# Patient Record
Sex: MALE | Race: OTHER | NOT HISPANIC OR LATINO | URBAN - METROPOLITAN AREA
[De-identification: names, ages, dates, MRNs, and addresses within clinical notes are randomized per-mention and may not be internally consistent; named-entity substitution may affect disease eponyms.]

---

## 2017-08-08 ENCOUNTER — INPATIENT (INPATIENT)
Facility: HOSPITAL | Age: 52
LOS: 3 days | Discharge: HOME | DRG: 682 | End: 2017-08-12
Attending: HOSPITALIST | Admitting: FAMILY MEDICINE
Payer: MEDICARE

## 2017-08-08 VITALS
DIASTOLIC BLOOD PRESSURE: 116 MMHG | WEIGHT: 202.83 LBS | TEMPERATURE: 98 F | HEART RATE: 95 BPM | SYSTOLIC BLOOD PRESSURE: 222 MMHG | OXYGEN SATURATION: 100 % | HEIGHT: 69 IN | RESPIRATION RATE: 16 BRPM

## 2017-08-08 DIAGNOSIS — E87.70 FLUID OVERLOAD, UNSPECIFIED: ICD-10-CM

## 2017-08-08 LAB
ALBUMIN SERPL ELPH-MCNC: 4.6 G/DL — SIGNIFICANT CHANGE UP (ref 3.3–5.2)
ALP SERPL-CCNC: 76 U/L — SIGNIFICANT CHANGE UP (ref 40–120)
ALT FLD-CCNC: 19 U/L — SIGNIFICANT CHANGE UP
ANION GAP SERPL CALC-SCNC: 16 MMOL/L — SIGNIFICANT CHANGE UP (ref 5–17)
APTT BLD: 34.6 SEC — SIGNIFICANT CHANGE UP (ref 27.5–37.4)
AST SERPL-CCNC: 23 U/L — SIGNIFICANT CHANGE UP
BASOPHILS # BLD AUTO: 0 K/UL — SIGNIFICANT CHANGE UP (ref 0–0.2)
BASOPHILS NFR BLD AUTO: 0.4 % — SIGNIFICANT CHANGE UP (ref 0–2)
BILIRUB SERPL-MCNC: 0.4 MG/DL — SIGNIFICANT CHANGE UP (ref 0.4–2)
BUN SERPL-MCNC: 56 MG/DL — HIGH (ref 8–20)
CALCIUM SERPL-MCNC: 10.5 MG/DL — HIGH (ref 8.6–10.2)
CHLORIDE SERPL-SCNC: 97 MMOL/L — LOW (ref 98–107)
CO2 SERPL-SCNC: 27 MMOL/L — SIGNIFICANT CHANGE UP (ref 22–29)
CREAT SERPL-MCNC: 10.3 MG/DL — HIGH (ref 0.5–1.3)
EOSINOPHIL # BLD AUTO: 0.2 K/UL — SIGNIFICANT CHANGE UP (ref 0–0.5)
EOSINOPHIL NFR BLD AUTO: 3.4 % — SIGNIFICANT CHANGE UP (ref 0–5)
GLUCOSE SERPL-MCNC: 111 MG/DL — SIGNIFICANT CHANGE UP (ref 70–115)
HCT VFR BLD CALC: 31.8 % — LOW (ref 42–52)
HGB BLD-MCNC: 10.7 G/DL — LOW (ref 14–18)
INR BLD: 0.95 RATIO — SIGNIFICANT CHANGE UP (ref 0.88–1.16)
LYMPHOCYTES # BLD AUTO: 1.4 K/UL — SIGNIFICANT CHANGE UP (ref 1–4.8)
LYMPHOCYTES # BLD AUTO: 25.7 % — SIGNIFICANT CHANGE UP (ref 20–55)
MCHC RBC-ENTMCNC: 31.3 PG — HIGH (ref 27–31)
MCHC RBC-ENTMCNC: 33.6 G/DL — SIGNIFICANT CHANGE UP (ref 32–36)
MCV RBC AUTO: 93 FL — SIGNIFICANT CHANGE UP (ref 80–94)
MONOCYTES # BLD AUTO: 0.4 K/UL — SIGNIFICANT CHANGE UP (ref 0–0.8)
MONOCYTES NFR BLD AUTO: 7.1 % — SIGNIFICANT CHANGE UP (ref 3–10)
NEUTROPHILS # BLD AUTO: 3.4 K/UL — SIGNIFICANT CHANGE UP (ref 1.8–8)
NEUTROPHILS NFR BLD AUTO: 63.2 % — SIGNIFICANT CHANGE UP (ref 37–73)
NT-PROBNP SERPL-SCNC: HIGH PG/ML (ref 0–300)
PLATELET # BLD AUTO: 167 K/UL — SIGNIFICANT CHANGE UP (ref 150–400)
POTASSIUM SERPL-MCNC: 5.6 MMOL/L — HIGH (ref 3.5–5.3)
POTASSIUM SERPL-SCNC: 5.6 MMOL/L — HIGH (ref 3.5–5.3)
PROT SERPL-MCNC: 7.2 G/DL — SIGNIFICANT CHANGE UP (ref 6.6–8.7)
PROTHROM AB SERPL-ACNC: 10.4 SEC — SIGNIFICANT CHANGE UP (ref 9.8–12.7)
RBC # BLD: 3.42 M/UL — LOW (ref 4.6–6.2)
RBC # FLD: 14.7 % — SIGNIFICANT CHANGE UP (ref 11–15.6)
SODIUM SERPL-SCNC: 140 MMOL/L — SIGNIFICANT CHANGE UP (ref 135–145)
WBC # BLD: 5.3 K/UL — SIGNIFICANT CHANGE UP (ref 4.8–10.8)
WBC # FLD AUTO: 5.3 K/UL — SIGNIFICANT CHANGE UP (ref 4.8–10.8)

## 2017-08-08 PROCEDURE — 93010 ELECTROCARDIOGRAM REPORT: CPT

## 2017-08-08 PROCEDURE — 71010: CPT | Mod: 26

## 2017-08-08 RX ORDER — LABETALOL HCL 100 MG
10 TABLET ORAL ONCE
Qty: 0 | Refills: 0 | Status: COMPLETED | OUTPATIENT
Start: 2017-08-08 | End: 2017-08-08

## 2017-08-08 RX ADMIN — Medication 10 MILLIGRAM(S): at 23:36

## 2017-08-08 NOTE — ED PROVIDER NOTE - OBJECTIVE STATEMENT
50 y/o male with a hx of DM, on dialysis MWF, presents to the ED c/o SOB which onset 2 weeks ago. Pt notes that he has been getting recurrent episodes of SOB and CP which resolve after he receives his dialysis. He told his renal doctor about sx, however he did not have sx at that time. Notes that he also notes chest pressure. Pain is worse with lying down flat. Denies N/V/D, fever, chills, difficulty breathing, HA, diaphoresis, leg swelling, blurry vision or abd pain.  No further complaints at this time.

## 2017-08-08 NOTE — ED ADULT NURSE NOTE - OBJECTIVE STATEMENT
Assumed pt care @ 2200. Pt received sitting on stretcher in NAD. Pt AOx3 C/O SOB that worsens when laying flat. Pt has had this on/off x 2 weeks. Pt is a dialysis pt with AVF in left forearm positive bruit and thrill. MWF dialysis. No chest pain at this time. Pt reports renal doctor aware of s/s and told to go to ED if it gets worst. Neuro WNL. Lungs CTA, RR even unlabored. Abd soft non tender, + bowel sounds x 4quads. Denies Nausea, Vomiting, Diarrhea. Skin warm, dry, color appropriate for age and race.

## 2017-08-09 DIAGNOSIS — N19 UNSPECIFIED KIDNEY FAILURE: ICD-10-CM

## 2017-08-09 DIAGNOSIS — I10 ESSENTIAL (PRIMARY) HYPERTENSION: ICD-10-CM

## 2017-08-09 DIAGNOSIS — J96.91 RESPIRATORY FAILURE, UNSPECIFIED WITH HYPOXIA: ICD-10-CM

## 2017-08-09 DIAGNOSIS — N18.6 END STAGE RENAL DISEASE: ICD-10-CM

## 2017-08-09 DIAGNOSIS — E11.9 TYPE 2 DIABETES MELLITUS WITHOUT COMPLICATIONS: ICD-10-CM

## 2017-08-09 DIAGNOSIS — I16.0 HYPERTENSIVE URGENCY: ICD-10-CM

## 2017-08-09 LAB — TROPONIN T SERPL-MCNC: 0.1 NG/ML — HIGH (ref 0–0.06)

## 2017-08-09 PROCEDURE — 99291 CRITICAL CARE FIRST HOUR: CPT

## 2017-08-09 PROCEDURE — 99223 1ST HOSP IP/OBS HIGH 75: CPT

## 2017-08-09 PROCEDURE — 99285 EMERGENCY DEPT VISIT HI MDM: CPT

## 2017-08-09 RX ORDER — HEPARIN SODIUM 5000 [USP'U]/ML
5000 INJECTION INTRAVENOUS; SUBCUTANEOUS EVERY 12 HOURS
Qty: 0 | Refills: 0 | Status: DISCONTINUED | OUTPATIENT
Start: 2017-08-09 | End: 2017-08-12

## 2017-08-09 RX ORDER — GLUCAGON INJECTION, SOLUTION 0.5 MG/.1ML
1 INJECTION, SOLUTION SUBCUTANEOUS ONCE
Qty: 0 | Refills: 0 | Status: DISCONTINUED | OUTPATIENT
Start: 2017-08-09 | End: 2017-08-12

## 2017-08-09 RX ORDER — FOLIC ACID 0.8 MG
1 TABLET ORAL DAILY
Qty: 0 | Refills: 0 | Status: DISCONTINUED | OUTPATIENT
Start: 2017-08-09 | End: 2017-08-12

## 2017-08-09 RX ORDER — NITROGLYCERIN 6.5 MG
5 CAPSULE, EXTENDED RELEASE ORAL
Qty: 50 | Refills: 0 | Status: DISCONTINUED | OUTPATIENT
Start: 2017-08-09 | End: 2017-08-10

## 2017-08-09 RX ORDER — LABETALOL HCL 100 MG
10 TABLET ORAL ONCE
Qty: 0 | Refills: 0 | Status: COMPLETED | OUTPATIENT
Start: 2017-08-09 | End: 2017-08-09

## 2017-08-09 RX ORDER — ACETAMINOPHEN 500 MG
650 TABLET ORAL EVERY 6 HOURS
Qty: 0 | Refills: 0 | Status: DISCONTINUED | OUTPATIENT
Start: 2017-08-09 | End: 2017-08-12

## 2017-08-09 RX ORDER — SODIUM CHLORIDE 9 MG/ML
1000 INJECTION, SOLUTION INTRAVENOUS
Qty: 0 | Refills: 0 | Status: DISCONTINUED | OUTPATIENT
Start: 2017-08-09 | End: 2017-08-12

## 2017-08-09 RX ORDER — TAMSULOSIN HYDROCHLORIDE 0.4 MG/1
0.4 CAPSULE ORAL AT BEDTIME
Qty: 0 | Refills: 0 | Status: DISCONTINUED | OUTPATIENT
Start: 2017-08-09 | End: 2017-08-12

## 2017-08-09 RX ORDER — INSULIN GLARGINE 100 [IU]/ML
40 INJECTION, SOLUTION SUBCUTANEOUS AT BEDTIME
Qty: 0 | Refills: 0 | Status: DISCONTINUED | OUTPATIENT
Start: 2017-08-09 | End: 2017-08-12

## 2017-08-09 RX ORDER — NITROGLYCERIN 6.5 MG
5 CAPSULE, EXTENDED RELEASE ORAL
Qty: 50 | Refills: 0 | Status: DISCONTINUED | OUTPATIENT
Start: 2017-08-09 | End: 2017-08-09

## 2017-08-09 RX ORDER — DEXTROSE 50 % IN WATER 50 %
12.5 SYRINGE (ML) INTRAVENOUS ONCE
Qty: 0 | Refills: 0 | Status: DISCONTINUED | OUTPATIENT
Start: 2017-08-09 | End: 2017-08-12

## 2017-08-09 RX ORDER — NITROGLYCERIN 6.5 MG
1 CAPSULE, EXTENDED RELEASE ORAL ONCE
Qty: 0 | Refills: 0 | Status: COMPLETED | OUTPATIENT
Start: 2017-08-09 | End: 2017-08-09

## 2017-08-09 RX ORDER — DEXTROSE 50 % IN WATER 50 %
1 SYRINGE (ML) INTRAVENOUS ONCE
Qty: 0 | Refills: 0 | Status: DISCONTINUED | OUTPATIENT
Start: 2017-08-09 | End: 2017-08-12

## 2017-08-09 RX ORDER — HYDRALAZINE HCL 50 MG
10 TABLET ORAL ONCE
Qty: 0 | Refills: 0 | Status: COMPLETED | OUTPATIENT
Start: 2017-08-09 | End: 2017-08-09

## 2017-08-09 RX ORDER — VALSARTAN 80 MG/1
80 TABLET ORAL
Qty: 0 | Refills: 0 | Status: DISCONTINUED | OUTPATIENT
Start: 2017-08-09 | End: 2017-08-10

## 2017-08-09 RX ORDER — PYRIDOXINE HCL (VITAMIN B6) 100 MG
50 TABLET ORAL DAILY
Qty: 0 | Refills: 0 | Status: DISCONTINUED | OUTPATIENT
Start: 2017-08-09 | End: 2017-08-12

## 2017-08-09 RX ORDER — VALSARTAN 80 MG/1
160 TABLET ORAL DAILY
Qty: 0 | Refills: 0 | Status: DISCONTINUED | OUTPATIENT
Start: 2017-08-09 | End: 2017-08-09

## 2017-08-09 RX ORDER — DILTIAZEM HCL 120 MG
120 CAPSULE, EXT RELEASE 24 HR ORAL DAILY
Qty: 0 | Refills: 0 | Status: DISCONTINUED | OUTPATIENT
Start: 2017-08-09 | End: 2017-08-12

## 2017-08-09 RX ORDER — INSULIN LISPRO 100/ML
VIAL (ML) SUBCUTANEOUS EVERY 6 HOURS
Qty: 0 | Refills: 0 | Status: DISCONTINUED | OUTPATIENT
Start: 2017-08-09 | End: 2017-08-10

## 2017-08-09 RX ORDER — DEXTROSE 50 % IN WATER 50 %
25 SYRINGE (ML) INTRAVENOUS ONCE
Qty: 0 | Refills: 0 | Status: DISCONTINUED | OUTPATIENT
Start: 2017-08-09 | End: 2017-08-12

## 2017-08-09 RX ORDER — CALCIUM ACETATE 667 MG
667 TABLET ORAL
Qty: 0 | Refills: 0 | Status: DISCONTINUED | OUTPATIENT
Start: 2017-08-09 | End: 2017-08-12

## 2017-08-09 RX ADMIN — Medication 1 INCH(S): at 06:30

## 2017-08-09 RX ADMIN — Medication 1.5 MICROGRAM(S)/MIN: at 07:18

## 2017-08-09 RX ADMIN — Medication 10 MILLIGRAM(S): at 03:10

## 2017-08-09 RX ADMIN — INSULIN GLARGINE 40 UNIT(S): 100 INJECTION, SOLUTION SUBCUTANEOUS at 21:37

## 2017-08-09 RX ADMIN — Medication 1 MILLIGRAM(S): at 14:07

## 2017-08-09 RX ADMIN — HEPARIN SODIUM 5000 UNIT(S): 5000 INJECTION INTRAVENOUS; SUBCUTANEOUS at 17:38

## 2017-08-09 RX ADMIN — Medication 10 MILLIGRAM(S): at 05:15

## 2017-08-09 RX ADMIN — Medication 120 MILLIGRAM(S): at 14:25

## 2017-08-09 RX ADMIN — VALSARTAN 80 MILLIGRAM(S): 80 TABLET ORAL at 14:25

## 2017-08-09 RX ADMIN — Medication 1 INCH(S): at 07:18

## 2017-08-09 RX ADMIN — Medication 2: at 23:54

## 2017-08-09 NOTE — ED ADULT NURSE REASSESSMENT NOTE - NS ED NURSE REASSESS COMMENT FT1
Pt is A&Ox3 in NAD c/o heaviness to chest, NSR on cardiac monitor.  IV clean dry and intact, flushes  without difficulty. Safety maintained, Bed locked in lowest position. Call bell in reach. Pt awaiting bed placement. Will continue to monitor.

## 2017-08-09 NOTE — CONSULT NOTE ADULT - SUBJECTIVE AND OBJECTIVE BOX
HPI:  50 yo male with ESRD on HD MWF from [facility] HD [place], also has h/o htn, DM on insulin, p/w uncontrolled htn/sob/weakness. Pt notes his BP has been elevated for the past 2 weeks and has been forgetting to take his morning 5am blood pressure medications land also admits to being non complaint with his renal diet. Pt notes on Friday at dialysis he had been progressively sob which improved with oxygen and dialysis. Pt notes that since Friday the sob has worsened and that his bp is still elevated. 2 days ago pt noted chest heaviness with his sob. Notes heaviness is constant. denies HA N/V/D. Pt denies missing any HD sessions.  In ED, bp elevated 220/100, no improvement with nitro paste, nitro drip started at 5mcg/min.     OS: per HPI    PAST MEDICAL & SURGICAL HISTORY:  Hypertension  Diabetes  Dialysis patient  Renal failure  No significant past surgical history      FAMILY HISTORY:  No pertinent family history in first degree relatives  [place]    Social History:Non smoker    MEDICATIONS  (STANDING):  nitroglycerin  Infusion 5 MICROgram(s)/Min (1.5 mL/Hr) IV Continuous <Continuous>  tamsulosin 0.4 milliGRAM(s) Oral at bedtime  valsartan 160 milliGRAM(s) Oral daily  diltiazem    milliGRAM(s) Oral daily  insulin glargine Injectable (LANTUS) 40 Unit(s) SubCutaneous at bedtime  folic acid 1 milliGRAM(s) Oral daily  insulin lispro (HumaLOG) corrective regimen sliding scale   SubCutaneous every 6 hours  dextrose 5%. 1000 milliLiter(s) (50 mL/Hr) IV Continuous <Continuous>  dextrose 50% Injectable 12.5 Gram(s) IV Push once  dextrose 50% Injectable 25 Gram(s) IV Push once  dextrose 50% Injectable 25 Gram(s) IV Push once  heparin  Injectable 5000 Unit(s) SubCutaneous every 12 hours    MEDICATIONS  (PRN):  dextrose Gel 1 Dose(s) Oral once PRN Blood Glucose LESS THAN 70 milliGRAM(s)/deciLiter  glucagon  Injectable 1 milliGRAM(s) IntraMuscular once PRN Glucose <70 milliGRAM(s)/deciLiter   Meds reviewed    Vital Signs Last 24 Hrs  T(C): 36.7 (09 Aug 2017 02:54), Max: 36.9 (08 Aug 2017 21:26)  T(F): 98.1 (09 Aug 2017 02:54), Max: 98.5 (08 Aug 2017 21:26)  HR: 80 (09 Aug 2017 09:45) (79 - 95)  BP: 191/99 (09 Aug 2017 09:45) (177/88 - 226/107)  BP(mean): 138 (09 Aug 2017 09:45) (138 - 154)  RR: 12 (09 Aug 2017 09:45) (12 - 36)  SpO2: 100% (09 Aug 2017 09:45) (96% - 100%)  Daily Height in cm: 175.26 (08 Aug 2017 21:26)    Daily     PHYSICAL EXAM:    GENERAL: appears chronically ill  HEAD:  Atraumatic, Normocephalic  EYES: EOMI  NECK: Supple, neck  veins full  NERVOUS SYSTEM:  Alert & Oriented X3  CHEST/LUNG: Rales at bases B/L   HEART: Regular rate and rhythm; No murmurs  ABDOMEN: Soft, Nontender, Nondistended; Bowel sounds present  EXTREMITIES: + LE edema B/L, L UE AVF + bruit/thrill        LABS:                        10.7   5.3   )-----------( 167      ( 08 Aug 2017 22:47 )             31.8     08-08    140  |  97<L>  |  56.0<H>  ----------------------------<  111  5.6<H>   |  27.0  |  10.30<H>    Ca    10.5<H>      08 Aug 2017 22:47    TPro  7.2  /  Alb  4.6  /  TBili  0.4  /  DBili  x   /  AST  23  /  ALT  19  /  AlkPhos  76  08-08    PT/INR - ( 08 Aug 2017 22:47 )   PT: 10.4 sec;   INR: 0.95 ratio         PTT - ( 08 Aug 2017 22:47 )  PTT:34.6 sec            RADIOLOGY & ADDITIONAL TESTS:

## 2017-08-09 NOTE — ED ADULT NURSE REASSESSMENT NOTE - NS ED NURSE REASSESS COMMENT FT1
ICU PA at the bedside pt b/p remains high nitro drip increased to 20mcg via pump no redness or swelling present at site. pt aware he will be transported to ICU. BiPAP remains in use resp still very labored

## 2017-08-09 NOTE — CONSULT NOTE ADULT - ATTENDING COMMENTS
Pt seen and examined with CCM PA, agree with above assessment and plan, edits made where necessary. Pt admitted to ICU for urgent HD, respiratory support via NIV and BP control.

## 2017-08-09 NOTE — H&P ADULT - NSHPREVIEWOFSYSTEMS_GEN_ALL_CORE
+sob  +chest heavyness  +lethargy  +temp 100  no nausea  no vomiting  no abd pain  no lower ext edema  no loc +sob  +chest heaviness  +lethargy  +headaches  +temp 100  no nausea  no vomiting  no abd pain  no lower ext edema  no loc

## 2017-08-09 NOTE — ED ADULT NURSE REASSESSMENT NOTE - NS ED NURSE REASSESS COMMENT FT1
pt. received from night RN. pt. awake, alert. pt. on bipap. sating well on 100%.  pt. on nitro drip at 5mcg. pt. bp. 211/106 MD. Armando made aware. pt. received from night RN. pt. awake, alert. pt. on bipap. sating well on 100%.  pt. on nitro drip at 5mcg. pt. bp. 211/106 MD. Armando made aware. increased drip to 10mcg/min

## 2017-08-09 NOTE — ED ADULT NURSE REASSESSMENT NOTE - NS ED NURSE REASSESS COMMENT FT1
Dr. Bradley made aware of pts BP and current status. Pt unable to lay down needs to sit straight up to breath. Will continue to monitor.

## 2017-08-09 NOTE — H&P ADULT - NSHPLABSRESULTS_GEN_ALL_CORE
LABS:    PT/INR - ( 08 Aug 2017 22:47 )   PT: 10.4 sec;   INR: 0.95 ratio         PTT - ( 08 Aug 2017 22:47 )  PTT:34.6 sec  LIVER FUNCTIONS - ( 08 Aug 2017 22:47 )  Alb: 4.6 g/dL / Pro: 7.2 g/dL / ALK PHOS: 76 U/L / ALT: 19 U/L / AST: 23 U/L / GGT: x                                 10.7   5.3   )-----------( 167      ( 08 Aug 2017 22:47 )             31.8     08-08    140  |  97<L>  |  56.0<H>  ----------------------------<  111  5.6<H>   |  27.0  |  10.30<H>    CXR PENDING.   Ca    10.5<H>      08 Aug 2017 22:47    TPro  7.2  /  Alb  4.6  /  TBili  0.4  /  DBili  x   /  AST  23  /  ALT  19  /  AlkPhos  76  08-08    CARDIAC MARKERS ( 09 Aug 2017 03:14 )  x     / 0.10 ng/mL / x     / x     / x      CARDIAC MARKERS ( 08 Aug 2017 22:47 )  x     / 0.12 ng/mL / x     / x     / x LABS:    PT/INR - ( 08 Aug 2017 22:47 )   PT: 10.4 sec;   INR: 0.95 ratio         PTT - ( 08 Aug 2017 22:47 )  PTT:34.6 sec  LIVER FUNCTIONS - ( 08 Aug 2017 22:47 )  Alb: 4.6 g/dL / Pro: 7.2 g/dL / ALK PHOS: 76 U/L / ALT: 19 U/L / AST: 23 U/L / GGT: x                                 10.7   5.3   )-----------( 167      ( 08 Aug 2017 22:47 )             31.8     08-08    140  |  97<L>  |  56.0<H>  ----------------------------<  111  5.6<H>   |  27.0  |  10.30<H>    Ca    10.5<H>      08 Aug 2017 22:47    TPro  7.2  /  Alb  4.6  /  TBili  0.4  /  DBili  x   /  AST  23  /  ALT  19  /  AlkPhos  76  08-08    CARDIAC MARKERS ( 09 Aug 2017 03:14 )  x     / 0.10 ng/mL / x     / x     / x      CARDIAC MARKERS ( 08 Aug 2017 22:47 )  x     / 0.12 ng/mL / x     / x     / x    CXR PENDING.

## 2017-08-09 NOTE — CONSULT NOTE ADULT - ASSESSMENT
ESRD on HD MWF will HD today and angel clinically volume overloaded   not compliant with fluid restriction, HD consent obtained by me   Hypertensive emergency likely 2/2 volume overload and noncompliance with BP meds  as above HD today and angel renmove additional UF, will also adjust BP meds   HyperKalemia will HD on 2 K bath

## 2017-08-09 NOTE — CONSULT NOTE ADULT - PROBLEM SELECTOR RECOMMENDATION 2
Related to fluid overload. Improving. Continue BiPAP therapy, keep SaO2 > 92%. Will wean as tolerated.

## 2017-08-09 NOTE — ED ADULT NURSE REASSESSMENT NOTE - NS ED NURSE REASSESS COMMENT FT1
VS repeated @ this time. Dr. Bradley aware of the pt's BP at this time. Pt denies chest pain, but points to upper abd for some pain and heaviness on the chest and pt is a little tachypneic at this time.

## 2017-08-09 NOTE — H&P ADULT - HISTORY OF PRESENT ILLNESS
50 yo male with esrd on HD MWF, htn, DM, presenting with uncontrolled htn. Pt notes his BP has been elevated for 2 weeks. Pt notes on friday he sob, progressively worsening improves after HD. Also has chest heavyness 52 yo male with esrd on HD MWF, htn, DM on insulin, presenting with uncontrolled htn/sob/weakness. Pt notes his BP has been elevated for the past 2 weeks and has been forgetting to take his morning 5am blood pressure medications land also admits to being non complaint with his renal diet. Pt notes on Friday at dialysis he had been progressively sob which improved with oxygen and dialysis. Pt notes that since Friday the sob has worsened and that his bp is still elevated. 2 days ago pt noted chest heaviness with his sob. Notes heaviness is constant.  Pt denies missing any HD sessions.      In ED, bp elevated 220/100, no improvement with nitro paste, nitro drip started at 5mcg/min. Still c.o chest heaviness. Pt respiratory rate at 34, cxr final report pending, started on bipap. Notes some relief.

## 2017-08-09 NOTE — CHART NOTE - NSCHARTNOTEFT_GEN_A_CORE
Patient seen and examined in ED, chart reviewed. On BiPAP, reports shortness of breath and chest heaviness. He was started on Nitro gtt for BP control.  On exam,  alert, mod resp distress  Lungs: decreased breath sounds in bases  CVS: regular  Abdomen: soft, non tender  Ext: No edema, tenderness  Labs and imaging reviewed.  Imp:  Acute respiratory failure  Hypertensive emergency  ESRD on HD  Plan:   Patient accepted by ICU, continue medications for BP control.  Discussed with nephrology for urgent HD  Spoke with patient and family at bedside.

## 2017-08-09 NOTE — H&P ADULT - NSHPPHYSICALEXAM_GEN_ALL_CORE
EXAM:  Vital Signs Last 24 Hrs  T(C): 36.7 (09 Aug 2017 02:54), Max: 36.9 (08 Aug 2017 21:26)  T(F): 98.1 (09 Aug 2017 02:54), Max: 98.5 (08 Aug 2017 21:26)  HR: 83 (09 Aug 2017 07:00) (80 - 95)  BP: 190/99 (09 Aug 2017 07:00) (177/88 - 222/116)  BP(mean): --  RR: 36 (09 Aug 2017 07:00) (16 - 36)  SpO2: 97% (09 Aug 2017 07:00) (96% - 100%)    GENERAL NAD,   HEENT: NORMAL CEPHALIC ATRAUMATIC, EOMI, MOIST MUCUS MEBRANES  NECK SUPPLE  CVS S1S2, RRR,   RESP BLCTA, NO RHONCHI  ABD SOFT, NON TENDER, NON DISTENDED  EXT NO EDEMA  NEURO MOVES ALL 4 EXTREMITES  PSYCH APPROPRIATE MOOD  SKIN WARM, DRY EXAM:  Vital Signs Last 24 Hrs  T(C): 36.7 (09 Aug 2017 02:54), Max: 36.9 (08 Aug 2017 21:26)  T(F): 98.1 (09 Aug 2017 02:54), Max: 98.5 (08 Aug 2017 21:26)  HR: 83 (09 Aug 2017 07:00) (80 - 95)  BP: 190/99 (09 Aug 2017 07:00) (177/88 - 222/116)  BP(mean): --  RR: 36 (09 Aug 2017 07:00) (16 - 36)  SpO2: 97% (09 Aug 2017 07:00) (96% - 100%)    GENERAL TACHYPNEIC. ON BIPAP. C.O CHEST HEAVYNESS. WIFE AT BEDSIDE.   HEENT: NORMAL CEPHALIC ATRAUMATIC, EOMI, MOIST MUCUS MEMBRANES  NECK SUPPLE  CVS S1S2, TACYCARDIC  RESP INCREASED WORK OF BREATHING.   ABD SOFT, NON TENDER, NON DISTENDED  EXT TRACE EDEMA. LUE FISTULA.   NEURO MOVES ALL 4 EXTREMITES  PSYCH APPROPRIATE MOOD  SKIN WARM, DRY

## 2017-08-09 NOTE — CONSULT NOTE ADULT - ASSESSMENT
50 y/o M with a h/o ESRD (on HD MWF), HTN, DM with hypertensive urgency, respiratory distress requiring BiPAP, needs urgent HD.    Admit to MICU for BP control and urgent HD.

## 2017-08-09 NOTE — CONSULT NOTE ADULT - SUBJECTIVE AND OBJECTIVE BOX
Patient is a 51y old  Male who presents with a chief complaint of sob, weakness, elevated bp (09 Aug 2017 07:10)      BRIEF HOSPITAL COURSE: 52 y/o M with a h/o ESRD (on HD MWF), HTN, DM presents to ED with hypertensive urgency (BP:220/110), respiratory distress, chest heaviness and HA. As per report, patient has had elevated BP over past 2 weeks and has been periodically forgetting to take BP medications and has been noncompliant with renal diet. Patient seen and examined in ED. Awake and alert, conversant, on BiPAP. Reports that his breathing has improved. BP remains uncontrolled (207/106) despite 10mg IV labetalol x2, 10mg IV hydralazine, nitro paste, and nitro gtt @ 10mcg/min. Patient reports that he did not go to his dialysis appointment this morning and did not take any of his BP control medications either. CXR not officially read but appears to show some congestion, b/l rhonchi, rales auscultated. K of 5.6. Nephrology contacted for urgent HD.      PAST MEDICAL & SURGICAL HISTORY:  Hypertension  Diabetes  Dialysis patient  Renal failure  No significant past surgical history    Allergies    No Known Allergies    Intolerances      FAMILY HISTORY:  No pertinent family history in first degree relatives      Review of Systems:  CONSTITUTIONAL: No fever, chills, or fatigue  EYES: No eye pain, visual disturbances, or discharge  ENMT:  No difficulty hearing, tinnitus, vertigo; No sinus or throat pain  NECK: No pain or stiffness  RESPIRATORY: No cough, wheezing, chills or hemoptysis; (+) shortness of breath  CARDIOVASCULAR: (+)chest heaviness, no palpitations, dizziness, or leg swelling  GASTROINTESTINAL: No abdominal or epigastric pain. No nausea, vomiting, or hematemesis; No diarrhea or constipation. No melena or hematochezia.  GENITOURINARY: No dysuria, frequency, hematuria, or incontinence  NEUROLOGICAL: (+)headaches, no memory loss, loss of strength, numbness, or tremors  SKIN: No itching, burning, rashes, or lesions   MUSCULOSKELETAL: No joint pain or swelling; No muscle, back, or extremity pain  PSYCHIATRIC: No depression, anxiety, mood swings, or difficulty sleeping      Medications:    nitroglycerin  Infusion 5 MICROgram(s)/Min IV Continuous <Continuous>  tamsulosin 0.4 milliGRAM(s) Oral at bedtime  valsartan 160 milliGRAM(s) Oral daily  diltiazem    milliGRAM(s) Oral daily  insulin glargine Injectable (LANTUS) 40 Unit(s) SubCutaneous at bedtime  folic acid 1 milliGRAM(s) Oral daily        ICU Vital Signs Last 24 Hrs  T(C): 36.7 (09 Aug 2017 02:54), Max: 36.9 (08 Aug 2017 21:26)  T(F): 98.1 (09 Aug 2017 02:54), Max: 98.5 (08 Aug 2017 21:26)  HR: 82 (09 Aug 2017 08:45) (79 - 95)  BP: 187/91 (09 Aug 2017 08:45) (177/88 - 226/107)  BP(mean): --  ABP: --  ABP(mean): --  RR: 34 (09 Aug 2017 08:45) (16 - 36)  SpO2: 100% (09 Aug 2017 08:45) (96% - 100%)    Vital Signs Last 24 Hrs  T(C): 36.7 (09 Aug 2017 02:54), Max: 36.9 (08 Aug 2017 21:26)  T(F): 98.1 (09 Aug 2017 02:54), Max: 98.5 (08 Aug 2017 21:26)  HR: 82 (09 Aug 2017 08:45) (79 - 95)  BP: 187/91 (09 Aug 2017 08:45) (177/88 - 226/107)  BP(mean): --  RR: 34 (09 Aug 2017 08:45) (16 - 36)  SpO2: 100% (09 Aug 2017 08:45) (96% - 100%)        I&O's Detail        LABS:                        10.7   5.3   )-----------( 167      ( 08 Aug 2017 22:47 )             31.8     08-08    140  |  97<L>  |  56.0<H>  ----------------------------<  111  5.6<H>   |  27.0  |  10.30<H>    Ca    10.5<H>      08 Aug 2017 22:47    TPro  7.2  /  Alb  4.6  /  TBili  0.4  /  DBili  x   /  AST  23  /  ALT  19  /  AlkPhos  76  08-08      CARDIAC MARKERS ( 09 Aug 2017 03:14 )  x     / 0.10 ng/mL / x     / x     / x      CARDIAC MARKERS ( 08 Aug 2017 22:47 )  x     / 0.12 ng/mL / x     / x     / x          CAPILLARY BLOOD GLUCOSE        PT/INR - ( 08 Aug 2017 22:47 )   PT: 10.4 sec;   INR: 0.95 ratio         PTT - ( 08 Aug 2017 22:47 )  PTT:34.6 sec    CULTURES:      Physical Examination:    General: No acute distress.  Alert, oriented, interactive, nonfocal, on BiPAP    HEENT: Pupils equal, reactive to light.  Symmetric.    PULM: b/l scattered rhonchi, b/l basilar rales, no significant sputum production    CVS: Regular rate and rhythm, no murmurs, rubs, or gallops    ABD: Soft, nondistended, nontender, normoactive bowel sounds, no masses    EXT: No edema, nontender    SKIN: Warm and well perfused, no rashes noted.    RADIOLOGY: CXR- no official read, appears to show some b/l congestion    CRITICAL CARE TIME SPENT: 63 mins

## 2017-08-09 NOTE — H&P ADULT - ASSESSMENT
- CHEST HEAVINESS:   - HYPERTENSIVE URGENCY WITH FLUID OVERLOAD: NO IMPROVEMENT WITH NITRO PASTE. NITRO DRIP STARTED.   -SOB: ? PULMONARY CONGESTION. STARTED ON BIPAP. ICU PA CALLED. AWAITING CALL BACK.   -ESRD ON HD MWF: FOR HD TODAY. NEPHRO DR JIM SERVICE CALLED. AWAITING CALL BACK.   -DM: ON LANTUS 40UNITS QHS ON HD DAYS. 50UNITS QPM ON NON HD DAYS.   -DVT PPX - CHEST HEAVINESS:  EKG NSR, TROPONIN X 2 NEGATIVE.     - HYPERTENSIVE URGENCY WITH FLUID OVERLOAD:   NO IMPROVEMENT WITH NITRO PASTE. NITRO DRIP STARTED AT 5MCG, TITRATED TO 10MCG AS NO IMPROVEMENT. ICU CONSULTED FOR EVALUATION.    -SOB: ? PULMONARY CONGESTION:  STARTED ON BIPAP. ICU PA CALLED. AWAITING CALL BACK.     -ESRD ON HD MWF:   FOR HD TODAY. NEPHRO DR JIM SERVICE CALLED. AWAITING CALL BACK.     -DM:   ON LANTUS 40UNITS QHS ON HD DAYS. 50UNITS QPM ON NON HD DAYS.     -HTN:   CW REMA AND CARDIZEM.    -DVT PPX :  SCDS.     UPDATE: PT ACCEPTED TO ICU DR AGUILAR SERVICE.

## 2017-08-09 NOTE — CONSULT NOTE ADULT - PROBLEM SELECTOR RECOMMENDATION 9
Related to fluid overload and medication noncompliance. Will urgently dialyze, home BP medications ordered. Continue nitro gtt, titrated up to 50 mcg/min- wean as tolerated. Monitor hemodynamics closely.

## 2017-08-10 DIAGNOSIS — I67.4 HYPERTENSIVE ENCEPHALOPATHY: ICD-10-CM

## 2017-08-10 LAB
ANION GAP SERPL CALC-SCNC: 17 MMOL/L — SIGNIFICANT CHANGE UP (ref 5–17)
BUN SERPL-MCNC: 47 MG/DL — HIGH (ref 8–20)
CALCIUM SERPL-MCNC: 10.3 MG/DL — HIGH (ref 8.6–10.2)
CHLORIDE SERPL-SCNC: 93 MMOL/L — LOW (ref 98–107)
CO2 SERPL-SCNC: 28 MMOL/L — SIGNIFICANT CHANGE UP (ref 22–29)
CREAT SERPL-MCNC: 8.56 MG/DL — HIGH (ref 0.5–1.3)
GLUCOSE SERPL-MCNC: 159 MG/DL — HIGH (ref 70–115)
HBA1C BLD-MCNC: 6.5 % — HIGH (ref 4–5.6)
MAGNESIUM SERPL-MCNC: 2.5 MG/DL — SIGNIFICANT CHANGE UP (ref 1.8–2.6)
PHOSPHATE SERPL-MCNC: 4.5 MG/DL — SIGNIFICANT CHANGE UP (ref 2.4–4.7)
POTASSIUM SERPL-MCNC: 4.8 MMOL/L — SIGNIFICANT CHANGE UP (ref 3.5–5.3)
POTASSIUM SERPL-SCNC: 4.8 MMOL/L — SIGNIFICANT CHANGE UP (ref 3.5–5.3)
SODIUM SERPL-SCNC: 138 MMOL/L — SIGNIFICANT CHANGE UP (ref 135–145)

## 2017-08-10 PROCEDURE — 99233 SBSQ HOSP IP/OBS HIGH 50: CPT

## 2017-08-10 PROCEDURE — 99291 CRITICAL CARE FIRST HOUR: CPT

## 2017-08-10 RX ORDER — VALSARTAN 80 MG/1
160 TABLET ORAL
Qty: 0 | Refills: 0 | Status: DISCONTINUED | OUTPATIENT
Start: 2017-08-10 | End: 2017-08-12

## 2017-08-10 RX ORDER — HYDRALAZINE HCL 50 MG
10 TABLET ORAL EVERY 4 HOURS
Qty: 0 | Refills: 0 | Status: DISCONTINUED | OUTPATIENT
Start: 2017-08-10 | End: 2017-08-12

## 2017-08-10 RX ORDER — DILTIAZEM HCL 120 MG
120 CAPSULE, EXT RELEASE 24 HR ORAL ONCE
Qty: 0 | Refills: 0 | Status: COMPLETED | OUTPATIENT
Start: 2017-08-10 | End: 2017-08-10

## 2017-08-10 RX ORDER — INSULIN LISPRO 100/ML
VIAL (ML) SUBCUTANEOUS
Qty: 0 | Refills: 0 | Status: DISCONTINUED | OUTPATIENT
Start: 2017-08-10 | End: 2017-08-12

## 2017-08-10 RX ORDER — ONDANSETRON 8 MG/1
4 TABLET, FILM COATED ORAL EVERY 6 HOURS
Qty: 0 | Refills: 0 | Status: DISCONTINUED | OUTPATIENT
Start: 2017-08-10 | End: 2017-08-12

## 2017-08-10 RX ORDER — VALSARTAN 80 MG/1
80 TABLET ORAL ONCE
Qty: 0 | Refills: 0 | Status: COMPLETED | OUTPATIENT
Start: 2017-08-10 | End: 2017-08-10

## 2017-08-10 RX ORDER — HYDRALAZINE HCL 50 MG
20 TABLET ORAL ONCE
Qty: 0 | Refills: 0 | Status: COMPLETED | OUTPATIENT
Start: 2017-08-10 | End: 2017-08-10

## 2017-08-10 RX ADMIN — VALSARTAN 80 MILLIGRAM(S): 80 TABLET ORAL at 06:22

## 2017-08-10 RX ADMIN — Medication 120 MILLIGRAM(S): at 06:22

## 2017-08-10 RX ADMIN — Medication 2: at 11:45

## 2017-08-10 RX ADMIN — VALSARTAN 80 MILLIGRAM(S): 80 TABLET ORAL at 08:52

## 2017-08-10 RX ADMIN — Medication 1 MILLIGRAM(S): at 11:48

## 2017-08-10 RX ADMIN — Medication 2: at 21:41

## 2017-08-10 RX ADMIN — INSULIN GLARGINE 40 UNIT(S): 100 INJECTION, SOLUTION SUBCUTANEOUS at 23:20

## 2017-08-10 RX ADMIN — TAMSULOSIN HYDROCHLORIDE 0.4 MILLIGRAM(S): 0.4 CAPSULE ORAL at 21:40

## 2017-08-10 RX ADMIN — Medication 1 TABLET(S): at 11:48

## 2017-08-10 RX ADMIN — Medication 20 MILLIGRAM(S): at 11:14

## 2017-08-10 RX ADMIN — Medication 667 MILLIGRAM(S): at 20:10

## 2017-08-10 RX ADMIN — Medication 1.5 MICROGRAM(S)/MIN: at 03:09

## 2017-08-10 RX ADMIN — Medication 50 MILLIGRAM(S): at 11:48

## 2017-08-10 RX ADMIN — Medication 120 MILLIGRAM(S): at 08:52

## 2017-08-10 RX ADMIN — Medication 667 MILLIGRAM(S): at 11:48

## 2017-08-10 RX ADMIN — VALSARTAN 160 MILLIGRAM(S): 80 TABLET ORAL at 20:10

## 2017-08-10 RX ADMIN — Medication 667 MILLIGRAM(S): at 08:52

## 2017-08-10 RX ADMIN — HEPARIN SODIUM 5000 UNIT(S): 5000 INJECTION INTRAVENOUS; SUBCUTANEOUS at 06:22

## 2017-08-10 NOTE — PROGRESS NOTE ADULT - SUBJECTIVE AND OBJECTIVE BOX
NEPHROLOGY INTERVAL HPI/OVERNIGHT EVENTS:    Examined earlier  Remains volume overloaded  will HD again today    MEDICATIONS  (STANDING):  tamsulosin 0.4 milliGRAM(s) Oral at bedtime  insulin glargine Injectable (LANTUS) 40 Unit(s) SubCutaneous at bedtime  folic acid 1 milliGRAM(s) Oral daily  dextrose 5%. 1000 milliLiter(s) (50 mL/Hr) IV Continuous <Continuous>  dextrose 50% Injectable 12.5 Gram(s) IV Push once  dextrose 50% Injectable 25 Gram(s) IV Push once  dextrose 50% Injectable 25 Gram(s) IV Push once  heparin  Injectable 5000 Unit(s) SubCutaneous every 12 hours  diltiazem    milliGRAM(s) Oral daily  calcium acetate 667 milliGRAM(s) Oral three times a day with meals  Nephro-amy 1 Tablet(s) Oral daily  pyridoxine 50 milliGRAM(s) Oral daily  valsartan 160 milliGRAM(s) Oral two times a day  insulin lispro (HumaLOG) corrective regimen sliding scale   SubCutaneous Before meals and at bedtime  hydrALAZINE Injectable 20 milliGRAM(s) IV Push once    MEDICATIONS  (PRN):  dextrose Gel 1 Dose(s) Oral once PRN Blood Glucose LESS THAN 70 milliGRAM(s)/deciLiter  glucagon  Injectable 1 milliGRAM(s) IntraMuscular once PRN Glucose <70 milliGRAM(s)/deciLiter  acetaminophen   Tablet. 650 milliGRAM(s) Oral every 6 hours PRN Moderate Pain (4 - 6)  hydrALAZINE Injectable 10 milliGRAM(s) IV Push every 4 hours PRN SBP > 160      Allergies    No Known Allergies    Intolerances        Vital Signs Last 24 Hrs  T(C): 36.7 (10 Aug 2017 09:00), Max: 36.9 (09 Aug 2017 16:15)  T(F): 98 (10 Aug 2017 09:00), Max: 98.4 (09 Aug 2017 16:15)  HR: 81 (10 Aug 2017 10:00) (73 - 98)  BP: 143/76 (10 Aug 2017 10:00) (127/74 - 184/95)  BP(mean): 104 (10 Aug 2017 10:00) (88 - 132)  RR: 18 (10 Aug 2017 10:00) (7 - 33)  SpO2: 99% (10 Aug 2017 10:00) (87% - 100%)  Daily     Daily Weight in k.2 (10 Aug 2017 06:00)    PHYSICAL EXAM:  GENERAL: appears chronically ill  HEAD:  Atraumatic, Normocephalic  EYES: EOMI  NECK: Supple, neck  veins full  NERVOUS SYSTEM:  Alert & Oriented X3  CHEST/LUNG: Rales at bases B/L   HEART: Regular rate and rhythm; No murmurs  ABDOMEN: Soft, Nontender, Nondistended; Bowel sounds present  EXTREMITIES: + LE edema B/L, L UE AVF + bruit/thrill      LABS:                        10.7   5.3   )-----------( 167      ( 08 Aug 2017 22:47 )             31.8     08-    140  |  97<L>  |  56.0<H>  ----------------------------<  111  5.6<H>   |  27.0  |  10.30<H>    Ca    10.5<H>      08 Aug 2017 22:47    TPro  7.2  /  Alb  4.6  /  TBili  0.4  /  DBili  x   /  AST  23  /  ALT  19  /  AlkPhos  76  08-08    PT/INR - ( 08 Aug 2017 22:47 )   PT: 10.4 sec;   INR: 0.95 ratio         PTT - ( 08 Aug 2017 22:47 )  PTT:34.6 sec            RADIOLOGY & ADDITIONAL TESTS:

## 2017-08-10 NOTE — PROGRESS NOTE ADULT - SUBJECTIVE AND OBJECTIVE BOX
Patient is a 51y old  Male who presents with a chief complaint of sob, weakness, elevated bp (09 Aug 2017 07:10)      BRIEF HOSPITAL COURSE: ***    Events last 24 hours: ***    PAST MEDICAL & SURGICAL HISTORY:  Hypertension  Diabetes  Dialysis patient  Renal failure  No significant past surgical history      Review of Systems:  CONSTITUTIONAL: No fever, chills, or fatigue  EYES: No eye pain, visual disturbances, or discharge  ENMT:  No difficulty hearing, tinnitus, vertigo; No sinus or throat pain  NECK: No pain or stiffness  RESPIRATORY: No cough, wheezing, chills or hemoptysis; No shortness of breath  CARDIOVASCULAR: No chest pain, palpitations, dizziness, or leg swelling  GASTROINTESTINAL: No abdominal or epigastric pain. No nausea, vomiting, or hematemesis; No diarrhea or constipation. No melena or hematochezia.  GENITOURINARY: No dysuria, frequency, hematuria, or incontinence  NEUROLOGICAL: No headaches, memory loss, loss of strength, numbness, or tremors  SKIN: No itching, burning, rashes, or lesions   MUSCULOSKELETAL: No joint pain or swelling; No muscle, back, or extremity pain  PSYCHIATRIC: No depression, anxiety, mood swings, or difficulty sleeping      Medications:    tamsulosin 0.4 milliGRAM(s) Oral at bedtime  diltiazem    milliGRAM(s) Oral daily  valsartan 160 milliGRAM(s) Oral two times a day  hydrALAZINE Injectable 10 milliGRAM(s) IV Push every 4 hours PRN      acetaminophen   Tablet. 650 milliGRAM(s) Oral every 6 hours PRN      heparin  Injectable 5000 Unit(s) SubCutaneous every 12 hours        insulin glargine Injectable (LANTUS) 40 Unit(s) SubCutaneous at bedtime  dextrose Gel 1 Dose(s) Oral once PRN  dextrose 50% Injectable 12.5 Gram(s) IV Push once  dextrose 50% Injectable 25 Gram(s) IV Push once  dextrose 50% Injectable 25 Gram(s) IV Push once  glucagon  Injectable 1 milliGRAM(s) IntraMuscular once PRN  insulin lispro (HumaLOG) corrective regimen sliding scale   SubCutaneous Before meals and at bedtime    folic acid 1 milliGRAM(s) Oral daily  dextrose 5%. 1000 milliLiter(s) IV Continuous <Continuous>  calcium acetate 667 milliGRAM(s) Oral three times a day with meals  Nephro-amy 1 Tablet(s) Oral daily  pyridoxine 50 milliGRAM(s) Oral daily                ICU Vital Signs Last 24 Hrs  T(C): 37.1 (10 Aug 2017 12:00), Max: 37.1 (10 Aug 2017 12:00)  T(F): 98.8 (10 Aug 2017 12:00), Max: 98.8 (10 Aug 2017 12:00)  HR: 94 (10 Aug 2017 12:01) (73 - 98)  BP: 151/84 (10 Aug 2017 12:01) (132/69 - 184/95)  BP(mean): 111 (10 Aug 2017 12:01) (88 - 132)  ABP: --  ABP(mean): --  RR: 18 (10 Aug 2017 12:01) (7 - 33)  SpO2: 100% (10 Aug 2017 12:01) (87% - 100%)          I&O's Detail    09 Aug 2017 07:01  -  10 Aug 2017 07:00  --------------------------------------------------------  IN:    nitroglycerin  Infusion: 30 mL    nitroglycerin  Infusion: 267 mL    Oral Fluid: 240 mL  Total IN: 537 mL    OUT:    Other: 3300 mL  Total OUT: 3300 mL    Total NET: -2763 mL      10 Aug 2017 07:01  -  10 Aug 2017 13:07  --------------------------------------------------------  IN:    nitroglycerin  Infusion: 30 mL    Oral Fluid: 240 mL  Total IN: 270 mL    OUT:    Voided: 330 mL  Total OUT: 330 mL    Total NET: -60 mL            LABS:                        10.7   5.3   )-----------( 167      ( 08 Aug 2017 22:47 )             31.8     08-08    140  |  97<L>  |  56.0<H>  ----------------------------<  111  5.6<H>   |  27.0  |  10.30<H>    Ca    10.5<H>      08 Aug 2017 22:47    TPro  7.2  /  Alb  4.6  /  TBili  0.4  /  DBili  x   /  AST  23  /  ALT  19  /  AlkPhos  76  08-08      CARDIAC MARKERS ( 09 Aug 2017 03:14 )  x     / 0.10 ng/mL / x     / x     / x      CARDIAC MARKERS ( 08 Aug 2017 22:47 )  x     / 0.12 ng/mL / x     / x     / x          CAPILLARY BLOOD GLUCOSE  159 (10 Aug 2017 11:00)        PT/INR - ( 08 Aug 2017 22:47 )   PT: 10.4 sec;   INR: 0.95 ratio         PTT - ( 08 Aug 2017 22:47 )  PTT:34.6 sec    CULTURES:      Physical Examination:    General: No acute distress.      HEENT: Pupils equal, reactive to light.  Symmetric.    PULM: Clear to auscultation bilaterally, no significant sputum production    CVS: Regular rate and rhythm, no murmurs, rubs, or gallops    ABD: Soft, nondistended, nontender, normoactive bowel sounds, no masses    EXT: No edema, nontender    SKIN: Warm and well perfused, no rashes noted.    NEURO: Alert, oriented, interactive, nonfocal    RADIOLOGY: ***    CRITICAL CARE TIME SPENT: ***

## 2017-08-10 NOTE — PROGRESS NOTE ADULT - PROBLEM SELECTOR PLAN 2
continuing to titrate bp medications
able to come of BIPAP immediately after dialysis yesterday and now in on room air. Overnight CCM NP noted sleep apnea pattern of breathing, will order nocturnal CPAP

## 2017-08-10 NOTE — PROGRESS NOTE ADULT - ATTENDING COMMENTS
Pt has stabilized and can transfer out of the ICU at this time. Care handed off to Dr. Montes De Oca by [facility] ELIDA Hobbs.

## 2017-08-10 NOTE — PROGRESS NOTE ADULT - SUBJECTIVE AND OBJECTIVE BOX
Subjective: pt seen and examined, pt requiring Tridil gtt for BP control. pt s/p HD yesterday . pt placed on Cpap for withnessed CAMMIE .     tamsulosin 0.4 milliGRAM(s) Oral at bedtime  insulin glargine Injectable (LANTUS) 40 Unit(s) SubCutaneous at bedtime  folic acid 1 milliGRAM(s) Oral daily  insulin lispro (HumaLOG) corrective regimen sliding scale   SubCutaneous every 6 hours  dextrose 5%. 1000 milliLiter(s) IV Continuous <Continuous>  dextrose Gel 1 Dose(s) Oral once PRN  dextrose 50% Injectable 12.5 Gram(s) IV Push once  dextrose 50% Injectable 25 Gram(s) IV Push once  dextrose 50% Injectable 25 Gram(s) IV Push once  glucagon  Injectable 1 milliGRAM(s) IntraMuscular once PRN  heparin  Injectable 5000 Unit(s) SubCutaneous every 12 hours  nitroglycerin  Infusion 5 MICROgram(s)/Min IV Continuous <Continuous>  diltiazem    milliGRAM(s) Oral daily  valsartan 80 milliGRAM(s) Oral two times a day  acetaminophen   Tablet. 650 milliGRAM(s) Oral every 6 hours PRN  calcium acetate 667 milliGRAM(s) Oral three times a day with meals  Nephro-amy 1 Tablet(s) Oral daily  pyridoxine 50 milliGRAM(s) Oral daily                            10.7   5.3   )-----------( 167      ( 08 Aug 2017 22:47 )             31.8       Hemoglobin: 10.7 g/dL (08-08 @ 22:47)      08-08    140  |  97<L>  |  56.0<H>  ----------------------------<  111  5.6<H>   |  27.0  |  10.30<H>    Ca    10.5<H>      08 Aug 2017 22:47    TPro  7.2  /  Alb  4.6  /  TBili  0.4  /  DBili  x   /  AST  23  /  ALT  19  /  AlkPhos  76  08-08    Creatinine Trend: 10.30<--    COAGS:     CARDIAC MARKERS ( 09 Aug 2017 03:14 )  x     / 0.10 ng/mL / x     / x     / x      CARDIAC MARKERS ( 08 Aug 2017 22:47 )  x     / 0.12 ng/mL / x     / x     / x            T(C): 36.6 (08-10-17 @ 03:00), Max: 36.9 (08-09-17 @ 16:15)  HR: 80 (08-10-17 @ 04:00) (73 - 96)  BP: 155/73 (08-10-17 @ 04:00) (127/74 - 226/107)  RR: 15 (08-10-17 @ 04:00) (7 - 36)  SpO2: 100% (08-10-17 @ 04:00) (87% - 100%)  Wt(kg): --    I&O's Summary    09 Aug 2017 07:01  -  10 Aug 2017 06:06  --------------------------------------------------------  IN: 483 mL / OUT: 3300 mL / NET: -2817 mL        Appearance: Normal	  HEENT:   Normal oral mucosa, PERRL, EOMI	  Lymphatic: No lymphadenopathy , no edema  Cardiovascular: Normal S1 S2, No JVD, No murmurs , Peripheral pulses palpable 2+ bilaterally  Respiratory: Lungs clear to auscultation, normal effort 	  Gastrointestinal:  Soft, Non-tender, + BS	  Skin: No rashes, No ecchymoses, No cyanosis, warm to touch  Musculoskeletal: Normal range of motion, normal strength  Psychiatry:  Mood & affect appropriate    TELEMETRY: 	  nsr      ASSESSMENT/PLAN: 	51y Male hx of HTN, chol , ESRD on HD now with HTN urgency , requiring urgent HD    HD per renal ( home schedule)  start all home Antihypertensives- wean tridil   CPAP support - recommend outpatient Sleep study   GI / DVT prophylaxis.   keep K>4, mag >2.0

## 2017-08-10 NOTE — PROGRESS NOTE ADULT - ASSESSMENT
52 y/o M with a h/o ESRD (on HD MWF), HTN, DM with hypertensive urgency, respiratory distress requiring BiPAP, needs urgent HD.    Admitted to MICU for BP control and urgent HD.

## 2017-08-10 NOTE — PROGRESS NOTE ADULT - ASSESSMENT
ESRD on HD MWF will HD today clinically volume overloaded   Chronic noncompliance with fluid restriction as long as I have known him  Hypertensive emergency likely 2/2 volume overload and noncompliance with BP meds  as above HD today remove additional UF, will also adjust BP meds   HyperKalemia will HD on 2 K bath

## 2017-08-10 NOTE — PATIENT PROFILE ADULT. - VISION (WITH CORRECTIVE LENSES IF THE PATIENT USUALLY WEARS THEM):
Nearsighted/Partially impaired: cannot see medication labels or newsprint, but can see obstacles in path, and the surrounding layout; can count fingers at arm's length

## 2017-08-10 NOTE — PROGRESS NOTE ADULT - PROBLEM SELECTOR PLAN 1
improving, patient describes less headache  continuing neurochecks
pt dialyzed yesterday and weaned off nitro drip today

## 2017-08-10 NOTE — PROGRESS NOTE ADULT - PROBLEM SELECTOR PLAN 4
hold oral hypoglycemics  continuing to follow fsg Qac and titrating HISS
doubled pt's medication regimen. Counseled him on medication compliance as well as dietary discretion and compliance to HD

## 2017-08-10 NOTE — PROGRESS NOTE ADULT - SUBJECTIVE AND OBJECTIVE BOX
Patient is a 51y old  Male who presents with a chief complaint of sob, weakness, elevated bp (09 Aug 2017 07:10)        HPI:  50 yo male with esrd on HD MWF, htn, DM on insulin, presenting with uncontrolled htn/sob/weakness. Pt notes his BP has been elevated for the past 2 weeks and has been forgetting to take his morning 5am blood pressure medications land also admits to being non complaint with his renal diet. Pt notes on Friday at dialysis he had been progressively sob which improved with oxygen and dialysis. Pt notes that since Friday the sob has worsened and that his bp is still elevated. 2 days ago pt noted chest heaviness with his sob. Notes heaviness is constant.  Pt denies missing any HD sessions.      In ED, bp elevated 220/100, no improvement with nitro paste, nitro drip started at 5mcg/min. Still c.o chest heaviness. Pt respiratory rate at 34, cxr final report pending, started on bipap. Notes some relief. (09 Aug 2017 07:10)      SUBJECTIVE & OBJECTIVE:   Pt seen and examined at bedside this afternoon.  States that his headache is resolving.  Denies chest pain, shortness of breath or other symptom.      PHYSICAL EXAM:  T(C): 36.8 (08-10-17 @ 16:20), Max: 37.1 (08-10-17 @ 12:00)  HR: 84 (08-10-17 @ 16:20) (73 - 98)  BP: 185/81 (08-10-17 @ 16:20) (132/69 - 185/81)  RR: 16 (08-10-17 @ 16:20) (7 - 33)  SpO2: 99% (08-10-17 @ 16:20) (87% - 100%)  Wt(kg): --   GENERAL: NAD, well-groomed, well-developed  HEAD:  Atraumatic, Normocephalic  EYES: EOMI, PERRLA, conjunctiva and sclera clear  ENMT: Moist mucous membranes  NECK: Supple, No JVD  NERVOUS SYSTEM:  Alert & Oriented X3, Motor Strength 5/5 B/L upper and lower extremities; DTRs 2+ intact and symmetric  CHEST/LUNG: Clear to auscultation bilaterally; No rales, rhonchi, wheezing, or rubs  HEART: Regular rate and rhythm; No murmurs, rubs, or gallops  ABDOMEN: Soft, Nontender, Nondistended; Bowel sounds present  EXTREMITIES:  2+ Peripheral Pulses, No clubbing, cyanosis, or edema        MEDICATIONS  (STANDING):  tamsulosin 0.4 milliGRAM(s) Oral at bedtime  insulin glargine Injectable (LANTUS) 40 Unit(s) SubCutaneous at bedtime  folic acid 1 milliGRAM(s) Oral daily  dextrose 5%. 1000 milliLiter(s) (50 mL/Hr) IV Continuous <Continuous>  dextrose 50% Injectable 12.5 Gram(s) IV Push once  dextrose 50% Injectable 25 Gram(s) IV Push once  dextrose 50% Injectable 25 Gram(s) IV Push once  heparin  Injectable 5000 Unit(s) SubCutaneous every 12 hours  diltiazem    milliGRAM(s) Oral daily  calcium acetate 667 milliGRAM(s) Oral three times a day with meals  Nephro-amy 1 Tablet(s) Oral daily  pyridoxine 50 milliGRAM(s) Oral daily  valsartan 160 milliGRAM(s) Oral two times a day  insulin lispro (HumaLOG) corrective regimen sliding scale   SubCutaneous Before meals and at bedtime    MEDICATIONS  (PRN):  dextrose Gel 1 Dose(s) Oral once PRN Blood Glucose LESS THAN 70 milliGRAM(s)/deciLiter  glucagon  Injectable 1 milliGRAM(s) IntraMuscular once PRN Glucose <70 milliGRAM(s)/deciLiter  acetaminophen   Tablet. 650 milliGRAM(s) Oral every 6 hours PRN Moderate Pain (4 - 6)  hydrALAZINE Injectable 10 milliGRAM(s) IV Push every 4 hours PRN SBP > 160  ondansetron Injectable 4 milliGRAM(s) IV Push every 6 hours PRN Nausea and/or Vomiting      LABS:                        10.7   5.3   )-----------( 167      ( 08 Aug 2017 22:47 )             31.8     08-10    138  |  93<L>  |  47.0<H>  ----------------------------<  159<H>  4.8   |  28.0  |  8.56<H>    Ca    10.3<H>      10 Aug 2017 12:36  Phos  4.5     08-10  Mg     2.5     08-10    TPro  7.2  /  Alb  4.6  /  TBili  0.4  /  DBili  x   /  AST  23  /  ALT  19  /  AlkPhos  76  08-08    PT/INR - ( 08 Aug 2017 22:47 )   PT: 10.4 sec;   INR: 0.95 ratio         PTT - ( 08 Aug 2017 22:47 )  PTT:34.6 sec    Magnesium, Serum: 2.5 mg/dL (08-10 @ 12:36)    CAPILLARY BLOOD GLUCOSE  127 (10 Aug 2017 16:20)  159 (10 Aug 2017 11:00)  85 (10 Aug 2017 06:30)  69 (10 Aug 2017 06:15)    CARDIAC MARKERS ( 09 Aug 2017 03:14 )  x     / 0.10 ng/mL / x     / x     / x      CARDIAC MARKERS ( 08 Aug 2017 22:47 )  x     / 0.12 ng/mL / x     / x     / x          RADIOLOGY & ADDITIONAL TESTS:  < from: Xray Chest 1 View AP/PA. (08.08.17 @ 22:55) >  IMPRESSION:   No infiltrates.    < end of copied text >      DVT/GI ppx  Discussed with pt @ bedside

## 2017-08-11 ENCOUNTER — TRANSCRIPTION ENCOUNTER (OUTPATIENT)
Age: 52
End: 2017-08-11

## 2017-08-11 PROCEDURE — 99239 HOSP IP/OBS DSCHRG MGMT >30: CPT

## 2017-08-11 RX ORDER — DILTIAZEM HCL 120 MG
1 CAPSULE, EXT RELEASE 24 HR ORAL
Qty: 0 | Refills: 0 | COMMUNITY
Start: 2017-08-11

## 2017-08-11 RX ORDER — TAMSULOSIN HYDROCHLORIDE 0.4 MG/1
1 CAPSULE ORAL
Qty: 30 | Refills: 0
Start: 2017-08-11 | End: 2017-09-10

## 2017-08-11 RX ORDER — FOLIC ACID 0.8 MG
1 TABLET ORAL
Qty: 30 | Refills: 3
Start: 2017-08-11 | End: 2017-12-08

## 2017-08-11 RX ORDER — VALSARTAN 80 MG/1
1 TABLET ORAL
Qty: 60 | Refills: 0 | OUTPATIENT
Start: 2017-08-11 | End: 2017-09-10

## 2017-08-11 RX ORDER — DILTIAZEM HCL 120 MG
1 CAPSULE, EXT RELEASE 24 HR ORAL
Qty: 30 | Refills: 0 | OUTPATIENT
Start: 2017-08-11 | End: 2017-09-10

## 2017-08-11 RX ORDER — AZITHROMYCIN 500 MG/1
250 TABLET, FILM COATED ORAL
Qty: 1250 | Refills: 0 | OUTPATIENT
Start: 2017-08-11 | End: 2017-08-16

## 2017-08-11 RX ORDER — INSULIN GLARGINE 100 [IU]/ML
20 INJECTION, SOLUTION SUBCUTANEOUS
Qty: 0 | Refills: 3 | DISCHARGE
Start: 2017-08-11 | End: 2017-12-08

## 2017-08-11 RX ORDER — PYRIDOXINE HCL (VITAMIN B6) 100 MG
1 TABLET ORAL
Qty: 30 | Refills: 3 | OUTPATIENT
Start: 2017-08-11 | End: 2017-12-08

## 2017-08-11 RX ORDER — INSULIN GLARGINE 100 [IU]/ML
40 INJECTION, SOLUTION SUBCUTANEOUS
Qty: 1 | Refills: 3 | OUTPATIENT
Start: 2017-08-11 | End: 2017-12-08

## 2017-08-11 RX ORDER — INSULIN LISPRO 100/ML
0 VIAL (ML) SUBCUTANEOUS
Qty: 0 | Refills: 0 | COMMUNITY
Start: 2017-08-11

## 2017-08-11 RX ORDER — CALCIUM ACETATE 667 MG
1 TABLET ORAL
Qty: 90 | Refills: 0 | OUTPATIENT
Start: 2017-08-11 | End: 2017-09-10

## 2017-08-11 RX ADMIN — Medication 667 MILLIGRAM(S): at 18:01

## 2017-08-11 RX ADMIN — Medication 1 TABLET(S): at 18:00

## 2017-08-11 RX ADMIN — HEPARIN SODIUM 5000 UNIT(S): 5000 INJECTION INTRAVENOUS; SUBCUTANEOUS at 05:55

## 2017-08-11 RX ADMIN — INSULIN GLARGINE 40 UNIT(S): 100 INJECTION, SOLUTION SUBCUTANEOUS at 21:41

## 2017-08-11 RX ADMIN — Medication 667 MILLIGRAM(S): at 12:55

## 2017-08-11 RX ADMIN — HEPARIN SODIUM 5000 UNIT(S): 5000 INJECTION INTRAVENOUS; SUBCUTANEOUS at 18:01

## 2017-08-11 RX ADMIN — Medication 50 MILLIGRAM(S): at 18:01

## 2017-08-11 RX ADMIN — Medication 2: at 12:55

## 2017-08-11 RX ADMIN — VALSARTAN 160 MILLIGRAM(S): 80 TABLET ORAL at 18:01

## 2017-08-11 RX ADMIN — Medication 1 MILLIGRAM(S): at 12:55

## 2017-08-11 RX ADMIN — Medication 120 MILLIGRAM(S): at 05:55

## 2017-08-11 RX ADMIN — TAMSULOSIN HYDROCHLORIDE 0.4 MILLIGRAM(S): 0.4 CAPSULE ORAL at 21:45

## 2017-08-11 RX ADMIN — Medication 667 MILLIGRAM(S): at 08:45

## 2017-08-11 RX ADMIN — VALSARTAN 160 MILLIGRAM(S): 80 TABLET ORAL at 06:03

## 2017-08-11 NOTE — DISCHARGE NOTE ADULT - PATIENT PORTAL LINK FT
“You can access the [employer] Patient Portal, offered by [facility], by registering with the following website: [email or web address]”

## 2017-08-11 NOTE — DISCHARGE NOTE ADULT - MEDICATION SUMMARY - MEDICATIONS TO TAKE
I will START or STAY ON the medications listed below when I get home from the hospital:    azithromycin  -- 250 milligram(s) by mouth once a day  -- Indication: For Respiratory failure with hypoxia    Diovan 160 mg oral tablet  -- 1 tab(s) by mouth 2 times a day  -- Do not take this drug if you are pregnant.  It is very important that you take or use this exactly as directed.  Do not skip doses or discontinue unless directed by your doctor.  Some non-prescription drugs may aggravate your condition.  Read all labels carefully.  If a warning appears, check with your doctor before taking.    -- Indication: For ESRD (end stage renal disease) on dialysis    Flomax 0.4 mg oral capsule  -- 1 cap(s) by mouth once a day  -- It is very important that you take or use this exactly as directed.  Do not skip doses or discontinue unless directed by your doctor.  May cause drowsiness.  Alcohol may intensify this effect.  Use care when operating dangerous machinery.  Some non-prescription drugs may aggravate your condition.  Read all labels carefully.  If a warning appears, check with your doctor before taking.  Swallow whole.  Do not crush.  Take with food or milk.    -- Indication: For BPH    dilTIAZem 120 mg/24 hours oral capsule, extended release  -- 1 cap(s) by mouth once a day  -- Indication: For Hypertension    dilTIAZem 240 mg/24 hours oral capsule, extended release  -- 1 cap(s) by mouth once a day  -- Indication: For Hypertensive urgency    insulin  --     -- Indication: For Diabetes    insulin lispro 100 units/mL subcutaneous solution  --  subcutaneous 4 times a day (before meals and at bedtime); 2 Unit(s) if Glucose 151 - 200  4 Unit(s) if Glucose 201 - 250  6 Unit(s) if Glucose 251 - 300  8 Unit(s) if Glucose 301 - 350  10 Unit(s) if Glucose 351 - 400  12 Unit(s) if Glucose GREATER THAN 400  -- Indication: For Diabetes    insulin glargine (concentrated) 300 units/mL subcutaneous solution  -- 40 unit(s) subcutaneous once a day  -- Check with your doctor before becoming pregnant.  Do not drink alcoholic beverages when taking this medication.  Expires___________________  It is very important that you take or use this exactly as directed.  Do not skip doses or discontinue unless directed by your doctor.  Keep in refrigerator.  Do not freeze.  Obtain medical advice before taking any non-prescription drugs as some may affect the action of this medication.  This drug may impair the ability to drive or operate machinery.  Use care until you become familiar with its effects.    -- Indication: For Diabetes    calcium acetate 667 mg oral tablet  -- 1 by mouth 3 times a day  -- Indication: For ESRD (end stage renal disease) on dialysis    folic acid 1 mg oral tablet  -- 1 tab(s) by mouth once a day  -- Indication: For ESRD (end stage renal disease) on dialysis    pyridoxine 50 mg oral tablet  -- 1 tab(s) by mouth once a day  -- Indication: For ESRD (end stage renal disease) on dialysis I will START or STAY ON the medications listed below when I get home from the hospital:    azithromycin  -- 250 milligram(s) by mouth once a day  -- Indication: For Respiratory failure with hypoxia    Diovan 160 mg oral tablet  -- 1 tab(s) by mouth 2 times a day  -- Do not take this drug if you are pregnant.  It is very important that you take or use this exactly as directed.  Do not skip doses or discontinue unless directed by your doctor.  Some non-prescription drugs may aggravate your condition.  Read all labels carefully.  If a warning appears, check with your doctor before taking.    -- Indication: For ESRD (end stage renal disease) on dialysis    Flomax 0.4 mg oral capsule  -- 1 cap(s) by mouth once a day  -- It is very important that you take or use this exactly as directed.  Do not skip doses or discontinue unless directed by your doctor.  May cause drowsiness.  Alcohol may intensify this effect.  Use care when operating dangerous machinery.  Some non-prescription drugs may aggravate your condition.  Read all labels carefully.  If a warning appears, check with your doctor before taking.  Swallow whole.  Do not crush.  Take with food or milk.    -- Indication: For BPH    dilTIAZem 120 mg/24 hours oral capsule, extended release  -- 1 cap(s) by mouth once a day  -- Indication: For Hypertension    dilTIAZem 240 mg/24 hours oral capsule, extended release  -- 1 cap(s) by mouth once a day  -- Indication: For Hypertensive urgency    insulin  --     -- Indication: For Diabetes    insulin lispro 100 units/mL subcutaneous solution  --  subcutaneous 4 times a day (before meals and at bedtime); 2 Unit(s) if Glucose 151 - 200  4 Unit(s) if Glucose 201 - 250  6 Unit(s) if Glucose 251 - 300  8 Unit(s) if Glucose 301 - 350  10 Unit(s) if Glucose 351 - 400  12 Unit(s) if Glucose GREATER THAN 400  -- Indication: For Diabetes    insulin glargine (concentrated) 300 units/mL subcutaneous solution  -- 40 unit(s) subcutaneous once a day  -- Check with your doctor before becoming pregnant.  Do not drink alcoholic beverages when taking this medication.  Expires___________________  It is very important that you take or use this exactly as directed.  Do not skip doses or discontinue unless directed by your doctor.  Keep in refrigerator.  Do not freeze.  Obtain medical advice before taking any non-prescription drugs as some may affect the action of this medication.  This drug may impair the ability to drive or operate machinery.  Use care until you become familiar with its effects.    -- Indication: For Diabetes    calcium acetate 667 mg oral tablet  -- 2 by mouth 3 times a day  -- Indication: For ESRD (end stage renal disease) on dialysis    folic acid 1 mg oral tablet  -- 1 tab(s) by mouth once a day  -- Indication: For ESRD (end stage renal disease) on dialysis    pyridoxine 50 mg oral tablet  -- 1 tab(s) by mouth once a day  -- Indication: For ESRD (end stage renal disease) on dialysis

## 2017-08-11 NOTE — DISCHARGE NOTE ADULT - CARE PROVIDER_API CALL
Darwin Walton (DO), Nephrology  [address] [place] [address]  Phone: [phone]  Fax: [phone]

## 2017-08-11 NOTE — PROGRESS NOTE ADULT - SUBJECTIVE AND OBJECTIVE BOX
Patient seen and examined    Feels fine  no c/o CP SOB NV   No swelling feet    Patient without any significant change  no specific c/o    Vital Signs Last 24 Hrs  T(C): 36.9 (08-11-17 @ 16:09), Max: 37.3 (08-10-17 @ 20:00)  T(F): 98.5 (08-11-17 @ 16:09), Max: 99.1 (08-10-17 @ 20:00)  HR: 84 (08-11-17 @ 16:09) (84 - 94)  BP: 169/89 (08-11-17 @ 16:09) (110/59 - 185/81)  BP(mean): --  RR: 18 (08-11-17 @ 16:09) (16 - 18)  SpO2: 97% (08-11-17 @ 16:09) (96% - 99%)    Chest   clear  CV   no murmur  Abd   soft, NT BS+  Extr   tr edema  Neuro  grossly iintact motor    Patient overall stable  Labs and Meds reviewed      10 Aug 2017 12:36    138    |  93     |  47.0   ----------------------------<  159    4.8     |  28.0   |  8.56     Ca    10.3       10 Aug 2017 12:36  Phos  4.5       10 Aug 2017 12:36  Mg     2.5       10 Aug 2017 12:36    Continue same treatment  Had HD wed/Th - shall dialyze in am and d/c home

## 2017-08-11 NOTE — DISCHARGE NOTE ADULT - HOSPITAL COURSE
50 yo male with ESRD on HD MWF, htn, DM on insulin, presenting with uncontrolled htn/sob/weakness. Pt notes his BP has been elevated for the past 2 weeks and has been forgetting to take his morning 5am blood pressure medications land also admits to being non complaint with his renal diet. Pt notes on Friday at dialysis he had been progressively sob which improved with oxygen and dialysis. Pt notes that since Friday the sob has worsened and that his bp is still elevated. 2 days ago pt noted chest heaviness with his sob. Notes heaviness is constant.  Pt denies missing any HD sessions but said he  has been very non compliant with his medications.      Restarted BP medications.  HD.  Bp is controlled . Patient is being discharge with BP meds and to continue HD. Patient has improved.

## 2017-08-11 NOTE — DISCHARGE NOTE ADULT - VISION (WITH CORRECTIVE LENSES IF THE PATIENT USUALLY WEARS THEM):
Partially impaired: cannot see medication labels or newsprint, but can see obstacles in path, and the surrounding layout; can count fingers at arm's length/Nearsighted

## 2017-08-11 NOTE — DISCHARGE NOTE ADULT - CARE PLAN
Principal Discharge DX:	Hypertensive encephalopathy  Goal:	ADLs  Instructions for follow-up, activity and diet:	Follow up with nephrology 5-10 days   Continue HD  Renally adjusted diet.  Up ad corbin  Secondary Diagnosis:	Type 2 diabetes mellitus with other circulatory complication  Secondary Diagnosis:	Acute respiratory failure with hypoxia  Secondary Diagnosis:	ESRD (end stage renal disease) on dialysis

## 2017-08-11 NOTE — DISCHARGE NOTE ADULT - SECONDARY DIAGNOSIS.
Type 2 diabetes mellitus with other circulatory complication Acute respiratory failure with hypoxia ESRD (end stage renal disease) on dialysis

## 2017-08-12 VITALS
RESPIRATION RATE: 19 BRPM | HEART RATE: 91 BPM | DIASTOLIC BLOOD PRESSURE: 65 MMHG | TEMPERATURE: 98 F | SYSTOLIC BLOOD PRESSURE: 130 MMHG | OXYGEN SATURATION: 97 %

## 2017-08-12 PROCEDURE — 99233 SBSQ HOSP IP/OBS HIGH 50: CPT

## 2017-08-12 RX ORDER — CALCIUM ACETATE 667 MG
2 TABLET ORAL
Qty: 180 | Refills: 0 | OUTPATIENT
Start: 2017-08-12 | End: 2017-09-11

## 2017-08-12 RX ADMIN — VALSARTAN 160 MILLIGRAM(S): 80 TABLET ORAL at 05:41

## 2017-08-12 RX ADMIN — Medication 667 MILLIGRAM(S): at 17:46

## 2017-08-12 RX ADMIN — HEPARIN SODIUM 5000 UNIT(S): 5000 INJECTION INTRAVENOUS; SUBCUTANEOUS at 05:41

## 2017-08-12 RX ADMIN — Medication 1 MILLIGRAM(S): at 17:46

## 2017-08-12 RX ADMIN — Medication 667 MILLIGRAM(S): at 08:36

## 2017-08-12 RX ADMIN — Medication 50 MILLIGRAM(S): at 17:46

## 2017-08-12 RX ADMIN — Medication 120 MILLIGRAM(S): at 05:41

## 2017-08-12 RX ADMIN — VALSARTAN 160 MILLIGRAM(S): 80 TABLET ORAL at 17:46

## 2017-08-12 RX ADMIN — Medication 1 TABLET(S): at 17:46

## 2017-08-12 RX ADMIN — Medication 4: at 17:46

## 2017-08-12 NOTE — PROGRESS NOTE ADULT - ASSESSMENT
52 y/o male with PMHx of ESRD on HD that presents in hypertensive emergency    Problem/Plan - 1:  ·  Problem: Hypertensive encephalopathy.  Plan: Symptoms of headache and confusion resolved.   BP controlled on medication. Was not taking his meds, non compliant . Has ordered BP meds for discharge.  To discharge home today     Problem/Plan - 2:  ·  Problem: ESRD (end stage renal disease) on dialysis.  Plan: received dialysis today  renal recommendations appreciated.  Continue outpatient HD    Problem/Plan - 4:  ·  Problem: Diabetes.  Plan: Will restarted po DM medications at home.

## 2017-08-12 NOTE — DIETITIAN INITIAL EVALUATION ADULT. - PERTINENT LABORATORY DATA
(8/8)- Labs WNL except low H/H 10.7/31.8, (8/10)-elevated BUN 47, elevated Crea 8.56, elevated Glucose 159

## 2017-08-12 NOTE — PROGRESS NOTE ADULT - SUBJECTIVE AND OBJECTIVE BOX
Patient was seen and evaluated on dialysis.   No c/o CP SOB NV  no F/C  no swelling    T(C): 36.9 (08-12-17 @ 10:52), Max: 36.9 (08-11-17 @ 16:09)  HR: 95 (08-12-17 @ 10:52) (84 - 95)  BP: 130/80 (08-12-17 @ 10:52) (130/80 - 169/89)  Wt(kg): --  PE ;  NAD  lungs - CTA  CV gr 1 murmer,  No gallop or rub  Abd : soft, NT BS +, No masses  Ext- No edema  Neuro : Grossly intact, moving extremities        08-10    138  |  93<L>  |  47.0<H>  ----------------------------<  159<H>  4.8   |  28.0  |  8.56<H>    Ca    10.3<H>      10 Aug 2017 12:36  Phos  4.5     08-10  Mg     2.5     08-10        MEDICATIONS  (STANDING):  tamsulosin  insulin glargine Injectable (LANTUS)  folic acid  dextrose 5%.  dextrose Gel PRN  dextrose 50% Injectable  dextrose 50% Injectable  dextrose 50% Injectable  glucagon  Injectable PRN  heparin  Injectable  diltiazem   CD  acetaminophen   Tablet. PRN  calcium acetate  Nephro-amy  pyridoxine  valsartan  hydrALAZINE Injectable PRN  insulin lispro (HumaLOG) corrective regimen sliding scale  ondansetron Injectable PRN      Patient stable  [facility] HD easily  Continue

## 2017-08-12 NOTE — PROGRESS NOTE ADULT - SUBJECTIVE AND OBJECTIVE BOX
CC: ESRD on HD, hypertensive urgency resolved.   HPI:  52 yo male with esrd on HD MWF, htn, DM on insulin, presenting with uncontrolled htn/sob/weakness. Pt notes his BP has been elevated for the past 2 weeks and has been forgetting to take his morning 5am blood pressure medications land also admits to being non complaint with his renal diet. Pt notes on Friday at dialysis he had been progressively sob which improved with oxygen and dialysis. Pt notes that since Friday the sob has worsened and that his bp is still elevated. 2 days ago pt noted chest heaviness with his sob. Notes heaviness is constant.  Pt denies missing any HD sessions.      In ED, bp elevated 220/100, no improvement with nitro paste, nitro drip started at 5mcg/min. Still c.o chest heaviness. Pt respiratory rate at 34, cxr final report pending, started on bipap. Notes some relief. (09 Aug 2017 07:10)    REVIEW OF SYSTEMS:    Patient denied fever, chills, abdominal pain, nausea, vomiting, cough, shortness of breath, chest pain or palpitations    Vital Signs Last 24 Hrs  T(C): 36.7 (12 Aug 2017 11:15), Max: 36.9 (11 Aug 2017 16:09)  T(F): 98.1 (12 Aug 2017 11:15), Max: 98.5 (11 Aug 2017 16:09)  HR: 86 (12 Aug 2017 11:15) (84 - 95)  BP: 154/85 (12 Aug 2017 11:15) (130/80 - 169/89)  BP(mean): --  RR: 16 (12 Aug 2017 11:15) (16 - 18)  SpO2: 98% (12 Aug 2017 11:15) (97% - 99%)I&O's Summary    PHYSICAL EXAM:  GENERAL: NAD, well-groomed  HEENT: PERRL, +EOMI, anicteric, no [place]  NECK: Supple, No JVD   CHEST/LUNG: CTA bilaterally; Normal effort  HEART: S1S2 Normal intensity, no murmurs, gallops or rubs noted  ABDOMEN: Soft, BS Normoactive, NT, ND, no HSM noted  EXTREMITIES:  2+ radial and DP pulses noted, no clubbing, cyanosis, or edema noted, FROM x 4  SKIN: No rashes or lesions noted  NEURO: A&Ox3, no focal deficits noted, CN II-XII intact  PSYCH: normal mood and affect; insight/judgement appropriate  LABS:              RADIOLOGY & ADDITIONAL TESTS:    MEDICATIONS:  MEDICATIONS  (STANDING):  tamsulosin 0.4 milliGRAM(s) Oral at bedtime  insulin glargine Injectable (LANTUS) 40 Unit(s) SubCutaneous at bedtime  folic acid 1 milliGRAM(s) Oral daily  dextrose 5%. 1000 milliLiter(s) (50 mL/Hr) IV Continuous <Continuous>  dextrose 50% Injectable 12.5 Gram(s) IV Push once  dextrose 50% Injectable 25 Gram(s) IV Push once  dextrose 50% Injectable 25 Gram(s) IV Push once  heparin  Injectable 5000 Unit(s) SubCutaneous every 12 hours  calcium acetate 667 milliGRAM(s) Oral three times a day with meals  Nephro-amy 1 Tablet(s) Oral daily  pyridoxine 50 milliGRAM(s) Oral daily  valsartan 160 milliGRAM(s) Oral two times a day  insulin lispro (HumaLOG) corrective regimen sliding scale   SubCutaneous Before meals and at bedtime  diltiazem    milliGRAM(s) Oral daily    MEDICATIONS  (PRN):  dextrose Gel 1 Dose(s) Oral once PRN Blood Glucose LESS THAN 70 milliGRAM(s)/deciLiter  glucagon  Injectable 1 milliGRAM(s) IntraMuscular once PRN Glucose <70 milliGRAM(s)/deciLiter  acetaminophen   Tablet. 650 milliGRAM(s) Oral every 6 hours PRN Moderate Pain (4 - 6)  hydrALAZINE Injectable 10 milliGRAM(s) IV Push every 4 hours PRN SBP > 160  ondansetron Injectable 4 milliGRAM(s) IV Push every 6 hours PRN Nausea and/or Vomiting 2017 10:52

## 2017-10-19 PROCEDURE — 80048 BASIC METABOLIC PNL TOTAL CA: CPT

## 2017-10-19 PROCEDURE — 96374 THER/PROPH/DIAG INJ IV PUSH: CPT

## 2017-10-19 PROCEDURE — 83036 HEMOGLOBIN GLYCOSYLATED A1C: CPT

## 2017-10-19 PROCEDURE — 80053 COMPREHEN METABOLIC PANEL: CPT

## 2017-10-19 PROCEDURE — 94660 CPAP INITIATION&MGMT: CPT

## 2017-10-19 PROCEDURE — 85610 PROTHROMBIN TIME: CPT

## 2017-10-19 PROCEDURE — 99261: CPT

## 2017-10-19 PROCEDURE — 96375 TX/PRO/DX INJ NEW DRUG ADDON: CPT

## 2017-10-19 PROCEDURE — 93005 ELECTROCARDIOGRAM TRACING: CPT

## 2017-10-19 PROCEDURE — 84484 ASSAY OF TROPONIN QUANT: CPT

## 2017-10-19 PROCEDURE — 99221 1ST HOSP IP/OBS SF/LOW 40: CPT

## 2017-10-19 PROCEDURE — 84100 ASSAY OF PHOSPHORUS: CPT

## 2017-10-19 PROCEDURE — 96376 TX/PRO/DX INJ SAME DRUG ADON: CPT

## 2017-10-19 PROCEDURE — 83880 ASSAY OF NATRIURETIC PEPTIDE: CPT

## 2017-10-19 PROCEDURE — 71045 X-RAY EXAM CHEST 1 VIEW: CPT

## 2017-10-19 PROCEDURE — 99285 EMERGENCY DEPT VISIT HI MDM: CPT | Mod: 25

## 2017-10-19 PROCEDURE — 85027 COMPLETE CBC AUTOMATED: CPT

## 2017-10-19 PROCEDURE — 83735 ASSAY OF MAGNESIUM: CPT

## 2017-10-19 PROCEDURE — 36415 COLL VENOUS BLD VENIPUNCTURE: CPT

## 2017-10-19 PROCEDURE — 99231 SBSQ HOSP IP/OBS SF/LOW 25: CPT

## 2017-10-19 PROCEDURE — 85730 THROMBOPLASTIN TIME PARTIAL: CPT

## 2018-03-17 ENCOUNTER — INPATIENT (INPATIENT)
Facility: HOSPITAL | Age: 53
LOS: 0 days | Discharge: HOME | DRG: 640 | End: 2018-03-18
Attending: HOSPITALIST | Admitting: HOSPITALIST
Payer: COMMERCIAL

## 2018-03-17 VITALS
TEMPERATURE: 98 F | OXYGEN SATURATION: 85 % | SYSTOLIC BLOOD PRESSURE: 196 MMHG | HEART RATE: 105 BPM | DIASTOLIC BLOOD PRESSURE: 96 MMHG

## 2018-03-17 DIAGNOSIS — N18.6 END STAGE RENAL DISEASE: ICD-10-CM

## 2018-03-17 DIAGNOSIS — E11.65 TYPE 2 DIABETES MELLITUS WITH HYPERGLYCEMIA: ICD-10-CM

## 2018-03-17 DIAGNOSIS — E87.70 FLUID OVERLOAD, UNSPECIFIED: ICD-10-CM

## 2018-03-17 LAB
ALBUMIN SERPL ELPH-MCNC: 4 G/DL — SIGNIFICANT CHANGE UP (ref 3.3–5.2)
ALP SERPL-CCNC: 87 U/L — SIGNIFICANT CHANGE UP (ref 40–120)
ALT FLD-CCNC: 20 U/L — SIGNIFICANT CHANGE UP
ANION GAP SERPL CALC-SCNC: 15 MMOL/L — SIGNIFICANT CHANGE UP (ref 5–17)
AST SERPL-CCNC: 22 U/L — SIGNIFICANT CHANGE UP
BASOPHILS # BLD AUTO: 0 K/UL — SIGNIFICANT CHANGE UP (ref 0–0.2)
BASOPHILS NFR BLD AUTO: 0.5 % — SIGNIFICANT CHANGE UP (ref 0–2)
BILIRUB SERPL-MCNC: 0.3 MG/DL — LOW (ref 0.4–2)
BUN SERPL-MCNC: 68 MG/DL — HIGH (ref 8–20)
CALCIUM SERPL-MCNC: 8.8 MG/DL — SIGNIFICANT CHANGE UP (ref 8.6–10.2)
CHLORIDE SERPL-SCNC: 98 MMOL/L — SIGNIFICANT CHANGE UP (ref 98–107)
CK MB CFR SERPL CALC: 5 NG/ML — SIGNIFICANT CHANGE UP (ref 0–6.7)
CK SERPL-CCNC: 442 U/L — HIGH (ref 30–200)
CO2 SERPL-SCNC: 25 MMOL/L — SIGNIFICANT CHANGE UP (ref 22–29)
CREAT SERPL-MCNC: 10.52 MG/DL — HIGH (ref 0.5–1.3)
EOSINOPHIL # BLD AUTO: 0.2 K/UL — SIGNIFICANT CHANGE UP (ref 0–0.5)
EOSINOPHIL NFR BLD AUTO: 2.9 % — SIGNIFICANT CHANGE UP (ref 0–5)
GLUCOSE BLDC GLUCOMTR-MCNC: 103 MG/DL — HIGH (ref 70–99)
GLUCOSE BLDC GLUCOMTR-MCNC: 104 MG/DL — HIGH (ref 70–99)
GLUCOSE BLDC GLUCOMTR-MCNC: 111 MG/DL — HIGH (ref 70–99)
GLUCOSE BLDC GLUCOMTR-MCNC: 126 MG/DL — HIGH (ref 70–99)
GLUCOSE SERPL-MCNC: 174 MG/DL — HIGH (ref 70–115)
HCT VFR BLD CALC: 27.9 % — LOW (ref 42–52)
HGB BLD-MCNC: 9.2 G/DL — LOW (ref 14–18)
LYMPHOCYTES # BLD AUTO: 1.1 K/UL — SIGNIFICANT CHANGE UP (ref 1–4.8)
LYMPHOCYTES # BLD AUTO: 19.7 % — LOW (ref 20–55)
MCHC RBC-ENTMCNC: 32.9 PG — HIGH (ref 27–31)
MCHC RBC-ENTMCNC: 33 G/DL — SIGNIFICANT CHANGE UP (ref 32–36)
MCV RBC AUTO: 99.6 FL — HIGH (ref 80–94)
MONOCYTES # BLD AUTO: 0.5 K/UL — SIGNIFICANT CHANGE UP (ref 0–0.8)
MONOCYTES NFR BLD AUTO: 8.6 % — SIGNIFICANT CHANGE UP (ref 3–10)
NEUTROPHILS # BLD AUTO: 3.8 K/UL — SIGNIFICANT CHANGE UP (ref 1.8–8)
NEUTROPHILS NFR BLD AUTO: 68.1 % — SIGNIFICANT CHANGE UP (ref 37–73)
PLATELET # BLD AUTO: 175 K/UL — SIGNIFICANT CHANGE UP (ref 150–400)
POTASSIUM SERPL-MCNC: 5.3 MMOL/L — SIGNIFICANT CHANGE UP (ref 3.5–5.3)
POTASSIUM SERPL-SCNC: 5.3 MMOL/L — SIGNIFICANT CHANGE UP (ref 3.5–5.3)
PROT SERPL-MCNC: 6.5 G/DL — LOW (ref 6.6–8.7)
RBC # BLD: 2.8 M/UL — LOW (ref 4.6–6.2)
RBC # FLD: 15.5 % — SIGNIFICANT CHANGE UP (ref 11–15.6)
SODIUM SERPL-SCNC: 138 MMOL/L — SIGNIFICANT CHANGE UP (ref 135–145)
TROPONIN T SERPL-MCNC: 0.11 NG/ML — HIGH (ref 0–0.06)
WBC # BLD: 5.6 K/UL — SIGNIFICANT CHANGE UP (ref 4.8–10.8)
WBC # FLD AUTO: 5.6 K/UL — SIGNIFICANT CHANGE UP (ref 4.8–10.8)

## 2018-03-17 PROCEDURE — 99223 1ST HOSP IP/OBS HIGH 75: CPT | Mod: AI

## 2018-03-17 PROCEDURE — 93010 ELECTROCARDIOGRAM REPORT: CPT

## 2018-03-17 PROCEDURE — 99291 CRITICAL CARE FIRST HOUR: CPT

## 2018-03-17 PROCEDURE — 71045 X-RAY EXAM CHEST 1 VIEW: CPT | Mod: 26

## 2018-03-17 RX ORDER — HYDRALAZINE HCL 50 MG
20 TABLET ORAL ONCE
Qty: 0 | Refills: 0 | Status: COMPLETED | OUTPATIENT
Start: 2018-03-17 | End: 2018-03-17

## 2018-03-17 RX ORDER — DEXTROSE 50 % IN WATER 50 %
12.5 SYRINGE (ML) INTRAVENOUS ONCE
Qty: 0 | Refills: 0 | Status: DISCONTINUED | OUTPATIENT
Start: 2018-03-17 | End: 2018-03-18

## 2018-03-17 RX ORDER — INSULIN LISPRO 100/ML
VIAL (ML) SUBCUTANEOUS
Qty: 0 | Refills: 0 | Status: DISCONTINUED | OUTPATIENT
Start: 2018-03-17 | End: 2018-03-18

## 2018-03-17 RX ORDER — FOLIC ACID 0.8 MG
1 TABLET ORAL DAILY
Qty: 0 | Refills: 0 | Status: DISCONTINUED | OUTPATIENT
Start: 2018-03-17 | End: 2018-03-18

## 2018-03-17 RX ORDER — DEXTROSE 50 % IN WATER 50 %
25 SYRINGE (ML) INTRAVENOUS ONCE
Qty: 0 | Refills: 0 | Status: DISCONTINUED | OUTPATIENT
Start: 2018-03-17 | End: 2018-03-18

## 2018-03-17 RX ORDER — SODIUM CHLORIDE 9 MG/ML
3 INJECTION INTRAMUSCULAR; INTRAVENOUS; SUBCUTANEOUS ONCE
Qty: 0 | Refills: 0 | Status: COMPLETED | OUTPATIENT
Start: 2018-03-17 | End: 2018-03-17

## 2018-03-17 RX ORDER — ERYTHROPOIETIN 10000 [IU]/ML
10000 INJECTION, SOLUTION INTRAVENOUS; SUBCUTANEOUS ONCE
Qty: 0 | Refills: 0 | Status: COMPLETED | OUTPATIENT
Start: 2018-03-17 | End: 2018-03-17

## 2018-03-17 RX ORDER — DILTIAZEM HCL 120 MG
240 CAPSULE, EXT RELEASE 24 HR ORAL DAILY
Qty: 0 | Refills: 0 | Status: DISCONTINUED | OUTPATIENT
Start: 2018-03-17 | End: 2018-03-18

## 2018-03-17 RX ORDER — INSULIN GLARGINE 100 [IU]/ML
40 INJECTION, SOLUTION SUBCUTANEOUS AT BEDTIME
Qty: 0 | Refills: 0 | Status: DISCONTINUED | OUTPATIENT
Start: 2018-03-17 | End: 2018-03-18

## 2018-03-17 RX ORDER — SODIUM CHLORIDE 9 MG/ML
1000 INJECTION, SOLUTION INTRAVENOUS
Qty: 0 | Refills: 0 | Status: DISCONTINUED | OUTPATIENT
Start: 2018-03-17 | End: 2018-03-18

## 2018-03-17 RX ORDER — VALSARTAN 80 MG/1
160 TABLET ORAL DAILY
Qty: 0 | Refills: 0 | Status: DISCONTINUED | OUTPATIENT
Start: 2018-03-17 | End: 2018-03-18

## 2018-03-17 RX ORDER — PYRIDOXINE HCL (VITAMIN B6) 100 MG
50 TABLET ORAL DAILY
Qty: 0 | Refills: 0 | Status: DISCONTINUED | OUTPATIENT
Start: 2018-03-17 | End: 2018-03-18

## 2018-03-17 RX ORDER — DEXTROSE 50 % IN WATER 50 %
1 SYRINGE (ML) INTRAVENOUS ONCE
Qty: 0 | Refills: 0 | Status: DISCONTINUED | OUTPATIENT
Start: 2018-03-17 | End: 2018-03-18

## 2018-03-17 RX ORDER — NITROGLYCERIN 6.5 MG
1 CAPSULE, EXTENDED RELEASE ORAL ONCE
Qty: 0 | Refills: 0 | Status: COMPLETED | OUTPATIENT
Start: 2018-03-17 | End: 2018-03-17

## 2018-03-17 RX ORDER — TAMSULOSIN HYDROCHLORIDE 0.4 MG/1
0.4 CAPSULE ORAL DAILY
Qty: 0 | Refills: 0 | Status: DISCONTINUED | OUTPATIENT
Start: 2018-03-17 | End: 2018-03-18

## 2018-03-17 RX ORDER — FUROSEMIDE 40 MG
100 TABLET ORAL ONCE
Qty: 0 | Refills: 0 | Status: COMPLETED | OUTPATIENT
Start: 2018-03-17 | End: 2018-03-17

## 2018-03-17 RX ORDER — CALCIUM ACETATE 667 MG
1334 TABLET ORAL
Qty: 0 | Refills: 0 | Status: DISCONTINUED | OUTPATIENT
Start: 2018-03-17 | End: 2018-03-18

## 2018-03-17 RX ORDER — NITROGLYCERIN 6.5 MG
0.4 CAPSULE, EXTENDED RELEASE ORAL ONCE
Qty: 0 | Refills: 0 | Status: COMPLETED | OUTPATIENT
Start: 2018-03-17 | End: 2018-03-17

## 2018-03-17 RX ORDER — PANTOPRAZOLE SODIUM 20 MG/1
40 TABLET, DELAYED RELEASE ORAL
Qty: 0 | Refills: 0 | Status: DISCONTINUED | OUTPATIENT
Start: 2018-03-17 | End: 2018-03-18

## 2018-03-17 RX ORDER — GLUCAGON INJECTION, SOLUTION 0.5 MG/.1ML
1 INJECTION, SOLUTION SUBCUTANEOUS ONCE
Qty: 0 | Refills: 0 | Status: DISCONTINUED | OUTPATIENT
Start: 2018-03-17 | End: 2018-03-18

## 2018-03-17 RX ADMIN — Medication 240 MILLIGRAM(S): at 16:27

## 2018-03-17 RX ADMIN — Medication 0.4 MILLIGRAM(S): at 03:51

## 2018-03-17 RX ADMIN — Medication 1 MILLIGRAM(S): at 13:24

## 2018-03-17 RX ADMIN — ERYTHROPOIETIN 10000 UNIT(S): 10000 INJECTION, SOLUTION INTRAVENOUS; SUBCUTANEOUS at 11:23

## 2018-03-17 RX ADMIN — VALSARTAN 160 MILLIGRAM(S): 80 TABLET ORAL at 16:27

## 2018-03-17 RX ADMIN — Medication 1 INCH(S): at 03:51

## 2018-03-17 RX ADMIN — INSULIN GLARGINE 40 UNIT(S): 100 INJECTION, SOLUTION SUBCUTANEOUS at 22:50

## 2018-03-17 RX ADMIN — Medication 100 MILLIGRAM(S): at 03:51

## 2018-03-17 RX ADMIN — SODIUM CHLORIDE 3 MILLILITER(S): 9 INJECTION INTRAMUSCULAR; INTRAVENOUS; SUBCUTANEOUS at 03:51

## 2018-03-17 RX ADMIN — Medication 1334 MILLIGRAM(S): at 13:24

## 2018-03-17 RX ADMIN — Medication 1334 MILLIGRAM(S): at 16:31

## 2018-03-17 RX ADMIN — Medication 1 INCH(S): at 15:00

## 2018-03-17 RX ADMIN — Medication 20 MILLIGRAM(S): at 05:18

## 2018-03-17 RX ADMIN — TAMSULOSIN HYDROCHLORIDE 0.4 MILLIGRAM(S): 0.4 CAPSULE ORAL at 13:24

## 2018-03-17 RX ADMIN — Medication 50 MILLIGRAM(S): at 13:23

## 2018-03-17 NOTE — CONSULT NOTE ADULT - SUBJECTIVE AND OBJECTIVE BOX
Patient is a 52y old  Male who presents with a chief complaint of SOB and  cough..    HPI:   Pt has Hx CRF 5 on HD due today. Pt developed sob without fever and try to control cough by drinking water. Pt has hx of fluid abuse in past. Pt denied fever, hemoptysis.    PAST MEDICAL & SURGICAL HISTORY:  Hypertension  Diabetes  Dialysis patient  Renal failure  Left arm a-v fistula.      FAMILY HISTORY:  No pertinent family history in first degree relatives  [place]    Social History :Non smoker    MEDICATIONS  (STANDING):  epoetin dima Injectable 58531 Unit(s) IV Push once    MEDICATIONS  (PRN):   Meds reviewed    Allergies    No Known Allergies      REVIEW OF SYSTEMS:    CONSTITUTIONAL:  sob, weight gain, feels weak  EYES: No eye pain, visual disturbances, or discharge  ENMT:  No difficulty hearing, tinnitus, vertigo; No sinus or throat pain  NECK: No pain or stiffness  BREASTS: No pain, masses, or nipple discharge  RESPIRATORY: sob  CARDIOVASCULAR: No chest pain, palpitations, dizziness,   GASTROINTESTINAL: No abdominal or epigastric pain. No nausea, vomiting, or hematemesis; No diarrhea or constipation.   GENITOURINARY: No dysuria, frequency, hematuria, or incontinence  NEUROLOGICAL: No headaches, memory loss, loss of strength, numbness, or tremors  SKIN: No rash  LYMPH NODES: No enlarged glands  ENDOCRINE: No heat or cold intolerance; No hair loss  MUSCULOSKELETAL: neg  PSYCHIATRIC: No depression, anxiety, mood swings, or difficulty sleeping  HEME/LYMPH: No easy bruising, or bleeding gums  ALLERGY  AND IMMUNOLOGIC: No hives or eczema      Vital Signs Last 24 Hrs  T(C): 36.6 (17 Mar 2018 03:06), Max: 36.6 (17 Mar 2018 03:06)  T(F): 97.8 (17 Mar 2018 03:06), Max: 97.8 (17 Mar 2018 03:06)  HR: 82 (17 Mar 2018 05:42) (70 - 110)  BP: 165/81 (17 Mar 2018 05:42) (165/81 - 206/92)  BP(mean): --  RR: 18 (17 Mar 2018 05:42) (18 - 22)  SpO2: 100% (17 Mar 2018 05:42) (85% - 100%)      PHYSICAL EXAM:    GENERAL: appears chronically ill, oriented.  HEAD:  Atraumatic, Normocephalic  EYES: EOMI, PERRLA, conjunctiva and sclera clear  ENMT: No tonsillar erythema, exudates, or enlargement; Moist mucous membranes, Good dentition, No lesions  NECK: Supple, neck  veins full  NERVOUS SYSTEM:  Alert & Oriented X3, Good concentration; Motor Strength wnl upper and lower extremities;  CHEST/LUNG: Bilateral rales, no wheezes, on nasal 02 now  HEART: Regular rate and rhythm; No  rubs, or gallops  ABDOMEN: Soft, Nontender, Nondistended; Bowel sounds present, body wall and flank edema  EXTREMITIES:  edema legs mild, left arm a-v fistula  LYMPH: No lymphadenopathy noted  SKIN: No rashes or lesions, pale   neg    LABS:                        9.2    5.6   )-----------( 175      ( 17 Mar 2018 03:52 )             27.9     03-17    138  |  98  |  68.0<H>  ----------------------------<  174<H>  5.3   |  25.0  |  10.52<H>    Ca    8.8      17 Mar 2018 04:56    TPro  6.5<L>  /  Alb  4.0  /  TBili  0.3<L>  /  DBili  x   /  AST  22  /  ALT  20  /  AlkPhos  87  03-17                RADIOLOGY & ADDITIONAL TESTS:

## 2018-03-17 NOTE — PROGRESS NOTE ADULT - ASSESSMENT
ESRD HD today h/o of noncompliance w fluid restriction for HD pt  additional UF today w HD  Anemia 2/2 CKD will check iron studies  HTN will remove additional UF w HD

## 2018-03-17 NOTE — ED PROVIDER NOTE - OBJECTIVE STATEMENT
53 y/o M with PMHx of HTN, DM and renal failure presents to ED c/o shortness of breath, difficulty breathing, and non-productive cough onset 2 hours PTA. Denies fever, chills, leg swelling, CP, abdominal pain, N/V/D or sick contacts at home. Pt produced urine 2 times per day and states he is still producing urine at baseline. States he does not feel like he is in fluid overload right now. He is due to go to dialysis tomorrow. No further complaints at this time.

## 2018-03-17 NOTE — PROGRESS NOTE ADULT - SUBJECTIVE AND OBJECTIVE BOX
NEPHROLOGY INTERVAL HPI/OVERNIGHT EVENTS:    Examined   HD today    MEDICATIONS  (STANDING):  epoetin dima Injectable 40077 Unit(s) IV Push once    MEDICATIONS  (PRN):      Allergies    No Known Allergies    Intolerances        Vital Signs Last 24 Hrs  T(C): 36.8 (17 Mar 2018 07:50), Max: 36.8 (17 Mar 2018 07:50)  T(F): 98.3 (17 Mar 2018 07:50), Max: 98.3 (17 Mar 2018 07:50)  HR: 97 (17 Mar 2018 07:50) (70 - 110)  BP: 165/79 (17 Mar 2018 07:50) (165/79 - 206/92)  BP(mean): --  RR: 20 (17 Mar 2018 07:50) (18 - 22)  SpO2: 93% (17 Mar 2018 07:50) (85% - 100%)  Daily     Daily     PHYSICAL EXAM:  GENERAL: appears chronically ill, oriented.  HEAD:  Atraumatic, Normocephalic  EYES: EOMI  NECK: Supple  NERVOUS SYSTEM:  Alert & Oriented X3  CHEST/LUNG: Bilateral rales, no wheezes  HEART: Regular rate and rhythm; No  rubs  ABDOMEN: Soft, Nontender, Nondistended; Bowel sounds present,    LABS:                        9.2    5.6   )-----------( 175      ( 17 Mar 2018 03:52 )             27.9     03-17    138  |  98  |  68.0<H>  ----------------------------<  174<H>  5.3   |  25.0  |  10.52<H>    Ca    8.8      17 Mar 2018 04:56    TPro  6.5<L>  /  Alb  4.0  /  TBili  0.3<L>  /  DBili  x   /  AST  22  /  ALT  20  /  AlkPhos  87  03-17                RADIOLOGY & ADDITIONAL TESTS:

## 2018-03-17 NOTE — ED PROVIDER NOTE - MEDICAL DECISION MAKING DETAILS
Will start on BiPAP, treat for fluid overload and speak with renal DrPadmini Boogie (kidney doctor). Give Lasix and re-assess.

## 2018-03-17 NOTE — ED PROVIDER NOTE - PROGRESS NOTE DETAILS
Pt markedly improved with Bi-pap and meds.  Now laying flat with improved BP after IV Hydralazine.  Case d/w Renal/Dr. Boogie and will arrange for urgent dialysis this AM. Case d/w Hospitalist/Dr. May and will admit

## 2018-03-17 NOTE — ED ADULT NURSE NOTE - OBJECTIVE STATEMENT
pt care assumed at 0340, no apparent distress noted at this time. pt received Alert and Oriented to person, place, situation and time sitting in bed with family at bedside. pt c/o shortness of breath for 2 days and increased shortness of breath today. pt receives dialysis Tuesday, Thursdays and Saturdays. pt has AV fistula in the left ac with positive thrill and bruits. pt placed on bipap. MD Monet and resp therapist at bedside. HR is sinus tach, lung sounds are clear b/l, abd is soft and nontender with positive bowel sounds in all four quadrants, skin is warm, dry and appropriate for age and race. plan of care explained, will continue to monitor.
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
## 2018-03-17 NOTE — ED ADULT NURSE REASSESSMENT NOTE - NS ED NURSE REASSESS COMMENT FT1
RN gave report to dialysis nurse at 0700. pt is awaiting consult from Dr. Boogie the nephrologist. pt remains Normal Sinus Rhythm on cardiac monitor. Pt is tolerating 4L NC with 94%. plan of care explained, will continue to monitor .
report given to armani deras. patient to return from dialysis to assigned bed in essu.
Received report from SEAN Gonzalez. Patient received awake alert and oriented x3. Respirations unlabored; rales bilaterally. Patient on 5L nasal cannula spo2 94%. Denies any other complaints at this time. Awaiting dialysis.

## 2018-03-18 ENCOUNTER — TRANSCRIPTION ENCOUNTER (OUTPATIENT)
Age: 53
End: 2018-03-18

## 2018-03-18 VITALS — SYSTOLIC BLOOD PRESSURE: 158 MMHG | DIASTOLIC BLOOD PRESSURE: 70 MMHG

## 2018-03-18 LAB
ANION GAP SERPL CALC-SCNC: 15 MMOL/L — SIGNIFICANT CHANGE UP (ref 5–17)
BUN SERPL-MCNC: 44 MG/DL — HIGH (ref 8–20)
CALCIUM SERPL-MCNC: 9.3 MG/DL — SIGNIFICANT CHANGE UP (ref 8.6–10.2)
CHLORIDE SERPL-SCNC: 98 MMOL/L — SIGNIFICANT CHANGE UP (ref 98–107)
CO2 SERPL-SCNC: 28 MMOL/L — SIGNIFICANT CHANGE UP (ref 22–29)
CREAT SERPL-MCNC: 8.61 MG/DL — HIGH (ref 0.5–1.3)
FERRITIN SERPL-MCNC: 970 NG/ML — HIGH (ref 30–400)
GLUCOSE BLDC GLUCOMTR-MCNC: 108 MG/DL — HIGH (ref 70–99)
GLUCOSE BLDC GLUCOMTR-MCNC: 69 MG/DL — LOW (ref 70–99)
GLUCOSE SERPL-MCNC: 67 MG/DL — LOW (ref 70–115)
HBA1C BLD-MCNC: 5.5 % — SIGNIFICANT CHANGE UP (ref 4–5.6)
HCT VFR BLD CALC: 27.7 % — LOW (ref 42–52)
HGB BLD-MCNC: 9.1 G/DL — LOW (ref 14–18)
IRON SATN MFR SERPL: 28 % — SIGNIFICANT CHANGE UP (ref 16–55)
IRON SATN MFR SERPL: 74 UG/DL — SIGNIFICANT CHANGE UP (ref 59–158)
MAGNESIUM SERPL-MCNC: 2.5 MG/DL — SIGNIFICANT CHANGE UP (ref 1.6–2.6)
MCHC RBC-ENTMCNC: 32.5 PG — HIGH (ref 27–31)
MCHC RBC-ENTMCNC: 32.9 G/DL — SIGNIFICANT CHANGE UP (ref 32–36)
MCV RBC AUTO: 98.9 FL — HIGH (ref 80–94)
PHOSPHATE SERPL-MCNC: 4.7 MG/DL — SIGNIFICANT CHANGE UP (ref 2.4–4.7)
PLATELET # BLD AUTO: 129 K/UL — LOW (ref 150–400)
POTASSIUM SERPL-MCNC: 4.9 MMOL/L — SIGNIFICANT CHANGE UP (ref 3.5–5.3)
POTASSIUM SERPL-SCNC: 4.9 MMOL/L — SIGNIFICANT CHANGE UP (ref 3.5–5.3)
RBC # BLD: 2.8 M/UL — LOW (ref 4.6–6.2)
RBC # FLD: 15 % — SIGNIFICANT CHANGE UP (ref 11–15.6)
SODIUM SERPL-SCNC: 141 MMOL/L — SIGNIFICANT CHANGE UP (ref 135–145)
TIBC SERPL-MCNC: 266 UG/DL — SIGNIFICANT CHANGE UP (ref 220–430)
TRANSFERRIN SERPL-MCNC: 186 MG/DL — SIGNIFICANT CHANGE UP (ref 180–329)
WBC # BLD: 5.1 K/UL — SIGNIFICANT CHANGE UP (ref 4.8–10.8)
WBC # FLD AUTO: 5.1 K/UL — SIGNIFICANT CHANGE UP (ref 4.8–10.8)

## 2018-03-18 PROCEDURE — 84100 ASSAY OF PHOSPHORUS: CPT

## 2018-03-18 PROCEDURE — 96374 THER/PROPH/DIAG INJ IV PUSH: CPT

## 2018-03-18 PROCEDURE — 83036 HEMOGLOBIN GLYCOSYLATED A1C: CPT

## 2018-03-18 PROCEDURE — 84484 ASSAY OF TROPONIN QUANT: CPT

## 2018-03-18 PROCEDURE — 83735 ASSAY OF MAGNESIUM: CPT

## 2018-03-18 PROCEDURE — 82550 ASSAY OF CK (CPK): CPT

## 2018-03-18 PROCEDURE — 82962 GLUCOSE BLOOD TEST: CPT

## 2018-03-18 PROCEDURE — 94660 CPAP INITIATION&MGMT: CPT

## 2018-03-18 PROCEDURE — 84466 ASSAY OF TRANSFERRIN: CPT

## 2018-03-18 PROCEDURE — 94760 N-INVAS EAR/PLS OXIMETRY 1: CPT

## 2018-03-18 PROCEDURE — 85027 COMPLETE CBC AUTOMATED: CPT

## 2018-03-18 PROCEDURE — 96375 TX/PRO/DX INJ NEW DRUG ADDON: CPT

## 2018-03-18 PROCEDURE — 83550 IRON BINDING TEST: CPT

## 2018-03-18 PROCEDURE — 36415 COLL VENOUS BLD VENIPUNCTURE: CPT

## 2018-03-18 PROCEDURE — 71045 X-RAY EXAM CHEST 1 VIEW: CPT

## 2018-03-18 PROCEDURE — 82728 ASSAY OF FERRITIN: CPT

## 2018-03-18 PROCEDURE — 93005 ELECTROCARDIOGRAM TRACING: CPT

## 2018-03-18 PROCEDURE — 80048 BASIC METABOLIC PNL TOTAL CA: CPT

## 2018-03-18 PROCEDURE — 36600 WITHDRAWAL OF ARTERIAL BLOOD: CPT

## 2018-03-18 PROCEDURE — 99239 HOSP IP/OBS DSCHRG MGMT >30: CPT

## 2018-03-18 PROCEDURE — 80053 COMPREHEN METABOLIC PANEL: CPT

## 2018-03-18 PROCEDURE — 99291 CRITICAL CARE FIRST HOUR: CPT | Mod: 25

## 2018-03-18 PROCEDURE — 82553 CREATINE MB FRACTION: CPT

## 2018-03-18 RX ORDER — INSULIN GLARGINE 100 [IU]/ML
40 INJECTION, SOLUTION SUBCUTANEOUS AT BEDTIME
Qty: 0 | Refills: 0 | Status: DISCONTINUED | OUTPATIENT
Start: 2018-03-18 | End: 2018-03-18

## 2018-03-18 RX ORDER — PANTOPRAZOLE SODIUM 20 MG/1
1 TABLET, DELAYED RELEASE ORAL
Qty: 0 | Refills: 0 | COMMUNITY
Start: 2018-03-18

## 2018-03-18 RX ORDER — INSULIN GLARGINE 100 [IU]/ML
34 INJECTION, SOLUTION SUBCUTANEOUS AT BEDTIME
Qty: 0 | Refills: 0 | Status: DISCONTINUED | OUTPATIENT
Start: 2018-03-18 | End: 2018-03-18

## 2018-03-18 RX ADMIN — Medication 1 MILLIGRAM(S): at 11:47

## 2018-03-18 RX ADMIN — VALSARTAN 160 MILLIGRAM(S): 80 TABLET ORAL at 06:40

## 2018-03-18 RX ADMIN — Medication 1334 MILLIGRAM(S): at 08:45

## 2018-03-18 RX ADMIN — Medication 240 MILLIGRAM(S): at 06:40

## 2018-03-18 RX ADMIN — Medication 50 MILLIGRAM(S): at 11:47

## 2018-03-18 RX ADMIN — PANTOPRAZOLE SODIUM 40 MILLIGRAM(S): 20 TABLET, DELAYED RELEASE ORAL at 06:40

## 2018-03-18 RX ADMIN — Medication 1334 MILLIGRAM(S): at 11:47

## 2018-03-18 NOTE — DISCHARGE NOTE ADULT - CARE PLAN
Principal Discharge DX:	Hypervolemia, unspecified hypervolemia type  Goal:	Please take all meds and f/u with nephrologist  Assessment and plan of treatment:	Please continue regular HD on M/W/F  Secondary Diagnosis:	ESRD on hemodialysis

## 2018-03-18 NOTE — DISCHARGE NOTE ADULT - PATIENT PORTAL LINK FT
You can access the [facility] Patient Portal, offered by [facility] [place], by registering with the following website: [email or web address]

## 2018-03-18 NOTE — DISCHARGE NOTE ADULT - MEDICATION SUMMARY - MEDICATIONS TO STOP TAKING
I will STOP taking the medications listed below when I get home from the hospital:    insulin  --    azithromycin  -- 250 milligram(s) by mouth once a day

## 2018-03-18 NOTE — DISCHARGE NOTE ADULT - CARE PROVIDER_API CALL
Chente Malcolm (DO), Internal Medicine  [address] [place] [address]  Phone: [phone]  Fax: [phone]

## 2018-03-18 NOTE — PROGRESS NOTE ADULT - SUBJECTIVE AND OBJECTIVE BOX
NEPHROLOGY INTERVAL HPI/OVERNIGHT EVENTS:    Examined earlier    MEDICATIONS  (STANDING):  calcium acetate 1334 milliGRAM(s) Oral three times a day with meals  dextrose 5%. 1000 milliLiter(s) (50 mL/Hr) IV Continuous <Continuous>  dextrose 50% Injectable 12.5 Gram(s) IV Push once  dextrose 50% Injectable 25 Gram(s) IV Push once  dextrose 50% Injectable 25 Gram(s) IV Push once  diltiazem    milliGRAM(s) Oral daily  folic acid 1 milliGRAM(s) Oral daily  insulin glargine Injectable (LANTUS) 40 Unit(s) SubCutaneous at bedtime  insulin lispro (HumaLOG) corrective regimen sliding scale   SubCutaneous three times a day before meals  pantoprazole    Tablet 40 milliGRAM(s) Oral before breakfast  pyridoxine 50 milliGRAM(s) Oral daily  tamsulosin 0.4 milliGRAM(s) Oral daily  valsartan 160 milliGRAM(s) Oral daily    MEDICATIONS  (PRN):  dextrose Gel 1 Dose(s) Oral once PRN Blood Glucose LESS THAN 70 milliGRAM(s)/deciliter  glucagon  Injectable 1 milliGRAM(s) IntraMuscular once PRN Glucose LESS THAN 70 milligrams/deciliter      Allergies    No Known Allergies    Intolerances        Vital Signs Last 24 Hrs  T(C): 37.2 (18 Mar 2018 10:21), Max: 37.3 (18 Mar 2018 00:59)  T(F): 98.9 (18 Mar 2018 10:21), Max: 99.1 (18 Mar 2018 00:59)  HR: 78 (18 Mar 2018 10:21) (78 - 94)  BP: 149/77 (18 Mar 2018 10:21) (122/73 - 175/84)  BP(mean): --  RR: 18 (18 Mar 2018 10:21) (17 - 20)  SpO2: 100% (18 Mar 2018 10:21) (95% - 100%)  Daily     Daily Weight in k.3 (18 Mar 2018 06:00)    PHYSICAL EXAM:  GENERAL: appears chronically ill, oriented.  HEAD:  Atraumatic, Normocephalic  EYES: EOMI  NECK: Supple  NERVOUS SYSTEM:  Alert & Oriented X3  CHEST/LUNG: Bilateral rales, no wheezes  HEART: Regular rate and rhythm; No  rubs  ABDOMEN: Soft, Nontender, Nondistended; Bowel sounds present,      LABS:                        9.1    5.1   )-----------( 129      ( 18 Mar 2018 05:49 )             27.7         141  |  98  |  44.0<H>  ----------------------------<  67<L>  4.9   |  28.0  |  8.61<H>    Ca    9.3      18 Mar 2018 05:49  Phos  4.7       Mg     2.5         TPro  6.5<L>  /  Alb  4.0  /  TBili  0.3<L>  /  DBili  x   /  AST  22  /  ALT  20  /  AlkPhos  87          Magnesium, Serum: 2.5 mg/dL ( @ 05:49)  Phosphorus Level, Serum: 4.7 mg/dL ( @ 05:49)          RADIOLOGY & ADDITIONAL TESTS:

## 2018-03-18 NOTE — DISCHARGE NOTE ADULT - MEDICATION SUMMARY - MEDICATIONS TO TAKE
I will START or STAY ON the medications listed below when I get home from the hospital:    Diovan 160 mg oral tablet  -- 1 tab(s) by mouth 2 times a day  -- Do not take this drug if you are pregnant.  It is very important that you take or use this exactly as directed.  Do not skip doses or discontinue unless directed by your doctor.  Some non-prescription drugs may aggravate your condition.  Read all labels carefully.  If a warning appears, check with your doctor before taking.    -- Indication: For Htn    Flomax 0.4 mg oral capsule  -- 1 cap(s) by mouth once a day  -- It is very important that you take or use this exactly as directed.  Do not skip doses or discontinue unless directed by your doctor.  May cause drowsiness.  Alcohol may intensify this effect.  Use care when operating dangerous machinery.  Some non-prescription drugs may aggravate your condition.  Read all labels carefully.  If a warning appears, check with your doctor before taking.  Swallow whole.  Do not crush.  Take with food or milk.    -- Indication: For bph    dilTIAZem 240 mg/24 hours oral capsule, extended release  -- 1 cap(s) by mouth once a day  -- Indication: For Htn    insulin glargine (concentrated) 300 units/mL subcutaneous solution  -- 40 unit(s) subcutaneous once a day  -- Check with your doctor before becoming pregnant.  Do not drink alcoholic beverages when taking this medication.  Expires___________________  It is very important that you take or use this exactly as directed.  Do not skip doses or discontinue unless directed by your doctor.  Keep in refrigerator.  Do not freeze.  Obtain medical advice before taking any non-prescription drugs as some may affect the action of this medication.  This drug may impair the ability to drive or operate machinery.  Use care until you become familiar with its effects.    -- Indication: For diabetes    insulin lispro 100 units/mL subcutaneous solution  --  subcutaneous 4 times a day (before meals and at bedtime); 2 Unit(s) if Glucose 151 - 200  4 Unit(s) if Glucose 201 - 250  6 Unit(s) if Glucose 251 - 300  8 Unit(s) if Glucose 301 - 350  10 Unit(s) if Glucose 351 - 400  12 Unit(s) if Glucose GREATER THAN 400  -- Indication: For diabetes    calcium acetate 667 mg oral tablet  -- 2 by mouth 3 times a day  -- Indication: For ESRD on hemodialysis    pantoprazole 40 mg oral delayed release tablet  -- 1 tab(s) by mouth once a day (before a meal)  -- Indication: For gi prophylaxis    Sharon-Sesar oral tablet  -- 1 tab(s) by mouth once a day  -- Indication: For Supplementation    folic acid 1 mg oral tablet  -- 1 tab(s) by mouth once a day  -- Indication: For Supplementation    pyridoxine 50 mg oral tablet  -- 1 tab(s) by mouth once a day  -- Indication: For Supplementation

## 2018-03-18 NOTE — PROGRESS NOTE ADULT - ASSESSMENT
ESRD HD yest h/o of noncompliance w fluid restrictions  Next HD angel  Anemia 2/2 CKD will check iron studies  HTN will remove additional UF w HD
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
## 2018-03-24 NOTE — INPATIENT CERTIFICATION FOR MEDICARE PATIENTS - PHYSICIAN CONCUR
I concur with the Admission Order and I certify that services are provided in accordance with Section 42 CFR § 412.3
None

## 2018-04-09 NOTE — ED PROVIDER NOTE - RESPIRATORY [+], MLM
Dr. Mcclelland    See pt message, she wonders about increased dose     check last fill for gabapentin was 11/13/17 for 270    Med cued    Kalpana Neal RN   [facility]             SHORTNESS OF BREATH/"chest pressure"

## 2018-12-03 ENCOUNTER — INPATIENT (INPATIENT)
Facility: HOSPITAL | Age: 53
LOS: 1 days | Discharge: HOME | DRG: 189 | End: 2018-12-05
Attending: INTERNAL MEDICINE | Admitting: INTERNAL MEDICINE
Payer: MEDICARE

## 2018-12-03 VITALS
RESPIRATION RATE: 34 BRPM | HEART RATE: 113 BPM | SYSTOLIC BLOOD PRESSURE: 206 MMHG | DIASTOLIC BLOOD PRESSURE: 99 MMHG | OXYGEN SATURATION: 86 % | WEIGHT: 193.12 LBS

## 2018-12-03 DIAGNOSIS — I24.8 OTHER FORMS OF ACUTE ISCHEMIC HEART DISEASE: ICD-10-CM

## 2018-12-03 DIAGNOSIS — E87.5 HYPERKALEMIA: ICD-10-CM

## 2018-12-03 DIAGNOSIS — J81.0 ACUTE PULMONARY EDEMA: ICD-10-CM

## 2018-12-03 DIAGNOSIS — I50.9 HEART FAILURE, UNSPECIFIED: ICD-10-CM

## 2018-12-03 DIAGNOSIS — E11.9 TYPE 2 DIABETES MELLITUS WITHOUT COMPLICATIONS: ICD-10-CM

## 2018-12-03 DIAGNOSIS — I25.10 ATHEROSCLEROTIC HEART DISEASE OF NATIVE CORONARY ARTERY WITHOUT ANGINA PECTORIS: ICD-10-CM

## 2018-12-03 DIAGNOSIS — J96.01 ACUTE RESPIRATORY FAILURE WITH HYPOXIA: ICD-10-CM

## 2018-12-03 DIAGNOSIS — I16.1 HYPERTENSIVE EMERGENCY: ICD-10-CM

## 2018-12-03 LAB
ALBUMIN SERPL ELPH-MCNC: 4.6 G/DL — SIGNIFICANT CHANGE UP (ref 3.3–5.2)
ALP SERPL-CCNC: 86 U/L — SIGNIFICANT CHANGE UP (ref 40–120)
ALT FLD-CCNC: 20 U/L — SIGNIFICANT CHANGE UP
ANION GAP SERPL CALC-SCNC: 17 MMOL/L — SIGNIFICANT CHANGE UP (ref 5–17)
APTT BLD: 33.5 SEC — SIGNIFICANT CHANGE UP (ref 27.5–36.3)
AST SERPL-CCNC: 23 U/L — SIGNIFICANT CHANGE UP
BASOPHILS # BLD AUTO: 0 K/UL — SIGNIFICANT CHANGE UP (ref 0–0.2)
BASOPHILS NFR BLD AUTO: 0.4 % — SIGNIFICANT CHANGE UP (ref 0–2)
BILIRUB SERPL-MCNC: 0.4 MG/DL — SIGNIFICANT CHANGE UP (ref 0.4–2)
BLD GP AB SCN SERPL QL: SIGNIFICANT CHANGE UP
BUN SERPL-MCNC: 33 MG/DL — HIGH (ref 8–20)
BUN SERPL-MCNC: 72 MG/DL — HIGH (ref 8–20)
BUN SERPL-MCNC: 72 MG/DL — HIGH (ref 8–20)
CALCIUM SERPL-MCNC: 9.3 MG/DL — SIGNIFICANT CHANGE UP (ref 8.6–10.2)
CALCIUM SERPL-MCNC: 9.4 MG/DL — SIGNIFICANT CHANGE UP (ref 8.6–10.2)
CALCIUM SERPL-MCNC: 9.9 MG/DL — SIGNIFICANT CHANGE UP (ref 8.6–10.2)
CHLORIDE SERPL-SCNC: 100 MMOL/L — SIGNIFICANT CHANGE UP (ref 98–107)
CHLORIDE SERPL-SCNC: 94 MMOL/L — LOW (ref 98–107)
CHLORIDE SERPL-SCNC: 97 MMOL/L — LOW (ref 98–107)
CO2 SERPL-SCNC: 24 MMOL/L — SIGNIFICANT CHANGE UP (ref 22–29)
CO2 SERPL-SCNC: 26 MMOL/L — SIGNIFICANT CHANGE UP (ref 22–29)
CO2 SERPL-SCNC: 26 MMOL/L — SIGNIFICANT CHANGE UP (ref 22–29)
CREAT SERPL-MCNC: 11.43 MG/DL — HIGH (ref 0.5–1.3)
CREAT SERPL-MCNC: 12.04 MG/DL — HIGH (ref 0.5–1.3)
CREAT SERPL-MCNC: 5.37 MG/DL — HIGH (ref 0.5–1.3)
EOSINOPHIL # BLD AUTO: 0.2 K/UL — SIGNIFICANT CHANGE UP (ref 0–0.5)
EOSINOPHIL NFR BLD AUTO: 2.7 % — SIGNIFICANT CHANGE UP (ref 0–6)
GLUCOSE BLDC GLUCOMTR-MCNC: 128 MG/DL — HIGH (ref 70–99)
GLUCOSE BLDC GLUCOMTR-MCNC: 132 MG/DL — HIGH (ref 70–99)
GLUCOSE BLDC GLUCOMTR-MCNC: 140 MG/DL — HIGH (ref 70–99)
GLUCOSE BLDC GLUCOMTR-MCNC: 209 MG/DL — HIGH (ref 70–99)
GLUCOSE SERPL-MCNC: 117 MG/DL — HIGH (ref 70–115)
GLUCOSE SERPL-MCNC: 219 MG/DL — HIGH (ref 70–115)
GLUCOSE SERPL-MCNC: 224 MG/DL — HIGH (ref 70–115)
HCT VFR BLD CALC: 32 % — LOW (ref 42–52)
HGB BLD-MCNC: 10.6 G/DL — LOW (ref 14–18)
INR BLD: 0.92 RATIO — SIGNIFICANT CHANGE UP (ref 0.88–1.16)
LYMPHOCYTES # BLD AUTO: 1.1 K/UL — SIGNIFICANT CHANGE UP (ref 1–4.8)
LYMPHOCYTES # BLD AUTO: 13.9 % — LOW (ref 20–55)
MAGNESIUM SERPL-MCNC: 2.8 MG/DL — HIGH (ref 1.8–2.6)
MCHC RBC-ENTMCNC: 31.4 PG — HIGH (ref 27–31)
MCHC RBC-ENTMCNC: 33.1 G/DL — SIGNIFICANT CHANGE UP (ref 32–36)
MCV RBC AUTO: 94.7 FL — HIGH (ref 80–94)
MONOCYTES # BLD AUTO: 0.4 K/UL — SIGNIFICANT CHANGE UP (ref 0–0.8)
MONOCYTES NFR BLD AUTO: 5.4 % — SIGNIFICANT CHANGE UP (ref 3–10)
MRSA PCR RESULT.: SIGNIFICANT CHANGE UP
NEUTROPHILS # BLD AUTO: 6.4 K/UL — SIGNIFICANT CHANGE UP (ref 1.8–8)
NEUTROPHILS NFR BLD AUTO: 77.4 % — HIGH (ref 37–73)
PLATELET # BLD AUTO: 162 K/UL — SIGNIFICANT CHANGE UP (ref 150–400)
POTASSIUM SERPL-MCNC: 4.1 MMOL/L — SIGNIFICANT CHANGE UP (ref 3.5–5.3)
POTASSIUM SERPL-MCNC: 6.5 MMOL/L — CRITICAL HIGH (ref 3.5–5.3)
POTASSIUM SERPL-MCNC: 6.6 MMOL/L — CRITICAL HIGH (ref 3.5–5.3)
POTASSIUM SERPL-SCNC: 4.1 MMOL/L — SIGNIFICANT CHANGE UP (ref 3.5–5.3)
POTASSIUM SERPL-SCNC: 6.5 MMOL/L — CRITICAL HIGH (ref 3.5–5.3)
POTASSIUM SERPL-SCNC: 6.6 MMOL/L — CRITICAL HIGH (ref 3.5–5.3)
PROT SERPL-MCNC: 7.7 G/DL — SIGNIFICANT CHANGE UP (ref 6.6–8.7)
PROTHROM AB SERPL-ACNC: 10.5 SEC — SIGNIFICANT CHANGE UP (ref 10–12.9)
RAPID RVP RESULT: SIGNIFICANT CHANGE UP
RBC # BLD: 3.38 M/UL — LOW (ref 4.6–6.2)
RBC # FLD: 14.6 % — SIGNIFICANT CHANGE UP (ref 11–15.6)
S AUREUS DNA NOSE QL NAA+PROBE: SIGNIFICANT CHANGE UP
SODIUM SERPL-SCNC: 137 MMOL/L — SIGNIFICANT CHANGE UP (ref 135–145)
SODIUM SERPL-SCNC: 140 MMOL/L — SIGNIFICANT CHANGE UP (ref 135–145)
SODIUM SERPL-SCNC: 141 MMOL/L — SIGNIFICANT CHANGE UP (ref 135–145)
TROPONIN T SERPL-MCNC: 0.21 NG/ML — HIGH (ref 0–0.06)
TROPONIN T SERPL-MCNC: 0.21 NG/ML — HIGH (ref 0–0.06)
TROPONIN T SERPL-MCNC: 0.28 NG/ML — HIGH (ref 0–0.06)
TYPE + AB SCN PNL BLD: SIGNIFICANT CHANGE UP
WBC # BLD: 8.2 K/UL — SIGNIFICANT CHANGE UP (ref 4.8–10.8)
WBC # FLD AUTO: 8.2 K/UL — SIGNIFICANT CHANGE UP (ref 4.8–10.8)

## 2018-12-03 PROCEDURE — 71045 X-RAY EXAM CHEST 1 VIEW: CPT | Mod: 26

## 2018-12-03 PROCEDURE — 99291 CRITICAL CARE FIRST HOUR: CPT

## 2018-12-03 PROCEDURE — 93010 ELECTROCARDIOGRAM REPORT: CPT

## 2018-12-03 RX ORDER — NITROGLYCERIN 6.5 MG
50 CAPSULE, EXTENDED RELEASE ORAL
Qty: 50 | Refills: 0 | Status: DISCONTINUED | OUTPATIENT
Start: 2018-12-03 | End: 2018-12-03

## 2018-12-03 RX ORDER — INSULIN GLARGINE 100 [IU]/ML
20 INJECTION, SOLUTION SUBCUTANEOUS AT BEDTIME
Qty: 0 | Refills: 0 | Status: DISCONTINUED | OUTPATIENT
Start: 2018-12-03 | End: 2018-12-05

## 2018-12-03 RX ORDER — DEXTROSE 50 % IN WATER 50 %
25 SYRINGE (ML) INTRAVENOUS ONCE
Qty: 0 | Refills: 0 | Status: COMPLETED | OUTPATIENT
Start: 2018-12-03 | End: 2018-12-03

## 2018-12-03 RX ORDER — LOSARTAN POTASSIUM 100 MG/1
50 TABLET, FILM COATED ORAL DAILY
Qty: 0 | Refills: 0 | Status: DISCONTINUED | OUTPATIENT
Start: 2018-12-03 | End: 2018-12-05

## 2018-12-03 RX ORDER — SODIUM CHLORIDE 9 MG/ML
1000 INJECTION, SOLUTION INTRAVENOUS
Qty: 0 | Refills: 0 | Status: DISCONTINUED | OUTPATIENT
Start: 2018-12-03 | End: 2018-12-05

## 2018-12-03 RX ORDER — INSULIN LISPRO 100/ML
VIAL (ML) SUBCUTANEOUS
Qty: 0 | Refills: 0 | Status: DISCONTINUED | OUTPATIENT
Start: 2018-12-03 | End: 2018-12-05

## 2018-12-03 RX ORDER — PYRIDOXINE HCL (VITAMIN B6) 100 MG
25 TABLET ORAL DAILY
Qty: 0 | Refills: 0 | Status: DISCONTINUED | OUTPATIENT
Start: 2018-12-03 | End: 2018-12-05

## 2018-12-03 RX ORDER — FUROSEMIDE 40 MG
80 TABLET ORAL DAILY
Qty: 0 | Refills: 0 | Status: DISCONTINUED | OUTPATIENT
Start: 2018-12-03 | End: 2018-12-05

## 2018-12-03 RX ORDER — ASPIRIN/CALCIUM CARB/MAGNESIUM 324 MG
81 TABLET ORAL DAILY
Qty: 0 | Refills: 0 | Status: DISCONTINUED | OUTPATIENT
Start: 2018-12-03 | End: 2018-12-05

## 2018-12-03 RX ORDER — DEXTROSE 50 % IN WATER 50 %
15 SYRINGE (ML) INTRAVENOUS ONCE
Qty: 0 | Refills: 0 | Status: DISCONTINUED | OUTPATIENT
Start: 2018-12-03 | End: 2018-12-05

## 2018-12-03 RX ORDER — ALBUTEROL 90 UG/1
2.5 AEROSOL, METERED ORAL ONCE
Qty: 0 | Refills: 0 | Status: COMPLETED | OUTPATIENT
Start: 2018-12-03 | End: 2018-12-03

## 2018-12-03 RX ORDER — CALCIUM ACETATE 667 MG
1334 TABLET ORAL
Qty: 0 | Refills: 0 | Status: DISCONTINUED | OUTPATIENT
Start: 2018-12-03 | End: 2018-12-05

## 2018-12-03 RX ORDER — LABETALOL HCL 100 MG
20 TABLET ORAL ONCE
Qty: 0 | Refills: 0 | Status: COMPLETED | OUTPATIENT
Start: 2018-12-03 | End: 2018-12-03

## 2018-12-03 RX ORDER — DEXTROSE 50 % IN WATER 50 %
25 SYRINGE (ML) INTRAVENOUS ONCE
Qty: 0 | Refills: 0 | Status: DISCONTINUED | OUTPATIENT
Start: 2018-12-03 | End: 2018-12-05

## 2018-12-03 RX ORDER — NIFEDIPINE 30 MG
60 TABLET, EXTENDED RELEASE 24 HR ORAL DAILY
Qty: 0 | Refills: 0 | Status: DISCONTINUED | OUTPATIENT
Start: 2018-12-03 | End: 2018-12-05

## 2018-12-03 RX ORDER — DEXTROSE 50 % IN WATER 50 %
12.5 SYRINGE (ML) INTRAVENOUS ONCE
Qty: 0 | Refills: 0 | Status: DISCONTINUED | OUTPATIENT
Start: 2018-12-03 | End: 2018-12-05

## 2018-12-03 RX ORDER — FOLIC ACID 0.8 MG
1 TABLET ORAL DAILY
Qty: 0 | Refills: 0 | Status: DISCONTINUED | OUTPATIENT
Start: 2018-12-03 | End: 2018-12-05

## 2018-12-03 RX ORDER — GLUCAGON INJECTION, SOLUTION 0.5 MG/.1ML
1 INJECTION, SOLUTION SUBCUTANEOUS ONCE
Qty: 0 | Refills: 0 | Status: DISCONTINUED | OUTPATIENT
Start: 2018-12-03 | End: 2018-12-05

## 2018-12-03 RX ORDER — ASPIRIN/CALCIUM CARB/MAGNESIUM 324 MG
81 TABLET ORAL ONCE
Qty: 0 | Refills: 0 | Status: COMPLETED | OUTPATIENT
Start: 2018-12-03 | End: 2018-12-03

## 2018-12-03 RX ORDER — HYDRALAZINE HCL 50 MG
10 TABLET ORAL ONCE
Qty: 0 | Refills: 0 | Status: COMPLETED | OUTPATIENT
Start: 2018-12-03 | End: 2018-12-03

## 2018-12-03 RX ORDER — TICAGRELOR 90 MG/1
90 TABLET ORAL
Qty: 0 | Refills: 0 | Status: DISCONTINUED | OUTPATIENT
Start: 2018-12-03 | End: 2018-12-05

## 2018-12-03 RX ORDER — HYDRALAZINE HCL 50 MG
25 TABLET ORAL
Qty: 0 | Refills: 0 | Status: DISCONTINUED | OUTPATIENT
Start: 2018-12-03 | End: 2018-12-04

## 2018-12-03 RX ORDER — HEPARIN SODIUM 5000 [USP'U]/ML
5000 INJECTION INTRAVENOUS; SUBCUTANEOUS EVERY 8 HOURS
Qty: 0 | Refills: 0 | Status: DISCONTINUED | OUTPATIENT
Start: 2018-12-03 | End: 2018-12-05

## 2018-12-03 RX ORDER — INSULIN HUMAN 100 [IU]/ML
10 INJECTION, SOLUTION SUBCUTANEOUS ONCE
Qty: 0 | Refills: 0 | Status: COMPLETED | OUTPATIENT
Start: 2018-12-03 | End: 2018-12-03

## 2018-12-03 RX ORDER — TAMSULOSIN HYDROCHLORIDE 0.4 MG/1
0.4 CAPSULE ORAL AT BEDTIME
Qty: 0 | Refills: 0 | Status: DISCONTINUED | OUTPATIENT
Start: 2018-12-03 | End: 2018-12-05

## 2018-12-03 RX ORDER — CALCIUM CHLORIDE
1000 POWDER (GRAM) MISCELLANEOUS ONCE
Qty: 0 | Refills: 0 | Status: DISCONTINUED | OUTPATIENT
Start: 2018-12-03 | End: 2018-12-03

## 2018-12-03 RX ORDER — ACETAMINOPHEN 500 MG
1000 TABLET ORAL ONCE
Qty: 0 | Refills: 0 | Status: COMPLETED | OUTPATIENT
Start: 2018-12-03 | End: 2018-12-03

## 2018-12-03 RX ORDER — CALCIUM GLUCONATE 100 MG/ML
1 VIAL (ML) INTRAVENOUS ONCE
Qty: 0 | Refills: 0 | Status: COMPLETED | OUTPATIENT
Start: 2018-12-03 | End: 2018-12-03

## 2018-12-03 RX ORDER — DEXTROSE 50 % IN WATER 50 %
50 SYRINGE (ML) INTRAVENOUS ONCE
Qty: 0 | Refills: 0 | Status: COMPLETED | OUTPATIENT
Start: 2018-12-03 | End: 2018-12-03

## 2018-12-03 RX ORDER — ACETAMINOPHEN 500 MG
650 TABLET ORAL EVERY 6 HOURS
Qty: 0 | Refills: 0 | Status: DISCONTINUED | OUTPATIENT
Start: 2018-12-03 | End: 2018-12-05

## 2018-12-03 RX ADMIN — Medication 81 MILLIGRAM(S): at 04:40

## 2018-12-03 RX ADMIN — Medication 50 MILLILITER(S): at 03:37

## 2018-12-03 RX ADMIN — Medication 1334 MILLIGRAM(S): at 19:03

## 2018-12-03 RX ADMIN — INSULIN HUMAN 10 UNIT(S): 100 INJECTION, SOLUTION SUBCUTANEOUS at 05:21

## 2018-12-03 RX ADMIN — ALBUTEROL 2.5 MILLIGRAM(S): 90 AEROSOL, METERED ORAL at 03:44

## 2018-12-03 RX ADMIN — ALBUTEROL 2.5 MILLIGRAM(S): 90 AEROSOL, METERED ORAL at 05:22

## 2018-12-03 RX ADMIN — INSULIN HUMAN 10 UNIT(S): 100 INJECTION, SOLUTION SUBCUTANEOUS at 03:44

## 2018-12-03 RX ADMIN — INSULIN GLARGINE 20 UNIT(S): 100 INJECTION, SOLUTION SUBCUTANEOUS at 22:25

## 2018-12-03 RX ADMIN — TAMSULOSIN HYDROCHLORIDE 0.4 MILLIGRAM(S): 0.4 CAPSULE ORAL at 22:18

## 2018-12-03 RX ADMIN — Medication 25 MILLIGRAM(S): at 16:51

## 2018-12-03 RX ADMIN — TICAGRELOR 90 MILLIGRAM(S): 90 TABLET ORAL at 17:23

## 2018-12-03 RX ADMIN — Medication 60 MILLIGRAM(S): at 16:51

## 2018-12-03 RX ADMIN — ALBUTEROL 2.5 MILLIGRAM(S): 90 AEROSOL, METERED ORAL at 05:21

## 2018-12-03 RX ADMIN — Medication 25 MILLILITER(S): at 05:21

## 2018-12-03 RX ADMIN — HEPARIN SODIUM 5000 UNIT(S): 5000 INJECTION INTRAVENOUS; SUBCUTANEOUS at 16:51

## 2018-12-03 RX ADMIN — Medication 400 MILLIGRAM(S): at 10:24

## 2018-12-03 RX ADMIN — LOSARTAN POTASSIUM 50 MILLIGRAM(S): 100 TABLET, FILM COATED ORAL at 16:51

## 2018-12-03 RX ADMIN — Medication 20 MILLIGRAM(S): at 02:10

## 2018-12-03 RX ADMIN — Medication 25 MILLIGRAM(S): at 17:22

## 2018-12-03 RX ADMIN — Medication 1 MILLIGRAM(S): at 16:51

## 2018-12-03 RX ADMIN — Medication 15 MICROGRAM(S)/MIN: at 08:34

## 2018-12-03 RX ADMIN — Medication 80 MILLIGRAM(S): at 03:23

## 2018-12-03 RX ADMIN — Medication 15 MICROGRAM(S)/MIN: at 02:03

## 2018-12-03 RX ADMIN — ALBUTEROL 2.5 MILLIGRAM(S): 90 AEROSOL, METERED ORAL at 03:38

## 2018-12-03 RX ADMIN — Medication 1000 MILLIGRAM(S): at 11:42

## 2018-12-03 RX ADMIN — Medication 200 GRAM(S): at 08:34

## 2018-12-03 RX ADMIN — Medication 10 MILLIGRAM(S): at 04:40

## 2018-12-03 RX ADMIN — HEPARIN SODIUM 5000 UNIT(S): 5000 INJECTION INTRAVENOUS; SUBCUTANEOUS at 22:18

## 2018-12-03 NOTE — H&P ADULT - ATTENDING COMMENTS
I saw this patient independently in the AM and agree with above; see progress note for more details.
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
## 2018-12-03 NOTE — CONSULT NOTE ADULT - SUBJECTIVE AND OBJECTIVE BOX
Patient is a 53y old  Male who presents with a chief complaint of SOB, fluid overload (03 Dec 2018 11:58)-1 day  tends to not control POFs and has happened many times before    HPI:  54 y/o male with PMH of ESRD on dialysis (6 years), DM, HTN, CAD s/p two stents within 1 year presents to [facility] with acute worsening SOB. Patient states the SOB started about 4 hours ago. He states he did not miss any dialysis appointments. He has been coughing and his daughter has been sick with a cough and runny nose. Today the shortness of breath got even worsen and he went to the ED, because of this he missed his missed his dialysis session. He admits to this happening about 1 year ago and the cause as he reports was fluid on the lungs. He denies fever, chill, nausea, CP, abdominal pain, diarrhea, or constipation. He did vomit x1. No hematemesis. In the ED he was found to have pulmonary edema and started on BiPAP 12/6 50% Fio2, BP was elevated to >200 systolic and nitro drip was added. Lab work significant for hyperkalemia, renal failure.      ICU Vital Signs Last 24 Hrs  T(C): 36.2 (03 Dec 2018 04:30), Max: 37 (03 Dec 2018 02:04)  T(F): 97.1 (03 Dec 2018 04:30), Max: 98.6 (03 Dec 2018 02:04)  HR: 82 (03 Dec 2018 06:15) (78 - 113)  BP: 167/79 (03 Dec 2018 06:15) (167/79 - 206/99)  BP(mean): --  ABP: --  ABP(mean): --  RR: 23 (03 Dec 2018 06:15) (21 - 36)  SpO2: 98% (03 Dec 2018 06:15) (86% - 100%) (03 Dec 2018 06:37)      PAST MEDICAL & SURGICAL HISTORY:  Hypertension  Diabetes  Dialysis patient  Renal failure  No significant past surgical history      FAMILY HISTORY:  No pertinent family history in first degree relatives      Social History:   -smoke   - Alcohol   -    Drugs        REVIEW OF SYSTEMS:  CONSTITUTIONAL: No fever, weight loss, or fatigue  EYES: No eye pain, No visual disturbances,   RESPIRATORY: No cough, hemoptysis; ++ SOB  CARDIOVASCULAR: No chest pain, No palpitations,   -leg swelling  GASTROINTESTINAL: No abdominal , NVD or GIB  GENITOURINARY: No UTI sx/hematuria  NEUROLOGICAL: No HA/wk/numbness  MUSCULOSKELETAL: No joint pain, + swelling      Vital Signs Last 24 Hrs  T(C): 36.6 (03 Dec 2018 13:50), Max: 37 (03 Dec 2018 02:04)  T(F): 97.8 (03 Dec 2018 13:50), Max: 98.6 (03 Dec 2018 02:04)  HR: 86 (03 Dec 2018 14:00) (78 - 113)  BP: 147/74 (03 Dec 2018 14:00) (122/64 - 206/99)  BP(mean): 102 (03 Dec 2018 14:00) (85 - 121)  RR: 20 (03 Dec 2018 14:00) (11 - 36)  SpO2: 100% (03 Dec 2018 14:00) (86% - 100%)    PHYSICAL EXAM:    GENERAL: NAD, Vented Obese  EYES:  conjunctiva and sclera clear  NECK: Supple, No JVD/Carotid Bruit -ve   NERVOUS SYSTEM:  A/O x3,   Lungs : few post rales, No rhonchi,   HEART:  No murmur,  No rub, No gallops  ABDOMEN: Soft, NT/ND BS+  EXTREMITIES:  + Peripheral Pulses, + edema  SKIN: No rashes or lesions      LABS:                        10.6   8.2   )-----------( 162      ( 03 Dec 2018 02:04 )             32.0     12-03    137  |  94<L>  |  33.0<H>  ----------------------------<  117<H>  4.1   |  26.0  |  5.37<H>    Ca    9.3      03 Dec 2018 12:09  Mg     2.8     12-03    TPro  7.7  /  Alb  4.6  /  TBili  0.4  /  DBili  x   /  AST  23  /  ALT  20  /  AlkPhos  86  12-03    PT/INR - ( 03 Dec 2018 02:04 )   PT: 10.5 sec;   INR: 0.92 ratio         PTT - ( 03 Dec 2018 02:04 )  PTT:33.5 sec    Magnesium, Serum: 2.8 mg/dL (12-03 @ 02:04)      RADIOLOGY & ADDITIONAL TESTS:    MEDICATIONS  (STANDING):  aspirin enteric coated  dextrose 40% Gel PRN  dextrose 5%.  dextrose 50% Injectable  dextrose 50% Injectable  dextrose 50% Injectable  furosemide   Injectable  glucagon  Injectable PRN  heparin  Injectable  insulin glargine Injectable (LANTUS)  insulin lispro (HumaLOG) corrective regimen sliding scale  tamsulosin  ticagrelor

## 2018-12-03 NOTE — H&P ADULT - HISTORY OF PRESENT ILLNESS
52 y/o male with PMH of ESRD on dialysis (6 years), DM, HTN, CAD s/p two stents within 1 year presents to [facility] with acute worsening SOB. Patient states the SOB started about 4 hours ago. He states he did not miss any dialysis appointments. He has been coughing and his daughter has been sick with a cough and runny nose. Today the shortness of breath got even worsen and he went to the ED, because of this he missed his missed his dialysis session. He admits to this happening about 1 year ago and the cause as he reports was fluid on the lungs. He denies fever, chill, nausea, CP, abdominal pain, diarrhea, or constipation. He did vomit x1. No hematemesis. In the ED he was found to have pulmonary edema and started on BiPAP 12/6 50% Fio2, BP was elevated to >200 systolic and nitro drip was added. Lab work significant for hyperkalemia, renal failure. 52 y/o male with PMH of ESRD on dialysis (6 years), DM, HTN, CAD s/p two stents within 1 year presents to [facility] with acute worsening SOB. Patient states the SOB started about 4 hours ago. He states he did not miss any dialysis appointments. He has been coughing and his daughter has been sick with a cough and runny nose. Today the shortness of breath got even worsen and he went to the ED, because of this he missed his missed his dialysis session. He admits to this happening about 1 year ago and the cause as he reports was fluid on the lungs. He denies fever, chill, nausea, CP, abdominal pain, diarrhea, or constipation. He did vomit x1. No hematemesis. In the ED he was found to have pulmonary edema and started on BiPAP 12/6 50% Fio2, BP was elevated to >200 systolic and nitro drip was added. Lab work significant for hyperkalemia, renal failure.      ICU Vital Signs Last 24 Hrs  T(C): 36.2 (03 Dec 2018 04:30), Max: 37 (03 Dec 2018 02:04)  T(F): 97.1 (03 Dec 2018 04:30), Max: 98.6 (03 Dec 2018 02:04)  HR: 82 (03 Dec 2018 06:15) (78 - 113)  BP: 167/79 (03 Dec 2018 06:15) (167/79 - 206/99)  BP(mean): --  ABP: --  ABP(mean): --  RR: 23 (03 Dec 2018 06:15) (21 - 36)  SpO2: 98% (03 Dec 2018 06:15) (86% - 100%)

## 2018-12-03 NOTE — PROGRESS NOTE ADULT - ATTENDING COMMENTS
I saw this patient independently while on NIPPV on HD and once again in the late afternoon once improved.    He had hypertensive crisis from dietary indiscretions -- he had [employer] soups over the weekend, which in all likelihood had a tremendous amount of salt.  When this occurred, the afterload prompted a decreased stroke volume and progressive pulmonary edema; treated with nitroglycerin infusion and noninvasive positive pressure ventilation at first until HD was started, 4.5L off.    Now feels much better; lung examination has improved substantially, now clear bilaterally; remains with lower extremity edema.  Feels much better as well.  heart is regular.  abdomen benign; head and neck examination normal, neuro normal.    In the morning he was critically ill from hypertensive crisis/pulmonary edema/volume overload; now improved.    OK for transfer out of MICU.  Does have troponin elevations; would consider following. I saw this patient independently while on NIPPV on HD and once again in the late afternoon once improved.    He had hypertensive crisis from dietary indiscretions -- he had [employer] soups over the weekend, which in all likelihood had a tremendous amount of salt.  When this occurred, the afterload prompted a decreased stroke volume and progressive pulmonary edema; treated with nitroglycerin infusion and noninvasive positive pressure ventilation at first until HD was started, 4.5L off.    Now feels much better; lung examination has improved substantially, now clear bilaterally; remains with lower extremity edema.  Feels much better as well.  heart is regular.  abdomen benign; head and neck examination normal, neuro normal.    In the morning he was critically ill from hypertensive crisis/pulmonary edema/volume overload; now improved.    Critical care time spent: 35 minutes    OK for transfer out of MICU.  Does have troponin elevations; would consider following.

## 2018-12-03 NOTE — H&P ADULT - PROBLEM SELECTOR PLAN 2
-Due to pulmonary edema  - Tolerating BiPAP 12/6 50% FiO2  - Needs emergent dialysis, pending   - Nitro drip to reduce after load

## 2018-12-03 NOTE — H&P ADULT - PROBLEM SELECTOR PLAN 3
-Received Insulin, D50, albuterol and calcium in the ED  -hyperkalemia not improved, needs dialysis  -EKG with peaked T waves  -Calcium dose now stat  -Repeat BMP in 2 hours  -Will need more insulin and albuterol if dialysis is delayed

## 2018-12-03 NOTE — PROGRESS NOTE ADULT - PROBLEM SELECTOR PLAN 3
-s/p  Insulin, D50, albuterol and calcium in the ED  - dialysis right now   -EKG with peaked T waves -s/p  Insulin, D50, albuterol and calcium in the ED  - dialysis right now   -EKG with peaked T waves  -will f/u K level

## 2018-12-03 NOTE — H&P ADULT - PROBLEM SELECTOR PLAN 1
-Etiology most likely from missed dialysis session, however hypertension may be playing a role  -Patient needs emergent dialysis, nephrology contacted by ED, dialysis to be given within a few hours  -Nitroglycerin drip and titrate to SBP <150  -Patient posses a high risk for decompensation given his ESRD, hyperkalemia, and hypoxic resp failure with HTN. If he is unable to get dialysis in a timely manner the patient may need to be intubated and would be at risk for cardiac arrest given his hyperkalemia. This patient requires close monitoring of hemodynamics and lab values.

## 2018-12-03 NOTE — H&P ADULT - PROBLEM SELECTOR PLAN 7
-ASA/Brilinta   -Confirm home medications (should be on statin and B-blocker)  -Uses [facility] in [place] ([address])

## 2018-12-03 NOTE — ED ADULT NURSE NOTE - OBJECTIVE STATEMENT
pt a+ox3, presents to ED in resp distress. pt tachypneic, accessory muscle use noted and diaphoretic. pt reports onset of sob this evening, reports dialysis patient, due in AM from dialysis.

## 2018-12-03 NOTE — H&P ADULT - PROBLEM SELECTOR PLAN 5
-Nitroglycerin drip titrate to SBP <150   - BP may improve with dialysis and fluid removal  -Restart home medications when confirmed and able

## 2018-12-03 NOTE — PROGRESS NOTE ADULT - PROBLEM SELECTOR PLAN 1
-2/2 to possible  viral infection , poor diet compliance vs hypertension   -Pt getting HD at this time ( will removed 4.5 lit as per nephro)  -Nitroglycerin drip off at this time , BP improving   -fluid restriction   -strict in and out  -RVP panel pending -2/2 to possible fluid overload  , poor diet compliance vs hypertension   -Pt getting HD at this time ( will removed 4.5 lit as per nephro)  -Nitroglycerin drip off at this time , BP improving   -fluid restriction   -strict in and out  -RVP panel pending

## 2018-12-03 NOTE — CONSULT NOTE ADULT - ASSESSMENT
Ac Pulm Edema - Vented, recd IV Lasix and NTG  Severe Htn - NTG, better  ESRD - on HD now  Dm watch BS  Anemia Hb > 10  Shall follow ; Thanks

## 2018-12-03 NOTE — ED ADULT TRIAGE NOTE - CHIEF COMPLAINT QUOTE
Pt presents with increased respiratory effort, diaphoretic, pt on non-rebreather mask, pt brought to critical care area, MD and RT called to bedside.

## 2018-12-03 NOTE — PROGRESS NOTE ADULT - SUBJECTIVE AND OBJECTIVE BOX
54 y/o male with PMH of ESRD on dialysis (6 years), DM, HTN, CAD s/p two stents within 1 year presents to [facility] with acute worsening SOB. Patient states the SOB started on the day before admission. He states he did not miss any dialysis appointments. He has been coughing and his daughter has been sick with a cough and runny nose. His SOB got even worsen and he went to the ED. He admits to this happening about 1 year ago and the cause as he reports was fluid on the lungs. He denies fever, chill, nausea, CP, abdominal pain, diarrhea, or constipation. He did vomit x1. No hematemesis. In the ED he was found to have pulmonary edema and started on BiPAP, BP was elevated to >200 systolic and nitro drip was added. Lab work significant for hyperkalemia, renal failure.            Patient seen and examined at bedside, No acute overnight events. Pt report that his breathing is better at this time , he denies any cough or chest pain. He report body aches and runny nose, pt also was drinking more than 1.5 lit of fluids at home.   Denies fever, chest pain, SOB, abdominal pain, diarrhea, constipation, calf pain.     Cardiac monitor reviewed; Sinus rhythm     VS:   Vital Signs Last 24 Hrs  T(C): 36.9 (03 Dec 2018 11:29), Max: 37 (03 Dec 2018 02:04)  T(F): 98.4 (03 Dec 2018 11:29), Max: 98.6 (03 Dec 2018 02:04)  HR: 83 (03 Dec 2018 11:30) (78 - 113)  BP: 149/71 (03 Dec 2018 11:30) (149/71 - 206/99)  BP(mean): 102 (03 Dec 2018 11:30) (102 - 121)  RR: 17 (03 Dec 2018 11:30) (11 - 36)  SpO2: 100% (03 Dec 2018 11:30) (86% - 100%)    PHYSICAL EXAM:                Labs:                        10.6   8.2   )-----------( 162      ( 03 Dec 2018 02:04 )             32.0   12-03    141  |  100  |  72.0<H>  ----------------------------<  219<H>  6.6<HH>   |  24.0  |  12.04<H>    Ca    9.4      03 Dec 2018 04:31  Mg     2.8     12-03    TPro  7.7  /  Alb  4.6  /  TBili  0.4  /  DBili  x   /  AST  23  /  ALT  20  /  AlkPhos  86  12-03 12-03 @ 07:01  -  12-03 @ 11:58  --------------------------------------------------------  IN: 330 mL / OUT: 0 mL / NET: 330 mL        Radiology:  *Pull    Medications:  MEDICATIONS  (STANDING):  aspirin enteric coated 81 milliGRAM(s) Oral daily  dextrose 5%. 1000 milliLiter(s) (50 mL/Hr) IV Continuous <Continuous>  dextrose 50% Injectable 12.5 Gram(s) IV Push once  dextrose 50% Injectable 25 Gram(s) IV Push once  dextrose 50% Injectable 25 Gram(s) IV Push once  furosemide   Injectable 80 milliGRAM(s) IV Push daily  heparin  Injectable 5000 Unit(s) SubCutaneous every 8 hours  insulin glargine Injectable (LANTUS) 20 Unit(s) SubCutaneous at bedtime  insulin lispro (HumaLOG) corrective regimen sliding scale   SubCutaneous three times a day before meals  tamsulosin 0.4 milliGRAM(s) Oral at bedtime  ticagrelor 90 milliGRAM(s) Oral two times a day    MEDICATIONS  (PRN):  dextrose 40% Gel 15 Gram(s) Oral once PRN Blood Glucose LESS THAN 70 milliGRAM(s)/deciliter  glucagon  Injectable 1 milliGRAM(s) IntraMuscular once PRN Glucose LESS THAN 70 milligrams/deciliter 54 y/o male with PMH of ESRD on dialysis (6 years), DM, HTN, CAD s/p two stents within 1 year presents to [facility] with acute worsening SOB. Patient states the SOB started on the day before admission. He states he did not miss any dialysis appointments. He has been coughing and his daughter has been sick with a cough and runny nose. His SOB got even worsen and he went to the ED. He admits to this happening about 1 year ago and the cause as he reports was fluid on the lungs. He denies fever, chill, nausea, CP, abdominal pain, diarrhea, or constipation. He did vomit x1. No hematemesis. In the ED he was found to have pulmonary edema and started on BiPAP, BP was elevated to >200 systolic and nitro drip was added. Lab work significant for hyperkalemia, renal failure.        Patient seen and examined at bedside, No acute overnight events. Pt report that his breathing is better at this time , he denies any abdominal pain  or chest pain. He report body aches and runny nose, pt also was drinking more than 1.5 lit of fluids at home.   ROS: Denies fever, chest pain, calf pain , abdominal pain, diarrhea, constipation, diarrhea or constipation .      Cardiac monitor reviewed; Sinus rhythm     VS:   Vital Signs Last 24 Hrs  T(C): 36.9 (03 Dec 2018 11:29), Max: 37 (03 Dec 2018 02:04)  T(F): 98.4 (03 Dec 2018 11:29), Max: 98.6 (03 Dec 2018 02:04)  HR: 83 (03 Dec 2018 11:30) (78 - 113)  BP: 149/71 (03 Dec 2018 11:30) (149/71 - 206/99)  BP(mean): 102 (03 Dec 2018 11:30) (102 - 121)  RR: 17 (03 Dec 2018 11:30) (11 - 36)  SpO2: 100% (03 Dec 2018 11:30) (86% - 100%)    PHYSICAL EXAM:  GENERAL: NAD, well-groomed, on Bipap  HEAD:  Atraumatic, Normocephalic  EYES: EOMI, PERRLA, conjunctiva and sclera clear  NECK: Supple, No JVD  Respiratory: Clear to auscultation bilaterally; positive  rales over both bases ,- rhonchi,- wheezing, or rubs  CV: Regular rate and rhythm; No murmurs, rubs, or gallops  Gastrointestinal: Soft, Nontender, Nondistended; Bowel sounds present  EXTREMITIES:  2+ Peripheral Pulses, No clubbing, cyanosis, 1+ edema over both lower extremities , AVF in LUE  NERVOUS SYSTEM:  Alert & Oriented X3, Good concentration; Motor Strength 5/5 B/L upper and lower extremities;  SKIN: No rashes or lesions     I&O's Summary    03 Dec 2018 07:01  -  03 Dec 2018 12:14  --------------------------------------------------------  IN: 330 mL / OUT: 0 mL / NET: 330 mL        Labs                      10.6   8.2   )-----------( 162      ( 03 Dec 2018 02:04 )             32.0   12-03    141  |  100  |  72.0<H>  ----------------------------<  219<H>  6.6<HH>   |  24.0  |  12.04<H>    Ca    9.4      03 Dec 2018 04:31  Mg     2.8     12-03    TPro  7.7  /  Alb  4.6  /  TBili  0.4  /  DBili  x   /  AST  23  /  ALT  20  /  AlkPhos  86  12-03 12-03 @ 07:01  -  12-03 @ 11:58  --------------------------------------------------------  IN: 330 mL / OUT: 0 mL / NET: 330 mL        Radiology:  *Pull    Medications:  MEDICATIONS  (STANDING):  aspirin enteric coated 81 milliGRAM(s) Oral daily  dextrose 5%. 1000 milliLiter(s) (50 mL/Hr) IV Continuous <Continuous>  dextrose 50% Injectable 12.5 Gram(s) IV Push once  dextrose 50% Injectable 25 Gram(s) IV Push once  dextrose 50% Injectable 25 Gram(s) IV Push once  furosemide   Injectable 80 milliGRAM(s) IV Push daily  heparin  Injectable 5000 Unit(s) SubCutaneous every 8 hours  insulin glargine Injectable (LANTUS) 20 Unit(s) SubCutaneous at bedtime  insulin lispro (HumaLOG) corrective regimen sliding scale   SubCutaneous three times a day before meals  tamsulosin 0.4 milliGRAM(s) Oral at bedtime  ticagrelor 90 milliGRAM(s) Oral two times a day    MEDICATIONS  (PRN):  dextrose 40% Gel 15 Gram(s) Oral once PRN Blood Glucose LESS THAN 70 milliGRAM(s)/deciliter  glucagon  Injectable 1 milliGRAM(s) IntraMuscular once PRN Glucose LESS THAN 70 milligrams/deciliter

## 2018-12-03 NOTE — H&P ADULT - ASSESSMENT
54 y/o male with PMH of ESRD on dialysis (6 years), DM, HTN, CAD s/p two stents within 1 year presents to [facility] with acute worsening SOB most likely due to pulmonary edema in the setting of missed dialysis verus hypertensive emergency. Will admit to MICU as patient is in critical condition requiring emergent dialysis and respiratory support with BiPAP. Also with hypertension require     45min minutes assessing presenting problems of acute illness, which pose high probability of life threatening deterioration or end organ damage/dysfunction, as well as medical decision making including initiating plan of care, reviewing data, reviewing radiologic exams, discussing with multidisciplinary team,  discussing goals of care with patient/family, and writing this note.  Non-inclusive of procedures performed    Discussed with E-ICU attending

## 2018-12-03 NOTE — ED PROVIDER NOTE - PROGRESS NOTE DETAILS
unable to titrate off bipap increased work of breathing when tried to discontinue will palce back on increase nitro gtt , consult being placed to renal right now for emergent dialysis contacting icu to evaluate patient spoke with dr bar will arrange dialysis at 630 am

## 2018-12-03 NOTE — PROGRESS NOTE ADULT - PROBLEM SELECTOR PLAN 4
-2/2 pulm edema and renal failure  -Continue ASA/Brilinta   -Trend troponin -2/2 pulm edema and renal failure  -Continue ASA/Brilinta   -Trend troponin, initial troponin = 0.21 in the setting of ESRD

## 2018-12-03 NOTE — PROGRESS NOTE ADULT - PROBLEM SELECTOR PLAN 5
-Nitroglycerin drip titrate to SBP <150   - BP may improved   -Restart home medications when confirmed and able - BP may improved   -Restart home medications when confirmed and able  -fany drip off at this time , bp will most likely will improve after HD

## 2018-12-04 LAB
ANION GAP SERPL CALC-SCNC: 17 MMOL/L — SIGNIFICANT CHANGE UP (ref 5–17)
BASOPHILS # BLD AUTO: 0 K/UL — SIGNIFICANT CHANGE UP (ref 0–0.2)
BASOPHILS NFR BLD AUTO: 0.6 % — SIGNIFICANT CHANGE UP (ref 0–2)
BUN SERPL-MCNC: 46 MG/DL — HIGH (ref 8–20)
CALCIUM SERPL-MCNC: 9.3 MG/DL — SIGNIFICANT CHANGE UP (ref 8.6–10.2)
CHLORIDE SERPL-SCNC: 94 MMOL/L — LOW (ref 98–107)
CK MB CFR SERPL CALC: 3.1 NG/ML — SIGNIFICANT CHANGE UP (ref 0–6.7)
CK SERPL-CCNC: 200 U/L — SIGNIFICANT CHANGE UP (ref 30–200)
CO2 SERPL-SCNC: 29 MMOL/L — SIGNIFICANT CHANGE UP (ref 22–29)
CREAT SERPL-MCNC: 8.51 MG/DL — HIGH (ref 0.5–1.3)
EOSINOPHIL # BLD AUTO: 0.3 K/UL — SIGNIFICANT CHANGE UP (ref 0–0.5)
EOSINOPHIL NFR BLD AUTO: 4.2 % — SIGNIFICANT CHANGE UP (ref 0–5)
GLUCOSE BLDC GLUCOMTR-MCNC: 102 MG/DL — HIGH (ref 70–99)
GLUCOSE BLDC GLUCOMTR-MCNC: 115 MG/DL — HIGH (ref 70–99)
GLUCOSE BLDC GLUCOMTR-MCNC: 135 MG/DL — HIGH (ref 70–99)
GLUCOSE BLDC GLUCOMTR-MCNC: 153 MG/DL — HIGH (ref 70–99)
GLUCOSE SERPL-MCNC: 94 MG/DL — SIGNIFICANT CHANGE UP (ref 70–115)
HBA1C BLD-MCNC: 6.6 % — HIGH (ref 4–5.6)
HCT VFR BLD CALC: 32.6 % — LOW (ref 42–52)
HGB BLD-MCNC: 10.6 G/DL — LOW (ref 14–18)
LYMPHOCYTES # BLD AUTO: 1.2 K/UL — SIGNIFICANT CHANGE UP (ref 1–4.8)
LYMPHOCYTES # BLD AUTO: 16.4 % — LOW (ref 20–55)
MAGNESIUM SERPL-MCNC: 2.5 MG/DL — SIGNIFICANT CHANGE UP (ref 1.8–2.6)
MCHC RBC-ENTMCNC: 30.6 PG — SIGNIFICANT CHANGE UP (ref 27–31)
MCHC RBC-ENTMCNC: 32.5 G/DL — SIGNIFICANT CHANGE UP (ref 32–36)
MCV RBC AUTO: 94.2 FL — HIGH (ref 80–94)
MONOCYTES # BLD AUTO: 0.4 K/UL — SIGNIFICANT CHANGE UP (ref 0–0.8)
MONOCYTES NFR BLD AUTO: 6.1 % — SIGNIFICANT CHANGE UP (ref 3–10)
NEUTROPHILS # BLD AUTO: 5.2 K/UL — SIGNIFICANT CHANGE UP (ref 1.8–8)
NEUTROPHILS NFR BLD AUTO: 72.6 % — SIGNIFICANT CHANGE UP (ref 37–73)
PHOSPHATE SERPL-MCNC: 5 MG/DL — HIGH (ref 2.4–4.7)
PLATELET # BLD AUTO: 153 K/UL — SIGNIFICANT CHANGE UP (ref 150–400)
POTASSIUM SERPL-MCNC: 5.6 MMOL/L — HIGH (ref 3.5–5.3)
POTASSIUM SERPL-SCNC: 5.6 MMOL/L — HIGH (ref 3.5–5.3)
RBC # BLD: 3.46 M/UL — LOW (ref 4.6–6.2)
RBC # FLD: 14.6 % — SIGNIFICANT CHANGE UP (ref 11–15.6)
SODIUM SERPL-SCNC: 140 MMOL/L — SIGNIFICANT CHANGE UP (ref 135–145)
TROPONIN T SERPL-MCNC: 0.27 NG/ML — HIGH (ref 0–0.06)
TROPONIN T SERPL-MCNC: 0.27 NG/ML — HIGH (ref 0–0.06)
WBC # BLD: 7.1 K/UL — SIGNIFICANT CHANGE UP (ref 4.8–10.8)
WBC # FLD AUTO: 7.1 K/UL — SIGNIFICANT CHANGE UP (ref 4.8–10.8)

## 2018-12-04 PROCEDURE — 99232 SBSQ HOSP IP/OBS MODERATE 35: CPT

## 2018-12-04 PROCEDURE — 93010 ELECTROCARDIOGRAM REPORT: CPT

## 2018-12-04 RX ORDER — HYDRALAZINE HCL 50 MG
25 TABLET ORAL EVERY 8 HOURS
Qty: 0 | Refills: 0 | Status: DISCONTINUED | OUTPATIENT
Start: 2018-12-04 | End: 2018-12-05

## 2018-12-04 RX ORDER — SODIUM POLYSTYRENE SULFONATE 4.1 MEQ/G
30 POWDER, FOR SUSPENSION ORAL ONCE
Qty: 0 | Refills: 0 | Status: COMPLETED | OUTPATIENT
Start: 2018-12-04 | End: 2018-12-04

## 2018-12-04 RX ADMIN — Medication 25 MILLIGRAM(S): at 06:31

## 2018-12-04 RX ADMIN — Medication 60 MILLIGRAM(S): at 06:31

## 2018-12-04 RX ADMIN — Medication 25 MILLIGRAM(S): at 22:31

## 2018-12-04 RX ADMIN — HEPARIN SODIUM 5000 UNIT(S): 5000 INJECTION INTRAVENOUS; SUBCUTANEOUS at 17:32

## 2018-12-04 RX ADMIN — INSULIN GLARGINE 20 UNIT(S): 100 INJECTION, SOLUTION SUBCUTANEOUS at 22:31

## 2018-12-04 RX ADMIN — Medication 25 MILLIGRAM(S): at 12:07

## 2018-12-04 RX ADMIN — Medication 1334 MILLIGRAM(S): at 12:07

## 2018-12-04 RX ADMIN — Medication 1 MILLIGRAM(S): at 12:09

## 2018-12-04 RX ADMIN — Medication 1334 MILLIGRAM(S): at 17:32

## 2018-12-04 RX ADMIN — HEPARIN SODIUM 5000 UNIT(S): 5000 INJECTION INTRAVENOUS; SUBCUTANEOUS at 06:39

## 2018-12-04 RX ADMIN — TICAGRELOR 90 MILLIGRAM(S): 90 TABLET ORAL at 17:32

## 2018-12-04 RX ADMIN — Medication 2: at 12:14

## 2018-12-04 RX ADMIN — TAMSULOSIN HYDROCHLORIDE 0.4 MILLIGRAM(S): 0.4 CAPSULE ORAL at 22:31

## 2018-12-04 RX ADMIN — SODIUM POLYSTYRENE SULFONATE 30 GRAM(S): 4.1 POWDER, FOR SUSPENSION ORAL at 12:06

## 2018-12-04 RX ADMIN — Medication 1334 MILLIGRAM(S): at 08:25

## 2018-12-04 RX ADMIN — Medication 80 MILLIGRAM(S): at 06:30

## 2018-12-04 RX ADMIN — Medication 81 MILLIGRAM(S): at 12:09

## 2018-12-04 RX ADMIN — LOSARTAN POTASSIUM 50 MILLIGRAM(S): 100 TABLET, FILM COATED ORAL at 06:31

## 2018-12-04 RX ADMIN — Medication 25 MILLIGRAM(S): at 17:35

## 2018-12-04 RX ADMIN — TICAGRELOR 90 MILLIGRAM(S): 90 TABLET ORAL at 06:31

## 2018-12-04 NOTE — PROGRESS NOTE ADULT - SUBJECTIVE AND OBJECTIVE BOX
Patient seen and examined    Feels much better  took soup x 2 days to celebrate his birthday  no c/o CP SOB NV now  No swelling feet    Vital Signs Last 24 Hrs  T(C): 37 (04 Dec 2018 15:50), Max: 37 (04 Dec 2018 15:50)  T(F): 98.6 (04 Dec 2018 15:50), Max: 98.6 (04 Dec 2018 15:50)  HR: 81 (04 Dec 2018 15:50) (80 - 91)  BP: 155/84 (04 Dec 2018 15:50) (131/66 - 160/84)  BP(mean): 113 (03 Dec 2018 22:00) (100 - 113)  RR: 20 (04 Dec 2018 15:50) (16 - 23)  SpO2: 95% (04 Dec 2018 11:50) (95% - 100%)    PHYSICAL EXAM    GENERAL: NAD, Obese  EYES:  conjunctiva and sclera clear  NECK: Supple, No JVD/Bruit  NERVOUS SYSTEM:  A/O x3,   CHEST:  No rales, No rhonchi  HEART:  RRR, No murmur  ABDOMEN: Soft, NT/ND BS+  EXTREMITIES:  mild Edema;  SKIN: No rashes    04 Dec 2018 07:56    140    |  94     |  46.0   ----------------------------<  94     5.6     |  29.0   |  8.51     Ca    9.3        04 Dec 2018 07:56  Phos  5.0       04 Dec 2018 07:56  Mg     2.5       04 Dec 2018 07:56    TPro  7.7    /  Alb  4.6    /  TBili  0.4    /  DBili  x      /  AST  23     /  ALT  20     /  AlkPhos  86     03 Dec 2018 02:04                          10.6   7.1   )-----------( 153      ( 04 Dec 2018 07:47 )             32.6       Overall better  Pul edema much better  HD am  Continue present treatment

## 2018-12-04 NOTE — PROGRESS NOTE ADULT - PROBLEM SELECTOR PLAN 3
-s/p  Insulin, D50, albuterol and calcium in the ED  - s/p HD  -EKG with peaked T waves: resolved. K 5.6 s/p kayaxalate  - monitor RF, K and Mg

## 2018-12-04 NOTE — PROGRESS NOTE ADULT - SUBJECTIVE AND OBJECTIVE BOX
Dr. Townsend Hospitalist Progress Note  PRATIK NUR [id number]    Patient is a 53y old  Male who presents with a chief complaint of SOB, fluid overload (04 Dec 2018 16:22)    HPI:  52 y/o male with PMH of ESRD on dialysis (6 years), DM, HTN, CAD s/p two stents within 1 year presents to [facility] with acute worsening SOB. Patient states the SOB started about 4 hours ago. He states he did not miss any dialysis appointments. He has been coughing and his daughter has been sick with a cough and runny nose. Today the shortness of breath got even worsen and he went to the ED, because of this he missed his missed his dialysis session. He admits to this happening about 1 year ago and the cause as he reports was fluid on the lungs. He denies fever, chill, nausea, CP, abdominal pain, diarrhea, or constipation. He did vomit x1. No hematemesis. In the ED he was found to have pulmonary edema and started on BiPAP 12/6 50% Fio2, BP was elevated to >200 systolic and nitro drip was added. Lab work significant for hyperkalemia, renal failure. Tall T wave in EKG. CXR reported consolidation. patient admitted to MICU, s/p HD and removal of 4.5l of IVF. patient was stabilized in MICU and DG to medical floor on 11/3 at 15:59. I got call this am around 10:30 to take over his care. Spoke MICU attending Dr. Singh and received sign out from him.     seen at bedside in am. denied complaints. SOB resolved.     ROS:  CONSTITUTIONAL:  No distress.no fever/chills/fatigue/weight loss  HEENT:  Eyes:  No diplopia or blurred vision.   CARDIOVASCULAR:  No pressure, squeezing, tightness, heaviness or aching about the chest; no palpitations. + leg swelling, no orthopnea or PND  RESPIRATORY:  no SOB. no wheezing.no cough or sputum.  No hemoptysis  GI: no nausea, no vomiting, no diarrhea, no constipation. No hematochezia or melena  EXT:No joint pain or joint swelling or redness  SKIN: no skin break or ulcer. No cellulitis.   CNS: No headaches. No weakness.no numbness. No depression or anxiety. No SI    T(C): 37 (12-04-18 @ 15:50), Max: 37 (12-04-18 @ 15:50)  HR: 81 (12-04-18 @ 15:50) (80 - 90)  BP: 155/84 (12-04-18 @ 15:50) (131/66 - 160/84)  RR: 20 (12-04-18 @ 15:50) (16 - 20)  SpO2: 95% (12-04-18 @ 11:50) (95% - 100%)    12-03-18 @ 07:01  -  12-04-18 @ 07:00  --------------------------------------------------------  IN: 510 mL / OUT: 4000 mL / NET: -3490 mL    12-04-18 @ 07:01  -  12-04-18 @ 19:10  --------------------------------------------------------  IN: 720 mL / OUT: 0 mL / NET: 720 mL    CAPILLARY BLOOD GLUCOSE      POCT Blood Glucose.: 102 mg/dL (04 Dec 2018 17:17)  POCT Blood Glucose.: 153 mg/dL (04 Dec 2018 12:11)  POCT Blood Glucose.: 115 mg/dL (04 Dec 2018 07:25)  POCT Blood Glucose.: 209 mg/dL (03 Dec 2018 22:20)      Physical Exam:  GENERAL: Not in distress. Alert    HEENT:  Normocephalic and atraumatic. PEARLA,EOMI  NECK: Supple.  No JVD.    CARDIOVASCULAR: RRR S1, S2. No murmur/rubs/gallop  LUNGS: BLAE+, no rales, no wheezing, no rhonchi.    ABDOMEN: ND. Soft,  NT, no guarding / rebound / rigidity. BS normoactive. No CVA tenderness.    BACK: No spine tenderness.  EXTREMITIES: no cyanosis, no clubbing, + edema.   SKIN: no rash. No skin break or ulcer. No cellulitis.  NEUROLOGIC: AAO*3.strength is symmetric, sensation intact, speech fluent.    PSYCHIATRIC: Calm.  No agitation.    Labs                        10.6   7.1   )-----------( 153      ( 04 Dec 2018 07:47 )             32.6     12-04    140  |  94<L>  |  46.0<H>  ----------------------------<  94  5.6<H>   |  29.0  |  8.51<H>    Ca    9.3      04 Dec 2018 07:56  Phos  5.0     12-04  Mg     2.5     12-04    TPro  7.7  /  Alb  4.6  /  TBili  0.4  /  DBili  x   /  AST  23  /  ALT  20  /  AlkPhos  86  12-03   PT/INR - ( 03 Dec 2018 02:04 )   PT: 10.5 sec;   INR: 0.92 ratio         PTT - ( 03 Dec 2018 02:04 )  PTT:33.5 sec    MEDICATIONS  (STANDING):  aspirin enteric coated 81 milliGRAM(s) Oral daily  calcium acetate 1334 milliGRAM(s) Oral three times a day with meals  dextrose 5%. 1000 milliLiter(s) (50 mL/Hr) IV Continuous <Continuous>  dextrose 50% Injectable 12.5 Gram(s) IV Push once  dextrose 50% Injectable 25 Gram(s) IV Push once  dextrose 50% Injectable 25 Gram(s) IV Push once  folic acid 1 milliGRAM(s) Oral daily  furosemide   Injectable 80 milliGRAM(s) IV Push daily  heparin  Injectable 5000 Unit(s) SubCutaneous every 8 hours  hydrALAZINE 25 milliGRAM(s) Oral every 8 hours  insulin glargine Injectable (LANTUS) 20 Unit(s) SubCutaneous at bedtime  insulin lispro (HumaLOG) corrective regimen sliding scale   SubCutaneous three times a day before meals  losartan 50 milliGRAM(s) Oral daily  NIFEdipine XL 60 milliGRAM(s) Oral daily  pyridoxine 25 milliGRAM(s) Oral daily  tamsulosin 0.4 milliGRAM(s) Oral at bedtime  ticagrelor 90 milliGRAM(s) Oral two times a day    MEDICATIONS  (PRN):  acetaminophen   Tablet .. 650 milliGRAM(s) Oral every 6 hours PRN Mild Pain (1 - 3)  dextrose 40% Gel 15 Gram(s) Oral once PRN Blood Glucose LESS THAN 70 milliGRAM(s)/deciliter  glucagon  Injectable 1 milliGRAM(s) IntraMuscular once PRN Glucose LESS THAN 70 milligrams/deciliter    < from: Xray Chest 1 View AP/PA. (12.03.18 @ 02:14) >    IMPRESSION:   RIGHT lower lobe airspace consolidation..      < end of copied text >

## 2018-12-04 NOTE — PROGRESS NOTE ADULT - PROBLEM SELECTOR PLAN 4
-2/2 pulm edema and renal failure  -Continue ASA/Brilinta   -Trend trop.  - CK normal  - patient asymptomatic

## 2018-12-04 NOTE — PROGRESS NOTE ADULT - PROBLEM SELECTOR PLAN 1
-2/2 to possible fluid overload  , poor diet compliance vs hypertension   - s/p HD  - off Nitroglycerin drip   -fluid restriction   -strict in and out  -RVP panel neg  - discussed with Dr. Vail. possible DC tomorrow after HD if patient is stable -2/2 to possible fluid overload  , poor diet compliance vs hypertension   - s/p HD  - off Nitroglycerin drip   -fluid restriction   -strict in and out  -RVP panel neg  - CXR consolidation: clinically no PNA  - Echo  - discussed with Dr. Vail. possible DC tomorrow after HD if patient is stable

## 2018-12-05 ENCOUNTER — TRANSCRIPTION ENCOUNTER (OUTPATIENT)
Age: 53
End: 2018-12-05

## 2018-12-05 VITALS
OXYGEN SATURATION: 97 % | HEART RATE: 95 BPM | TEMPERATURE: 99 F | SYSTOLIC BLOOD PRESSURE: 158 MMHG | DIASTOLIC BLOOD PRESSURE: 88 MMHG | RESPIRATION RATE: 20 BRPM

## 2018-12-05 LAB
ALBUMIN SERPL ELPH-MCNC: 4.1 G/DL — SIGNIFICANT CHANGE UP (ref 3.3–5.2)
ALP SERPL-CCNC: 78 U/L — SIGNIFICANT CHANGE UP (ref 40–120)
ALT FLD-CCNC: 15 U/L — SIGNIFICANT CHANGE UP
ANION GAP SERPL CALC-SCNC: 20 MMOL/L — HIGH (ref 5–17)
AST SERPL-CCNC: 17 U/L — SIGNIFICANT CHANGE UP
BILIRUB SERPL-MCNC: 0.5 MG/DL — SIGNIFICANT CHANGE UP (ref 0.4–2)
BUN SERPL-MCNC: 70 MG/DL — HIGH (ref 8–20)
CALCIUM SERPL-MCNC: 9.5 MG/DL — SIGNIFICANT CHANGE UP (ref 8.6–10.2)
CHLORIDE SERPL-SCNC: 93 MMOL/L — LOW (ref 98–107)
CHOLEST SERPL-MCNC: 171 MG/DL — SIGNIFICANT CHANGE UP (ref 110–199)
CO2 SERPL-SCNC: 26 MMOL/L — SIGNIFICANT CHANGE UP (ref 22–29)
CREAT SERPL-MCNC: 11.47 MG/DL — HIGH (ref 0.5–1.3)
GLUCOSE BLDC GLUCOMTR-MCNC: 116 MG/DL — HIGH (ref 70–99)
GLUCOSE BLDC GLUCOMTR-MCNC: 188 MG/DL — HIGH (ref 70–99)
GLUCOSE BLDC GLUCOMTR-MCNC: 92 MG/DL — SIGNIFICANT CHANGE UP (ref 70–99)
GLUCOSE SERPL-MCNC: 82 MG/DL — SIGNIFICANT CHANGE UP (ref 70–115)
HBA1C BLD-MCNC: 6.6 % — HIGH (ref 4–5.6)
HCT VFR BLD CALC: 30.6 % — LOW (ref 42–52)
HDLC SERPL-MCNC: 30 MG/DL — LOW
HGB BLD-MCNC: 10.1 G/DL — LOW (ref 14–18)
LIPID PNL WITH DIRECT LDL SERPL: 103 MG/DL — SIGNIFICANT CHANGE UP
MAGNESIUM SERPL-MCNC: 2.6 MG/DL — SIGNIFICANT CHANGE UP (ref 1.6–2.6)
MCHC RBC-ENTMCNC: 30.4 PG — SIGNIFICANT CHANGE UP (ref 27–31)
MCHC RBC-ENTMCNC: 33 G/DL — SIGNIFICANT CHANGE UP (ref 32–36)
MCV RBC AUTO: 92.2 FL — SIGNIFICANT CHANGE UP (ref 80–94)
PHOSPHATE SERPL-MCNC: 6.5 MG/DL — HIGH (ref 2.4–4.7)
PLATELET # BLD AUTO: 156 K/UL — SIGNIFICANT CHANGE UP (ref 150–400)
POTASSIUM SERPL-MCNC: 5.3 MMOL/L — SIGNIFICANT CHANGE UP (ref 3.5–5.3)
POTASSIUM SERPL-SCNC: 5.3 MMOL/L — SIGNIFICANT CHANGE UP (ref 3.5–5.3)
PROT SERPL-MCNC: 7.1 G/DL — SIGNIFICANT CHANGE UP (ref 6.6–8.7)
RBC # BLD: 3.32 M/UL — LOW (ref 4.6–6.2)
RBC # FLD: 14.3 % — SIGNIFICANT CHANGE UP (ref 11–15.6)
SODIUM SERPL-SCNC: 139 MMOL/L — SIGNIFICANT CHANGE UP (ref 135–145)
TOTAL CHOLESTEROL/HDL RATIO MEASUREMENT: 6 RATIO — SIGNIFICANT CHANGE UP (ref 3.4–9.6)
TRIGL SERPL-MCNC: 188 MG/DL — SIGNIFICANT CHANGE UP (ref 10–200)
TROPONIN T SERPL-MCNC: 0.25 NG/ML — HIGH (ref 0–0.06)
WBC # BLD: 6 K/UL — SIGNIFICANT CHANGE UP (ref 4.8–10.8)
WBC # FLD AUTO: 6 K/UL — SIGNIFICANT CHANGE UP (ref 4.8–10.8)

## 2018-12-05 PROCEDURE — 82550 ASSAY OF CK (CPK): CPT

## 2018-12-05 PROCEDURE — 99291 CRITICAL CARE FIRST HOUR: CPT | Mod: 25

## 2018-12-05 PROCEDURE — 85730 THROMBOPLASTIN TIME PARTIAL: CPT

## 2018-12-05 PROCEDURE — 94660 CPAP INITIATION&MGMT: CPT

## 2018-12-05 PROCEDURE — 94640 AIRWAY INHALATION TREATMENT: CPT

## 2018-12-05 PROCEDURE — 87798 DETECT AGENT NOS DNA AMP: CPT

## 2018-12-05 PROCEDURE — 96374 THER/PROPH/DIAG INJ IV PUSH: CPT

## 2018-12-05 PROCEDURE — 87633 RESP VIRUS 12-25 TARGETS: CPT

## 2018-12-05 PROCEDURE — 96376 TX/PRO/DX INJ SAME DRUG ADON: CPT

## 2018-12-05 PROCEDURE — 93005 ELECTROCARDIOGRAM TRACING: CPT

## 2018-12-05 PROCEDURE — 87581 M.PNEUMON DNA AMP PROBE: CPT

## 2018-12-05 PROCEDURE — 80061 LIPID PANEL: CPT

## 2018-12-05 PROCEDURE — 87486 CHLMYD PNEUM DNA AMP PROBE: CPT

## 2018-12-05 PROCEDURE — 87641 MR-STAPH DNA AMP PROBE: CPT

## 2018-12-05 PROCEDURE — 96375 TX/PRO/DX INJ NEW DRUG ADDON: CPT

## 2018-12-05 PROCEDURE — 71045 X-RAY EXAM CHEST 1 VIEW: CPT

## 2018-12-05 PROCEDURE — 36415 COLL VENOUS BLD VENIPUNCTURE: CPT

## 2018-12-05 PROCEDURE — 82962 GLUCOSE BLOOD TEST: CPT

## 2018-12-05 PROCEDURE — 85610 PROTHROMBIN TIME: CPT

## 2018-12-05 PROCEDURE — 80053 COMPREHEN METABOLIC PANEL: CPT

## 2018-12-05 PROCEDURE — 86900 BLOOD TYPING SEROLOGIC ABO: CPT

## 2018-12-05 PROCEDURE — 86850 RBC ANTIBODY SCREEN: CPT

## 2018-12-05 PROCEDURE — 85027 COMPLETE CBC AUTOMATED: CPT

## 2018-12-05 PROCEDURE — 84100 ASSAY OF PHOSPHORUS: CPT

## 2018-12-05 PROCEDURE — 80048 BASIC METABOLIC PNL TOTAL CA: CPT

## 2018-12-05 PROCEDURE — 93306 TTE W/DOPPLER COMPLETE: CPT | Mod: 26

## 2018-12-05 PROCEDURE — 83735 ASSAY OF MAGNESIUM: CPT

## 2018-12-05 PROCEDURE — 87640 STAPH A DNA AMP PROBE: CPT

## 2018-12-05 PROCEDURE — 99261: CPT

## 2018-12-05 PROCEDURE — 93306 TTE W/DOPPLER COMPLETE: CPT

## 2018-12-05 PROCEDURE — 86901 BLOOD TYPING SEROLOGIC RH(D): CPT

## 2018-12-05 PROCEDURE — 84484 ASSAY OF TROPONIN QUANT: CPT

## 2018-12-05 PROCEDURE — 83036 HEMOGLOBIN GLYCOSYLATED A1C: CPT

## 2018-12-05 PROCEDURE — 82553 CREATINE MB FRACTION: CPT

## 2018-12-05 PROCEDURE — 99239 HOSP IP/OBS DSCHRG MGMT >30: CPT

## 2018-12-05 RX ORDER — LOSARTAN POTASSIUM 100 MG/1
1 TABLET, FILM COATED ORAL
Qty: 0 | Refills: 0 | COMMUNITY

## 2018-12-05 RX ORDER — NIFEDIPINE 30 MG
1 TABLET, EXTENDED RELEASE 24 HR ORAL
Qty: 0 | Refills: 0 | COMMUNITY

## 2018-12-05 RX ORDER — ASPIRIN/CALCIUM CARB/MAGNESIUM 324 MG
1 TABLET ORAL
Qty: 30 | Refills: 0
Start: 2018-12-05 | End: 2019-01-03

## 2018-12-05 RX ORDER — ACETAMINOPHEN 500 MG
2 TABLET ORAL
Qty: 0 | Refills: 0 | DISCHARGE
Start: 2018-12-05

## 2018-12-05 RX ORDER — FUROSEMIDE 40 MG
1 TABLET ORAL
Qty: 30 | Refills: 0
Start: 2018-12-05 | End: 2019-01-03

## 2018-12-05 RX ORDER — CALCIUM ACETATE 667 MG
2 TABLET ORAL
Qty: 180 | Refills: 0
Start: 2018-12-05 | End: 2019-01-03

## 2018-12-05 RX ORDER — HYDRALAZINE HCL 50 MG
1 TABLET ORAL
Qty: 90 | Refills: 0
Start: 2018-12-05 | End: 2019-01-03

## 2018-12-05 RX ORDER — LOSARTAN POTASSIUM 100 MG/1
1 TABLET, FILM COATED ORAL
Qty: 30 | Refills: 0
Start: 2018-12-05 | End: 2019-01-03

## 2018-12-05 RX ORDER — METOPROLOL TARTRATE 50 MG
25 TABLET ORAL DAILY
Qty: 0 | Refills: 0 | Status: DISCONTINUED | OUTPATIENT
Start: 2018-12-05 | End: 2018-12-05

## 2018-12-05 RX ORDER — HYDRALAZINE HCL 50 MG
1 TABLET ORAL
Qty: 0 | Refills: 0 | COMMUNITY

## 2018-12-05 RX ORDER — TICAGRELOR 90 MG/1
1 TABLET ORAL
Qty: 60 | Refills: 0
Start: 2018-12-05 | End: 2019-01-03

## 2018-12-05 RX ORDER — ATORVASTATIN CALCIUM 80 MG/1
1 TABLET, FILM COATED ORAL
Qty: 30 | Refills: 0
Start: 2018-12-05 | End: 2019-01-03

## 2018-12-05 RX ORDER — ATORVASTATIN CALCIUM 80 MG/1
40 TABLET, FILM COATED ORAL AT BEDTIME
Qty: 0 | Refills: 0 | Status: DISCONTINUED | OUTPATIENT
Start: 2018-12-05 | End: 2018-12-05

## 2018-12-05 RX ORDER — METOPROLOL TARTRATE 50 MG
1 TABLET ORAL
Qty: 30 | Refills: 0
Start: 2018-12-05 | End: 2019-01-03

## 2018-12-05 RX ORDER — NIFEDIPINE 30 MG
1 TABLET, EXTENDED RELEASE 24 HR ORAL
Qty: 30 | Refills: 0
Start: 2018-12-05 | End: 2019-01-03

## 2018-12-05 RX ADMIN — LOSARTAN POTASSIUM 50 MILLIGRAM(S): 100 TABLET, FILM COATED ORAL at 05:26

## 2018-12-05 RX ADMIN — Medication 60 MILLIGRAM(S): at 05:26

## 2018-12-05 RX ADMIN — Medication 25 MILLIGRAM(S): at 13:26

## 2018-12-05 RX ADMIN — TICAGRELOR 90 MILLIGRAM(S): 90 TABLET ORAL at 18:54

## 2018-12-05 RX ADMIN — Medication 1334 MILLIGRAM(S): at 13:26

## 2018-12-05 RX ADMIN — Medication 25 MILLIGRAM(S): at 18:54

## 2018-12-05 RX ADMIN — Medication 25 MILLIGRAM(S): at 05:26

## 2018-12-05 RX ADMIN — TICAGRELOR 90 MILLIGRAM(S): 90 TABLET ORAL at 05:26

## 2018-12-05 RX ADMIN — Medication 2: at 13:23

## 2018-12-05 RX ADMIN — Medication 80 MILLIGRAM(S): at 05:26

## 2018-12-05 RX ADMIN — HEPARIN SODIUM 5000 UNIT(S): 5000 INJECTION INTRAVENOUS; SUBCUTANEOUS at 02:40

## 2018-12-05 RX ADMIN — Medication 1334 MILLIGRAM(S): at 09:29

## 2018-12-05 RX ADMIN — Medication 1334 MILLIGRAM(S): at 16:55

## 2018-12-05 NOTE — PROGRESS NOTE ADULT - PROBLEM SELECTOR PLAN 6
-lantus and insulin sliding scale  -accuchecks  - A1c
-lantus and insulin sliding scale  -accuchecks  - A1c 6.6
-lantus and insulin sliding scale  -accuchecks q6 hours, while NPO on BIPAP

## 2018-12-05 NOTE — DISCHARGE NOTE ADULT - CARE PLAN
Principal Discharge DX:	Acute hypoxemic respiratory failure  Goal:	prevent recurrrence  Assessment and plan of treatment:	take meds. do not miss dialysis. follow up with primary MD and kidney doctor and diabetes doctor in 1-2 weeks  Secondary Diagnosis:	Pulmonary edema, acute  Assessment and plan of treatment:	improved. as above. fluid restriction as per renal  Secondary Diagnosis:	Hypertensive emergency  Assessment and plan of treatment:	take meds. do not miss dialysis. follow up with primary MD and kidney doctor and diabetes doctor in 1-2 weeks  Secondary Diagnosis:	Demand ischemia  Assessment and plan of treatment:	seen by cardiology. follow up with them in 1-2 weeks. we started one metoprolol and Lipitor. .  Secondary Diagnosis:	Hypertension, unspecified type  Assessment and plan of treatment:	continue meds. see primary MD in 1 week  Secondary Diagnosis:	Hyperkalemia  Assessment and plan of treatment:	get K level rechecked by kidney doc on dialysis  Secondary Diagnosis:	Type 2 diabetes mellitus with chronic kidney disease on chronic dialysis, with long-term current use of insulin  Assessment and plan of treatment:	folow up with your diabetes doctor and primary MD Principal Discharge DX:	Acute hypoxemic respiratory failure  Goal:	prevent recurrrence  Assessment and plan of treatment:	take meds. do not miss dialysis. follow up with primary MD and kidney doctor and diabetes doctor in 1-2 weeks. please call your heart doctor office tomorrow to get the result of your echocardiogram.  Secondary Diagnosis:	Pulmonary edema, acute  Assessment and plan of treatment:	improved. as above. fluid restriction as per renal  Secondary Diagnosis:	Hypertensive emergency  Assessment and plan of treatment:	take meds. do not miss dialysis. follow up with primary MD and kidney doctor and diabetes doctor in 1-2 weeks  Secondary Diagnosis:	Demand ischemia  Assessment and plan of treatment:	seen by cardiology. follow up with them in 1-2 weeks. we started one metoprolol and Lipitor. .  Secondary Diagnosis:	Hypertension, unspecified type  Assessment and plan of treatment:	continue meds. see primary MD in 1 week  Secondary Diagnosis:	Hyperkalemia  Assessment and plan of treatment:	get K level rechecked by kidney doc on dialysis  Secondary Diagnosis:	Type 2 diabetes mellitus with chronic kidney disease on chronic dialysis, with long-term current use of insulin  Assessment and plan of treatment:	folow up with your diabetes doctor and primary MD

## 2018-12-05 NOTE — PROGRESS NOTE ADULT - PROBLEM SELECTOR PLAN 1
-2/2 to possible fluid overload  , poor diet compliance vs hypertension   - s/p HD  - off Nitroglycerin drip   -fluid restriction   -strict in and out  -RVP panel neg  - CXR consolidation: clinically no PNA  - Echo

## 2018-12-05 NOTE — PROGRESS NOTE ADULT - ASSESSMENT
52 y/o male with PMH of ESRD on dialysis (6 years), DM, HTN, CAD s/p two stents within 1 year presents to [facility] with acute worsening SOB most likely due to pulmonary edema in the setting of poor fluid restriction at home ( pt was drinking more than 1.5 lit x day)  possible viral infection  vs hypertensive emergency. Pt still on Bipap , report that his breathing  is better , getting HD at this time ( will removed 4.5 lit). Off nitro infusion .
54 y/o male with PMH of ESRD on dialysis (6 years), DM, HTN, CAD s/p two stents within 1 year presents to [facility] with acute worsening SOB most likely due to pulmonary edema in the setting of poor fluid restriction at home ( pt was drinking more than 1.5 lit x day)  possible viral infection  vs hypertensive emergency. Pt still on Bipap , report that his breathing  is better , getting HD at this time ( will removed 4.5 lit). Off nitro infusion .
ESRD on HD MWF schedule  Pulm edema clinically volume overloaded  still has UOP would cont daily lasix  will remove additional UF w HD going forward    Will follow
52 y/o male with PMH of ESRD on dialysis (6 years), DM, HTN, CAD s/p two stents within 1 year presents to [facility] with acute worsening SOB most likely due to pulmonary edema in the setting of poor fluid restriction at home ( pt was drinking more than 1.5 lit x day)  possible viral infection  vs hypertensive emergency. Pt still on Bipap , report that his breathing  is better , getting HD at this time ( will removed 4.5 lit). Off nitro infusion .

## 2018-12-05 NOTE — PROGRESS NOTE ADULT - PROBLEM SELECTOR PROBLEM 2
Acute hypoxemic respiratory failure

## 2018-12-05 NOTE — PROGRESS NOTE ADULT - SUBJECTIVE AND OBJECTIVE BOX
NEPHROLOGY INTERVAL HPI/OVERNIGHT EVENTS:    Examined earlier    MEDICATIONS  (STANDING):  aspirin enteric coated 81 milliGRAM(s) Oral daily  atorvastatin 40 milliGRAM(s) Oral at bedtime  calcium acetate 1334 milliGRAM(s) Oral three times a day with meals  dextrose 5%. 1000 milliLiter(s) (50 mL/Hr) IV Continuous <Continuous>  dextrose 50% Injectable 12.5 Gram(s) IV Push once  dextrose 50% Injectable 25 Gram(s) IV Push once  dextrose 50% Injectable 25 Gram(s) IV Push once  folic acid 1 milliGRAM(s) Oral daily  furosemide   Injectable 80 milliGRAM(s) IV Push daily  heparin  Injectable 5000 Unit(s) SubCutaneous every 8 hours  hydrALAZINE 25 milliGRAM(s) Oral every 8 hours  insulin glargine Injectable (LANTUS) 20 Unit(s) SubCutaneous at bedtime  insulin lispro (HumaLOG) corrective regimen sliding scale   SubCutaneous three times a day before meals  losartan 50 milliGRAM(s) Oral daily  metoprolol succinate ER 25 milliGRAM(s) Oral daily  NIFEdipine XL 60 milliGRAM(s) Oral daily  pyridoxine 25 milliGRAM(s) Oral daily  tamsulosin 0.4 milliGRAM(s) Oral at bedtime  ticagrelor 90 milliGRAM(s) Oral two times a day    MEDICATIONS  (PRN):  acetaminophen   Tablet .. 650 milliGRAM(s) Oral every 6 hours PRN Mild Pain (1 - 3)  dextrose 40% Gel 15 Gram(s) Oral once PRN Blood Glucose LESS THAN 70 milliGRAM(s)/deciliter  glucagon  Injectable 1 milliGRAM(s) IntraMuscular once PRN Glucose LESS THAN 70 milligrams/deciliter      Allergies    No Known Allergies    Intolerances        Vital Signs Last 24 Hrs  T(C): 36.7 (05 Dec 2018 13:23), Max: 36.9 (04 Dec 2018 20:26)  T(F): 98.1 (05 Dec 2018 13:23), Max: 98.4 (04 Dec 2018 20:26)  HR: 95 (05 Dec 2018 13:23) (85 - 95)  BP: 195/100 (05 Dec 2018 13:23) (139/77 - 195/100)  BP(mean): --  RR: 18 (05 Dec 2018 13:23) (18 - 20)  SpO2: 98% (05 Dec 2018 13:23) (96% - 100%)  Daily     Daily Weight in k.2 (05 Dec 2018 13:23)    PHYSICAL EXAM:  GENERAL: NAD, Obese  EYES:  conjunctiva and sclera clear  NECK: Supple, No JVD/Bruit  NERVOUS SYSTEM:  A/O x3,   CHEST:  No rales, No rhonchi  HEART:  RRR, No murmur  ABDOMEN: Soft, NT/ND BS+  EXTREMITIES:  mild Edema    LABS:                        10.1   6.0   )-----------( 156      ( 05 Dec 2018 07:53 )             30.6     12-    139  |  93<L>  |  70.0<H>  ----------------------------<  82  5.3   |  26.0  |  11.47<H>    Ca    9.5      05 Dec 2018 07:53  Phos  6.5     12-  Mg     2.6     -    TPro  7.1  /  Alb  4.1  /  TBili  0.5  /  DBili  x   /  AST  17  /  ALT  15  /  AlkPhos  78  12-        Magnesium, Serum: 2.6 mg/dL ( @ 07:53)  Phosphorus Level, Serum: 6.5 mg/dL ( @ 07:53)          RADIOLOGY & ADDITIONAL TESTS:

## 2018-12-05 NOTE — DISCHARGE NOTE ADULT - ADDITIONAL INSTRUCTIONS
please call primary MD, renal, diabetes doctor and cardiology to schedule appointment. bring all meds and discharge papers to al health care visits. return to ER if symptoms recur or any other concerning symptoms.

## 2018-12-05 NOTE — DISCHARGE NOTE ADULT - HOSPITAL COURSE
54 y/o male with PMH of ESRD on dialysis (6 years), DM, HTN, CAD s/p two stents within 1 year presents to [facility] with acute worsening SOB. Patient states the SOB started about 4 hours ago. He states he did not miss any dialysis appointments. He has been coughing and his daughter has been sick with a cough and runny nose. Today the shortness of breath got even worsen and he went to the ED, because of this he missed his missed his dialysis session. He admits to this happening about 1 year ago and the cause as he reports was fluid on the lungs. He denies fever, chill, nausea, CP, abdominal pain, diarrhea, or constipation. He did vomit x1. No hematemesis. In the ED he was found to have pulmonary edema and started on BiPAP 12/6 50% Fio2, BP was elevated to >200 systolic and nitro drip was added. Lab work significant for hyperkalemia, renal failure. Tall T wave in EKG. CXR reported consolidation. patient admitted to MICU, s/p HD and removal of 4.5l of IVF. patient was stabilized in MICU and DG to medical floor on 11/3 at 15:59. I got call this am around 10:30 to take over his care. Spoke MICU attending Dr. Singh and received sign out from him.     Assessment and Plan:   · Assessment		  54 y/o male with PMH of ESRD on dialysis (6 years), DM, HTN, CAD s/p two stents within 1 year presents to [facility] with acute worsening SOB most likely due to pulmonary edema in the setting of poor fluid restriction at home ( pt was drinking more than 1.5 lit x day)  possible viral infection  vs hypertensive emergency. Pt still on Bipap , report that his breathing  is better , getting HD at this time ( will removed 4.5 lit). Off nitro infusion .      Problem/Plan - 1:  ·  Problem: Pulmonary edema, acute.  Plan: -2/2 to possible fluid overload  , poor diet compliance vs hypertension   - s/p HD  - off Nitroglycerin drip   -fluid restriction   -strict in and out  -RVP panel neg  - CXR consolidation: clinically no PNA  - Echo OP as per cardio  - counselled about compliance with diet,meds,HD and f/u appoinments     Problem/Plan - 2:  ·  Problem: Acute hypoxemic respiratory failure.  Plan: -Due to pulmonary edema  -improved. maintaining Sat in RA  - off BiPAP   - s/p HD*2  - Nitro drip off.      Problem/Plan - 3:  ·  Problem: Hyperkalemia.  Plan: -s/p  Insulin, D50, albuterol and calcium in the ED  - s/p HD  -EKG with peaked T waves: resolved. K 5.6 s/p kayaxalate/ K normal today  - monitor RF, K and Mg.      Problem/Plan - 4:  ·  Problem: Demand ischemia.  Plan: -2/2 pulm edema and renal failure  -Continue ASA/Brilinta   - stable trop.  - CK normal  - patient asymptomatic.      Problem/Plan - 5:  ·  Problem: Hypertensive emergency.  Plan: - BP better controlled   - on home medications. off nitro drip  - monitor BP and adjust meds.      Problem/Plan - 6:  Problem: Diabetes. Plan: -lantus and insulin sliding scale  -accuchecks  - A1c 6.6.     Problem/Plan - 7:  ·  Problem: CAD (coronary artery disease).  Plan: -ASA/Brilinta , not on asa and BB  -Uses [facility] in [place] ([address]): confirmed patient is not on BB or statin.  Cardio cx was called [ EKG Ischaemic changes, trop elevated and stable, h/o CAD s/p stent, not on BB or statin confirmed from pharmacy, patient unreliable for OP follow up.]  - cardio eval appreciated. He recently underwent stenting to lad and circ stenting at [facility] in Aug and September. His renal transplant team had recommended a nuclear stress test which was abnl leading to the cath and stents.  - started on low dose metoprolol and Lipitor  Seen at bedside. Denied complaints. Exam unremarkable. Stable for DC  Plan of care discussed with the patient. Return precautions discussed. Patient verbalized understanding  Time spent 40 min 52 y/o male with PMH of ESRD on dialysis (6 years), DM, HTN, CAD s/p two stents within 1 year presents to [facility] with acute worsening SOB. Patient states the SOB started about 4 hours ago. He states he did not miss any dialysis appointments. He has been coughing and his daughter has been sick with a cough and runny nose. Today the shortness of breath got even worsen and he went to the ED, because of this he missed his missed his dialysis session. He admits to this happening about 1 year ago and the cause as he reports was fluid on the lungs. He denies fever, chill, nausea, CP, abdominal pain, diarrhea, or constipation. He did vomit x1. No hematemesis. In the ED he was found to have pulmonary edema and started on BiPAP 12/6 50% Fio2, BP was elevated to >200 systolic and nitro drip was added. Lab work significant for hyperkalemia, renal failure. Tall T wave in EKG. CXR reported consolidation. patient admitted to MICU, s/p HD and removal of 4.5l of IVF. patient was stabilized in MICU and DG to medical floor on 11/3 at 15:59. I got call this am around 10:30 to take over his care. Spoke MICU attending Dr. Singh and received sign out from him.     Assessment and Plan:   · Assessment		  52 y/o male with PMH of ESRD on dialysis (6 years), DM, HTN, CAD s/p two stents within 1 year presents to [facility] with acute worsening SOB most likely due to pulmonary edema in the setting of poor fluid restriction at home ( pt was drinking more than 1.5 lit x day)  possible viral infection  vs hypertensive emergency. Pt still on Bipap , report that his breathing  is better , getting HD at this time ( will removed 4.5 lit). Off nitro infusion .      Problem/Plan - 1:  ·  Problem: Pulmonary edema, acute.  Plan: -2/2 to possible fluid overload  , poor diet compliance vs hypertension   - s/p HD  - off Nitroglycerin drip   -fluid restriction   -strict in and out  -RVP panel neg  - CXR consolidation: clinically no PNA  - Echo done. cardio will follow up report OP  - counselled about compliance with diet,meds,HD and f/u appoinments     Problem/Plan - 2:  ·  Problem: Acute hypoxemic respiratory failure.  Plan: -Due to pulmonary edema  -improved. maintaining Sat in RA  - off BiPAP   - s/p HD*2  - Nitro drip off.      Problem/Plan - 3:  ·  Problem: Hyperkalemia.  Plan: -s/p  Insulin, D50, albuterol and calcium in the ED  - s/p HD  -EKG with peaked T waves: resolved. K 5.6 s/p kayaxalate/ K normal today  - monitor RF, K and Mg.      Problem/Plan - 4:  ·  Problem: Demand ischemia.  Plan: -2/2 pulm edema and renal failure  -Continue ASA/Brilinta   - stable trop.  - CK normal  - patient asymptomatic.      Problem/Plan - 5:  ·  Problem: Hypertensive emergency.  Plan: - BP better controlled   - on home medications. off nitro drip  - monitor BP and adjust meds.      Problem/Plan - 6:  Problem: Diabetes. Plan: -lantus and insulin sliding scale  -accuchecks  - A1c 6.6.     Problem/Plan - 7:  ·  Problem: CAD (coronary artery disease).  Plan: -ASA/Brilinta , not on asa and BB  -Uses [facility] in [place] ([address]): confirmed patient is not on BB or statin.  Cardio cx was called [ EKG Ischaemic changes, trop elevated and stable, h/o CAD s/p stent, not on BB or statin confirmed from pharmacy, patient unreliable for OP follow up.]  - cardio eval appreciated. He recently underwent stenting to lad and circ stenting at [facility] in Aug and September. His renal transplant team had recommended a nuclear stress test which was abnl leading to the cath and stents.  - started on low dose metoprolol and Lipitor  Seen at bedside. Denied complaints. Exam unremarkable. Stable for DC  Plan of care discussed with the patient. Return precautions discussed. Patient verbalized understanding  Time spent 40 min

## 2018-12-05 NOTE — PROGRESS NOTE ADULT - SUBJECTIVE AND OBJECTIVE BOX
Dr. Townsend Hospitalist Progress Note  PRATIK NUR [id number]    Patient is a 53y old  Male who presents with a chief complaint of SOB, fluid overload (04 Dec 2018 16:22)    HPI:  54 y/o male with PMH of ESRD on dialysis (6 years), DM, HTN, CAD s/p two stents within 1 year presents to [facility] with acute worsening SOB. Patient states the SOB started about 4 hours ago. He states he did not miss any dialysis appointments. He has been coughing and his daughter has been sick with a cough and runny nose. Today the shortness of breath got even worsen and he went to the ED, because of this he missed his missed his dialysis session. He admits to this happening about 1 year ago and the cause as he reports was fluid on the lungs. He denies fever, chill, nausea, CP, abdominal pain, diarrhea, or constipation. He did vomit x1. No hematemesis. In the ED he was found to have pulmonary edema and started on BiPAP 12/6 50% Fio2, BP was elevated to >200 systolic and nitro drip was added. Lab work significant for hyperkalemia, renal failure. Tall T wave in EKG. CXR reported consolidation. patient admitted to MICU, s/p HD and removal of 4.5l of IVF. patient was stabilized in MICU and DG to medical floor on 11/3 at 15:59. I got call this am around 10:30 to take over his care. Spoke MICU attending Dr. Singh and received sign out from him.     seen at bedside in am. denied complaints. SOB resolved. For HD today. cardio cx was called [ EKG Ischaemic changes, trop elevated and stable, h/o CAD s/p stent, not on BB or statin confirmed from pharmacy, patient unreliable for OP follow up.]    ROS:  CONSTITUTIONAL:  No distress.no fever/chills/fatigue/weight loss  HEENT:  Eyes:  No diplopia or blurred vision.   CARDIOVASCULAR:  No pressure, squeezing, tightness, heaviness or aching about the chest; no palpitations. + leg swelling, no orthopnea or PND  RESPIRATORY:  no SOB. no wheezing.no cough or sputum.  No hemoptysis  GI: no nausea, no vomiting, no diarrhea, no constipation. No hematochezia or melena  EXT:No joint pain or joint swelling or redness  SKIN: no skin break or ulcer. No cellulitis.   CNS: No headaches. No weakness.no numbness. No depression or anxiety. No SI    ICU Vital Signs Last 24 Hrs  T(C): 36.4 (05 Dec 2018 05:23), Max: 37 (04 Dec 2018 15:50)  T(F): 97.5 (05 Dec 2018 05:23), Max: 98.6 (04 Dec 2018 15:50)  HR: 85 (05 Dec 2018 05:23) (81 - 88)  BP: 157/81 (05 Dec 2018 05:23) (137/86 - 157/81)  BP(mean): --  ABP: --  ABP(mean): --  RR: 20 (05 Dec 2018 05:23) (18 - 20)  SpO2: 96% (05 Dec 2018 05:23) (95% - 97%)    CAPILLARY BLOOD GLUCOSE      POCT Blood Glucose.: 92 mg/dL (05 Dec 2018 07:55)  POCT Blood Glucose.: 135 mg/dL (04 Dec 2018 22:30)  POCT Blood Glucose.: 102 mg/dL (04 Dec 2018 17:17)  POCT Blood Glucose.: 153 mg/dL (04 Dec 2018 12:11)    I&O's Detail    04 Dec 2018 07:01  -  05 Dec 2018 07:00  --------------------------------------------------------  IN:    Oral Fluid: 1080 mL  Total IN: 1080 mL    OUT:    Voided: 1 mL  Total OUT: 1 mL    Total NET: 1079 mL      05 Dec 2018 07:01  -  05 Dec 2018 10:14  --------------------------------------------------------  IN:    Oral Fluid: 360 mL  Total IN: 360 mL    OUT:  Total OUT: 0 mL    Total NET: 360 mL      Physical Exam:  GENERAL: Not in distress. Alert    HEENT:  Normocephalic and atraumatic.   NECK: Supple.  No JVD.    CARDIOVASCULAR: RRR S1, S2. No murmur/rubs/gallop  LUNGS: BLAE+, no rales, no wheezing, no rhonchi.    ABDOMEN: ND. Soft,  NT, no guarding / rebound / rigidity.     EXTREMITIES: no cyanosis, no clubbing, no edema.   SKIN: no rash.   NEUROLOGIC: AAO*3. grossly inatct   PSYCHIATRIC: Calm.  No agitation.    Labs                                   10.1   6.0   )-----------( 156      ( 05 Dec 2018 07:53 )             30.6       12-05    139  |  93<L>  |  70.0<H>  ----------------------------<  82  5.3   |  26.0  |  11.47<H>    Ca    9.5      05 Dec 2018 07:53  Phos  6.5     12-05  Mg     2.6     12-05    TPro  7.1  /  Alb  4.1  /  TBili  0.5  /  DBili  x   /  AST  17  /  ALT  15  /  AlkPhos  78  12-05    MEDICATIONS  (STANDING):  aspirin enteric coated 81 milliGRAM(s) Oral daily  calcium acetate 1334 milliGRAM(s) Oral three times a day with meals  dextrose 5%. 1000 milliLiter(s) (50 mL/Hr) IV Continuous <Continuous>  dextrose 50% Injectable 12.5 Gram(s) IV Push once  dextrose 50% Injectable 25 Gram(s) IV Push once  dextrose 50% Injectable 25 Gram(s) IV Push once  folic acid 1 milliGRAM(s) Oral daily  furosemide   Injectable 80 milliGRAM(s) IV Push daily  heparin  Injectable 5000 Unit(s) SubCutaneous every 8 hours  hydrALAZINE 25 milliGRAM(s) Oral every 8 hours  insulin glargine Injectable (LANTUS) 20 Unit(s) SubCutaneous at bedtime  insulin lispro (HumaLOG) corrective regimen sliding scale   SubCutaneous three times a day before meals  losartan 50 milliGRAM(s) Oral daily  NIFEdipine XL 60 milliGRAM(s) Oral daily  pyridoxine 25 milliGRAM(s) Oral daily  tamsulosin 0.4 milliGRAM(s) Oral at bedtime  ticagrelor 90 milliGRAM(s) Oral two times a day    MEDICATIONS  (PRN):  acetaminophen   Tablet .. 650 milliGRAM(s) Oral every 6 hours PRN Mild Pain (1 - 3)  dextrose 40% Gel 15 Gram(s) Oral once PRN Blood Glucose LESS THAN 70 milliGRAM(s)/deciliter  glucagon  Injectable 1 milliGRAM(s) IntraMuscular once PRN Glucose LESS THAN 70 milligrams/deciliter      < from: Xray Chest 1 View AP/PA. (12.03.18 @ 02:14) >    IMPRESSION:   RIGHT lower lobe airspace consolidation..      < end of copied text >

## 2018-12-05 NOTE — DISCHARGE NOTE ADULT - PLAN OF CARE
prevent recurrrence take meds. do not miss dialysis. follow up with primary MD and kidney doctor and diabetes doctor in 1-2 weeks improved. as above. fluid restriction as per renal seen by cardiology. follow up with them in 1-2 weeks. we started one metoprolol and Lipitor. . continue meds. see primary MD in 1 week get K level rechecked by kidney doc on dialysis folow up with your diabetes doctor and primary MD take meds. do not miss dialysis. follow up with primary MD and kidney doctor and diabetes doctor in 1-2 weeks. please call your heart doctor office tomorrow to get the result of your echocardiogram.

## 2018-12-05 NOTE — PROGRESS NOTE ADULT - PROBLEM SELECTOR PLAN 3
-s/p  Insulin, D50, albuterol and calcium in the ED  - s/p HD  -EKG with peaked T waves: resolved. K 5.6 s/p kayaxalate/ K normal today  - monitor RF, K and Mg

## 2018-12-05 NOTE — DISCHARGE NOTE ADULT - CARE PROVIDER_API CALL
Burt Vail), Internal Medicine; Nephrology  [address] [place] [address]  Phone: [phone]  Fax: [phone]    Jose Brantley), Cardiovascular Disease; Critical Care Medicine; Internal Medicine  [address] [place] [address]  Phone: [phone]  Fax: [phone]

## 2018-12-05 NOTE — DISCHARGE NOTE ADULT - PATIENT PORTAL LINK FT
You can access the [facility] Patient Portal, offered by [facility], by registering with the following website: [email or web address]

## 2018-12-05 NOTE — DISCHARGE NOTE ADULT - MEDICATION SUMMARY - MEDICATIONS TO TAKE
I will START or STAY ON the medications listed below when I get home from the hospital:    acetaminophen 325 mg oral tablet  -- 2 tab(s) by mouth every 6 hours, As needed, Mild Pain (1 - 3)  -- Indication: For Pain    aspirin 81 mg oral delayed release tablet  -- 1 tab(s) by mouth once a day  -- Indication: For CAD (coronary artery disease)    losartan 50 mg oral tablet  -- 1 tab(s) by mouth once a day  -- Indication: For Hypertension    Flomax 0.4 mg oral capsule  -- 1 cap(s) by mouth once a day  -- It is very important that you take or use this exactly as directed.  Do not skip doses or discontinue unless directed by your doctor.  May cause drowsiness.  Alcohol may intensify this effect.  Use care when operating dangerous machinery.  Some non-prescription drugs may aggravate your condition.  Read all labels carefully.  If a warning appears, check with your doctor before taking.  Swallow whole.  Do not crush.  Take with food or milk.    -- Indication: For BPH    insulin glargine (concentrated) 300 units/mL subcutaneous solution  -- 20 unit(s) subcutaneous once a day  -- Check with your doctor before becoming pregnant.  Do not drink alcoholic beverages when taking this medication.  Expires___________________  It is very important that you take or use this exactly as directed.  Do not skip doses or discontinue unless directed by your doctor.  Keep in refrigerator.  Do not freeze.  Obtain medical advice before taking any non-prescription drugs as some may affect the action of this medication.  This drug may impair the ability to drive or operate machinery.  Use care until you become familiar with its effects.    -- Indication: For DM    atorvastatin 40 mg oral tablet  -- 1 tab(s) by mouth once a day (at bedtime)  -- Indication: For HLD    ticagrelor 90 mg oral tablet  -- 1 tab(s) by mouth 2 times a day  -- Indication: For CAD (coronary artery disease)    metoprolol succinate 25 mg oral tablet, extended release  -- 1 tab(s) by mouth once a day  -- Indication: For Hypertensive emergency    NIFEdipine 60 mg oral tablet, extended release  -- 1 tab(s) by mouth once a day  -- Indication: For Hypertensive emergency    calcium acetate 667 mg oral tablet  -- 2 tab(s) by mouth 3 times a day   -- Indication: For ESRD    hydrALAZINE 25 mg oral tablet  -- 1 tab(s) by mouth every 8 hours  -- Indication: For Hypertension    Sharon-Sesar oral tablet  -- 1 tab(s) by mouth once a day  -- Indication: For ESRD    pyridoxine 25 mg oral tablet  -- 1 tab(s) by mouth once a day  -- Indication: For supplement    ergocalciferol 50,000 intl units (1.25 mg) oral capsule  -- 1 cap(s) by mouth once a week  -- Indication: For ESRD    folic acid 1 mg oral tablet  -- 1 tab(s) by mouth once a day  -- Indication: For supplement I will START or STAY ON the medications listed below when I get home from the hospital:    acetaminophen 325 mg oral tablet  -- 2 tab(s) by mouth every 6 hours, As needed, Mild Pain (1 - 3)  -- Indication: For Pain    aspirin 81 mg oral delayed release tablet  -- 1 tab(s) by mouth once a day  -- Indication: For CAD (coronary artery disease)    losartan 50 mg oral tablet  -- 1 tab(s) by mouth once a day  -- Indication: For Hypertension    Flomax 0.4 mg oral capsule  -- 1 cap(s) by mouth once a day  -- It is very important that you take or use this exactly as directed.  Do not skip doses or discontinue unless directed by your doctor.  May cause drowsiness.  Alcohol may intensify this effect.  Use care when operating dangerous machinery.  Some non-prescription drugs may aggravate your condition.  Read all labels carefully.  If a warning appears, check with your doctor before taking.  Swallow whole.  Do not crush.  Take with food or milk.    -- Indication: For BPH    insulin glargine (concentrated) 300 units/mL subcutaneous solution  -- 20 unit(s) subcutaneous once a day  -- Check with your doctor before becoming pregnant.  Do not drink alcoholic beverages when taking this medication.  Expires___________________  It is very important that you take or use this exactly as directed.  Do not skip doses or discontinue unless directed by your doctor.  Keep in refrigerator.  Do not freeze.  Obtain medical advice before taking any non-prescription drugs as some may affect the action of this medication.  This drug may impair the ability to drive or operate machinery.  Use care until you become familiar with its effects.    -- Indication: For DM    atorvastatin 40 mg oral tablet  -- 1 tab(s) by mouth once a day (at bedtime)  -- Indication: For HLD    ticagrelor 90 mg oral tablet  -- 1 tab(s) by mouth 2 times a day  -- Indication: For CAD (coronary artery disease)    metoprolol succinate 25 mg oral tablet, extended release  -- 1 tab(s) by mouth once a day  -- Indication: For Hypertensive emergency    NIFEdipine 60 mg oral tablet, extended release  -- 1 tab(s) by mouth once a day  -- Indication: For Hypertensive emergency    Lasix 40 mg oral tablet  -- 1 tab(s) by mouth once a day   -- Avoid prolonged or excessive exposure to direct and/or artificial sunlight while taking this medication.  It is very important that you take or use this exactly as directed.  Do not skip doses or discontinue unless directed by your doctor.  It may be advisable to drink a full glass orange juice or eat a banana daily while taking this medication.    -- Indication: For Pulmonary edema, acute    calcium acetate 667 mg oral tablet  -- 2 tab(s) by mouth 3 times a day   -- Indication: For ESRD    hydrALAZINE 25 mg oral tablet  -- 1 tab(s) by mouth every 8 hours  -- Indication: For Hypertension    Sharon-Sesar oral tablet  -- 1 tab(s) by mouth once a day  -- Indication: For ESRD    pyridoxine 25 mg oral tablet  -- 1 tab(s) by mouth once a day  -- Indication: For supplement    ergocalciferol 50,000 intl units (1.25 mg) oral capsule  -- 1 cap(s) by mouth once a week  -- Indication: For ESRD    folic acid 1 mg oral tablet  -- 1 tab(s) by mouth once a day  -- Indication: For supplement

## 2018-12-05 NOTE — DISCHARGE NOTE ADULT - SECONDARY DIAGNOSIS.
Pulmonary edema, acute Hypertensive emergency Demand ischemia Hypertension, unspecified type Hyperkalemia Type 2 diabetes mellitus with chronic kidney disease on chronic dialysis, with long-term current use of insulin

## 2018-12-05 NOTE — PROGRESS NOTE ADULT - PROBLEM SELECTOR PLAN 2
-Due to pulmonary edema  -improved. maintaining Sat in RA  - off BiPAP   - s/p HD on admisson, For HD today  - Nitro drip off
-Due to pulmonary edema  -improved. maintaining Sat in RA  - off BiPAP   - s/p HD on admisson, For HD tomorrow  - Nitro drip off
-Due to pulmonary edema  -improving today   - Tolerating BiPAP   - currently getting HD    - Nitro drip off

## 2018-12-05 NOTE — CONSULT NOTE ADULT - SUBJECTIVE AND OBJECTIVE BOX
Chief Complaint: 52 yo male admitted in pulmonary edema.    HPI: Pt on HD for 6 yrs. Hx of CRF secondary to diabetes. Old records reviewed. He came to ER acutely sob and was found to be in pulmonary edema/volume overload and markedly hypertensive. He Denies any chest pain. He recently underwent stenting to lad and circ stenting at [facility] in Aug and September. His renal transplant team had recommended a nuclear stress test which was abnl leading to the cath and stents. Pt had no ischemic sx's or hx of MI. PMH+ IDDM/hpt. No surgery other than AV fistula. No hx of CVA/syncope/seizure/pulmonary/hepatic/thyroid or heme problems. No allergy. Nonsmoker/etoh. Meds reviewed. ROS otherwise neg.    PAST MEDICAL & SURGICAL HISTORY:  Hypertension  Diabetes  Dialysis patient  Renal failure  No significant past surgical history      PREVIOUS DIAGNOSTIC TESTING:      ECHO  FINDINGS:    STRESS  FINDINGS:    CATHETERIZATION  FINDINGS: see HPI.    MEDICATIONS  (STANDING):  aspirin enteric coated 81 milliGRAM(s) Oral daily  calcium acetate 1334 milliGRAM(s) Oral three times a day with meals  dextrose 5%. 1000 milliLiter(s) (50 mL/Hr) IV Continuous <Continuous>  dextrose 50% Injectable 12.5 Gram(s) IV Push once  dextrose 50% Injectable 25 Gram(s) IV Push once  dextrose 50% Injectable 25 Gram(s) IV Push once  folic acid 1 milliGRAM(s) Oral daily  furosemide   Injectable 80 milliGRAM(s) IV Push daily  heparin  Injectable 5000 Unit(s) SubCutaneous every 8 hours  hydrALAZINE 25 milliGRAM(s) Oral every 8 hours  insulin glargine Injectable (LANTUS) 20 Unit(s) SubCutaneous at bedtime  insulin lispro (HumaLOG) corrective regimen sliding scale   SubCutaneous three times a day before meals  losartan 50 milliGRAM(s) Oral daily  NIFEdipine XL 60 milliGRAM(s) Oral daily  pyridoxine 25 milliGRAM(s) Oral daily  tamsulosin 0.4 milliGRAM(s) Oral at bedtime  ticagrelor 90 milliGRAM(s) Oral two times a day    MEDICATIONS  (PRN):  acetaminophen   Tablet .. 650 milliGRAM(s) Oral every 6 hours PRN Mild Pain (1 - 3)  dextrose 40% Gel 15 Gram(s) Oral once PRN Blood Glucose LESS THAN 70 milliGRAM(s)/deciliter  glucagon  Injectable 1 milliGRAM(s) IntraMuscular once PRN Glucose LESS THAN 70 milligrams/deciliter      FAMILY HISTORY:  No pertinent family history in first degree relatives      SOCIAL HISTORY: .    CIGARETTES: 0    ALCOHOL: 0    ROS: Negative other than as mentioned in HPI.    Vital Signs Last 24 Hrs  T(C): 36.4 (05 Dec 2018 05:23), Max: 37 (04 Dec 2018 15:50)  T(F): 97.5 (05 Dec 2018 05:23), Max: 98.6 (04 Dec 2018 15:50)  HR: 85 reg. (05 Dec 2018 05:23) (81 - 88)  BP: 140/70 (05 Dec 2018 05:23) (137/86 - 157/81)  BP(mean): --  RR: 14 flat ora (05 Dec 2018 05:23) (18 - 20)  SpO2: 96% (05 Dec 2018 05:23) (95% - 97%)    PHYSICAL EXAM:  General: Appears well developed, well nourished alert and cooperative. Middle aged afebrile M NAD  HEENT: Head; normocephalic, atraumatic.  Eyes;   Pupils reactive, cornea wnl.  Neck; Supple, no nodes adenopathy, no NVD or carotid bruit or thyromegaly.  CARDIOVASCULAR; No murmur, rub, gallop or lift. Normal S1 and S2.  LUNGS; No rales, rhonchi or wheeze. Normal breath sounds bilaterally.  ABDOMEN ; Soft, nontender without mass or organomegaly. bowel sounds normoactive.  EXTREMITIES; No clubbing, cyanosis or edema. Distal pulses wnl. ROM normal. HD fistula LA  SKIN; warm and dry with normal turgor.  NEURO; Alert/oriented x 3/normal motor exam. No pathologic reflexes.    PSYCH; normal affect.            INTERPRETATION OF TELEMETRY:    ECG: SR with T inversions 1/L v4-6 borderline lvh  CXR-portable: +[facility]    I&O's Detail    04 Dec 2018 07:01  -  05 Dec 2018 07:00  --------------------------------------------------------  IN:    Oral Fluid: 1080 mL  Total IN: 1080 mL    OUT:    Voided: 1 mL  Total OUT: 1 mL    Total NET: 1079 mL      05 Dec 2018 07:01  -  05 Dec 2018 11:05  --------------------------------------------------------  IN:    Oral Fluid: 360 mL  Total IN: 360 mL    OUT:  Total OUT: 0 mL    Total NET: 360 mL          LABS:                        10.1   6.0   )-----------( 156      ( 05 Dec 2018 07:53 )             30.6     12-05    139  |  93<L>  |  70.0<H>  ----------------------------<  82  5.3   |  26.0  |  11.47<H>    Ca    9.5      05 Dec 2018 07:53  Phos  6.5     12-05  Mg     2.6     12-05    TPro  7.1  /  Alb  4.1  /  TBili  0.5  /  DBili  x   /  AST  17  /  ALT  15  /  AlkPhos  78  12-05    CARDIAC MARKERS ( 05 Dec 2018 07:53 )  x     / 0.25 ng/mL / x     / x     / x      CARDIAC MARKERS ( 04 Dec 2018 16:12 )  x     / 0.27 ng/mL / x     / x     / x      CARDIAC MARKERS ( 04 Dec 2018 10:35 )  x     / 0.27 ng/mL / 200 U/L / x     / 3.1 ng/mL  CARDIAC MARKERS ( 03 Dec 2018 19:22 )  x     / 0.28 ng/mL / x     / x     / x      CARDIAC MARKERS ( 03 Dec 2018 12:09 )  x     / 0.21 ng/mL / x     / x     / x              I&O's Summary    04 Dec 2018 07:01  -  05 Dec 2018 07:00  --------------------------------------------------------  IN: 1080 mL / OUT: 1 mL / NET: 1079 mL    05 Dec 2018 07:01  -  05 Dec 2018 11:05  --------------------------------------------------------  IN: 360 mL / OUT: 0 mL / NET: 360 mL        RADIOLOGY & ADDITIONAL STUDIES:

## 2018-12-05 NOTE — PROGRESS NOTE ADULT - PROBLEM SELECTOR PLAN 7
-ASA/Brilinta , not on asa and BB  -Uses [facility] in [place] ([address]): confirmed patient is not on BB or statin.  - discuss with cardiology
-ASA/Brilinta , not on asa and BB  -Uses [facility] in [place] ([address]): confirmed patient is not on BB or statin.  Cardio cx was called [ EKG Ischaemic changes, trop elevated and stable, h/o CAD s/p stent, not on BB or statin confirmed from pharmacy, patient unreliable for OP follow up.]  - discuss with cardiology
-ASA/Brilinta   -Confirm home medications (should be on statin and B-blocker)  -Uses [facility] in [place] ([address])

## 2018-12-05 NOTE — CONSULT NOTE ADULT - ASSESSMENT
1. 54 yo male presented with volume overload and pulmonary edema secondary to crf (creatinine >11)  2. IDDM  3. HPT-currently controlled on polypharmacy  4. CAD with very recent stents to LAD and Circ.  5. Elevated troponins-has had 5 determinations all about same value 0.2-trend does not suggest acute ischemic event and is more likely secondary to his CRF. Pt can have dialysis today and can then be discharged w/o further cardiac w/u at this time. 1. 52 yo male presented with volume overload and pulmonary edema secondary to crf (creatinine >11)  2. IDDM  3. HPT-currently controlled on polypharmacy.wtih hx of hpt and cad a beta blocker is of benefit. Would start with Toprol 25 qd.  4. CAD with very recent stents to LAD and Circ. With hx of diabetes and cad pt should be on a statin-would start lipitor 40 mg qd.  5. Elevated troponins-has had 5 determinations all about same value 0.2-trend does not suggest acute ischemic event and is more likely secondary to his CRF. Pt can have dialysis today and can then be discharged w/o further cardiac w/u at this time.

## 2019-08-11 ENCOUNTER — EMERGENCY (EMERGENCY)
Facility: HOSPITAL | Age: 54
LOS: 1 days | Discharge: HOME | End: 2019-08-11
Attending: EMERGENCY MEDICINE
Payer: MEDICARE

## 2019-08-11 VITALS
TEMPERATURE: 98 F | RESPIRATION RATE: 20 BRPM | SYSTOLIC BLOOD PRESSURE: 179 MMHG | DIASTOLIC BLOOD PRESSURE: 84 MMHG | HEART RATE: 88 BPM | OXYGEN SATURATION: 97 % | HEIGHT: 68 IN | WEIGHT: 199.96 LBS

## 2019-08-11 VITALS
DIASTOLIC BLOOD PRESSURE: 72 MMHG | OXYGEN SATURATION: 98 % | SYSTOLIC BLOOD PRESSURE: 160 MMHG | RESPIRATION RATE: 16 BRPM | HEART RATE: 85 BPM | TEMPERATURE: 98 F

## 2019-08-11 LAB
ALBUMIN SERPL ELPH-MCNC: 4.2 G/DL — SIGNIFICANT CHANGE UP (ref 3.3–5.2)
ALP SERPL-CCNC: 102 U/L — SIGNIFICANT CHANGE UP (ref 40–120)
ALT FLD-CCNC: 28 U/L — SIGNIFICANT CHANGE UP
ANION GAP SERPL CALC-SCNC: 14 MMOL/L — SIGNIFICANT CHANGE UP (ref 5–17)
ANION GAP SERPL CALC-SCNC: 14 MMOL/L — SIGNIFICANT CHANGE UP (ref 5–17)
AST SERPL-CCNC: 24 U/L — SIGNIFICANT CHANGE UP
BASOPHILS # BLD AUTO: 0.04 K/UL — SIGNIFICANT CHANGE UP (ref 0–0.2)
BASOPHILS NFR BLD AUTO: 0.5 % — SIGNIFICANT CHANGE UP (ref 0–2)
BILIRUB SERPL-MCNC: 0.5 MG/DL — SIGNIFICANT CHANGE UP (ref 0.4–2)
BUN SERPL-MCNC: 49 MG/DL — HIGH (ref 8–20)
BUN SERPL-MCNC: 52 MG/DL — HIGH (ref 8–20)
CALCIUM SERPL-MCNC: 9.7 MG/DL — SIGNIFICANT CHANGE UP (ref 8.6–10.2)
CALCIUM SERPL-MCNC: 9.7 MG/DL — SIGNIFICANT CHANGE UP (ref 8.6–10.2)
CHLORIDE SERPL-SCNC: 97 MMOL/L — LOW (ref 98–107)
CHLORIDE SERPL-SCNC: 99 MMOL/L — SIGNIFICANT CHANGE UP (ref 98–107)
CO2 SERPL-SCNC: 25 MMOL/L — SIGNIFICANT CHANGE UP (ref 22–29)
CO2 SERPL-SCNC: 29 MMOL/L — SIGNIFICANT CHANGE UP (ref 22–29)
CREAT SERPL-MCNC: 10.51 MG/DL — HIGH (ref 0.5–1.3)
CREAT SERPL-MCNC: 9.32 MG/DL — HIGH (ref 0.5–1.3)
EOSINOPHIL # BLD AUTO: 0.17 K/UL — SIGNIFICANT CHANGE UP (ref 0–0.5)
EOSINOPHIL NFR BLD AUTO: 2.3 % — SIGNIFICANT CHANGE UP (ref 0–6)
GLUCOSE SERPL-MCNC: 186 MG/DL — HIGH (ref 70–115)
GLUCOSE SERPL-MCNC: 207 MG/DL — HIGH (ref 70–115)
HCT VFR BLD CALC: 29.4 % — LOW (ref 39–50)
HGB BLD-MCNC: 9.8 G/DL — LOW (ref 13–17)
IMM GRANULOCYTES NFR BLD AUTO: 0.3 % — SIGNIFICANT CHANGE UP (ref 0–1.5)
LYMPHOCYTES # BLD AUTO: 0.75 K/UL — LOW (ref 1–3.3)
LYMPHOCYTES # BLD AUTO: 10 % — LOW (ref 13–44)
MCHC RBC-ENTMCNC: 32.7 PG — SIGNIFICANT CHANGE UP (ref 27–34)
MCHC RBC-ENTMCNC: 33.3 GM/DL — SIGNIFICANT CHANGE UP (ref 32–36)
MCV RBC AUTO: 98 FL — SIGNIFICANT CHANGE UP (ref 80–100)
MONOCYTES # BLD AUTO: 0.46 K/UL — SIGNIFICANT CHANGE UP (ref 0–0.9)
MONOCYTES NFR BLD AUTO: 6.1 % — SIGNIFICANT CHANGE UP (ref 2–14)
NEUTROPHILS # BLD AUTO: 6.07 K/UL — SIGNIFICANT CHANGE UP (ref 1.8–7.4)
NEUTROPHILS NFR BLD AUTO: 80.8 % — HIGH (ref 43–77)
PLATELET # BLD AUTO: 155 K/UL — SIGNIFICANT CHANGE UP (ref 150–400)
POTASSIUM SERPL-MCNC: 5.6 MMOL/L — HIGH (ref 3.5–5.3)
POTASSIUM SERPL-MCNC: 6.6 MMOL/L — CRITICAL HIGH (ref 3.5–5.3)
POTASSIUM SERPL-SCNC: 5.6 MMOL/L — HIGH (ref 3.5–5.3)
POTASSIUM SERPL-SCNC: 6.6 MMOL/L — CRITICAL HIGH (ref 3.5–5.3)
PROT SERPL-MCNC: 6.7 G/DL — SIGNIFICANT CHANGE UP (ref 6.6–8.7)
RBC # BLD: 3 M/UL — LOW (ref 4.2–5.8)
RBC # FLD: 12.9 % — SIGNIFICANT CHANGE UP (ref 10.3–14.5)
SODIUM SERPL-SCNC: 136 MMOL/L — SIGNIFICANT CHANGE UP (ref 135–145)
SODIUM SERPL-SCNC: 142 MMOL/L — SIGNIFICANT CHANGE UP (ref 135–145)
WBC # BLD: 7.51 K/UL — SIGNIFICANT CHANGE UP (ref 3.8–10.5)
WBC # FLD AUTO: 7.51 K/UL — SIGNIFICANT CHANGE UP (ref 3.8–10.5)

## 2019-08-11 PROCEDURE — 85027 COMPLETE CBC AUTOMATED: CPT

## 2019-08-11 PROCEDURE — 80053 COMPREHEN METABOLIC PANEL: CPT

## 2019-08-11 PROCEDURE — 11765 WEDGE EXCISION SKN NAIL FOLD: CPT | Mod: TA

## 2019-08-11 PROCEDURE — 36415 COLL VENOUS BLD VENIPUNCTURE: CPT

## 2019-08-11 PROCEDURE — 96375 TX/PRO/DX INJ NEW DRUG ADDON: CPT | Mod: XU

## 2019-08-11 PROCEDURE — 80048 BASIC METABOLIC PNL TOTAL CA: CPT

## 2019-08-11 PROCEDURE — 71046 X-RAY EXAM CHEST 2 VIEWS: CPT

## 2019-08-11 PROCEDURE — 93010 ELECTROCARDIOGRAM REPORT: CPT

## 2019-08-11 PROCEDURE — 96374 THER/PROPH/DIAG INJ IV PUSH: CPT | Mod: XU

## 2019-08-11 PROCEDURE — 71046 X-RAY EXAM CHEST 2 VIEWS: CPT | Mod: 26

## 2019-08-11 PROCEDURE — 99284 EMERGENCY DEPT VISIT MOD MDM: CPT | Mod: 25

## 2019-08-11 PROCEDURE — 93005 ELECTROCARDIOGRAM TRACING: CPT

## 2019-08-11 PROCEDURE — 11765 WEDGE EXCISION SKN NAIL FOLD: CPT

## 2019-08-11 RX ORDER — SODIUM POLYSTYRENE SULFONATE 4.1 MEQ/G
30 POWDER, FOR SUSPENSION ORAL ONCE
Refills: 0 | Status: COMPLETED | OUTPATIENT
Start: 2019-08-11 | End: 2019-08-11

## 2019-08-11 RX ORDER — LIDOCAINE HCL 20 MG/ML
10 VIAL (ML) INJECTION ONCE
Refills: 0 | Status: COMPLETED | OUTPATIENT
Start: 2019-08-11 | End: 2019-08-11

## 2019-08-11 RX ORDER — SODIUM POLYSTYRENE SULFONATE 4.1 MEQ/G
15 POWDER, FOR SUSPENSION ORAL ONCE
Refills: 0 | Status: COMPLETED | OUTPATIENT
Start: 2019-08-11 | End: 2019-08-11

## 2019-08-11 RX ORDER — INSULIN HUMAN 100 [IU]/ML
5 INJECTION, SOLUTION SUBCUTANEOUS ONCE
Refills: 0 | Status: COMPLETED | OUTPATIENT
Start: 2019-08-11 | End: 2019-08-11

## 2019-08-11 RX ORDER — DEXTROSE 50 % IN WATER 50 %
50 SYRINGE (ML) INTRAVENOUS ONCE
Refills: 0 | Status: COMPLETED | OUTPATIENT
Start: 2019-08-11 | End: 2019-08-11

## 2019-08-11 RX ADMIN — Medication 50 MILLILITER(S): at 16:55

## 2019-08-11 RX ADMIN — INSULIN HUMAN 5 UNIT(S): 100 INJECTION, SOLUTION SUBCUTANEOUS at 16:56

## 2019-08-11 RX ADMIN — SODIUM POLYSTYRENE SULFONATE 15 GRAM(S): 4.1 POWDER, FOR SUSPENSION ORAL at 21:12

## 2019-08-11 RX ADMIN — SODIUM POLYSTYRENE SULFONATE 30 GRAM(S): 4.1 POWDER, FOR SUSPENSION ORAL at 16:57

## 2019-08-11 NOTE — ED STATDOCS - OBJECTIVE STATEMENT
52 y/o M pt with PMHx of DM, HTN, and renal failure presents to the ED c/o difficulty breathing that began 2 days ago. Pt reports that symptoms only occur when he is laying on his back, but not when he is sitting. He says that he cannot sleep secondary to difficulty breathing which also causes cough. Pt also reports mild pain since he stubbed his L toe on the way to the ED today, causing the nail to raise off the bed. He says that he cannot feel his toes bilaterally secondary to his diabetic neuropathy. Pt is currently on dialysis. Denies N/V/D. No further acute complaints at this time.  Nephrologist: Dr. Dan

## 2019-08-11 NOTE — CONSULT NOTE ADULT - SUBJECTIVE AND OBJECTIVE BOX
Patient is a 53y old  Male who presents with a chief complaint of     HPI: ESRD , due for HD in am   Came here for cough, feels much better   CXR clear   K+ was elevated , given Kayexalate , had BM , K+ now 5.6   EKG OK   Feels better , wants to go home . Had excision of nail lesion caused by trauma       PAST MEDICAL & SURGICAL HISTORY:  Hypertension  Diabetes  Dialysis patient  Renal failure  No significant past surgical history      FAMILY HISTORY:  No pertinent family history in first degree relatives      Social History:    MEDICATIONS  (STANDING):  sodium polystyrene sulfonate Suspension 15 Gram(s) Oral Once    MEDICATIONS  (PRN):      Allergies    No Known Allergies    Intolerances      Vital Signs Last 24 Hrs  T(C): 36.7 (11 Aug 2019 19:45), Max: 36.7 (11 Aug 2019 13:09)  T(F): 98 (11 Aug 2019 19:45), Max: 98.1 (11 Aug 2019 18:47)  HR: 85 (11 Aug 2019 19:45) (85 - 88)  BP: 160/72 (11 Aug 2019 19:45) (160/72 - 179/84)  BP(mean): --  RR: 16 (11 Aug 2019 19:45) (16 - 20)  SpO2: 98% (11 Aug 2019 19:45) (97% - 98%)  Daily Height in cm: 172.72 (11 Aug 2019 13:09)    Daily   I&O's Detail    I&O's Summary      PHYSICAL EXAM:    GENERAL: NAD, well-groomed, well-developed  HEAD:  Atraumatic, Normocephalic  EYES: EOMI, PERRLA, conjunctiva and sclera clear  ENMT: No tonsillar erythema, exudates, or enlargement; Moist mucous membranes, Good dentition, No lesions  NECK: Supple, No JVD, Normal thyroid  NERVOUS SYSTEM:  Alert & Oriented X3, Good concentration; Motor Strength 5/5 B/L upper and lower extremities; DTRs 2+ intact and symmetric  CHEST/LUNG: Clear to percussion bilaterally; No rales, rhonchi, wheezing, or rubs  HEART: Regular rate and rhythm; No murmurs, rubs, or gallops  ABDOMEN: Soft, Nontender, Nondistended; Bowel sounds present  EXTREMITIES:  2+ Peripheral Pulses, No clubbing, cyanosis, or edema, access with thrill         LABS:                        9.8    7.51  )-----------( 155      ( 11 Aug 2019 15:14 )             29.4     08-11    142  |  99  |  52.0<H>  ----------------------------<  186<H>  5.6<H>   |  29.0  |  10.51<H>    Ca    9.7      11 Aug 2019 19:17    TPro  6.7  /  Alb  4.2  /  TBili  0.5  /  DBili  x   /  AST  24  /  ALT  28  /  AlkPhos  102  08-11           EXAM:  XR CHEST PA LAT 2V                          PROCEDURE DATE:  08/11/2019          INTERPRETATION:  TECHNIQUE: 2 views of the chest.    COMPARISON: 12/30/2018    CLINICAL HISTORY: cough    FINDINGS:    Frontal and lateral views of the chest demonstrates the lungs to be   clear. The cardiomediastinal silhouette is normal. No acute osseous   abnormalities. Overlying EKG leads and wires are noted.    IMPRESSION:    No acute cardiopulmonary disease process.                MARKUS CARRERA M.D., ATTENDING RADIOLOGIST  This document has been electronically signed. Aug 11 2019  5:29PM                  RADIOLOGY & ADDITIONAL TESTS:

## 2019-08-11 NOTE — CONSULT NOTE ADULT - ASSESSMENT
ESRD   Resolved cough   CXR clear   Feels better , wants to go home   Potassium better post Kayexalate   He is scheduled for Hd first thing in am  Mon 8-12 in [facility]   Cleared from a renal perspective for discharge

## 2019-08-11 NOTE — ED STATDOCS - CLINICAL SUMMARY MEDICAL DECISION MAKING FREE TEXT BOX
renal pt here for cough  found to have k 6.6  will treat  to go to main room for monitor and treatment. Repeat lab. dispo

## 2019-08-11 NOTE — ED STATDOCS - SHIFT CHANGE DETAILS
Dialysis pt came to  for cough. Found to  have K 6.6  cxr pending  meds to treat K ordered. Repeat labs after treatment. If not improved , call bryant Liu  If improved dc

## 2019-08-11 NOTE — ED STATDOCS - CHPI ED SYMPTOM NEG
no diarrhea/no vomiting/no nausea no abdominal distention/no diarrhea/no vomiting/no dysuria/no palpitations/no fever/no nausea

## 2019-08-11 NOTE — ED PROVIDER NOTE - OBJECTIVE STATEMENT
52 y/o male due for dialysis in am comes in for SOB ad elevated K   pt has been medicated with Kayexalate and has had several BM will repeat potassium   also needs toe nail cut

## 2019-08-11 NOTE — ED STATDOCS - MUSCULOSKELETAL, MLM
partial evulsion of the medial border of L great toe. range of motion is not limited and there is no muscle tenderness.

## 2019-08-11 NOTE — ED ADULT NURSE NOTE - OBJECTIVE STATEMENT
Patient A&Ox4 complaining of shortness of breath & cough x1 day. Patient has AV fistula in place to left forearm, positive thrill, positive bruit, left arm precautions in place. Negative obvious distress, speaking in full sentences. Stated dialysis days are M-W-F.

## 2019-08-11 NOTE — ED PROCEDURE NOTE - GENERAL PROCEDURE DETAILS
Wedge resection performed utilizing standard sterile protocol. Digital block performed 3ml Lidocaine 1%. Pt tolerated Toe draped , Needle  used to lift nail , Scissors used to cut nail. Bacitracin and dressing applied.

## 2019-10-23 ENCOUNTER — OUTPATIENT (OUTPATIENT)
Dept: OUTPATIENT SERVICES | Facility: HOSPITAL | Age: 54
LOS: 1 days | End: 2019-10-23
Payer: MEDICARE

## 2019-10-23 VITALS
RESPIRATION RATE: 16 BRPM | HEIGHT: 68 IN | OXYGEN SATURATION: 96 % | HEART RATE: 83 BPM | DIASTOLIC BLOOD PRESSURE: 76 MMHG | TEMPERATURE: 83 F | WEIGHT: 198.42 LBS | SYSTOLIC BLOOD PRESSURE: 159 MMHG

## 2019-10-23 DIAGNOSIS — I77.0 ARTERIOVENOUS FISTULA, ACQUIRED: Chronic | ICD-10-CM

## 2019-10-23 DIAGNOSIS — I25.10 ATHEROSCLEROTIC HEART DISEASE OF NATIVE CORONARY ARTERY WITHOUT ANGINA PECTORIS: ICD-10-CM

## 2019-10-23 LAB
ALBUMIN SERPL ELPH-MCNC: 4.5 G/DL — SIGNIFICANT CHANGE UP (ref 3.3–5)
ALP SERPL-CCNC: 97 U/L — SIGNIFICANT CHANGE UP (ref 40–120)
ALT FLD-CCNC: 20 U/L — SIGNIFICANT CHANGE UP (ref 10–45)
ANION GAP SERPL CALC-SCNC: 18 MMOL/L — HIGH (ref 5–17)
AST SERPL-CCNC: 24 U/L — SIGNIFICANT CHANGE UP (ref 10–40)
BILIRUB SERPL-MCNC: 0.4 MG/DL — SIGNIFICANT CHANGE UP (ref 0.2–1.2)
BUN SERPL-MCNC: 42 MG/DL — HIGH (ref 7–23)
CALCIUM SERPL-MCNC: 9.9 MG/DL — SIGNIFICANT CHANGE UP (ref 8.4–10.5)
CHLORIDE SERPL-SCNC: 96 MMOL/L — SIGNIFICANT CHANGE UP (ref 96–108)
CO2 SERPL-SCNC: 30 MMOL/L — SIGNIFICANT CHANGE UP (ref 22–31)
CREAT SERPL-MCNC: 10.27 MG/DL — HIGH (ref 0.5–1.3)
GLUCOSE BLDC GLUCOMTR-MCNC: 118 MG/DL — HIGH (ref 70–99)
GLUCOSE BLDC GLUCOMTR-MCNC: 122 MG/DL — HIGH (ref 70–99)
GLUCOSE SERPL-MCNC: 125 MG/DL — HIGH (ref 70–99)
HCT VFR BLD CALC: 33.1 % — LOW (ref 39–50)
HGB BLD-MCNC: 11.1 G/DL — LOW (ref 13–17)
MAGNESIUM SERPL-MCNC: 2.8 MG/DL — HIGH (ref 1.6–2.6)
MCHC RBC-ENTMCNC: 31.6 PG — SIGNIFICANT CHANGE UP (ref 27–34)
MCHC RBC-ENTMCNC: 33.5 GM/DL — SIGNIFICANT CHANGE UP (ref 32–36)
MCV RBC AUTO: 94.3 FL — SIGNIFICANT CHANGE UP (ref 80–100)
NRBC # BLD: 0 /100 WBCS — SIGNIFICANT CHANGE UP (ref 0–0)
PLATELET # BLD AUTO: 145 K/UL — LOW (ref 150–400)
POTASSIUM SERPL-MCNC: 5.6 MMOL/L — HIGH (ref 3.5–5.3)
POTASSIUM SERPL-SCNC: 5.6 MMOL/L — HIGH (ref 3.5–5.3)
PROT SERPL-MCNC: 7.2 G/DL — SIGNIFICANT CHANGE UP (ref 6–8.3)
RBC # BLD: 3.51 M/UL — LOW (ref 4.2–5.8)
RBC # FLD: 13.2 % — SIGNIFICANT CHANGE UP (ref 10.3–14.5)
SODIUM SERPL-SCNC: 144 MMOL/L — SIGNIFICANT CHANGE UP (ref 135–145)
WBC # BLD: 6.72 K/UL — SIGNIFICANT CHANGE UP (ref 3.8–10.5)
WBC # FLD AUTO: 6.72 K/UL — SIGNIFICANT CHANGE UP (ref 3.8–10.5)

## 2019-10-23 PROCEDURE — 80053 COMPREHEN METABOLIC PANEL: CPT

## 2019-10-23 PROCEDURE — C1728: CPT

## 2019-10-23 PROCEDURE — 93010 ELECTROCARDIOGRAM REPORT: CPT

## 2019-10-23 PROCEDURE — 77290 THER RAD SIMULAJ FIELD CPLX: CPT | Mod: 26

## 2019-10-23 PROCEDURE — 85027 COMPLETE CBC AUTOMATED: CPT

## 2019-10-23 PROCEDURE — 93010 ELECTROCARDIOGRAM REPORT: CPT | Mod: 77

## 2019-10-23 PROCEDURE — C1894: CPT

## 2019-10-23 PROCEDURE — 77770 HDR RDNCL NTRSTL/ICAV BRCHTX: CPT | Mod: 26

## 2019-10-23 PROCEDURE — C1887: CPT

## 2019-10-23 PROCEDURE — 92978 ENDOLUMINL IVUS OCT C 1ST: CPT | Mod: LD

## 2019-10-23 PROCEDURE — 99221 1ST HOSP IP/OBS SF/LOW 40: CPT | Mod: 25

## 2019-10-23 PROCEDURE — 77470 SPECIAL RADIATION TREATMENT: CPT | Mod: 26

## 2019-10-23 PROCEDURE — 77770 HDR RDNCL NTRSTL/ICAV BRCHTX: CPT

## 2019-10-23 PROCEDURE — 93005 ELECTROCARDIOGRAM TRACING: CPT

## 2019-10-23 PROCEDURE — 77318 BRACHYTX ISODOSE COMPLEX: CPT | Mod: 26

## 2019-10-23 PROCEDURE — 99153 MOD SED SAME PHYS/QHP EA: CPT

## 2019-10-23 PROCEDURE — 77370 RADIATION PHYSICS CONSULT: CPT

## 2019-10-23 PROCEDURE — 77290 THER RAD SIMULAJ FIELD CPLX: CPT

## 2019-10-23 PROCEDURE — 77318 BRACHYTX ISODOSE COMPLEX: CPT

## 2019-10-23 PROCEDURE — 92920 PRQ TRLUML C ANGIOP 1ART&/BR: CPT | Mod: LD

## 2019-10-23 PROCEDURE — 99152 MOD SED SAME PHYS/QHP 5/>YRS: CPT

## 2019-10-23 PROCEDURE — C1769: CPT

## 2019-10-23 PROCEDURE — 92974 CATH PLACE CARDIO BRACHYTX: CPT

## 2019-10-23 PROCEDURE — C1725: CPT

## 2019-10-23 PROCEDURE — 83735 ASSAY OF MAGNESIUM: CPT

## 2019-10-23 PROCEDURE — 77470 SPECIAL RADIATION TREATMENT: CPT

## 2019-10-23 PROCEDURE — 82962 GLUCOSE BLOOD TEST: CPT

## 2019-10-23 PROCEDURE — 77263 THER RADIOLOGY TX PLNG CPLX: CPT

## 2019-10-23 PROCEDURE — C1717: CPT

## 2019-10-23 RX ORDER — GLUCAGON INJECTION, SOLUTION 0.5 MG/.1ML
1 INJECTION, SOLUTION SUBCUTANEOUS ONCE
Refills: 0 | Status: DISCONTINUED | OUTPATIENT
Start: 2019-10-23 | End: 2019-11-09

## 2019-10-23 RX ORDER — DEXTROSE 50 % IN WATER 50 %
12.5 SYRINGE (ML) INTRAVENOUS ONCE
Refills: 0 | Status: DISCONTINUED | OUTPATIENT
Start: 2019-10-23 | End: 2019-11-09

## 2019-10-23 RX ORDER — INSULIN LISPRO 100/ML
VIAL (ML) SUBCUTANEOUS AT BEDTIME
Refills: 0 | Status: DISCONTINUED | OUTPATIENT
Start: 2019-10-23 | End: 2019-11-09

## 2019-10-23 RX ORDER — DEXTROSE 50 % IN WATER 50 %
25 SYRINGE (ML) INTRAVENOUS ONCE
Refills: 0 | Status: DISCONTINUED | OUTPATIENT
Start: 2019-10-23 | End: 2019-11-09

## 2019-10-23 RX ORDER — INSULIN LISPRO 100/ML
VIAL (ML) SUBCUTANEOUS
Refills: 0 | Status: DISCONTINUED | OUTPATIENT
Start: 2019-10-23 | End: 2019-11-09

## 2019-10-23 RX ORDER — TAMSULOSIN HYDROCHLORIDE 0.4 MG/1
1 CAPSULE ORAL
Qty: 0 | Refills: 0 | DISCHARGE

## 2019-10-23 RX ORDER — DEXTROSE 50 % IN WATER 50 %
15 SYRINGE (ML) INTRAVENOUS ONCE
Refills: 0 | Status: DISCONTINUED | OUTPATIENT
Start: 2019-10-23 | End: 2019-11-09

## 2019-10-23 RX ORDER — HYDRALAZINE HCL 50 MG
5 TABLET ORAL ONCE
Refills: 0 | Status: COMPLETED | OUTPATIENT
Start: 2019-10-23 | End: 2019-10-23

## 2019-10-23 RX ORDER — SODIUM CHLORIDE 9 MG/ML
1000 INJECTION, SOLUTION INTRAVENOUS
Refills: 0 | Status: DISCONTINUED | OUTPATIENT
Start: 2019-10-23 | End: 2019-11-09

## 2019-10-23 RX ORDER — ERGOCALCIFEROL 1.25 MG/1
1 CAPSULE ORAL
Qty: 0 | Refills: 0 | DISCHARGE

## 2019-10-23 RX ADMIN — Medication 5 MILLIGRAM(S): at 18:09

## 2019-10-23 NOTE — H&P CARDIOLOGY - HISTORY OF PRESENT ILLNESS
54 y/o male with PMH of HTN, ESRD, T2DM (managed by endo Dr Yanet Irizarry, last AI 7.1), CAD with PCI RADHA to LAD and LCx (on ASA/Brilinta) who is currently undergoing evaluation for renal transplant and had NST that was abnormal, followed by angiogram on 10/3/19 s/p Laser atherectomy/PTCA of LAD, PTCA OM1 in [facility].  Patent was recommended for outpt brachytherapy of LAD for which he presents today. Denied prior implantable cardiac monitoring device, chest discomfort, dizziness, diaphoresis, palpitations, nausea, vomiting, peripheral edema, recent weight gain, or syncope.      Patient is on ESRD on dialysis (for 6 years on M,W,F via L arm AV fistula, Nephrologist Keagan Mars, [facility], [place]),     Cardiology Dr Campos

## 2019-11-02 NOTE — PATIENT PROFILE ADULT. - MEDICATIONS BROUGHT TO HOSPITAL, PROFILE
L posterior ankle wound dehiscence L posterior ankle wound dehiscence L posterior ankle wound dehiscence L posterior ankle wound dehiscence L posterior ankle wound dehiscence L posterior ankle wound dehiscence L posterior ankle wound dehiscence L posterior ankle wound dehiscence L posterior ankle wound dehiscence L posterior ankle wound dehiscence no

## 2020-01-12 ENCOUNTER — TRANSCRIPTION ENCOUNTER (OUTPATIENT)
Age: 55
End: 2020-01-12

## 2020-01-12 ENCOUNTER — INPATIENT (INPATIENT)
Facility: HOSPITAL | Age: 55
LOS: 0 days | Discharge: HOME | DRG: 640 | End: 2020-01-12
Attending: GENERAL ACUTE CARE HOSPITAL | Admitting: HOSPITALIST
Payer: MEDICARE

## 2020-01-12 VITALS
DIASTOLIC BLOOD PRESSURE: 84 MMHG | HEART RATE: 83 BPM | TEMPERATURE: 98 F | SYSTOLIC BLOOD PRESSURE: 180 MMHG | OXYGEN SATURATION: 94 % | RESPIRATION RATE: 18 BRPM

## 2020-01-12 VITALS
SYSTOLIC BLOOD PRESSURE: 220 MMHG | OXYGEN SATURATION: 93 % | WEIGHT: 190.04 LBS | HEIGHT: 69 IN | TEMPERATURE: 99 F | HEART RATE: 99 BPM | DIASTOLIC BLOOD PRESSURE: 98 MMHG | RESPIRATION RATE: 22 BRPM

## 2020-01-12 DIAGNOSIS — I77.0 ARTERIOVENOUS FISTULA, ACQUIRED: Chronic | ICD-10-CM

## 2020-01-12 DIAGNOSIS — E87.70 FLUID OVERLOAD, UNSPECIFIED: ICD-10-CM

## 2020-01-12 PROBLEM — I25.10 ATHEROSCLEROTIC HEART DISEASE OF NATIVE CORONARY ARTERY WITHOUT ANGINA PECTORIS: Chronic | Status: ACTIVE | Noted: 2019-10-23

## 2020-01-12 LAB
ALBUMIN SERPL ELPH-MCNC: 4.1 G/DL — SIGNIFICANT CHANGE UP (ref 3.3–5.2)
ALP SERPL-CCNC: 92 U/L — SIGNIFICANT CHANGE UP (ref 40–120)
ALT FLD-CCNC: 27 U/L — SIGNIFICANT CHANGE UP
ANION GAP SERPL CALC-SCNC: 16 MMOL/L — SIGNIFICANT CHANGE UP (ref 5–17)
APTT BLD: 34.1 SEC — SIGNIFICANT CHANGE UP (ref 27.5–36.3)
AST SERPL-CCNC: 32 U/L — SIGNIFICANT CHANGE UP
BASOPHILS # BLD AUTO: 0.03 K/UL — SIGNIFICANT CHANGE UP (ref 0–0.2)
BASOPHILS NFR BLD AUTO: 0.6 % — SIGNIFICANT CHANGE UP (ref 0–2)
BILIRUB SERPL-MCNC: 0.4 MG/DL — SIGNIFICANT CHANGE UP (ref 0.4–2)
BUN SERPL-MCNC: 57 MG/DL — HIGH (ref 8–20)
CALCIUM SERPL-MCNC: 9.1 MG/DL — SIGNIFICANT CHANGE UP (ref 8.6–10.2)
CHLORIDE SERPL-SCNC: 100 MMOL/L — SIGNIFICANT CHANGE UP (ref 98–107)
CO2 SERPL-SCNC: 26 MMOL/L — SIGNIFICANT CHANGE UP (ref 22–29)
CREAT SERPL-MCNC: 9.43 MG/DL — HIGH (ref 0.5–1.3)
EOSINOPHIL # BLD AUTO: 0.15 K/UL — SIGNIFICANT CHANGE UP (ref 0–0.5)
EOSINOPHIL NFR BLD AUTO: 3 % — SIGNIFICANT CHANGE UP (ref 0–6)
GLUCOSE SERPL-MCNC: 217 MG/DL — HIGH (ref 70–115)
HBV CORE AB SER-ACNC: SIGNIFICANT CHANGE UP
HBV SURFACE AB SER-ACNC: 37 MIU/ML — SIGNIFICANT CHANGE UP
HBV SURFACE AG SER-ACNC: SIGNIFICANT CHANGE UP
HCT VFR BLD CALC: 28.8 % — LOW (ref 39–50)
HCV AB S/CO SERPL IA: 0.08 S/CO — SIGNIFICANT CHANGE UP (ref 0–0.99)
HCV AB SERPL-IMP: SIGNIFICANT CHANGE UP
HGB BLD-MCNC: 9.5 G/DL — LOW (ref 13–17)
IMM GRANULOCYTES NFR BLD AUTO: 0.2 % — SIGNIFICANT CHANGE UP (ref 0–1.5)
INR BLD: 0.96 RATIO — SIGNIFICANT CHANGE UP (ref 0.88–1.16)
LYMPHOCYTES # BLD AUTO: 0.76 K/UL — LOW (ref 1–3.3)
LYMPHOCYTES # BLD AUTO: 15.2 % — SIGNIFICANT CHANGE UP (ref 13–44)
MCHC RBC-ENTMCNC: 31.1 PG — SIGNIFICANT CHANGE UP (ref 27–34)
MCHC RBC-ENTMCNC: 33 GM/DL — SIGNIFICANT CHANGE UP (ref 32–36)
MCV RBC AUTO: 94.4 FL — SIGNIFICANT CHANGE UP (ref 80–100)
MONOCYTES # BLD AUTO: 0.35 K/UL — SIGNIFICANT CHANGE UP (ref 0–0.9)
MONOCYTES NFR BLD AUTO: 7 % — SIGNIFICANT CHANGE UP (ref 2–14)
NEUTROPHILS # BLD AUTO: 3.7 K/UL — SIGNIFICANT CHANGE UP (ref 1.8–7.4)
NEUTROPHILS NFR BLD AUTO: 74 % — SIGNIFICANT CHANGE UP (ref 43–77)
PLATELET # BLD AUTO: 144 K/UL — LOW (ref 150–400)
POTASSIUM SERPL-MCNC: 5.5 MMOL/L — HIGH (ref 3.5–5.3)
POTASSIUM SERPL-MCNC: 6.5 MMOL/L — CRITICAL HIGH (ref 3.5–5.3)
POTASSIUM SERPL-SCNC: 5.5 MMOL/L — HIGH (ref 3.5–5.3)
POTASSIUM SERPL-SCNC: 6.5 MMOL/L — CRITICAL HIGH (ref 3.5–5.3)
PROT SERPL-MCNC: 6.5 G/DL — LOW (ref 6.6–8.7)
PROTHROM AB SERPL-ACNC: 11 SEC — SIGNIFICANT CHANGE UP (ref 10–12.9)
RBC # BLD: 3.05 M/UL — LOW (ref 4.2–5.8)
RBC # FLD: 13.3 % — SIGNIFICANT CHANGE UP (ref 10.3–14.5)
SODIUM SERPL-SCNC: 142 MMOL/L — SIGNIFICANT CHANGE UP (ref 135–145)
WBC # BLD: 5 K/UL — SIGNIFICANT CHANGE UP (ref 3.8–10.5)
WBC # FLD AUTO: 5 K/UL — SIGNIFICANT CHANGE UP (ref 3.8–10.5)

## 2020-01-12 PROCEDURE — 93010 ELECTROCARDIOGRAM REPORT: CPT

## 2020-01-12 PROCEDURE — 93005 ELECTROCARDIOGRAM TRACING: CPT

## 2020-01-12 PROCEDURE — 71045 X-RAY EXAM CHEST 1 VIEW: CPT

## 2020-01-12 PROCEDURE — 80053 COMPREHEN METABOLIC PANEL: CPT

## 2020-01-12 PROCEDURE — 96374 THER/PROPH/DIAG INJ IV PUSH: CPT

## 2020-01-12 PROCEDURE — 85730 THROMBOPLASTIN TIME PARTIAL: CPT

## 2020-01-12 PROCEDURE — 85027 COMPLETE CBC AUTOMATED: CPT

## 2020-01-12 PROCEDURE — 86704 HEP B CORE ANTIBODY TOTAL: CPT

## 2020-01-12 PROCEDURE — 84132 ASSAY OF SERUM POTASSIUM: CPT

## 2020-01-12 PROCEDURE — 86803 HEPATITIS C AB TEST: CPT

## 2020-01-12 PROCEDURE — 99261: CPT

## 2020-01-12 PROCEDURE — 86706 HEP B SURFACE ANTIBODY: CPT

## 2020-01-12 PROCEDURE — 87340 HEPATITIS B SURFACE AG IA: CPT

## 2020-01-12 PROCEDURE — 99285 EMERGENCY DEPT VISIT HI MDM: CPT

## 2020-01-12 PROCEDURE — 85610 PROTHROMBIN TIME: CPT

## 2020-01-12 PROCEDURE — 99285 EMERGENCY DEPT VISIT HI MDM: CPT | Mod: 25

## 2020-01-12 PROCEDURE — 99239 HOSP IP/OBS DSCHRG MGMT >30: CPT

## 2020-01-12 PROCEDURE — 36415 COLL VENOUS BLD VENIPUNCTURE: CPT

## 2020-01-12 PROCEDURE — 71045 X-RAY EXAM CHEST 1 VIEW: CPT | Mod: 26

## 2020-01-12 RX ORDER — CALCIUM ACETATE 667 MG
1334 TABLET ORAL
Refills: 0 | Status: DISCONTINUED | OUTPATIENT
Start: 2020-01-12 | End: 2020-01-12

## 2020-01-12 RX ORDER — LOSARTAN POTASSIUM 100 MG/1
50 TABLET, FILM COATED ORAL DAILY
Refills: 0 | Status: DISCONTINUED | OUTPATIENT
Start: 2020-01-12 | End: 2020-01-12

## 2020-01-12 RX ORDER — METOPROLOL TARTRATE 50 MG
25 TABLET ORAL DAILY
Refills: 0 | Status: DISCONTINUED | OUTPATIENT
Start: 2020-01-12 | End: 2020-01-12

## 2020-01-12 RX ORDER — TICAGRELOR 90 MG/1
60 TABLET ORAL EVERY 12 HOURS
Refills: 0 | Status: DISCONTINUED | OUTPATIENT
Start: 2020-01-12 | End: 2020-01-12

## 2020-01-12 RX ORDER — AMLODIPINE BESYLATE 2.5 MG/1
10 TABLET ORAL DAILY
Refills: 0 | Status: DISCONTINUED | OUTPATIENT
Start: 2020-01-12 | End: 2020-01-12

## 2020-01-12 RX ORDER — ATORVASTATIN CALCIUM 80 MG/1
40 TABLET, FILM COATED ORAL AT BEDTIME
Refills: 0 | Status: DISCONTINUED | OUTPATIENT
Start: 2020-01-12 | End: 2020-01-12

## 2020-01-12 RX ORDER — ASPIRIN/CALCIUM CARB/MAGNESIUM 324 MG
81 TABLET ORAL DAILY
Refills: 0 | Status: DISCONTINUED | OUTPATIENT
Start: 2020-01-12 | End: 2020-01-12

## 2020-01-12 RX ORDER — HYDRALAZINE HCL 50 MG
50 TABLET ORAL EVERY 6 HOURS
Refills: 0 | Status: DISCONTINUED | OUTPATIENT
Start: 2020-01-12 | End: 2020-01-12

## 2020-01-12 RX ORDER — FUROSEMIDE 40 MG
40 TABLET ORAL DAILY
Refills: 0 | Status: DISCONTINUED | OUTPATIENT
Start: 2020-01-12 | End: 2020-01-12

## 2020-01-12 RX ORDER — HEPARIN SODIUM 5000 [USP'U]/ML
5000 INJECTION INTRAVENOUS; SUBCUTANEOUS EVERY 12 HOURS
Refills: 0 | Status: DISCONTINUED | OUTPATIENT
Start: 2020-01-12 | End: 2020-01-12

## 2020-01-12 RX ORDER — ACETAMINOPHEN 500 MG
650 TABLET ORAL EVERY 6 HOURS
Refills: 0 | Status: DISCONTINUED | OUTPATIENT
Start: 2020-01-12 | End: 2020-01-12

## 2020-01-12 RX ORDER — TICAGRELOR 90 MG/1
90 TABLET ORAL EVERY 12 HOURS
Refills: 0 | Status: DISCONTINUED | OUTPATIENT
Start: 2020-01-12 | End: 2020-01-12

## 2020-01-12 RX ORDER — HYDRALAZINE HCL 50 MG
10 TABLET ORAL ONCE
Refills: 0 | Status: COMPLETED | OUTPATIENT
Start: 2020-01-12 | End: 2020-01-12

## 2020-01-12 RX ORDER — FOLIC ACID 0.8 MG
1 TABLET ORAL DAILY
Refills: 0 | Status: DISCONTINUED | OUTPATIENT
Start: 2020-01-12 | End: 2020-01-12

## 2020-01-12 RX ORDER — SODIUM CHLORIDE 9 MG/ML
3 INJECTION INTRAMUSCULAR; INTRAVENOUS; SUBCUTANEOUS ONCE
Refills: 0 | Status: COMPLETED | OUTPATIENT
Start: 2020-01-12 | End: 2020-01-12

## 2020-01-12 RX ORDER — FUROSEMIDE 40 MG
60 TABLET ORAL ONCE
Refills: 0 | Status: COMPLETED | OUTPATIENT
Start: 2020-01-12 | End: 2020-01-12

## 2020-01-12 RX ORDER — HYDRALAZINE HCL 50 MG
10 TABLET ORAL EVERY 6 HOURS
Refills: 0 | Status: DISCONTINUED | OUTPATIENT
Start: 2020-01-12 | End: 2020-01-12

## 2020-01-12 RX ORDER — TAMSULOSIN HYDROCHLORIDE 0.4 MG/1
0.4 CAPSULE ORAL AT BEDTIME
Refills: 0 | Status: DISCONTINUED | OUTPATIENT
Start: 2020-01-12 | End: 2020-01-12

## 2020-01-12 RX ORDER — ACETAMINOPHEN 500 MG
650 TABLET ORAL ONCE
Refills: 0 | Status: COMPLETED | OUTPATIENT
Start: 2020-01-12 | End: 2020-01-12

## 2020-01-12 RX ORDER — NITROGLYCERIN 6.5 MG
2 CAPSULE, EXTENDED RELEASE ORAL ONCE
Refills: 0 | Status: COMPLETED | OUTPATIENT
Start: 2020-01-12 | End: 2020-01-12

## 2020-01-12 RX ADMIN — Medication 650 MILLIGRAM(S): at 07:58

## 2020-01-12 RX ADMIN — Medication 50 MILLIGRAM(S): at 10:51

## 2020-01-12 RX ADMIN — SODIUM CHLORIDE 3 MILLILITER(S): 9 INJECTION INTRAMUSCULAR; INTRAVENOUS; SUBCUTANEOUS at 04:19

## 2020-01-12 RX ADMIN — AMLODIPINE BESYLATE 10 MILLIGRAM(S): 2.5 TABLET ORAL at 10:50

## 2020-01-12 RX ADMIN — Medication 1 MILLIGRAM(S): at 10:50

## 2020-01-12 RX ADMIN — Medication 2 INCH(S): at 04:23

## 2020-01-12 RX ADMIN — Medication 10 MILLIGRAM(S): at 06:59

## 2020-01-12 RX ADMIN — Medication 100 MILLIGRAM(S): at 10:49

## 2020-01-12 RX ADMIN — Medication 60 MILLIGRAM(S): at 04:20

## 2020-01-12 RX ADMIN — Medication 81 MILLIGRAM(S): at 10:49

## 2020-01-12 RX ADMIN — Medication 50 MILLIGRAM(S): at 10:50

## 2020-01-12 RX ADMIN — Medication 1334 MILLIGRAM(S): at 10:50

## 2020-01-12 NOTE — DISCHARGE NOTE PROVIDER - CARE PROVIDER_API CALL
Burt Vail)  Internal Medicine; Nephrology  [address] [place] [address]  Phone: [phone]  Fax: [phone]  Follow Up Time:

## 2020-01-12 NOTE — DISCHARGE NOTE PROVIDER - NSDCCPCAREPLAN_GEN_ALL_CORE_FT
PRINCIPAL DISCHARGE DIAGNOSIS  Diagnosis: Fluid overload  Assessment and Plan of Treatment:       SECONDARY DISCHARGE DIAGNOSES  Diagnosis: ESRD on hemodialysis  Assessment and Plan of Treatment:

## 2020-01-12 NOTE — ED PROVIDER NOTE - CLINICAL SUMMARY MEDICAL DECISION MAKING FREE TEXT BOX
will treat for fluid overload check labs discuss with renal and schedule dialysis for later this morning. will admit.

## 2020-01-12 NOTE — DISCHARGE NOTE PROVIDER - NSDCMRMEDTOKEN_GEN_ALL_CORE_FT
acetaminophen 325 mg oral tablet: 2 tab(s) orally every 6 hours, As needed, Mild Pain (1 - 3)  aspirin 81 mg oral delayed release tablet: 1 tab(s) orally once a day  atorvastatin 40 mg oral tablet: 1 tab(s) orally once a day (at bedtime)  Brilinta (ticagrelor) 90 mg oral tablet: 1 tab(s) orally 2 times a day  calcium acetate 667 mg oral tablet: 2 tab(s) orally 3 times a day   Flomax 0.4 mg oral capsule: 1 cap(s) orally once a day  folic acid 1 mg oral tablet: 1 tab(s) orally once a day  hydrALAZINE 50 mg oral tablet: 2 tab(s) orally 2 times a day  Lantus Solostar Pen 100 units/mL subcutaneous solution: 25 unit(s) subcutaneous once a day  Lasix 40 mg oral tablet: 1 tab(s) orally once a day   losartan 50 mg oral tablet: 1 tab(s) orally once a day  metoprolol succinate 25 mg oral tablet, extended release: 1 tab(s) orally once a day  NIFEdipine 60 mg oral tablet, extended release: 1 tab(s) orally once a day  pyridoxine 25 mg oral tablet: 1 tab(s) orally once a day  Sharon-Sesar oral tablet: 1 tab(s) orally once a day

## 2020-01-12 NOTE — ED ADULT NURSE REASSESSMENT NOTE - NS ED NURSE REASSESS COMMENT FT1
pt ambulated to bathroom independently, steady gait noted. pt urinated without difficulty. pt states "I feel a little better".
patient awake alert oriented comfortable in appearance states he has a headache since they placed the nitroglycerin paste. afebrile. denies palpitations currently a little sob. no increased work of breathing. patient gets dialysis mwf, last received on friday. patient currently has diminished lung sounds lower lobes. saturation 97% on 3 L NC. plan for admit for dialysis will be followed by hospitalist and renal.

## 2020-01-12 NOTE — ED ADULT NURSE NOTE - NSIMPLEMENTINTERV_GEN_ALL_ED
Implemented All Universal Safety Interventions:  [place] to call system. Call bell, personal items and telephone within reach. Instruct patient to call for assistance. Room bathroom lighting operational. Non-slip footwear when patient is off stretcher. Physically safe environment: no spills, clutter or unnecessary equipment. Stretcher in lowest position, wheels locked, appropriate side rails in place.

## 2020-01-12 NOTE — ED PROVIDER NOTE - PROGRESS NOTE DETAILS
Case d/w Hospitalist and will admit and arrange for dialysis this AM.  Call placed to [facility]/Dr. Vail

## 2020-01-12 NOTE — ED PROVIDER NOTE - OBJECTIVE STATEMENT
55 y/o M pt with significant PMHx of CAD, DM, renal failure and AV fistula in left arm presents to the ED c/o SOB that started last night. Pt is on dialysis m/w/f. pt had full dialysis Friday and had a work party where he thinks he may have eaten the wrong thing. Denies abd pain. No further complaints at this time.

## 2020-01-12 NOTE — ED PROVIDER NOTE - CONSTITUTIONAL, MLM
normal... appears mildly SOB, awake, alert, oriented to person, place, time/situation and in no apparent distress.

## 2020-01-12 NOTE — DISCHARGE NOTE NURSING/CASE MANAGEMENT/SOCIAL WORK - PATIENT PORTAL LINK FT
You can access the FollowMyHealth Patient Portal offered by [facility] by registering at the following website: [email or web address]. By joining [employer]’s FollowMyHealth portal, you will also be able to view your health information using other applications (apps) compatible with our system.

## 2020-01-12 NOTE — ED ADULT TRIAGE NOTE - CHIEF COMPLAINT QUOTE
I can't breathe, reports symptoms began last night, pt is M/W/F dialysis pt, fistula in left lower arm +thrills and bruits, pt tachypneic in triage, no resp distress noted

## 2020-01-12 NOTE — ED ADULT NURSE NOTE - OBJECTIVE STATEMENT
pt presents to ed a&ox3, tachypneic but in no acute distress. pt placed on cardiac monitor and , maintaining 87% spo2, placed on 3lpm o2 via nasal cannula, spo2 increased to 95%, pt tolerated well. pt presents with L sided AV fistula for dialysis M,W,F. pt reports that he was at party on friday and had a lot of sodium intake. pt reports last time he felt this sob, he needed dialysis.

## 2020-01-12 NOTE — DISCHARGE NOTE PROVIDER - HOSPITAL COURSE
54 yom with pmh of esrd on HD m/w/f and htn presents from home with sob . Patient went to his normal HD session on friday and was in his usual state of health when he went to a party and ate salty food and then started to feel sob. Patient presented to the ed and was found to have elevated bp and is being admitted for urgent HD. Patient seen at bedside and states feeling better. Patient will likely be discharged post hd and advised to go to his normal hd session tomorrow.         time spent on dc 32 minutes

## 2020-01-12 NOTE — CONSULT NOTE ADULT - ASSESSMENT
Fluid overload  ESRD  DM  Htn  Needs urgent HD - arranged ; on now, solange easily  wants to go home and cont with reg treatment MWF  K was hemolyzed; rpt pending  Ok for d/c if stable

## 2020-01-12 NOTE — ED PROVIDER NOTE - MUSCULOSKELETAL, MLM
Spine appears normal, range of motion is not limited, no muscle or joint tenderness Av fistula in LUE +thrills and bruit

## 2020-01-12 NOTE — ED PROVIDER NOTE - SKIN, MLM
Skin normal color for race, warm, dry and intact. No evidence of rash. +2 pitting edema in lower extremities

## 2020-06-09 NOTE — H&P CARDIOLOGY - WEIGHT IN LBS
198.4 Methotrexate Counseling:  Patient counseled regarding adverse effects of methotrexate including but not limited to nausea, vomiting, abnormalities in liver function tests. Patients may develop mouth sores, rash, diarrhea, and abnormalities in blood counts. The patient understands that monitoring is required including LFT's and blood counts.  There is a rare possibility of scarring of the liver and lung problems that can occur when taking methotrexate. Persistent nausea, loss of appetite, pale stools, dark urine, cough, and shortness of breath should be reported immediately. Patient advised to discontinue methotrexate treatment at least three months before attempting to become pregnant.  I discussed the need for folate supplements while taking methotrexate.  These supplements can decrease side effects during methotrexate treatment. The patient verbalized understanding of the proper use and possible adverse effects of methotrexate.  All of the patient's questions and concerns were addressed.

## 2020-11-11 NOTE — ED PROVIDER NOTE - SKIN, MLM
Initiate Treatment: Pimecrolimus cream once daily under occlusion \\nVaseline qhs with cotton gloves and socks
Discontinue Regimen: Clobetasol
Detail Level: Zone
Otc Regimen: Moisturize after every hand wash\\nTry to avoid hand  as much as possible
Plan: Discussed non-steroidal treatments such as UVB treatment, Protopic, Eucrisa and Elidel. \\n\\nPatient prefers to start with topical medication at this time. May consider UVB
Skin normal color for race, warm, dry and intact.

## 2021-06-09 ENCOUNTER — APPOINTMENT (OUTPATIENT)
Dept: GASTROENTEROLOGY | Facility: CLINIC | Age: 56
End: 2021-06-09
Payer: MEDICARE

## 2021-06-09 VITALS
DIASTOLIC BLOOD PRESSURE: 88 MMHG | BODY MASS INDEX: 28.88 KG/M2 | OXYGEN SATURATION: 98 % | WEIGHT: 184 LBS | TEMPERATURE: 98.1 F | HEIGHT: 67 IN | HEART RATE: 84 BPM | RESPIRATION RATE: 14 BRPM | SYSTOLIC BLOOD PRESSURE: 153 MMHG

## 2021-06-09 DIAGNOSIS — Z86.39 PERSONAL HISTORY OF OTHER ENDOCRINE, NUTRITIONAL AND METABOLIC DISEASE: ICD-10-CM

## 2021-06-09 DIAGNOSIS — Z83.3 FAMILY HISTORY OF DIABETES MELLITUS: ICD-10-CM

## 2021-06-09 DIAGNOSIS — Z82.3 FAMILY HISTORY OF STROKE: ICD-10-CM

## 2021-06-09 DIAGNOSIS — Z78.9 OTHER SPECIFIED HEALTH STATUS: ICD-10-CM

## 2021-06-09 PROCEDURE — 99202 OFFICE O/P NEW SF 15 MIN: CPT

## 2021-06-09 RX ORDER — ASPIRIN ENTERIC COATED TABLETS 81 MG 81 MG/1
81 TABLET, DELAYED RELEASE ORAL
Refills: 0 | Status: ACTIVE | COMMUNITY

## 2021-06-09 RX ORDER — TICAGRELOR 90 MG/1
90 TABLET ORAL
Refills: 0 | Status: ACTIVE | COMMUNITY

## 2021-06-09 RX ORDER — LOSARTAN POTASSIUM 100 MG/1
TABLET, FILM COATED ORAL
Refills: 0 | Status: ACTIVE | COMMUNITY

## 2021-06-09 RX ORDER — TAMSULOSIN HYDROCHLORIDE 0.4 MG/1
0.4 CAPSULE ORAL
Refills: 0 | Status: ACTIVE | COMMUNITY

## 2021-06-09 RX ORDER — NIFEDIPINE 30 MG
30 TABLET, EXTENDED RELEASE ORAL
Refills: 0 | Status: ACTIVE | COMMUNITY

## 2021-06-09 RX ORDER — METOPROLOL TARTRATE 75 MG/1
TABLET, FILM COATED ORAL
Refills: 0 | Status: ACTIVE | COMMUNITY

## 2021-06-09 NOTE — ASSESSMENT
[FreeTextEntry1] : The patient presents today for colon cancer screening as part of a pre kidney transplant work up. He will undergo a colonoscopy with MiraLAX prep. I have discussed the indications (including but not limited to ruling out inflammatory bowel disease, colorectal neoplasm, GI bleed, and AVM's), benefits, risks  (including but not limited to reaction to the anesthesia, infection, bleeding, and perforation),  and alternatives to colonoscopy with the patient. The patient understands all options and has agreed to have a colonoscopy and is medically optimized for the planned procedure. \par \par He will need to obtain cardiac clearance in order to stop the Brilinta for 7 days prior\par We will obtain his recent labs from [facility]

## 2021-06-09 NOTE — REASON FOR VISIT
[Initial Evaluation] : an initial evaluation [FreeTextEntry1] : colon cancer screening, pre transplant evaluation

## 2021-06-09 NOTE — HISTORY OF PRESENT ILLNESS
[Heartburn] : denies heartburn [Nausea] : denies nausea [Vomiting] : denies vomiting [Diarrhea] : denies diarrhea [Constipation] : denies constipation [Yellow Skin Or Eyes (Jaundice)] : denies jaundice [Abdominal Pain] : denies abdominal pain [Abdominal Swelling] : denies abdominal swelling [Rectal Pain] : denies rectal pain [de-identified] : PRATIK NUR is a 55 year old male presenting today for colon cancer screening as part of his pre transplant work up. He currently has ESRD and is on dialysis MWF. No prior colonoscopies. No family history of colon cancer or polyps. He denies any abdominal pain, constipation, diarrhea, black or bloody stools. He moves his bowels daily. No upper GI complaints.\par He will be added to the [facility] kidney transplant list. \par \par Medical history includes CAD w/ stents (on Brilinta), ESRD, HTN, diabetes. BMI 28.82.

## 2021-06-23 ENCOUNTER — TRANSCRIPTION ENCOUNTER (OUTPATIENT)
Age: 56
End: 2021-06-23

## 2021-07-14 ENCOUNTER — TRANSCRIPTION ENCOUNTER (OUTPATIENT)
Age: 56
End: 2021-07-14

## 2021-07-15 ENCOUNTER — OUTPATIENT (OUTPATIENT)
Dept: OUTPATIENT SERVICES | Facility: HOSPITAL | Age: 56
LOS: 1 days | End: 2021-07-15
Payer: MEDICARE

## 2021-07-15 ENCOUNTER — APPOINTMENT (OUTPATIENT)
Dept: GASTROENTEROLOGY | Facility: GI CENTER | Age: 56
End: 2021-07-15
Payer: MEDICARE

## 2021-07-15 DIAGNOSIS — Z12.11 ENCOUNTER FOR SCREENING FOR MALIGNANT NEOPLASM OF COLON: ICD-10-CM

## 2021-07-15 DIAGNOSIS — I77.0 ARTERIOVENOUS FISTULA, ACQUIRED: Chronic | ICD-10-CM

## 2021-07-15 LAB — GLUCOSE BLDC GLUCOMTR-MCNC: 121 MG/DL — HIGH (ref 70–99)

## 2021-07-15 PROCEDURE — 45378 DIAGNOSTIC COLONOSCOPY: CPT | Mod: 53

## 2021-07-15 PROCEDURE — 82962 GLUCOSE BLOOD TEST: CPT

## 2021-07-15 PROCEDURE — G0121: CPT

## 2021-07-15 NOTE — ASSESSMENT
[FreeTextEntry1] : There have been no significant changes in the patient's medical history since the initial evaluation.  The patient is medically optimized to undergo scheduled procedure(s).

## 2021-07-29 RX ORDER — POLYETHYLENE GLYCOL 3350, SODIUM CHLORIDE, SODIUM BICARBONATE AND POTASSIUM CHLORIDE WITH LEMON FLAVOR 420; 11.2; 5.72; 1.48 G/4L; G/4L; G/4L; G/4L
420 POWDER, FOR SOLUTION ORAL
Qty: 4000 | Refills: 0 | Status: ACTIVE | COMMUNITY
Start: 2021-07-29 | End: 1900-01-01

## 2021-08-12 NOTE — H&P CARDIOLOGY - AS BP NONINV METHOD
46M with etOH abuse found unresponsive with SDH / SAH s/p craniectomy with discarded bone flap coded in OR 5/2021 with wound revision x2, now POD#6 s/p L cranioplasty with replacement of bone flap 8/6/2021     PLAN:  - PT/OT/Rehab   - Q4 neurochecks   - Pain control   - Dispo planning - pending [facility]  - Discussed case with attending  electronic

## 2021-08-13 ENCOUNTER — TRANSCRIPTION ENCOUNTER (OUTPATIENT)
Age: 56
End: 2021-08-13

## 2021-08-23 ENCOUNTER — APPOINTMENT (OUTPATIENT)
Dept: DISASTER EMERGENCY | Facility: CLINIC | Age: 56
End: 2021-08-23

## 2021-08-23 DIAGNOSIS — Z01.818 ENCOUNTER FOR OTHER PREPROCEDURAL EXAMINATION: ICD-10-CM

## 2021-08-24 LAB — SARS-COV-2 N GENE NPH QL NAA+PROBE: NOT DETECTED

## 2021-08-26 ENCOUNTER — TRANSCRIPTION ENCOUNTER (OUTPATIENT)
Age: 56
End: 2021-08-26

## 2021-08-26 ENCOUNTER — RESULT REVIEW (OUTPATIENT)
Age: 56
End: 2021-08-26

## 2021-08-26 ENCOUNTER — APPOINTMENT (OUTPATIENT)
Dept: GASTROENTEROLOGY | Facility: HOSPITAL | Age: 56
End: 2021-08-26

## 2021-08-26 ENCOUNTER — OUTPATIENT (OUTPATIENT)
Dept: OUTPATIENT SERVICES | Facility: HOSPITAL | Age: 56
LOS: 1 days | End: 2021-08-26
Payer: MEDICARE

## 2021-08-26 DIAGNOSIS — K64.8 OTHER HEMORRHOIDS: ICD-10-CM

## 2021-08-26 DIAGNOSIS — I77.0 ARTERIOVENOUS FISTULA, ACQUIRED: Chronic | ICD-10-CM

## 2021-08-26 DIAGNOSIS — K63.5 POLYP OF COLON: ICD-10-CM

## 2021-08-26 DIAGNOSIS — Z12.11 ENCOUNTER FOR SCREENING FOR MALIGNANT NEOPLASM OF COLON: ICD-10-CM

## 2021-08-26 LAB — GLUCOSE BLDC GLUCOMTR-MCNC: 91 MG/DL — SIGNIFICANT CHANGE UP (ref 70–99)

## 2021-08-26 PROCEDURE — 88305 TISSUE EXAM BY PATHOLOGIST: CPT | Mod: 26

## 2021-08-26 PROCEDURE — 45385 COLONOSCOPY W/LESION REMOVAL: CPT | Mod: PT

## 2021-08-26 PROCEDURE — 88305 TISSUE EXAM BY PATHOLOGIST: CPT

## 2021-08-26 PROCEDURE — 82962 GLUCOSE BLOOD TEST: CPT

## 2021-08-26 PROCEDURE — 45385 COLONOSCOPY W/LESION REMOVAL: CPT

## 2021-08-26 NOTE — REASON FOR VISIT
[Procedure: _________] : a [unfilled] procedure visit [FreeTextEntry1] : Pt. is here for screening colonoscopy with previous poor prep.

## 2021-08-26 NOTE — PHYSICAL EXAM
[General Appearance - Alert] : alert [General Appearance - In No Acute Distress] : in no acute distress [General Appearance - Well Nourished] : well nourished [General Appearance - Well Developed] : well developed [General Appearance - Well-Appearing] : healthy appearing [Sclera] : the sclera and conjunctiva were normal [PERRL With Normal Accommodation] : pupils were equal in size, round, and reactive to light [Extraocular Movements] : extraocular movements were intact [Outer Ear] : the ears and nose were normal in appearance [Oropharynx] : the oropharynx was normal [Neck Appearance] : the appearance of the neck was normal [Neck Cervical Mass (___cm)] : no neck mass was observed [Jugular Venous Distention Increased] : there was no jugular-venous distention [Thyroid Diffuse Enlargement] : the thyroid was not enlarged [Thyroid Nodule] : there were no palpable thyroid nodules [Bowel Sounds] : normal bowel sounds [Abdomen Soft] : soft [Abdomen Tenderness] : non-tender [] : no hepato-splenomegaly [Abdomen Mass (___ Cm)] : no abdominal mass palpated [Skin Turgor] : normal skin turgor [Skin Color & Pigmentation] : normal skin color and pigmentation [Oriented To Time, Place, And Person] : oriented to person, place, and time

## 2021-08-27 NOTE — PROGRESS NOTE ADULT - PROVIDER SPECIALTY LIST ADULT
Critical Care Jerry Gillis is a 1year old male who was brought in for this visit. History was provided by the Mom  HPI:   Patient presents with:   Well Child: silvio normal     School and activities: talking very well, social, doing well in school , fede t with no murmurs, gallups, or rubs; normal radial and femoral pulses  Abdomen: Soft, non-tender, non-distended; no organomegaly noted; no masses  Genitourinary:Normal Dennis I male with testes descended bilaterally; no hernia  Skin/Hair: No unusual rashes p

## 2021-08-30 LAB — SURGICAL PATHOLOGY STUDY: SIGNIFICANT CHANGE UP

## 2021-09-14 NOTE — DISCHARGE NOTE ADULT - ADMISSION DATE +STARTOFVISITDATE
Music Therapy Progress Note    Pre-Session Assessment  Pt sitting up in chair and watching movie at onset of session. Mom and brother (Chapin) initially present but then they left room to take a break during the session.     Goals  Promote developmental engagement   Provide opportunities for choice and self-expression     Interventions  Patient Preferred Recorded Music and beat-making    Outcomes  Camden explored beat-making on [employer] and made choices throughout about how to make the beat and what song to make the beat to. Pt also shared preferred rap music and sang/danced along. Pt with shorter attention span today and not able to listen to a complete song or make a beat for more than couple minutes. Camden expressed not liking to be alone in the room while waiting for mom to return; pt used coping skill of calling mom and choosing movie to watch while he waited for her to return. RN came in room to sit with pt. Pt agreeable to session on Friday.      Plan for Follow Up  Music therapist will continue to follow with a goal of 2 times/week.    Session Duration: 50 minutes    Dimitris Awan MA, MT-BC  [email or web address]  Tuesdays and Fridays   Pager: [phone]      Statement Selected

## 2021-12-03 NOTE — DISCHARGE NOTE PROVIDER - NSDCADMDATE_GEN_ALL_CORE_FT
Nutrition Problem #1: Inadequate oral intake  Intervention: Food and/or Nutrient Delivery: Continue Current Diet, Continue Oral Nutrition Supplement  Nutritional Goals: Oral intakes to meet % of estimated nutrition needs. 12-Jan-2020 06:45

## 2022-02-06 ENCOUNTER — INPATIENT (INPATIENT)
Facility: HOSPITAL | Age: 57
LOS: 1 days | Discharge: HOME | DRG: 640 | End: 2022-02-08
Attending: INTERNAL MEDICINE | Admitting: STUDENT IN AN ORGANIZED HEALTH CARE EDUCATION/TRAINING PROGRAM
Payer: MEDICARE

## 2022-02-06 VITALS
HEART RATE: 43 BPM | HEIGHT: 69 IN | DIASTOLIC BLOOD PRESSURE: 46 MMHG | TEMPERATURE: 99 F | WEIGHT: 179.9 LBS | RESPIRATION RATE: 18 BRPM | SYSTOLIC BLOOD PRESSURE: 104 MMHG

## 2022-02-06 DIAGNOSIS — I77.0 ARTERIOVENOUS FISTULA, ACQUIRED: Chronic | ICD-10-CM

## 2022-02-06 PROCEDURE — 99291 CRITICAL CARE FIRST HOUR: CPT

## 2022-02-06 PROCEDURE — 93010 ELECTROCARDIOGRAM REPORT: CPT

## 2022-02-06 NOTE — ED ADULT NURSE NOTE - OBJECTIVE STATEMENT
Pt in no apparent distress at this time. Airway patent, breathing spontaneous and nonlabored. Pt A&Ox3 resting in stretcher. Pt c/o       , chest pressure but denies pain. dial pt last dial friday. took pressure at home and it was low, skipped PM meds. in CC on monitor

## 2022-02-06 NOTE — ED ADULT TRIAGE NOTE - CHIEF COMPLAINT QUOTE
pt BIBA, c/o of chest heaviness, fatigue, and bradycardic,  no SOB, pt is a HD pt, R hand fistula, schedule to go to HD tomorrow , MD Hutchins call during triage,

## 2022-02-07 DIAGNOSIS — E87.5 HYPERKALEMIA: ICD-10-CM

## 2022-02-07 LAB
ANION GAP SERPL CALC-SCNC: 12 MMOL/L — SIGNIFICANT CHANGE UP (ref 5–17)
ANION GAP SERPL CALC-SCNC: 16 MMOL/L — SIGNIFICANT CHANGE UP (ref 5–17)
ANION GAP SERPL CALC-SCNC: 18 MMOL/L — HIGH (ref 5–17)
ANION GAP SERPL CALC-SCNC: 20 MMOL/L — HIGH (ref 5–17)
ANION GAP SERPL CALC-SCNC: 21 MMOL/L — HIGH (ref 5–17)
BASOPHILS # BLD AUTO: 0.05 K/UL — SIGNIFICANT CHANGE UP (ref 0–0.2)
BASOPHILS NFR BLD AUTO: 0.5 % — SIGNIFICANT CHANGE UP (ref 0–2)
BUN SERPL-MCNC: 32.3 MG/DL — HIGH (ref 8–20)
BUN SERPL-MCNC: 34.3 MG/DL — HIGH (ref 8–20)
BUN SERPL-MCNC: 63.8 MG/DL — HIGH (ref 8–20)
BUN SERPL-MCNC: 68 MG/DL — HIGH (ref 8–20)
BUN SERPL-MCNC: 72.6 MG/DL — HIGH (ref 8–20)
CALCIUM SERPL-MCNC: 10 MG/DL — SIGNIFICANT CHANGE UP (ref 8.6–10.2)
CALCIUM SERPL-MCNC: 10.1 MG/DL — SIGNIFICANT CHANGE UP (ref 8.6–10.2)
CALCIUM SERPL-MCNC: 9.3 MG/DL — SIGNIFICANT CHANGE UP (ref 8.6–10.2)
CALCIUM SERPL-MCNC: 9.7 MG/DL — SIGNIFICANT CHANGE UP (ref 8.6–10.2)
CALCIUM SERPL-MCNC: 9.7 MG/DL — SIGNIFICANT CHANGE UP (ref 8.6–10.2)
CHLORIDE SERPL-SCNC: 89 MMOL/L — LOW (ref 98–107)
CHLORIDE SERPL-SCNC: 90 MMOL/L — LOW (ref 98–107)
CHLORIDE SERPL-SCNC: 90 MMOL/L — LOW (ref 98–107)
CHLORIDE SERPL-SCNC: 92 MMOL/L — LOW (ref 98–107)
CHLORIDE SERPL-SCNC: 95 MMOL/L — LOW (ref 98–107)
CO2 SERPL-SCNC: 22 MMOL/L — SIGNIFICANT CHANGE UP (ref 22–29)
CO2 SERPL-SCNC: 22 MMOL/L — SIGNIFICANT CHANGE UP (ref 22–29)
CO2 SERPL-SCNC: 24 MMOL/L — SIGNIFICANT CHANGE UP (ref 22–29)
CO2 SERPL-SCNC: 28 MMOL/L — SIGNIFICANT CHANGE UP (ref 22–29)
CO2 SERPL-SCNC: 28 MMOL/L — SIGNIFICANT CHANGE UP (ref 22–29)
CREAT SERPL-MCNC: 10.84 MG/DL — HIGH (ref 0.5–1.3)
CREAT SERPL-MCNC: 11.1 MG/DL — HIGH (ref 0.5–1.3)
CREAT SERPL-MCNC: 6.86 MG/DL — HIGH (ref 0.5–1.3)
CREAT SERPL-MCNC: 7.01 MG/DL — HIGH (ref 0.5–1.3)
CREAT SERPL-MCNC: 9.76 MG/DL — HIGH (ref 0.5–1.3)
EOSINOPHIL # BLD AUTO: 0.07 K/UL — SIGNIFICANT CHANGE UP (ref 0–0.5)
EOSINOPHIL NFR BLD AUTO: 0.7 % — SIGNIFICANT CHANGE UP (ref 0–6)
GLUCOSE BLDC GLUCOMTR-MCNC: 114 MG/DL — HIGH (ref 70–99)
GLUCOSE BLDC GLUCOMTR-MCNC: 127 MG/DL — HIGH (ref 70–99)
GLUCOSE BLDC GLUCOMTR-MCNC: 141 MG/DL — HIGH (ref 70–99)
GLUCOSE BLDC GLUCOMTR-MCNC: 179 MG/DL — HIGH (ref 70–99)
GLUCOSE BLDC GLUCOMTR-MCNC: 187 MG/DL — HIGH (ref 70–99)
GLUCOSE BLDC GLUCOMTR-MCNC: 312 MG/DL — HIGH (ref 70–99)
GLUCOSE BLDC GLUCOMTR-MCNC: 396 MG/DL — HIGH (ref 70–99)
GLUCOSE SERPL-MCNC: 145 MG/DL — HIGH (ref 70–99)
GLUCOSE SERPL-MCNC: 157 MG/DL — HIGH (ref 70–99)
GLUCOSE SERPL-MCNC: 350 MG/DL — HIGH (ref 70–99)
GLUCOSE SERPL-MCNC: 455 MG/DL — CRITICAL HIGH (ref 70–99)
GLUCOSE SERPL-MCNC: 456 MG/DL — CRITICAL HIGH (ref 70–99)
HCT VFR BLD CALC: 30 % — LOW (ref 39–50)
HCT VFR BLD CALC: 31.8 % — LOW (ref 39–50)
HGB BLD-MCNC: 10.1 G/DL — LOW (ref 13–17)
HGB BLD-MCNC: 11 G/DL — LOW (ref 13–17)
IMM GRANULOCYTES NFR BLD AUTO: 0.5 % — SIGNIFICANT CHANGE UP (ref 0–1.5)
LYMPHOCYTES # BLD AUTO: 0.82 K/UL — LOW (ref 1–3.3)
LYMPHOCYTES # BLD AUTO: 7.8 % — LOW (ref 13–44)
MAGNESIUM SERPL-MCNC: 2.6 MG/DL — SIGNIFICANT CHANGE UP (ref 1.6–2.6)
MCHC RBC-ENTMCNC: 31.9 PG — SIGNIFICANT CHANGE UP (ref 27–34)
MCHC RBC-ENTMCNC: 32.3 PG — SIGNIFICANT CHANGE UP (ref 27–34)
MCHC RBC-ENTMCNC: 33.7 GM/DL — SIGNIFICANT CHANGE UP (ref 32–36)
MCHC RBC-ENTMCNC: 34.6 GM/DL — SIGNIFICANT CHANGE UP (ref 32–36)
MCV RBC AUTO: 93.3 FL — SIGNIFICANT CHANGE UP (ref 80–100)
MCV RBC AUTO: 94.6 FL — SIGNIFICANT CHANGE UP (ref 80–100)
MONOCYTES # BLD AUTO: 0.6 K/UL — SIGNIFICANT CHANGE UP (ref 0–0.9)
MONOCYTES NFR BLD AUTO: 5.7 % — SIGNIFICANT CHANGE UP (ref 2–14)
NEUTROPHILS # BLD AUTO: 8.86 K/UL — HIGH (ref 1.8–7.4)
NEUTROPHILS NFR BLD AUTO: 84.8 % — HIGH (ref 43–77)
PHOSPHATE SERPL-MCNC: 4.3 MG/DL — SIGNIFICANT CHANGE UP (ref 2.4–4.7)
PLATELET # BLD AUTO: 147 K/UL — LOW (ref 150–400)
PLATELET # BLD AUTO: 193 K/UL — SIGNIFICANT CHANGE UP (ref 150–400)
POTASSIUM SERPL-MCNC: 4.6 MMOL/L — SIGNIFICANT CHANGE UP (ref 3.5–5.3)
POTASSIUM SERPL-MCNC: 4.9 MMOL/L — SIGNIFICANT CHANGE UP (ref 3.5–5.3)
POTASSIUM SERPL-MCNC: 5.7 MMOL/L — HIGH (ref 3.5–5.3)
POTASSIUM SERPL-MCNC: 6 MMOL/L — HIGH (ref 3.5–5.3)
POTASSIUM SERPL-MCNC: 7.1 MMOL/L — CRITICAL HIGH (ref 3.5–5.3)
POTASSIUM SERPL-MCNC: 7.3 MMOL/L — CRITICAL HIGH (ref 3.5–5.3)
POTASSIUM SERPL-SCNC: 4.6 MMOL/L — SIGNIFICANT CHANGE UP (ref 3.5–5.3)
POTASSIUM SERPL-SCNC: 4.9 MMOL/L — SIGNIFICANT CHANGE UP (ref 3.5–5.3)
POTASSIUM SERPL-SCNC: 5.7 MMOL/L — HIGH (ref 3.5–5.3)
POTASSIUM SERPL-SCNC: 6 MMOL/L — HIGH (ref 3.5–5.3)
POTASSIUM SERPL-SCNC: 7.1 MMOL/L — CRITICAL HIGH (ref 3.5–5.3)
POTASSIUM SERPL-SCNC: 7.3 MMOL/L — CRITICAL HIGH (ref 3.5–5.3)
RAPID RVP RESULT: SIGNIFICANT CHANGE UP
RBC # BLD: 3.17 M/UL — LOW (ref 4.2–5.8)
RBC # BLD: 3.41 M/UL — LOW (ref 4.2–5.8)
RBC # FLD: 13.2 % — SIGNIFICANT CHANGE UP (ref 10.3–14.5)
RBC # FLD: 13.3 % — SIGNIFICANT CHANGE UP (ref 10.3–14.5)
SARS-COV-2 RNA SPEC QL NAA+PROBE: SIGNIFICANT CHANGE UP
SODIUM SERPL-SCNC: 130 MMOL/L — LOW (ref 135–145)
SODIUM SERPL-SCNC: 132 MMOL/L — LOW (ref 135–145)
SODIUM SERPL-SCNC: 133 MMOL/L — LOW (ref 135–145)
SODIUM SERPL-SCNC: 135 MMOL/L — SIGNIFICANT CHANGE UP (ref 135–145)
SODIUM SERPL-SCNC: 136 MMOL/L — SIGNIFICANT CHANGE UP (ref 135–145)
TROPONIN T SERPL-MCNC: 0.15 NG/ML — HIGH (ref 0–0.06)
TROPONIN T SERPL-MCNC: 0.16 NG/ML — HIGH (ref 0–0.06)
TSH SERPL-MCNC: 4.68 UIU/ML — HIGH (ref 0.27–4.2)
WBC # BLD: 10.45 K/UL — SIGNIFICANT CHANGE UP (ref 3.8–10.5)
WBC # BLD: 6.97 K/UL — SIGNIFICANT CHANGE UP (ref 3.8–10.5)
WBC # FLD AUTO: 10.45 K/UL — SIGNIFICANT CHANGE UP (ref 3.8–10.5)
WBC # FLD AUTO: 6.97 K/UL — SIGNIFICANT CHANGE UP (ref 3.8–10.5)

## 2022-02-07 PROCEDURE — 93010 ELECTROCARDIOGRAM REPORT: CPT

## 2022-02-07 PROCEDURE — 71045 X-RAY EXAM CHEST 1 VIEW: CPT | Mod: 26

## 2022-02-07 PROCEDURE — 99223 1ST HOSP IP/OBS HIGH 75: CPT

## 2022-02-07 RX ORDER — SODIUM CHLORIDE 9 MG/ML
1000 INJECTION, SOLUTION INTRAVENOUS
Refills: 0 | Status: DISCONTINUED | OUTPATIENT
Start: 2022-02-07 | End: 2022-02-08

## 2022-02-07 RX ORDER — TICAGRELOR 90 MG/1
1 TABLET ORAL
Qty: 0 | Refills: 0 | DISCHARGE

## 2022-02-07 RX ORDER — INSULIN GLARGINE 100 [IU]/ML
25 INJECTION, SOLUTION SUBCUTANEOUS AT BEDTIME
Refills: 0 | Status: DISCONTINUED | OUTPATIENT
Start: 2022-02-07 | End: 2022-02-08

## 2022-02-07 RX ORDER — INSULIN HUMAN 100 [IU]/ML
10 INJECTION, SOLUTION SUBCUTANEOUS ONCE
Refills: 0 | Status: COMPLETED | OUTPATIENT
Start: 2022-02-07 | End: 2022-02-07

## 2022-02-07 RX ORDER — CALCIUM ACETATE 667 MG
1334 TABLET ORAL
Refills: 0 | Status: DISCONTINUED | OUTPATIENT
Start: 2022-02-07 | End: 2022-02-08

## 2022-02-07 RX ORDER — ACETAMINOPHEN 500 MG
650 TABLET ORAL EVERY 6 HOURS
Refills: 0 | Status: DISCONTINUED | OUTPATIENT
Start: 2022-02-07 | End: 2022-02-08

## 2022-02-07 RX ORDER — SEVELAMER CARBONATE 2400 MG/1
1600 POWDER, FOR SUSPENSION ORAL
Refills: 0 | Status: DISCONTINUED | OUTPATIENT
Start: 2022-02-07 | End: 2022-02-08

## 2022-02-07 RX ORDER — ONDANSETRON 8 MG/1
4 TABLET, FILM COATED ORAL EVERY 8 HOURS
Refills: 0 | Status: DISCONTINUED | OUTPATIENT
Start: 2022-02-07 | End: 2022-02-08

## 2022-02-07 RX ORDER — DEXTROSE 50 % IN WATER 50 %
25 SYRINGE (ML) INTRAVENOUS ONCE
Refills: 0 | Status: DISCONTINUED | OUTPATIENT
Start: 2022-02-07 | End: 2022-02-08

## 2022-02-07 RX ORDER — TICAGRELOR 90 MG/1
60 TABLET ORAL EVERY 12 HOURS
Refills: 0 | Status: DISCONTINUED | OUTPATIENT
Start: 2022-02-07 | End: 2022-02-08

## 2022-02-07 RX ORDER — LANOLIN ALCOHOL/MO/W.PET/CERES
3 CREAM (GRAM) TOPICAL AT BEDTIME
Refills: 0 | Status: DISCONTINUED | OUTPATIENT
Start: 2022-02-07 | End: 2022-02-08

## 2022-02-07 RX ORDER — GLUCAGON INJECTION, SOLUTION 0.5 MG/.1ML
1 INJECTION, SOLUTION SUBCUTANEOUS ONCE
Refills: 0 | Status: DISCONTINUED | OUTPATIENT
Start: 2022-02-07 | End: 2022-02-08

## 2022-02-07 RX ORDER — ALBUTEROL 90 UG/1
2.5 AEROSOL, METERED ORAL
Refills: 0 | Status: COMPLETED | OUTPATIENT
Start: 2022-02-07 | End: 2022-02-07

## 2022-02-07 RX ORDER — CALCIUM GLUCONATE 100 MG/ML
2 VIAL (ML) INTRAVENOUS ONCE
Refills: 0 | Status: COMPLETED | OUTPATIENT
Start: 2022-02-07 | End: 2022-02-07

## 2022-02-07 RX ORDER — ATORVASTATIN CALCIUM 80 MG/1
40 TABLET, FILM COATED ORAL AT BEDTIME
Refills: 0 | Status: DISCONTINUED | OUTPATIENT
Start: 2022-02-07 | End: 2022-02-08

## 2022-02-07 RX ORDER — TAMSULOSIN HYDROCHLORIDE 0.4 MG/1
0.4 CAPSULE ORAL AT BEDTIME
Refills: 0 | Status: DISCONTINUED | OUTPATIENT
Start: 2022-02-07 | End: 2022-02-08

## 2022-02-07 RX ORDER — HEPARIN SODIUM 5000 [USP'U]/ML
5000 INJECTION INTRAVENOUS; SUBCUTANEOUS EVERY 8 HOURS
Refills: 0 | Status: DISCONTINUED | OUTPATIENT
Start: 2022-02-07 | End: 2022-02-08

## 2022-02-07 RX ORDER — DEXTROSE 50 % IN WATER 50 %
15 SYRINGE (ML) INTRAVENOUS ONCE
Refills: 0 | Status: DISCONTINUED | OUTPATIENT
Start: 2022-02-07 | End: 2022-02-08

## 2022-02-07 RX ORDER — ASPIRIN/CALCIUM CARB/MAGNESIUM 324 MG
81 TABLET ORAL DAILY
Refills: 0 | Status: DISCONTINUED | OUTPATIENT
Start: 2022-02-07 | End: 2022-02-08

## 2022-02-07 RX ORDER — DEXTROSE 50 % IN WATER 50 %
50 SYRINGE (ML) INTRAVENOUS ONCE
Refills: 0 | Status: COMPLETED | OUTPATIENT
Start: 2022-02-07 | End: 2022-02-07

## 2022-02-07 RX ORDER — NITROGLYCERIN 6.5 MG
0.4 CAPSULE, EXTENDED RELEASE ORAL ONCE
Refills: 0 | Status: COMPLETED | OUTPATIENT
Start: 2022-02-07 | End: 2022-02-07

## 2022-02-07 RX ORDER — INSULIN LISPRO 100/ML
VIAL (ML) SUBCUTANEOUS
Refills: 0 | Status: DISCONTINUED | OUTPATIENT
Start: 2022-02-07 | End: 2022-02-08

## 2022-02-07 RX ORDER — DEXTROSE 50 % IN WATER 50 %
12.5 SYRINGE (ML) INTRAVENOUS ONCE
Refills: 0 | Status: DISCONTINUED | OUTPATIENT
Start: 2022-02-07 | End: 2022-02-08

## 2022-02-07 RX ORDER — FUROSEMIDE 40 MG
80 TABLET ORAL ONCE
Refills: 0 | Status: COMPLETED | OUTPATIENT
Start: 2022-02-07 | End: 2022-02-07

## 2022-02-07 RX ADMIN — TICAGRELOR 60 MILLIGRAM(S): 90 TABLET ORAL at 17:31

## 2022-02-07 RX ADMIN — INSULIN GLARGINE 25 UNIT(S): 100 INJECTION, SOLUTION SUBCUTANEOUS at 21:42

## 2022-02-07 RX ADMIN — Medication 200 GRAM(S): at 05:43

## 2022-02-07 RX ADMIN — ALBUTEROL 2.5 MILLIGRAM(S): 90 AEROSOL, METERED ORAL at 01:28

## 2022-02-07 RX ADMIN — Medication 1: at 22:13

## 2022-02-07 RX ADMIN — INSULIN HUMAN 10 UNIT(S): 100 INJECTION, SOLUTION SUBCUTANEOUS at 01:28

## 2022-02-07 RX ADMIN — SEVELAMER CARBONATE 1600 MILLIGRAM(S): 2400 POWDER, FOR SUSPENSION ORAL at 17:31

## 2022-02-07 RX ADMIN — HEPARIN SODIUM 5000 UNIT(S): 5000 INJECTION INTRAVENOUS; SUBCUTANEOUS at 16:14

## 2022-02-07 RX ADMIN — SEVELAMER CARBONATE 1600 MILLIGRAM(S): 2400 POWDER, FOR SUSPENSION ORAL at 12:45

## 2022-02-07 RX ADMIN — ALBUTEROL 2.5 MILLIGRAM(S): 90 AEROSOL, METERED ORAL at 01:27

## 2022-02-07 RX ADMIN — ATORVASTATIN CALCIUM 40 MILLIGRAM(S): 80 TABLET, FILM COATED ORAL at 21:42

## 2022-02-07 RX ADMIN — Medication 1 TABLET(S): at 12:44

## 2022-02-07 RX ADMIN — Medication 0.4 MILLIGRAM(S): at 01:00

## 2022-02-07 RX ADMIN — ALBUTEROL 2.5 MILLIGRAM(S): 90 AEROSOL, METERED ORAL at 01:29

## 2022-02-07 RX ADMIN — Medication 1334 MILLIGRAM(S): at 17:32

## 2022-02-07 RX ADMIN — Medication 50 MILLILITER(S): at 01:28

## 2022-02-07 RX ADMIN — INSULIN HUMAN 10 UNIT(S): 100 INJECTION, SOLUTION SUBCUTANEOUS at 05:01

## 2022-02-07 RX ADMIN — Medication 81 MILLIGRAM(S): at 12:44

## 2022-02-07 RX ADMIN — TAMSULOSIN HYDROCHLORIDE 0.4 MILLIGRAM(S): 0.4 CAPSULE ORAL at 21:42

## 2022-02-07 RX ADMIN — Medication 1334 MILLIGRAM(S): at 12:45

## 2022-02-07 RX ADMIN — Medication 80 MILLIGRAM(S): at 05:44

## 2022-02-07 RX ADMIN — ONDANSETRON 4 MILLIGRAM(S): 8 TABLET, FILM COATED ORAL at 06:36

## 2022-02-07 RX ADMIN — Medication 5: at 07:54

## 2022-02-07 NOTE — CHART NOTE - NSCHARTNOTEFT_GEN_A_CORE
Pt is seen earlier today at HD suit   he is feeling better , no CP , no SOB     chart reviewed admitted this am by nocturnist . BP is better     Vital Signs Last 24 Hrs  T(C): 34.7 (07 Feb 2022 11:41), Max: 37.1 (06 Feb 2022 23:40)  T(F): 94.4 (07 Feb 2022 11:41), Max: 98.7 (06 Feb 2022 23:40)  HR: 65 (07 Feb 2022 11:41) (39 - 71)  BP: 118/66 (07 Feb 2022 11:41) (96/51 - 159/63)  BP(mean): --  RR: 18 (07 Feb 2022 11:08) (15 - 18)  SpO2: 100% (07 Feb 2022 11:41) (99% - 100%)    general : awake alert   Lungs CTA bilateral   Heart : regular s1 /s2   Abd soft no tenderness , BS positive   Ext : no pretibial edema                           10.1   6.97  )-----------( 147      ( 07 Feb 2022 06:36 )             30.0   02-07    x   |  x   |  x   ----------------------------<  x   6.0<H>   |  x   |  x     Ca    10.0      07 Feb 2022 06:36  Phos  4.3     02-07  Mg     2.6     02-07    trponin T, Serum: 0.15: Reference Interval for Troponin T   Less than 0.06 ng/mL - includes the 99th percentile of a healthy   population at a method C.V. of 10% or less.   NOTE: Troponin T is measured by the Roche CLIA method and these   results are not interchangable with Troponin I results since they are   different assays with different reference intervals. ng/mL (02.07.22 @ 00:18)       56M with PMHX ESRD/HD MWF, HTN, HLD, CAD on DAPT, BPH, IDDM presents to [facility] ER c/o Chest Pain admitted for severe hyperkalemia and bradycardia.   BP and bradycardia improved   off cardizem , avoid aster blocking   hyperkalemia treated still high   HD in progress   nephrology consult  appreciated

## 2022-02-07 NOTE — ED PROVIDER NOTE - NSICDXPASTMEDICALHX_GEN_ALL_CORE_FT
PAST MEDICAL HISTORY:  CAD (coronary artery disease)     Diabetes     Dialysis patient     Hypertension     Renal failure

## 2022-02-07 NOTE — CONSULT NOTE ADULT - SUBJECTIVE AND OBJECTIVE BOX
Patient is a 56y old  Male who presents with a chief complaint of Chest Pain/Hyperkalemia/Bradycardia (07 Feb 2022 06:14)      HPI:  56M with PMHX ESRD/HD MWF, HTN, HLD, CAD on DAPT, BPH, IDDM presents to [facility] ER c/o Chest Pain. Patient found to be bradycardic 40s with mild hypotension SBP 90s. Patient given SLN and Insulin and D50 in ED. Does not know all his medications but tells me he is compliant with them. Started new medication 2 days ago Cardizem 240mg ER once a day for which he doesn't know why he was [facility]. Compliant with HD MWF last session Friday no issues and planned for HD today (Monday). Also compliant with diet/fluid intake. CP resolved at time of admission. Denies SOB. No other issues except for nausea. [facility]ses [facility] for [facility] meds and [facility] for others need to confirm med rec in AM.    ROS negative unless mentioned.  (07 Feb 2022 06:14)   Hx renal stones x1 not aware of type, no other renal  problems; elevated creatinine on admission.    PAST MEDICAL & SURGICAL HISTORY:  Renal failure    Dialysis patient    Diabetes    Hypertension    CAD (coronary artery disease)    AV fistula  Left arm         FAMILY HISTORY:  No pertinent family history in first degree relatives    [place]    Social History:Non smoker    MEDICATIONS  (STANDING):  aspirin enteric coated 81 milliGRAM(s) Oral daily  atorvastatin 40 milliGRAM(s) Oral at bedtime  calcium acetate 1334 milliGRAM(s) Oral three times a day with meals  dextrose 40% Gel 15 Gram(s) Oral once  dextrose 5%. 1000 milliLiter(s) (50 mL/Hr) IV Continuous <Continuous>  dextrose 5%. 1000 milliLiter(s) (100 mL/Hr) IV Continuous <Continuous>  dextrose 50% Injectable 25 Gram(s) IV Push once  dextrose 50% Injectable 12.5 Gram(s) IV Push once  dextrose 50% Injectable 25 Gram(s) IV Push once  glucagon  Injectable 1 milliGRAM(s) IntraMuscular once  heparin   Injectable 5000 Unit(s) SubCutaneous every 8 hours  insulin glargine Injectable (LANTUS) 25 Unit(s) SubCutaneous at bedtime  insulin lispro (ADMELOG) corrective regimen sliding scale   SubCutaneous Before meals and at bedtime  Nephro-amy 1 Tablet(s) Oral daily  tamsulosin 0.4 milliGRAM(s) Oral at bedtime  ticagrelor 60 milliGRAM(s) Oral every 12 hours    MEDICATIONS  (PRN):  acetaminophen     Tablet .. 650 milliGRAM(s) Oral every 6 hours PRN Temp greater or equal to 38C (100.4F), Mild Pain (1 - 3)  aluminum hydroxide/magnesium hydroxide/simethicone Suspension 30 milliLiter(s) Oral every 4 hours PRN Dyspepsia  melatonin 3 milliGRAM(s) Oral at bedtime PRN Insomnia  ondansetron Injectable 4 milliGRAM(s) IV Push every 8 hours PRN Nausea and/or Vomiting   Meds reviewed    Allergies    No Known Allergies      REVIEW OF SYSTEMS:    CONSTITUTIONAL:  sob, weight gain, feels weak  EYES: No eye pain, visual disturbances, or discharge  ENMT:  No difficulty hearing, tinnitus, vertigo; No sinus or throat pain  NECK: No pain or stiffness  BREASTS: No pain, masses, or nipple discharge  RESPIRATORY: neg  CARDIOVASCULAR: + chest pain,  dizziness,   GASTROINTESTINAL: No abdominal or epigastric pain. No nausea, vomiting, or hematemesis; No diarrhea or constipation  GENITOURINARY: No dysuria, frequency, hematuria, or incontinence  NEUROLOGICAL: No headaches, memory loss, loss of strength, numbness, or tremors  SKIN: neg  LYMPH NODES: No enlarged glands  ENDOCRINE: No heat or cold intolerance; No hair loss  MUSCULOSKELETAL: left arm access  PSYCHIATRIC: No depression, anxiety, mood swings, or difficulty sleeping  HEME/LYMPH: No easy bruising, or bleeding gums  ALLERY AND IMMUNOLOGIC: No hives or eczema      Vital Signs Last 24 Hrs  T(C): 36.4 (07 Feb 2022 06:28), Max: 37.1 (06 Feb 2022 23:40)  T(F): 97.5 (07 Feb 2022 06:28), Max: 98.7 (06 Feb 2022 23:40)  HR: 44 (07 Feb 2022 06:28) (39 - 44)  BP: 135/53 (07 Feb 2022 06:28) (96/51 - 135/53)  BP(mean): --  RR: 18 (07 Feb 2022 06:28) (15 - 18)  SpO2: 99% (07 Feb 2022 06:28) (99% - 100%)  Daily Height in cm: 175.26 (06 Feb 2022 23:40)        PHYSICAL EXAM:    GENERAL: appears chronically ill, oriented.  HEAD:  Atraumatic, Normocephalic  EYES: EOMI, PERRLA, conjunctiva and sclera clear  ENMT: No tonsillar erythema, exudates, or enlargement; Moist mucous membranes, Good dentition, No lesions  NECK: Supple, neck  veins full  NERVOUS SYSTEM:  Alert & Oriented X3, Good concentration; Motor Strength wnl upper and lower extremities;  CHEST/LUNG: Clear to percussion bilaterally; No rales, rhonchi, wheezing, or rubs  HEART: Regular rate and rhythm; No  rubs, or gallops  ABDOMEN: Soft, Nontender, Nondistended; Bowel sounds present, body wall and flank edema  EXTREMITIES:  bruit left lower arm access  LYMPH: No lymphadenopathy noted  SKIN: No rashes or lesions, pale      LABS:                        10.1   6.97  )-----------( 147      ( 07 Feb 2022 06:36 )             30.0     02-07    130<L>  |  89<L>  |  72.6<H>  ----------------------------<  456<HH>  7.3<HH>   |  24.0  |  11.10<H>    Ca    10.0      07 Feb 2022 06:36  Phos  4.3     02-07  Mg     2.6     02-07          Magnesium, Serum: 2.6 mg/dL (02-07 @ 06:36)  Phosphorus Level, Serum: 4.3 mg/dL (02-07 @ 06:36)          RADIOLOGY & ADDITIONAL TESTS:

## 2022-02-07 NOTE — ED PROVIDER NOTE - OBJECTIVE STATEMENT
56M with PMHX ESRD/HD MWF, HTN, HLD, CAD on DAPT, BPH, IDDM presents to [facility] ER c/o Chest Pain. Patient found to be bradycardic 40s with mild hypotension SBP 90s. P. Does not know all his medications but tells me he is compliant with them. Started new medication 2 days ago Cardizem 240mg ER once a day for which he doesn't know why he was sent [facility]. Compliant with HD MWF last session Friday no issues and planned for HD today (Monday). Also compliant with diet/fluid intake.  Denies SOB. No other issues except for nausea.

## 2022-02-07 NOTE — H&P ADULT - HISTORY OF PRESENT ILLNESS
56M with PMHX ESRD/HD MWF, HTN, HLD, CAD on DAPT, BPH, IDDM presents to [facility] ER c/o Chest Pain. Patient found to be bradycardic 40s with mild hypotension SBP 90s. Patient given SLN and Insulin and D50 in ED. Does not know all his medications but tells me he is compliant with them. Started new medication 2 days ago Cardizem 240mg ER once a day for which he doesn't know why he was sent [facility]. Compliant with HD MWF last session Friday no issues and planned for HD today (Monday). Also compliant with diet/fluid intake. CP resolved at time of admission. Denies SOB. No other issues except for nausea. [facility]ses [facility] for [facility] meds and [facility] for others need to confirm med rec in AM.    ROS negative unless mentioned.

## 2022-02-07 NOTE — ED PROVIDER NOTE - CLINICAL SUMMARY MEDICAL DECISION MAKING FREE TEXT BOX
multiple bedside re evaluations performed for hemodynamic status. need for treatment immediate life threatening hyperkalemia to avoid life threatening decompensation

## 2022-02-07 NOTE — H&P ADULT - ASSESSMENT
ASSESSMENT:  56M with PMHX ESRD/HD MWF, HTN, HLD, CAD on DAPT, BPH, IDDM presents to [facility] ER c/o Chest Pain admitted for severe hyperkalemia and bradycardia.     PLAN:  Hyperkalemia, Sinus Bradycardia  -K >7 repeat improved to 5s s/p Albuterol/D50/Insulin  -Calcium Gluconate 2g IV x1 for hyperkalemia and bradycardia (cardizem-induced?)  -Lasix 80mg IV x1 for hyperkalemia  -Monitor Telemetry  -Repeat EKG   -Repeat Labs/monitor BMP for K  -VTE PPX SQH 5000q8  -Renal Restrictions  -Nephrology Consulted (Genna) for HD    Chest Pain  -Now resolved. Likely hyperkalemia induced.  -Hold off on ischemic cardiac workup for now unless CP continues after HD.     CAD s/p DAPT, HTN, HLD  -ASA 81mg PO q24 + Brilinta 60mg PO BID  -Atorvastatin 40mg PO qHS  -Hold Cardizem 240mg ER q24 with bradycardia/hypotension  -Unclear if patient remains on Nifedipine? Probably replaced by Cardizem  -Hold BB with bradycardia/hypotension  -Hold Losartan for Hyperkalemia and Hypotension  -Hold Hydralazine with hypotension    ESRD/HD  -HD MWF scheduled this AM in outpt center  -Calcium Acetate 1337mg PO TID  -Sharon-Sesar 1 tab q24  -Nephrology Consutled (Genna)    IDDM  -Lispro 10 units x2 in ED repeat -500s  -Accuchecks/ISS/A1C and Renal Diet  -Restart Basal Insulin Lantus 25 units QHS     BPH  -Flomax 0.4mg PO QHS    Medication Reconciliation  -Pharmacy records reviewed to obtain mail order meds but unable to confirm all doses of meds/insulin.  -Please confirm med rec in AM

## 2022-02-07 NOTE — H&P ADULT - NSHPPHYSICALEXAM_GEN_ALL_CORE
Vital Signs Last 24 Hrs  T(C): 37.1 (06 Feb 2022 23:40), Max: 37.1 (06 Feb 2022 23:40)  T(F): 98.7 (06 Feb 2022 23:40), Max: 98.7 (06 Feb 2022 23:40)  HR: 39 (07 Feb 2022 05:30) (39 - 43)  BP: 105/56 (07 Feb 2022 05:30) (96/51 - 105/56)  BP(mean): --  RR: 15 (07 Feb 2022 05:30) (15 - 18)  SpO2: 100% (07 Feb 2022 05:30) (100% - 100%)    Constitutional: Well-appearing, NAD, VSS  Head: NC/AT  Eyes: PERRL, EOMI, anicteric sclera, conjunctiva WNL  ENT: Normal Pharynx, MMM, No tonsillar exudate/erythema  Neck: Supple, Non-tender  Chest: Non-tender, no rashes  Cardio: +Sinus Bradycardia   Resp: BS CTA bilaterally, no wheezing/rhonchi/rales  Abd: Soft, Non-tender, Non-distended, no rebound/guarding/rigidity  : not examined  Rectal: not examined  MSK: moving all extremities, no motor weakness, full ROM x4  Ext: palpable distal pulses, good capillary refill   Psych: appropriate, cooperative  Neuro: CN II-XII grossly intact, no focal deficits  Skin: Warm/Dry. No rashes.

## 2022-02-08 ENCOUNTER — TRANSCRIPTION ENCOUNTER (OUTPATIENT)
Age: 57
End: 2022-02-08

## 2022-02-08 VITALS
DIASTOLIC BLOOD PRESSURE: 88 MMHG | TEMPERATURE: 99 F | HEART RATE: 89 BPM | SYSTOLIC BLOOD PRESSURE: 177 MMHG | OXYGEN SATURATION: 99 % | RESPIRATION RATE: 20 BRPM

## 2022-02-08 LAB
A1C WITH ESTIMATED AVERAGE GLUCOSE RESULT: 9.1 % — HIGH (ref 4–5.6)
ALBUMIN SERPL ELPH-MCNC: 3.8 G/DL — SIGNIFICANT CHANGE UP (ref 3.3–5.2)
ALP SERPL-CCNC: 92 U/L — SIGNIFICANT CHANGE UP (ref 40–120)
ALT FLD-CCNC: 19 U/L — SIGNIFICANT CHANGE UP
ANION GAP SERPL CALC-SCNC: 14 MMOL/L — SIGNIFICANT CHANGE UP (ref 5–17)
AST SERPL-CCNC: 19 U/L — SIGNIFICANT CHANGE UP
BILIRUB SERPL-MCNC: 0.4 MG/DL — SIGNIFICANT CHANGE UP (ref 0.4–2)
BUN SERPL-MCNC: 44.7 MG/DL — HIGH (ref 8–20)
CALCIUM SERPL-MCNC: 9.8 MG/DL — SIGNIFICANT CHANGE UP (ref 8.6–10.2)
CHLORIDE SERPL-SCNC: 95 MMOL/L — LOW (ref 98–107)
CO2 SERPL-SCNC: 26 MMOL/L — SIGNIFICANT CHANGE UP (ref 22–29)
CREAT SERPL-MCNC: 8.25 MG/DL — HIGH (ref 0.5–1.3)
ESTIMATED AVERAGE GLUCOSE: 214 MG/DL — HIGH (ref 68–114)
GLUCOSE BLDC GLUCOMTR-MCNC: 123 MG/DL — HIGH (ref 70–99)
GLUCOSE BLDC GLUCOMTR-MCNC: 146 MG/DL — HIGH (ref 70–99)
GLUCOSE SERPL-MCNC: 132 MG/DL — HIGH (ref 70–99)
PHOSPHATE SERPL-MCNC: 4.3 MG/DL — SIGNIFICANT CHANGE UP (ref 2.4–4.7)
POTASSIUM SERPL-MCNC: 5.1 MMOL/L — SIGNIFICANT CHANGE UP (ref 3.5–5.3)
POTASSIUM SERPL-SCNC: 5.1 MMOL/L — SIGNIFICANT CHANGE UP (ref 3.5–5.3)
PROT SERPL-MCNC: 6.6 G/DL — SIGNIFICANT CHANGE UP (ref 6.6–8.7)
SODIUM SERPL-SCNC: 135 MMOL/L — SIGNIFICANT CHANGE UP (ref 135–145)
TROPONIN T SERPL-MCNC: 0.2 NG/ML — HIGH (ref 0–0.06)

## 2022-02-08 PROCEDURE — 84443 ASSAY THYROID STIM HORMONE: CPT

## 2022-02-08 PROCEDURE — 36415 COLL VENOUS BLD VENIPUNCTURE: CPT

## 2022-02-08 PROCEDURE — 99239 HOSP IP/OBS DSCHRG MGMT >30: CPT

## 2022-02-08 PROCEDURE — 83735 ASSAY OF MAGNESIUM: CPT

## 2022-02-08 PROCEDURE — 83036 HEMOGLOBIN GLYCOSYLATED A1C: CPT

## 2022-02-08 PROCEDURE — 71045 X-RAY EXAM CHEST 1 VIEW: CPT

## 2022-02-08 PROCEDURE — 93010 ELECTROCARDIOGRAM REPORT: CPT

## 2022-02-08 PROCEDURE — 82962 GLUCOSE BLOOD TEST: CPT

## 2022-02-08 PROCEDURE — 96374 THER/PROPH/DIAG INJ IV PUSH: CPT

## 2022-02-08 PROCEDURE — 94640 AIRWAY INHALATION TREATMENT: CPT

## 2022-02-08 PROCEDURE — 80053 COMPREHEN METABOLIC PANEL: CPT

## 2022-02-08 PROCEDURE — 80048 BASIC METABOLIC PNL TOTAL CA: CPT

## 2022-02-08 PROCEDURE — 85025 COMPLETE CBC W/AUTO DIFF WBC: CPT

## 2022-02-08 PROCEDURE — 84100 ASSAY OF PHOSPHORUS: CPT

## 2022-02-08 PROCEDURE — 84132 ASSAY OF SERUM POTASSIUM: CPT

## 2022-02-08 PROCEDURE — 0225U NFCT DS DNA&RNA 21 SARSCOV2: CPT

## 2022-02-08 PROCEDURE — 93005 ELECTROCARDIOGRAM TRACING: CPT

## 2022-02-08 PROCEDURE — 85027 COMPLETE CBC AUTOMATED: CPT

## 2022-02-08 PROCEDURE — 84484 ASSAY OF TROPONIN QUANT: CPT

## 2022-02-08 PROCEDURE — 96375 TX/PRO/DX INJ NEW DRUG ADDON: CPT

## 2022-02-08 PROCEDURE — 99285 EMERGENCY DEPT VISIT HI MDM: CPT

## 2022-02-08 PROCEDURE — 99261: CPT

## 2022-02-08 RX ORDER — DILTIAZEM HCL 120 MG
1 CAPSULE, EXT RELEASE 24 HR ORAL
Qty: 0 | Refills: 0 | DISCHARGE

## 2022-02-08 RX ORDER — NIFEDIPINE 30 MG
60 TABLET, EXTENDED RELEASE 24 HR ORAL DAILY
Refills: 0 | Status: DISCONTINUED | OUTPATIENT
Start: 2022-02-08 | End: 2022-02-08

## 2022-02-08 RX ORDER — SODIUM ZIRCONIUM CYCLOSILICATE 10 G/10G
5 POWDER, FOR SUSPENSION ORAL ONCE
Refills: 0 | Status: COMPLETED | OUTPATIENT
Start: 2022-02-08 | End: 2022-02-08

## 2022-02-08 RX ORDER — ENOXAPARIN SODIUM 100 MG/ML
25 INJECTION SUBCUTANEOUS
Qty: 0 | Refills: 0 | DISCHARGE

## 2022-02-08 RX ADMIN — SODIUM ZIRCONIUM CYCLOSILICATE 5 GRAM(S): 10 POWDER, FOR SUSPENSION ORAL at 15:20

## 2022-02-08 RX ADMIN — Medication 1334 MILLIGRAM(S): at 08:50

## 2022-02-08 RX ADMIN — HEPARIN SODIUM 5000 UNIT(S): 5000 INJECTION INTRAVENOUS; SUBCUTANEOUS at 08:50

## 2022-02-08 RX ADMIN — Medication 81 MILLIGRAM(S): at 11:10

## 2022-02-08 RX ADMIN — Medication 1334 MILLIGRAM(S): at 11:43

## 2022-02-08 RX ADMIN — TICAGRELOR 60 MILLIGRAM(S): 90 TABLET ORAL at 05:15

## 2022-02-08 RX ADMIN — SEVELAMER CARBONATE 1600 MILLIGRAM(S): 2400 POWDER, FOR SUSPENSION ORAL at 11:43

## 2022-02-08 RX ADMIN — SEVELAMER CARBONATE 1600 MILLIGRAM(S): 2400 POWDER, FOR SUSPENSION ORAL at 08:51

## 2022-02-08 RX ADMIN — Medication 1 TABLET(S): at 11:08

## 2022-02-08 RX ADMIN — Medication 60 MILLIGRAM(S): at 11:07

## 2022-02-08 NOTE — DISCHARGE NOTE PROVIDER - PROVIDER TOKENS
FREE:[LAST:[primary care provider],PHONE:[(   )    -],FAX:[(   )    -],FOLLOWUP:[2 weeks]],FREE:[LAST:[endocrinologist],PHONE:[(   )    -],FAX:[(   )    -],FOLLOWUP:[1 week]],FREE:[LAST:[nephrologist],PHONE:[(   )    -],FAX:[(   )    -],FOLLOWUP:[2 weeks]]

## 2022-02-08 NOTE — DISCHARGE NOTE NURSING/CASE MANAGEMENT/SOCIAL WORK - NSDCPEFALRISK_GEN_ALL_CORE
For information on Fall & Injury Prevention, visit: [email or web address] OR  [email or web address] OR  https://www.cdc.gov/steadi/patient.html

## 2022-02-08 NOTE — DISCHARGE NOTE PROVIDER - ATTENDING DISCHARGE PHYSICAL EXAMINATION:
PHYSICAL EXAM:    General: in no acute distress  Eyes: PERRLA, EOMI; conjunctiva and sclera clear  Head: Normocephalic; atraumatic  ENMT: No nasal discharge; airway clear  Neck: Supple; non tender; no masses  Respiratory: No wheezes, rales or rhonchi  Cardiovascular: Regular rate and rhythm. S1 and S2 Normal; No murmurs, gallops or rubs  Gastrointestinal: Soft non-tender non-distended; Normal bowel sounds  Genitourinary: No costovertebral angle tenderness  Extremities: Normal range of motion, No clubbing, cyanosis or edema  Vascular: Peripheral pulses palpable 2+ bilaterally  Neurological: Alert and oriented x4  Skin: Warm and dry. No acute rash  Lymph Nodes: No acute cervical adenopathy  Musculoskeletal: Normal gait, tone, without deformities  Psychiatric: Cooperative and appropriate

## 2022-02-08 NOTE — DISCHARGE NOTE PROVIDER - CARE PROVIDER_API CALL
primary care provider,   Phone: (   )    -  Fax: (   )    -  Follow Up Time: 2 weeks    endocrinologist,   Phone: (   )    -  Fax: (   )    -  Follow Up Time: 1 week    nephrologist,   Phone: (   )    -  Fax: (   )    -  Follow Up Time: 2 weeks

## 2022-02-08 NOTE — PROGRESS NOTE ADULT - SUBJECTIVE AND OBJECTIVE BOX
NEPHROLOGY INTERVAL HPI/OVERNIGHT EVENTS:    No new events.    MEDICATIONS  (STANDING):  aspirin enteric coated 81 milliGRAM(s) Oral daily  atorvastatin 40 milliGRAM(s) Oral at bedtime  calcium acetate 1334 milliGRAM(s) Oral three times a day with meals  dextrose 40% Gel 15 Gram(s) Oral once  dextrose 5%. 1000 milliLiter(s) (50 mL/Hr) IV Continuous <Continuous>  dextrose 5%. 1000 milliLiter(s) (100 mL/Hr) IV Continuous <Continuous>  dextrose 50% Injectable 25 Gram(s) IV Push once  dextrose 50% Injectable 12.5 Gram(s) IV Push once  dextrose 50% Injectable 25 Gram(s) IV Push once  glucagon  Injectable 1 milliGRAM(s) IntraMuscular once  heparin   Injectable 5000 Unit(s) SubCutaneous every 8 hours  insulin glargine Injectable (LANTUS) 25 Unit(s) SubCutaneous at bedtime  insulin lispro (ADMELOG) corrective regimen sliding scale   SubCutaneous Before meals and at bedtime  Nephro-amy 1 Tablet(s) Oral daily  sevelamer carbonate 1600 milliGRAM(s) Oral three times a day with meals  tamsulosin 0.4 milliGRAM(s) Oral at bedtime  ticagrelor 60 milliGRAM(s) Oral every 12 hours    MEDICATIONS  (PRN):  acetaminophen     Tablet .. 650 milliGRAM(s) Oral every 6 hours PRN Temp greater or equal to 38C (100.4F), Mild Pain (1 - 3)  aluminum hydroxide/magnesium hydroxide/simethicone Suspension 30 milliLiter(s) Oral every 4 hours PRN Dyspepsia  melatonin 3 milliGRAM(s) Oral at bedtime PRN Insomnia  ondansetron Injectable 4 milliGRAM(s) IV Push every 8 hours PRN Nausea and/or Vomiting      Allergies    No Known Allergies          Vital Signs Last 24 Hrs  T(C): 36.9 (08 Feb 2022 07:19), Max: 37.3 (07 Feb 2022 23:00)  T(F): 98.5 (08 Feb 2022 07:19), Max: 99.2 (07 Feb 2022 23:00)  HR: 78 (08 Feb 2022 07:19) (59 - 88)  BP: 164/80 (08 Feb 2022 07:19) (118/66 - 164/80)  BP(mean): --  RR: 20 (08 Feb 2022 07:19) (18 - 20)  SpO2: 95% (08 Feb 2022 07:19) (95% - 100%)        PHYSICAL EXAM:    GENERAL: comfortable  supine in bed  HEAD:    EYES:   ENMT: wnl  NECK: veins  wnl  NERVOUS SYSTEM: alert, oriented   CHEST/LUNG: no 02, no wheezes  HEART: RR, 1/6 systolic murmur  ABDOMEN: NT  EXTREMITIES:  left arm with bruit  LYMPH:   SKIN: no rash    LABS:                        10.1   6.97  )-----------( 147      ( 07 Feb 2022 06:36 )             30.0     02-08    135  |  95<L>  |  44.7<H>  ----------------------------<  132<H>  5.1   |  26.0  |  8.25<H>    Ca    9.8      08 Feb 2022 02:51  Phos  4.3     02-08  Mg     2.6     02-07    TPro  6.6  /  Alb  3.8  /  TBili  0.4  /  DBili  x   /  AST  19  /  ALT  19  /  AlkPhos  92  02-08        Phosphorus Level, Serum: 4.3 mg/dL (02-08 @ 02:51)          RADIOLOGY & ADDITIONAL TESTS:

## 2022-02-08 NOTE — ADVANCED PRACTICE NURSE CONSULT - ASSESSMENT
went to see pt in am pt is a+ox3 co 0 pain. pt states he has diabetes for 10 years he fu w endocrine in [place] who started him lantus 20 units qhs. he has a glucose meter and teast bg 2xdaily w numbers between 120-150. last week he was started on 2 units before meals, but he did not start it yet. discussed healthy eating habit the plate method was used to teach portions.

## 2022-02-08 NOTE — DISCHARGE NOTE PROVIDER - NSDCCPCAREPLAN_GEN_ALL_CORE_FT
PRINCIPAL DISCHARGE DIAGNOSIS  Diagnosis: Acute hyperkalemia  Assessment and Plan of Treatment: resolved      SECONDARY DISCHARGE DIAGNOSES  Diagnosis: Type 2 diabetes mellitus  Assessment and Plan of Treatment: resume medical management and follow-up with endocrinologist    Diagnosis: ESRD (end stage renal disease)  Assessment and Plan of Treatment: follow up with nephrology and continue dialysis    Diagnosis: Chest pain  Assessment and Plan of Treatment: resolved

## 2022-02-08 NOTE — DISCHARGE NOTE PROVIDER - NSDCMRMEDTOKEN_GEN_ALL_CORE_FT
aspirin 81 mg oral delayed release tablet: 1 tab(s) orally once a day  atorvastatin 40 mg oral tablet: 1 tab(s) orally once a day (at bedtime)  Brilinta (ticagrelor) 60 mg oral tablet: 1 tab(s) orally 2 times a day  calcium acetate 667 mg oral tablet: 2 tab(s) orally 3 times a day   Flomax 0.4 mg oral capsule: 1 cap(s) orally once a day  folic acid 1 mg oral tablet: 1 tab(s) orally once a day  hydrALAZINE 50 mg oral tablet: 2 tab(s) orally 2 times a day  Lantus Solostar Pen 100 units/mL subcutaneous solution: 20 unit(s) subcutaneous once a day  Lasix 40 mg oral tablet: 1 tab(s) orally once a day   losartan 50 mg oral tablet: 1 tab(s) orally once a day  metoprolol succinate 25 mg oral tablet, extended release: 1 tab(s) orally once a day  NIFEdipine (Eqv-Procardia XL) 60 mg oral tablet, extended release: 1 tab(s) orally once a day  pyridoxine 25 mg oral tablet: 1 tab(s) orally once a day  Sharon-Sesar oral tablet: 1 tab(s) orally once a day

## 2022-02-08 NOTE — DISCHARGE NOTE NURSING/CASE MANAGEMENT/SOCIAL WORK - PATIENT PORTAL LINK FT
You can access the FollowMyHealth Patient Portal offered by [facility] by registering at the following website: [email or web address]. By joining [facility]’s FollowMyHealth portal, you will also be able to view your health information using other applications (apps) compatible with our system.

## 2022-02-08 NOTE — DISCHARGE NOTE PROVIDER - HOSPITAL COURSE
56M with PMHX ESRD/HD MWF, HTN, HLD, CAD on DAPT, BPH, IDDM presents to [facility] ER c/o Chest Pain. Patient found to be bradycardic 40s with mild hypotension SBP 90s. Patient was dx and tzx for hyperkalemia which. resolved chest pain.  Started new medication 2 days ago Cardizem 240mg ER once a day for which he doesn't know why he was sent via mail order [facility]. Compliant with HD last session was 2/7/22.WIll be d/c on home anti-diabetic medications as epr endocrinology; will continue to f/u with his endocrinologist. Will continue his medication regimen for HLD, HTN and CAD. Nephro consulted no changes made.          56M with PMHX ESRD/HD MWF, HTN, HLD, CAD on DAPT, BPH, IDDM presents to [facility] ER c/o Chest Pain. Patient found to be bradycardic 40s with mild hypotension SBP 90s. Patient was dx and tx for hyperkalemia which resolved chest pain.  Started new medication 2 days ago Cardizem 240mg ER once a day for which he doesn't know why he was sent via mail order [facility] patient was told to not continue on cardizem on d/c. Compliant with HD last session was 2/7/22.WIll be d/c on home anti-diabetic medications as per endocrinology; will continue to f/u with his endocrinologist. Will continue his medication regimen for HLD, HTN and CAD. Nephro consulted no changes made.    PHYSICAL EXAM:    Vital Signs Last 24 Hrs  T(C): 37 (08 Feb 2022 10:53), Max: 37.3 (07 Feb 2022 23:00)  T(F): 98.6 (08 Feb 2022 10:53), Max: 99.2 (07 Feb 2022 23:00)  HR: 89 (08 Feb 2022 10:53) (59 - 89)  BP: 190/95 (08 Feb 2022 10:53) (153/67 - 190/95)  BP(mean): --  RR: 20 (08 Feb 2022 10:53) (19 - 20)  SpO2: 96% (08 Feb 2022 10:53) (95% - 98%)    GENERAL: Pt lying comfortably, NAD.  CHEST/LUNG: Clear to auscultation bilaterally; No wheezing.  HEART: S1S2+, Regular rate and rhythm; No murmurs.  ABDOMEN: Soft, Nontender, Nondistended; Bowel sounds present.  SKIN: warm and dry  NEURO: AAOX3,   PSYCH: normal mood.

## 2022-04-18 NOTE — ED PROVIDER NOTE - CPE EDP NEURO NORM
[FreeTextEntry1] : See notes from last visit\par Pt is here for follow up\par Recent UA showed lots of RBCs\par Her is here for cystoscopy, and also to evaluate his bladder outlet in light of his voiding symptoms despite having had prostate enucleation\par \par He is on Eliquis daily\par \par cystoscopy: open bladder neck and prostatic urethra, prostatic calcifications at bladder neck junction. bladder unremarkable\par \par \par Continue VESIcare 10 mg daily\par Keep follow-up appointment for May 2022 normal...

## 2022-08-01 NOTE — PATIENT PROFILE ADULT - NSPROALCOHOLUSE2_GEN_A_NUR
Cholecystectomy and hx ectopic preg  Total hysterectomy for fibroids left ovarians  Allergies Azthromycin - swelling    Non-smoker, rare EtOH, no marijuana, no illicit    Mom- EtOH abuse cirrhosis  Dad- fibromyalgia   No known MI, stroke or cancer   yes

## 2022-08-25 NOTE — ED PROVIDER NOTE - CRITICAL CARE ATTENDING CONTRIBUTION TO CARE
alone I have personally provided _33__ minutes of critical care time exclusive of time spent on separately billable procedures. Time includes review of laboratory data, radiology results, discussion with consultants, and monitoring for potential decompensation. Interventions were performed as documented above

## 2022-09-19 ENCOUNTER — EMERGENCY (EMERGENCY)
Facility: HOSPITAL | Age: 57
LOS: 1 days | Discharge: HOME | End: 2022-09-19
Attending: EMERGENCY MEDICINE
Payer: MEDICARE

## 2022-09-19 ENCOUNTER — NON-APPOINTMENT (OUTPATIENT)
Age: 57
End: 2022-09-19

## 2022-09-19 VITALS
SYSTOLIC BLOOD PRESSURE: 158 MMHG | OXYGEN SATURATION: 97 % | TEMPERATURE: 98 F | HEART RATE: 74 BPM | RESPIRATION RATE: 20 BRPM | DIASTOLIC BLOOD PRESSURE: 83 MMHG

## 2022-09-19 VITALS
DIASTOLIC BLOOD PRESSURE: 77 MMHG | WEIGHT: 178.57 LBS | OXYGEN SATURATION: 98 % | HEIGHT: 69 IN | SYSTOLIC BLOOD PRESSURE: 141 MMHG | RESPIRATION RATE: 18 BRPM | HEART RATE: 95 BPM | TEMPERATURE: 101 F

## 2022-09-19 DIAGNOSIS — I77.0 ARTERIOVENOUS FISTULA, ACQUIRED: Chronic | ICD-10-CM

## 2022-09-19 LAB
ALBUMIN SERPL ELPH-MCNC: 4 G/DL — SIGNIFICANT CHANGE UP (ref 3.3–5.2)
ALP SERPL-CCNC: 89 U/L — SIGNIFICANT CHANGE UP (ref 40–120)
ALT FLD-CCNC: 20 U/L — SIGNIFICANT CHANGE UP
ANION GAP SERPL CALC-SCNC: 15 MMOL/L — SIGNIFICANT CHANGE UP (ref 5–17)
ANISOCYTOSIS BLD QL: SLIGHT — SIGNIFICANT CHANGE UP
AST SERPL-CCNC: 54 U/L — HIGH
BASOPHILS # BLD AUTO: 0 K/UL — SIGNIFICANT CHANGE UP (ref 0–0.2)
BASOPHILS NFR BLD AUTO: 0 % — SIGNIFICANT CHANGE UP (ref 0–2)
BILIRUB SERPL-MCNC: 0.4 MG/DL — SIGNIFICANT CHANGE UP (ref 0.4–2)
BUN SERPL-MCNC: 32.1 MG/DL — HIGH (ref 8–20)
CALCIUM SERPL-MCNC: 10 MG/DL — SIGNIFICANT CHANGE UP (ref 8.4–10.5)
CHLORIDE SERPL-SCNC: 92 MMOL/L — LOW (ref 98–107)
CO2 SERPL-SCNC: 29 MMOL/L — SIGNIFICANT CHANGE UP (ref 22–29)
CREAT SERPL-MCNC: 6.4 MG/DL — HIGH (ref 0.5–1.3)
EGFR: 10 ML/MIN/1.73M2 — LOW
EOSINOPHIL # BLD AUTO: 0.26 K/UL — SIGNIFICANT CHANGE UP (ref 0–0.5)
EOSINOPHIL NFR BLD AUTO: 2.6 % — SIGNIFICANT CHANGE UP (ref 0–6)
GLUCOSE SERPL-MCNC: 284 MG/DL — HIGH (ref 70–99)
HCT VFR BLD CALC: 32.8 % — LOW (ref 39–50)
HGB BLD-MCNC: 10.9 G/DL — LOW (ref 13–17)
LYMPHOCYTES # BLD AUTO: 0.35 K/UL — LOW (ref 1–3.3)
LYMPHOCYTES # BLD AUTO: 3.5 % — LOW (ref 13–44)
MANUAL SMEAR VERIFICATION: SIGNIFICANT CHANGE UP
MCHC RBC-ENTMCNC: 30.8 PG — SIGNIFICANT CHANGE UP (ref 27–34)
MCHC RBC-ENTMCNC: 33.2 GM/DL — SIGNIFICANT CHANGE UP (ref 32–36)
MCV RBC AUTO: 92.7 FL — SIGNIFICANT CHANGE UP (ref 80–100)
METAMYELOCYTES # FLD: 0.4 % — HIGH (ref 0–0)
MICROCYTES BLD QL: SLIGHT — SIGNIFICANT CHANGE UP
MONOCYTES # BLD AUTO: 0.63 K/UL — SIGNIFICANT CHANGE UP (ref 0–0.9)
MONOCYTES NFR BLD AUTO: 6.2 % — SIGNIFICANT CHANGE UP (ref 2–14)
NEUTROPHILS # BLD AUTO: 8.83 K/UL — HIGH (ref 1.8–7.4)
NEUTROPHILS NFR BLD AUTO: 87.3 % — HIGH (ref 43–77)
PLAT MORPH BLD: NORMAL — SIGNIFICANT CHANGE UP
PLATELET # BLD AUTO: 184 K/UL — SIGNIFICANT CHANGE UP (ref 150–400)
POLYCHROMASIA BLD QL SMEAR: SIGNIFICANT CHANGE UP
POTASSIUM SERPL-MCNC: 5.3 MMOL/L — SIGNIFICANT CHANGE UP (ref 3.5–5.3)
POTASSIUM SERPL-SCNC: 5.3 MMOL/L — SIGNIFICANT CHANGE UP (ref 3.5–5.3)
PROT SERPL-MCNC: 7.2 G/DL — SIGNIFICANT CHANGE UP (ref 6.6–8.7)
RBC # BLD: 3.54 M/UL — LOW (ref 4.2–5.8)
RBC # FLD: 14 % — SIGNIFICANT CHANGE UP (ref 10.3–14.5)
RBC BLD AUTO: ABNORMAL
SMUDGE CELLS # BLD: PRESENT — SIGNIFICANT CHANGE UP
SODIUM SERPL-SCNC: 136 MMOL/L — SIGNIFICANT CHANGE UP (ref 135–145)
WBC # BLD: 10.12 K/UL — SIGNIFICANT CHANGE UP (ref 3.8–10.5)
WBC # FLD AUTO: 10.12 K/UL — SIGNIFICANT CHANGE UP (ref 3.8–10.5)

## 2022-09-19 PROCEDURE — 36415 COLL VENOUS BLD VENIPUNCTURE: CPT

## 2022-09-19 PROCEDURE — 99284 EMERGENCY DEPT VISIT MOD MDM: CPT | Mod: 25

## 2022-09-19 PROCEDURE — G1004: CPT

## 2022-09-19 PROCEDURE — 85025 COMPLETE CBC W/AUTO DIFF WBC: CPT

## 2022-09-19 PROCEDURE — 74176 CT ABD & PELVIS W/O CONTRAST: CPT | Mod: 26,MG

## 2022-09-19 PROCEDURE — 87040 BLOOD CULTURE FOR BACTERIA: CPT

## 2022-09-19 PROCEDURE — 74176 CT ABD & PELVIS W/O CONTRAST: CPT | Mod: MG

## 2022-09-19 PROCEDURE — 80053 COMPREHEN METABOLIC PANEL: CPT

## 2022-09-19 PROCEDURE — 99284 EMERGENCY DEPT VISIT MOD MDM: CPT

## 2022-09-19 PROCEDURE — 96374 THER/PROPH/DIAG INJ IV PUSH: CPT

## 2022-09-19 PROCEDURE — 96361 HYDRATE IV INFUSION ADD-ON: CPT

## 2022-09-19 RX ORDER — ACETAMINOPHEN 500 MG
975 TABLET ORAL ONCE
Refills: 0 | Status: COMPLETED | OUTPATIENT
Start: 2022-09-19 | End: 2022-09-19

## 2022-09-19 RX ORDER — SODIUM CHLORIDE 9 MG/ML
1000 INJECTION INTRAMUSCULAR; INTRAVENOUS; SUBCUTANEOUS ONCE
Refills: 0 | Status: COMPLETED | OUTPATIENT
Start: 2022-09-19 | End: 2022-09-19

## 2022-09-19 RX ORDER — ONDANSETRON 8 MG/1
4 TABLET, FILM COATED ORAL ONCE
Refills: 0 | Status: COMPLETED | OUTPATIENT
Start: 2022-09-19 | End: 2022-09-19

## 2022-09-19 RX ADMIN — SODIUM CHLORIDE 1000 MILLILITER(S): 9 INJECTION INTRAMUSCULAR; INTRAVENOUS; SUBCUTANEOUS at 20:29

## 2022-09-19 RX ADMIN — ONDANSETRON 4 MILLIGRAM(S): 8 TABLET, FILM COATED ORAL at 20:30

## 2022-09-19 RX ADMIN — Medication 975 MILLIGRAM(S): at 20:31

## 2022-09-19 NOTE — ED ADULT TRIAGE NOTE - CHIEF COMPLAINT QUOTE
pt c/o unable to urinate or have a BM x 4 days, on ABX for abcess  A&Ox3, resp wnl, unable to eat or drink x 3 days

## 2022-09-19 NOTE — ED PROVIDER NOTE - PHYSICAL EXAMINATION
Gen: Well appearing in NAD  Head: NC/AT  Neck: trachea midline  Card: regular rate and rhythm  Resp:  CTAB  Abd: soft, non-distended, non-tender  Rectal: multiple yesenia-rectal scar with small amount of induration, no fluctuance no erythema  Ext: AV fistula lleft upper extremities   Neuro:  A&O appears non focal  Skin:  Warm and dry as visualized  Psych:  Normal affect and mood Gen: Well appearing in NAD  Head: NC/AT  Neck: trachea midline  Card: regular rate and rhythm  Resp:  CTAB  Abd: soft, non-distended, non-tender  Rectal: multiple yesenia-rectal scar with small amount of induration, no fluctuance no erythema. No tenderness to palpation.  Ext: AV fistula left upper extremities   Neuro:  A&O appears non focal  Skin:  Warm and dry as visualized  Psych:  Normal affect and mood

## 2022-09-19 NOTE — ED PROVIDER NOTE - NSFOLLOWUPINSTRUCTIONS_ED_ALL_ED_FT
- Follow up with your doctor within 2-3 days.   - Bring results with you to the appointment.   - Continue your antibiotics as prescribed.   - Take Zofran 4mg every 6 hours as needed for nausea.   - Stay well hydrated.   - Return to the ED for any new or worsening symptoms.     Fever    A fever is an increase in the body's temperature above 100.4°F (38°C) or higher. In adults and children older than three months, a brief mild or moderate fever generally has no long-term effect, and it usually does not require treatment. Many times, fevers are the result of viral infections, which are self-resolving.  However, certain symptoms or diagnostic tests may suggest a bacterial infection that may respond to antibiotics. Take medications as directed by your health care provider.    SEEK IMMEDIATE MEDICAL CARE IF YOU OR YOUR CHILD HAVE ANY OF THE FOLLOWING SYMPTOMS : shortness of breath, seizure, rash/stiff neck/headache, severe abdominal pain, persistent vomiting, any signs of dehydration, or if your child has a fever for over five (5) days.

## 2022-09-19 NOTE — ED PROVIDER NOTE - OBJECTIVE STATEMENT
57 y/o male with PMHx of HTN, ESRD on dialysis M/W/F  presents to the ED states had abscess for about 1-2 weeks was started on Keflex 500 BID on Friday but started developed N/V and then his doctor switched his antibiotic to Doxycycline Pt states he usually makes urine but states he is currently only have a small amount of urine output and also has had constipation. Pt states he did not know he had fever, states his temp was 97 orally at dialysis today. Pt states he had dialysis today but states they took of less than usually.

## 2022-09-19 NOTE — ED PROVIDER NOTE - CLINICAL SUMMARY MEDICAL DECISION MAKING FREE TEXT BOX
Plan for labs, CT. urine, IV fluids, reassess pt with fever, N/V , s/p rectal abscess, abscess now well appearing, possible intolerance to keflex, plan for labs, symptom control, CT, reassess

## 2022-09-19 NOTE — ED PROVIDER NOTE - PROGRESS NOTE DETAILS
Da WHEATLEY: patient feeling significantly improved. Does not want to wait for urine as he is already on abx. Will follow with nephrologist. Strict return precautions given.

## 2022-09-19 NOTE — ED ADULT NURSE NOTE - OBJECTIVE STATEMENT
pt to ED with complaints of urinary retention. pt with pmh of ESRD on HD. pt states he last had HD today. pt presents with an abscess on his back that he has been taking keflex for. pt states he started having N/V with the abx and had the abx switched to doxy. pt states since the N/V he has made very little urine compared to his normal. pt denies any fever/chills/abd pain.

## 2022-09-19 NOTE — ED PROVIDER NOTE - PATIENT PORTAL LINK FT
You can access the FollowMyHealth Patient Portal offered by [facility] by registering at the following website: [email or web address]. By joining [employer]’s FollowMyHealth portal, you will also be able to view your health information using other applications (apps) compatible with our system.

## 2022-09-20 RX ORDER — ONDANSETRON 8 MG/1
1 TABLET, FILM COATED ORAL
Qty: 12 | Refills: 0
Start: 2022-09-20 | End: 2022-09-22

## 2022-09-22 ENCOUNTER — EMERGENCY (EMERGENCY)
Facility: HOSPITAL | Age: 57
LOS: 1 days | Discharge: HOME | End: 2022-09-22
Attending: EMERGENCY MEDICINE
Payer: MEDICARE

## 2022-09-22 VITALS
SYSTOLIC BLOOD PRESSURE: 195 MMHG | HEIGHT: 69 IN | HEART RATE: 81 BPM | DIASTOLIC BLOOD PRESSURE: 86 MMHG | TEMPERATURE: 98 F | RESPIRATION RATE: 16 BRPM | OXYGEN SATURATION: 99 % | WEIGHT: 196.21 LBS

## 2022-09-22 DIAGNOSIS — I77.0 ARTERIOVENOUS FISTULA, ACQUIRED: Chronic | ICD-10-CM

## 2022-09-22 PROCEDURE — 99283 EMERGENCY DEPT VISIT LOW MDM: CPT

## 2022-09-22 PROCEDURE — 99282 EMERGENCY DEPT VISIT SF MDM: CPT

## 2022-09-22 RX ORDER — POLYETHYLENE GLYCOL 3350 17 G/17G
17 POWDER, FOR SOLUTION ORAL ONCE
Refills: 0 | Status: COMPLETED | OUTPATIENT
Start: 2022-09-22 | End: 2022-09-22

## 2022-09-22 RX ORDER — MINERAL OIL
133 OIL (ML) MISCELLANEOUS ONCE
Refills: 0 | Status: COMPLETED | OUTPATIENT
Start: 2022-09-22 | End: 2022-09-22

## 2022-09-22 RX ADMIN — Medication 1 ENEMA: at 21:24

## 2022-09-22 RX ADMIN — Medication 133 MILLILITER(S): at 21:32

## 2022-09-22 RX ADMIN — POLYETHYLENE GLYCOL 3350 17 GRAM(S): 17 POWDER, FOR SOLUTION ORAL at 21:21

## 2022-09-22 NOTE — ED STATDOCS - OBJECTIVE STATEMENT
55 y/o male with PMHx of HTN, Renal Failure presents to the ED c/o constipation for 5 days. Pt has only tried taking colace. Pt was here on Monday for urinary retention, N/V, constipation caused by an antibiotic given to him previously. The antibiotic was switched and since, N/V has stopped, and urination has slowly been improving. Pt on dialysis, M/W/F. No further complaints. 57 y/o male with PMHx of HTN, Renal Failure presents to the ED c/o constipation for 5 days. Pt has only tried taking colace. Pt was here on Monday for urinary retention, N/V, constipation caused by an antibiotic given to him previously. The antibiotic was switched and since, N/V has stopped, and urination has slowly been improving. No abd pain or cramping. no trauma. No rectal bleeding. Saw PMD who recommended colace, senna, miralax which he plans to start tonight. No abd surgeries  Pt on dialysis, M/W/F. No further complaints.

## 2022-09-22 NOTE — ED ADULT TRIAGE NOTE - CHIEF COMPLAINT QUOTE
Pt c/o feeling constipated for the last 5 days. Was seen here on Monday for difficulty urinating but he states his constipation was not addressed at that time.

## 2022-09-22 NOTE — ED STATDOCS - CLINICAL SUMMARY MEDICAL DECISION MAKING FREE TEXT BOX
[facility]   Progress Note                   Date:   4/13/2019  Patient name: Carlos Vance  Date of admission:  4/7/2019  4:48 AM  MRN:   [id number]  YOB: 1964  PCP: No primary care provider on file. Reason for Admission: STEMI    Subjective:       S/P emergent CABG, POD#6    Patient seen and examined. More agitated today. Opens eyes to sound, but does not follow commands. Pacer wires clipped so plan for MRI today. Continues to have fevers with last temp 100.1 at 7 AM. D/W RN in detail. I/O last 3 completed shifts: In: 2483 [I.V.:1647; NG/GT:836]  Out: 2675 [Urine:2675]  No intake/output data recorded.       Medications:   Scheduled Meds:   nystatin  5 mL Oral 4x Daily    heparin (porcine)  5,000 Units Subcutaneous 3 times per day    famotidine  20 mg Oral Daily    ceftaroline fosamil (TEFLARO) IVPB  400 mg Intravenous Q12H    aspirin  81 mg Oral Daily    docusate  100 mg Oral Daily    [Held by provider] furosemide  40 mg Intravenous BID    dextrose  12.5 g Intravenous Once    albuterol  2.5 mg Nebulization Q6H    sodium chloride flush  10 mL Intravenous 2 times per day    sodium chloride flush  10 mL Intravenous 2 times per day    polyethylene glycol  17 g Oral Daily    metoprolol tartrate  25 mg Oral BID    amiodarone  200 mg Oral BID    chlorhexidine  15 mL Mouth/Throat BID    therapeutic multivitamin-minerals  1 tablet Oral Daily with breakfast    atorvastatin  40 mg Oral Nightly    clopidogrel  75 mg Oral Daily    insulin lispro  0-12 Units Subcutaneous TID     insulin lispro  0-6 Units Subcutaneous Nightly       Continuous Infusions:   midazolam Stopped (04/11/19 0800)    sodium chloride 75 mL/hr at 04/13/19 0608    dextrose      phenylephrine (GIANNA-SYNEPHRINE) 50mg/250mL infusion Stopped (04/08/19 1418)    norepinephrine Stopped (04/08/19 0230)       CBC:   Recent Labs     04/11/19  0456 04/12/19  0534 04/12/19  0729 04/13/19  0546   WBC 10.2 12.4* --  16.5*   HGB 10.1* 6.6* 8.7* 9.3*   * 122*  --  169     BMP:    Recent Labs     04/11/19  0456 04/12/19  0534 04/13/19  0546    142 136   K 3.6* 4.6 4.6   * 113* 108*   CO2 20 18* 18*   BUN 19 43* 48*   CREATININE 0.43* 1.99* 1.69*   GLUCOSE 207* 148* 287*     Hepatic:   Recent Labs     04/12/19  1124   AST 3,077*   ALT 3,818*   BILITOT 1.90*   ALKPHOS 71     Troponin: No results for input(s): TROPONINI in the last 72 hours. BNP: No results for input(s): BNP in the last 72 hours. Lipids: No results for input(s): CHOL, HDL in the last 72 hours. Invalid input(s): LDLCALCU  INR:   Recent Labs     04/11/19  0456 04/12/19  0534 04/13/19  0546   INR 1.4 1.4 1.2       Objective:   Vitals: BP (!) 162/61   Pulse 99   Temp 100.1 °F (37.8 °C) (Oral)   Resp 18   Ht 5' 8\" (1.727 m)   Wt 183 lb 3.2 oz (83.1 kg)   SpO2 98%   BMI 27.86 kg/m²   General appearance: intubated, but not sedated. Does not follow commands  HEENT: Head: Normocephalic, no lesions, without obvious abnormality  Neck: no JVD  Lungs:  Mechanical ventilator  Heart: regular rate and rhythm, S1, S2 normal, no murmur, click, rub or gallop  Abdomen: soft, non-tender; bowel sounds normal  Extremities: No LE edema  Neurologic: Opens eyes to sound, but does not follow commands      EKG: Sinus tachycardia with anteroseptal infarct    4/7/19 Coronary Angiography:   LMCA: has distal 60% stenosis. LAD: has ostial 90% stenosis and long proximal 80% stenosis. D1 has proximal 70% stenosis. LCx: has 20% stenosis. OM1 is very small. OM2 has proximal 90% stenosis. RCA: has proximal 50% stenosis, mid 60% stenosis and distal 80% stenosis. Ramus: has ostial 99% stenosis.     The LV gram was performed in the DUTTON 30 position. LVEF: 15%. LV Wall Motion: severe hypokinesis of the anterior wall and mid inferior wall with apical akinesis. Echo 4/12/19:  Left ventricle is normal in size.  Global left ventricular systolic function is mildly reduced. Calculated ejection fraction is 40. Left ventricular wall thickness is mildly increased. Grade II diastolic dysfunction  Normal valvular function      Assessment:   1. PEA Arrest (Multiple times) with ROSC in few minutes - off sedation and paralytics  2. Late presentation of anteroseptal STEMI   3. Multivessel CAD s/p emergent CABG on 4/7 with LIMA-LAD, SVG-PDA,OM 1 & distal circumflex). 4. S/P sternal closure on 4/8  5. Ischemic cardiomyopathy with acute systolic CHF (EF 01% on ANGELA and TTE)  6. NSVT  7. Post op anemia  8. Post op thrombocytopenia  9. Acute pulmonary edema S/p intubation  10. Sepsis   11. Acute renal failure - creatinine improving slowly  12. Metabolic encephalopathy  13. Exposed orthopedic hardware S/P removal of Protruding orthopedic nail from Rt lateral knee area. Old blood and malodor. Possible underlying chronic osteomyelitis of screw tract. 14. S/P ORIF Rt proximal tibia. Non compliant with follow up. Treatment Plan:   1. On ASA 81, Plavix 75, Lipitor 40, Amiodarone 200 bid  2. On Teflaro per ID for fevers  3. Vent management per Pulmonary  4. Plan for MRI brain noted  5. Neuro and ID input appreciated  6. Wean vent as able  7. Check labs  8. Correct electrolytes prn to keep Mag > 2.0 and K > 4.0  9. Continue post-operative management per CT surgery      Electronically signed on 04/13/19 at 9:49 AM by:    Audelia Lennox, MD   Fellow, [address]    Attending Cardiologist Addendum: I have reviewed and performed the history, physical, subjective, objective, assessment, and plan with the resident/fellow and agree with the note. I performed the history and physical personally. I have made changes to the note above as needed. Awaiting neurological recovery- MRI planned today    Thank you for allowing me to participate in the care of this patient, please do not hesitate to call if you have any questions.     Oswaldo Herrera, , [facility] [place], [place], [address] [facility]  [email or web address]  [id number] Pt presenting with constipation, recent CT scan with addendum of possible of pericholecystic fluid however no abdominal pain and no RUQ tenderness. Will give enema here. Pt primary doctor prescribed senokot colace and Miralax which he will start at home tonight. No fecal impaction, will discharge. Pt presenting with constipation, recent CT scan with addendum of possible of pericholecystic fluid however no abdominal pain and no RUQ tenderness. Will give enema here. Pt primary doctor prescribed senokot colace and Miralax which he will start at home tonight. No fecal impaction, will discharge. benign abd exam. no vomiting.

## 2022-09-22 NOTE — ED STATDOCS - PATIENT PORTAL LINK FT
You can access the FollowMyHealth Patient Portal offered by [facility] by registering at the following website: [email or web address]. By joining [employer]’s FollowMyHealth portal, you will also be able to view your health information using other applications (apps) compatible with our system.

## 2022-09-22 NOTE — ED STATDOCS - GASTROINTESTINAL, MLM
Rectal Exam: Chaperone, Jae Sauleco. abdomen soft, non-tender, and non-distended. Bowel sounds present. Rectal Exam: Chaperone, Jae Caro. No blood or fecal impaction.  abdomen soft, non-tender, and non-distended. Bowel sounds present.

## 2022-09-25 LAB
CULTURE RESULTS: SIGNIFICANT CHANGE UP
CULTURE RESULTS: SIGNIFICANT CHANGE UP
SPECIMEN SOURCE: SIGNIFICANT CHANGE UP
SPECIMEN SOURCE: SIGNIFICANT CHANGE UP

## 2022-11-17 ENCOUNTER — ASC (OUTPATIENT)
Dept: URBAN - METROPOLITAN AREA SURGERY 8 | Facility: SURGERY | Age: 57
Setting detail: OPHTHALMOLOGY
End: 2022-11-17
Payer: MEDICARE

## 2022-11-17 DIAGNOSIS — E10.3511: ICD-10-CM

## 2022-11-17 PROCEDURE — 67228 TREATMENT X10SV RETINOPATHY: CPT | Performed by: SPECIALIST

## 2022-11-17 ASSESSMENT — SPHEQUIV_DERIVED
OD_SPHEQUIV: -0.5
OS_SPHEQUIV: 0.25

## 2022-11-17 ASSESSMENT — REFRACTION_AUTOREFRACTION
OD_AXIS: 074
OS_SPHERE: +0.75
OD_CYLINDER: -0.50
OD_SPHERE: -0.25
OS_AXIS: 080
OS_CYLINDER: -1.00

## 2022-11-17 ASSESSMENT — VISUAL ACUITY
OS_BCVA: 20/40
OD_BCVA: 20/50-1

## 2022-11-17 ASSESSMENT — KERATOMETRY
OD_AXISANGLE_DEGREES: 086
OD_K1POWER_DIOPTERS: 43.00
OS_K1POWER_DIOPTERS: 42.50
OS_K2POWER_DIOPTERS: 43.00
METHOD_AUTO_MANUAL: AUTO
OD_K2POWER_DIOPTERS: 43.25
OS_AXISANGLE_DEGREES: 097

## 2022-11-17 ASSESSMENT — AXIALLENGTH_DERIVED
OD_AL: 23.9292
OS_AL: 23.7713

## 2022-11-17 ASSESSMENT — TONOMETRY
OD_IOP_MMHG: 13
OS_IOP_MMHG: 15

## 2022-11-25 NOTE — ED ADULT NURSE NOTE - IN THE PAST 12 MONTHS HAVE YOU USED DRUGS OTHER THAN THOSE REQUIRED FOR MEDICAL REASON?
This note was not shared with the patient due to this is a psychotherapy note    Psychotherapy Provided: Individual Psychotherapy 45 minutes     Length of time in session: 45 minutes, follow up in 1-2 week    Encounter Diagnosis     ICD-10-CM    1  Moderate episode of recurrent major depressive disorder (HCC)  F33 1       2  Generalized anxiety disorder with panic attacks  F41 1     F41 0       3  Nicotine dependence, cigarettes, uncomplicated  C08 227           Goals addressed in session: Goal 1     Pain:      none    0    Current suicide risk : Low     DATA: Met with Negin Wing for scheduled individual session  Reported feeling ok but very tired due to preparing for the Thanksgiving holiday  Reported and processed feeling very discouraged with herself due to physical limitations and as a result does not want to do anything for Jimmie  Worked through conflicts with her sister and continues to demonstrate difficulties in setting limits, one-upping, and allowing bad behavior from others  ASSESSMENT: Negin Wing presents with a normal mood  Her affect is normal range and intensity, appropriate  Negin Wing exhibits good therapeutic rapport with this clinician  Negin Wing continues to exhibit willingness to work on treatment goals and objectives  No reported or observed safety risks  PLAN: Negin Wing will return in 1-2 weeks for the next scheduled session  Between sessions, Negin Wing will take her medication as prescribed and practice positive coping strategies as needed to help manage mood and sense of usefulness, and will report back during the next session re: successes and barriers  Behavioral Health Treatment Plan [facility]: Diagnosis and Treatment Plan explained to Nataliespencer Jensens relates understanding diagnosis and is agreeable to Treatment Plan   Yes     Visit start and stop times:    11/25/22  Start Time: 0935  Stop Time: 1020  Total Visit Time: 45 minutes
No

## 2022-12-08 ENCOUNTER — OFFICE (OUTPATIENT)
Dept: URBAN - METROPOLITAN AREA CLINIC 94 | Facility: CLINIC | Age: 57
Setting detail: OPHTHALMOLOGY
End: 2022-12-08
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
## 2022-12-08 PROCEDURE — NO SHOW FE NO SHOW FEE: Performed by: SPECIALIST

## 2022-12-15 ENCOUNTER — ASC (OUTPATIENT)
Dept: URBAN - METROPOLITAN AREA SURGERY 8 | Facility: SURGERY | Age: 57
Setting detail: OPHTHALMOLOGY
End: 2022-12-15
Payer: MEDICARE

## 2022-12-15 DIAGNOSIS — E10.3512: ICD-10-CM

## 2022-12-15 PROCEDURE — 67228 TREATMENT X10SV RETINOPATHY: CPT | Performed by: SPECIALIST

## 2022-12-15 ASSESSMENT — AXIALLENGTH_DERIVED
OS_AL: 23.7713
OD_AL: 23.9292

## 2022-12-15 ASSESSMENT — KERATOMETRY
OD_AXISANGLE_DEGREES: 086
METHOD_AUTO_MANUAL: AUTO
OS_AXISANGLE_DEGREES: 097
OS_K1POWER_DIOPTERS: 42.50
OD_K1POWER_DIOPTERS: 43.00
OD_K2POWER_DIOPTERS: 43.25
OS_K2POWER_DIOPTERS: 43.00

## 2022-12-15 ASSESSMENT — REFRACTION_AUTOREFRACTION
OS_CYLINDER: -1.00
OD_SPHERE: -0.25
OS_SPHERE: +0.75
OD_AXIS: 074
OS_AXIS: 080
OD_CYLINDER: -0.50

## 2022-12-15 ASSESSMENT — TONOMETRY
OD_IOP_MMHG: 16
OS_IOP_MMHG: 16

## 2022-12-15 ASSESSMENT — VISUAL ACUITY
OS_BCVA: 20/50-1
OD_BCVA: 20/50-1

## 2022-12-15 ASSESSMENT — SPHEQUIV_DERIVED
OD_SPHEQUIV: -0.5
OS_SPHEQUIV: 0.25

## 2023-01-01 NOTE — PROGRESS NOTE ADULT - PROBLEM SELECTOR PLAN 4
Progress Note    Attendance at delivery: No    Mom is:  Information for the patient's mother:  Dee Figueroa [[id number]]   22 year old      Information for the patient's mother:  Dee Figueroa [[id number]]       Gestational Age: 40w3d at delivery  Maternal History Includes:    Information for the patient's mother:  Dee Figueroa [[id number]]     Social History     Tobacco Use   • Smoking status: Never   • Smokeless tobacco: Never   Vaping Use   • Vaping Use: never used   Substance Use Topics   • Alcohol use: Not Currently   • Drug use: Not Currently     Types: Marijuana      Information for the patient's mother:  Dee Figueroa [[id number]]     Sexually Active: Not Asked        Information for the patient's mother:  Dee Figueroa [[id number]]     Drug Use:    Not Current*    Information for the patient's mother:  Dee Figueroa [[id number]]   Review of patient's social economics indicates:  No social economics status on file     Information for the patient's mother:  Dee Figueroa [[id number]]     Medications Prior to Admission   Medication Sig Dispense Refill   • Prenatal Vit-Fe Fumarate-FA (multivitamin & mineral w/folic acid- PRENATAL) 27-1 MG Tab Take by mouth daily. Indications: Pregnancy     • Ferrous Sulfate (Iron) 325 (65 Fe) MG Tab         Pregnancy Complications:  None  Prenatal Labs:  Information for the patient's mother:  Dee Figueroa [[id number]]     Recent Labs   Lab 238   ABORHDABR  --  A Rh Positive   AS  --  Negative   HIV Antigen/ Antibody Combo Screen  --  Nonreactive   Hepatitis B Surface Antigen Negative  --    RPR  --  Nonreactive   Rubella Antibody, IgG 15.5  --       Maternal HepB status: Not resulted - Pending  Maternal Syphilis status: RPR negative  Maternal GBBS status: Unknown,  Ampicillin x 2 doses PTD  Maternal HIV status: Resulted - Negative/Non-Reactive  Maternal Rubella status: Unknown  Maternal status (other):                  Maternal Inpatient Meds:  Information for the patient's mother:  Dee Figueroa [[id number]]     Current Facility-Administered Medications   Medication   • dextrose 5 % / lactated ringers infusion   • ampicillin (OMNIPEN) 1,000 mg in sodium chloride 0.9 % 100 mL IVPB   • morphine injection 1 mg   • ondansetron (ZOFRAN) injection 4 mg   • oxyTOCIN (PITOCIN) injection 10 Units   • oxyTOCIN (PITOCIN) 30 Units in sodium chloride 0.9% 500 mL   • acetaminophen (TYLENOL) tablet 650 mg   • ibuprofen (MOTRIN) tablet 600 mg   • measles-mumps-rubella vaccine 0.5 mL   • varicella virus (VARIVAX) live vaccine 0.5 mL   • diphtheria-pertussis (acellular)-tetanus (BOOSTRIX) vaccine 0.5 mL   • hydroCORTisone-pramoxine (ANALPRAM-HC) 2.5-1 % rectal cream   • simethicone (MYLICON) tablet 125 mg   • calcium carbonate (TUMS) chewable tablet 500 mg   • ferrous sulfate (65 mg Fe per 325 mg) tablet 325 mg   • lidocaine HCl (PF) (XYLOCAINE) 1 % injection 300 mg   • sodium chloride 0.9 % flush bag 25 mL   • sodium chloride (PF) 0.9 % injection 2 mL   • lactated ringers infusion      Inpatient Baby Meds:  Current Facility-Administered Medications   Medication Dose Route Frequency Provider Last Rate Last Admin      Current Facility-Administered Medications   Medication Dose Route Frequency Provider Last Rate Last Admin   • lidocaine HCl (PF) (XYLOCAINE) 1 % injection 10 mg  1 mL Subcutaneous PRN Nestor Concepcion MD         Labor:  Delivery Method:  Vaginal, Spontaneous [250]  Rupture Date:  2023  Rupture Time: 9:48 AM  YOB: 2023  Time of Birth:  10:21 AM     BIRTH HISTORY:     Delivery Method: Vaginal, Spontaneous [250]   Rupture date & time: 2023 9:48 AM   Date & time of birth 2023 10:21 AM   Induction: Cervidil Post-term Gestation   Labor  complications: None     Placenta appearance: Intact   Cord info/complications:   None       Delayed cord clamping: Yes   Indications for :     Presentation/position: Vertex Left Occiput Anterior   Forceps attempted? No     Vacuum attempted? No     Shoulder dystocia? No                       Resuscitation:    Baby required  None    APGAR  1 Minute  5 Minute    Skin Color 1  1     Heart Rate 2  2     Grimace 2  2     Muscle Tone 2  2    Breathing 2  2    Total 9  9      Birth Measurements:  Weight:  8 lb 2.2 oz (3690 g)    Length:  18.5\"    Head circumference:  35 cm     Health Care Maintenance:  Girl's Birth Weight:  8 lb 2.2 oz (3690 g)   Wt Readings from Last 4 Encounters:   23 3.69 kg (8 lb 2.2 oz) (83 %, Z= 0.96)*     * Growth percentiles are based on WHO (Girls, 0-2 years) data.     Change from birth weight: 0%    Feeding: Natural Human Milk  Stool Occurrence: 1 (23 1615)    Early onset sepsis screen:  Sepsis Risk Calculator Data    Flowsheet Row Admission (Current) from 2023 in [facility]   Gestational age (weeks) 40 weeks   Gestational age (days) 3 days   Highest maternal antepartum temp (F) 98.2   ROM (hours) 0 hours   Maternal GBS status 0   Type of intrapartum antibiotics 1        Risk at Birth: 0.01  Risk - Well Appearin  Risk - Equivocal: 0.05  Risk - Clinical Illness: 0.22    Clinical Exam:  Well Appearing (no persistent physiologic abnormalities)    Plan for EOS  - EOS Risk after Clinical Exam: Less than 1 per 1,000 live births - No culture, No antibiotics, Routine Vitals  - Blood culture and CBC on admission - No  - No Antibiotics was initiated due to low risk of Sepsis.     Vital 24 Hour Range Last Value   Temperature Temp  Min: 97.3 °F (36.3 °C)  Max: 99.5 °F (37.5 °C) 99.5 °F (37.5 °C) (23 1546)   Pulse Pulse  Min: 116  Max: 142 136 (23 1245)   Respiratory Resp  Min: 34  Max: 41 34 (23 1245)   Non-Invasive  Blood Pressure No data recorded      Arterial  Blood Pressure No data recorded     Pulse Oximetry No data recorded       PHYSICAL EXAM  GENERAL:  Alert, vigorous female with appropriate behavior.  She is in no acute distress.  SKIN:  Her skin is warm with normal turgor.  The color of the skin is pink.  There is no rash.  There are no bruises or other signs of injury.  No significant jaundice.  HEAD:  The head is atraumatic and normocephalic.  The anterior fontanel is open and flat.  EYES:  Opens both eyes equally.  Red reflexes bilaterally EC  EARS:  Pinnae and external ear canals normal.  NOSE:  There is no nasal flaring, nares patent bilaterally.  THROAT:  The oropharynx is normal.  There is no cleft of the palate.  NECK:  Clavicles without crepitus.  TRUNK AND THORAX:  There are no lesions on the trunk; there is no dimple over the presacral area.  There are no retractions.  LUNGS:  The lung fields are clear to auscultation.  HEART:  The precordium is quiet.  The heart rhythm is grossly regular.  There are no murmurs.  The femoral pulses are normal.  ABDOMEN:  The umbilical cord stump is normal.  Three-vessel cord.  There is not an umbilical hernia.  The abdomen is flat and soft.   GENITALIA:  normal female genitalia.    RECTAL:  Anus patent.  EXTREMITIES:  Moving all 4 extremities.  The hip exam is normal.  There are no hip clicks. Normal Ortalani's and Matos's.  NEUROLOGIC:  she displays normal tone throughout.  She is not jittery and she has normal  reflexes.  [place] reflex present. No focal deficits.    Discharge Planning:  Labs:  Recent Results (from the past 24 hour(s))   Bilirubin Panel    Collection Time: 23 12:19 PM   Result Value Ref Range    Bilirubin, Total 5.5 2.0 - 6.0 mg/dL    Bilirubin, Direct 0.1 0.0 - 0.6 mg/dL     No results available in last 24 hours    Hematologic/Blood Type:   Infant: No results found     Hearing screen :   Hearing Test Results: Pass R, Pass L;Final result (23 1300)    [place] [employer]  Screen drawn:      CHD Screening   Screening complete: Done (23 1230)  Right hand reading %: 99 %  Foot reading %: 100 %  CHD: Normal    Immunizations  Most Recent Immunizations   Administered Date(s) Administered   • Hep B, adolescent or pediatric 2023     Circumcision    Not indicated - Baby Girl    Car Seat Screen    Not Indicated    ASSESSMENT:    Well 1 day old female infant.      Delivered by: Vaginal, Spontaneous [250]  Feeding well, voiding and passing stool  Being managed for the following Problem List:  2023: Single liveborn infant delivered vaginally    PLAN:  Routine care  To room-in with mother  Normal  Care with Pediatrician    Discharge Disposition: Plan Home With Mom in 1-2 days  Pediatrician after discharge: [facility]    Routine  Care  1. Anticipatory guidance provided for parents at bedside.  2. Breastfeed ad poncho. Lactation consultation as needed.  3. Hepatitis B vaccine ordered and given.  4. Hearing screen prior to discharge. -  Hearing Test Results: Pass R, Pass L;Final result (23 1300)  5.  Screen Done at 24 hours -    6. Bilirubin  at 24 hours. 5.5  7. Cardiac Screen prior to discharge. - Done (23 1230) Normal  8. PCP: Discussed with mother regarding making appt with pediatrician 1-4 days after discharge.  9. Spoken to RN and reviewed findings and plan of care.    I have reviewed infants current condition and plan of management with Mother and Grandparent in mother's hospital room.     -2/2 pulm edema and renal failure  -Continue ASA/Brilinta   - stable trop.  - CK normal  - patient asymptomatic

## 2023-01-12 ENCOUNTER — ASC (OUTPATIENT)
Dept: URBAN - METROPOLITAN AREA SURGERY 8 | Facility: SURGERY | Age: 58
Setting detail: OPHTHALMOLOGY
End: 2023-01-12
Payer: MEDICARE

## 2023-01-12 ENCOUNTER — OFFICE (OUTPATIENT)
Dept: URBAN - METROPOLITAN AREA CLINIC 94 | Facility: CLINIC | Age: 58
Setting detail: OPHTHALMOLOGY
End: 2023-01-12
Payer: MEDICARE

## 2023-01-12 DIAGNOSIS — E10.3511: ICD-10-CM

## 2023-01-12 DIAGNOSIS — E10.3513: ICD-10-CM

## 2023-01-12 PROCEDURE — 67228 TREATMENT X10SV RETINOPATHY: CPT | Performed by: SPECIALIST

## 2023-01-12 PROCEDURE — 92134 CPTRZ OPH DX IMG PST SGM RTA: CPT | Performed by: SPECIALIST

## 2023-01-12 ASSESSMENT — REFRACTION_AUTOREFRACTION
OD_SPHERE: -0.25
OS_SPHERE: +0.75
OD_AXIS: 074
OD_CYLINDER: -0.50
OS_AXIS: 080
OS_CYLINDER: -1.00

## 2023-01-12 ASSESSMENT — VISUAL ACUITY
OS_BCVA: 20/50
OD_BCVA: 20/50-1

## 2023-01-12 ASSESSMENT — KERATOMETRY
OS_K2POWER_DIOPTERS: 43.00
OD_K1POWER_DIOPTERS: 43.00
OD_K2POWER_DIOPTERS: 43.25
OS_K1POWER_DIOPTERS: 42.50
OS_AXISANGLE_DEGREES: 097
METHOD_AUTO_MANUAL: AUTO
OD_AXISANGLE_DEGREES: 086

## 2023-01-12 ASSESSMENT — CONFRONTATIONAL VISUAL FIELD TEST (CVF)
OS_FINDINGS: FULL
OD_FINDINGS: FULL

## 2023-01-12 ASSESSMENT — TONOMETRY
OD_IOP_MMHG: 13
OS_IOP_MMHG: 16

## 2023-01-12 ASSESSMENT — AXIALLENGTH_DERIVED
OD_AL: 23.9292
OS_AL: 23.7713

## 2023-01-12 ASSESSMENT — SPHEQUIV_DERIVED
OS_SPHEQUIV: 0.25
OD_SPHEQUIV: -0.5

## 2023-03-24 ENCOUNTER — OFFICE (OUTPATIENT)
Dept: URBAN - METROPOLITAN AREA CLINIC 116 | Facility: CLINIC | Age: 58
Setting detail: OPHTHALMOLOGY
End: 2023-03-24
Payer: COMMERCIAL

## 2023-03-24 DIAGNOSIS — Z01.00: ICD-10-CM

## 2023-03-24 PROCEDURE — 92014 COMPRE OPH EXAM EST PT 1/>: CPT | Performed by: OPTOMETRIST

## 2023-03-24 ASSESSMENT — REFRACTION_MANIFEST
OD_VA1: 20/60NIPH
OD_AXIS: 080
OU_VA: 20/50-
OS_AXIS: 065
OD_CYLINDER: -0.75
OS_ADD: +2.00
OS_CYLINDER: -0.75
OS_VA1: 20/50-
OD_ADD: +2.00
OD_SPHERE: PLANO
OS_SPHERE: PLANO

## 2023-03-24 ASSESSMENT — KERATOMETRY
OS_K2POWER_DIOPTERS: 43.00
OS_K1POWER_DIOPTERS: 42.50
OS_AXISANGLE_DEGREES: 098
METHOD_AUTO_MANUAL: AUTO
OD_K2POWER_DIOPTERS: 43.25
OD_AXISANGLE_DEGREES: 098
OD_K1POWER_DIOPTERS: 42.75

## 2023-03-24 ASSESSMENT — REFRACTION_AUTOREFRACTION
OD_SPHERE: -0.25
OS_SPHERE: +0.75
OS_AXIS: 080
OD_CYLINDER: -0.50
OD_AXIS: 074
OS_CYLINDER: -1.00

## 2023-03-24 ASSESSMENT — TONOMETRY
OD_IOP_MMHG: 15
OS_IOP_MMHG: 13

## 2023-03-24 ASSESSMENT — CONFRONTATIONAL VISUAL FIELD TEST (CVF)
OD_FINDINGS: FULL
OS_FINDINGS: FULL

## 2023-03-24 ASSESSMENT — VISUAL ACUITY
OS_BCVA: 20/60
OD_BCVA: 20/60

## 2023-03-24 ASSESSMENT — SPHEQUIV_DERIVED
OS_SPHEQUIV: 0.25
OD_SPHEQUIV: -0.5

## 2023-03-24 ASSESSMENT — AXIALLENGTH_DERIVED
OS_AL: 23.7713
OD_AL: 23.9765

## 2023-04-27 ENCOUNTER — OFFICE (OUTPATIENT)
Dept: URBAN - METROPOLITAN AREA CLINIC 94 | Facility: CLINIC | Age: 58
Setting detail: OPHTHALMOLOGY
End: 2023-04-27
Payer: MEDICARE

## 2023-04-27 ENCOUNTER — ASC (OUTPATIENT)
Dept: URBAN - METROPOLITAN AREA SURGERY 8 | Facility: SURGERY | Age: 58
Setting detail: OPHTHALMOLOGY
End: 2023-04-27
Payer: MEDICARE

## 2023-04-27 DIAGNOSIS — E10.3513: ICD-10-CM

## 2023-04-27 DIAGNOSIS — E10.3511: ICD-10-CM

## 2023-04-27 PROCEDURE — 67210 TREATMENT OF RETINAL LESION: CPT | Performed by: SPECIALIST

## 2023-04-27 PROCEDURE — 92012 INTRM OPH EXAM EST PATIENT: CPT | Performed by: SPECIALIST

## 2023-04-27 PROCEDURE — 92134 CPTRZ OPH DX IMG PST SGM RTA: CPT | Performed by: SPECIALIST

## 2023-04-27 ASSESSMENT — REFRACTION_AUTOREFRACTION
OD_SPHERE: -0.25
OS_CYLINDER: -1.00
OS_AXIS: 080
OD_AXIS: 074
OD_CYLINDER: -0.50
OS_SPHERE: +0.75

## 2023-04-27 ASSESSMENT — KERATOMETRY
OD_K2POWER_DIOPTERS: 43.25
OD_AXISANGLE_DEGREES: 098
OS_K2POWER_DIOPTERS: 43.00
OS_AXISANGLE_DEGREES: 098
OD_K1POWER_DIOPTERS: 42.75
OS_K1POWER_DIOPTERS: 42.50
METHOD_AUTO_MANUAL: AUTO

## 2023-04-27 ASSESSMENT — TONOMETRY
OS_IOP_MMHG: 13
OD_IOP_MMHG: 12

## 2023-04-27 ASSESSMENT — VISUAL ACUITY
OS_BCVA: 20/50
OD_BCVA: 20/70

## 2023-04-27 ASSESSMENT — CONFRONTATIONAL VISUAL FIELD TEST (CVF)
OD_FINDINGS: FULL
OS_FINDINGS: FULL

## 2023-04-27 ASSESSMENT — SPHEQUIV_DERIVED
OS_SPHEQUIV: 0.25
OD_SPHEQUIV: -0.5

## 2023-04-27 ASSESSMENT — AXIALLENGTH_DERIVED
OD_AL: 23.9765
OS_AL: 23.7713

## 2023-05-11 ENCOUNTER — ASC (OUTPATIENT)
Dept: URBAN - METROPOLITAN AREA SURGERY 8 | Facility: SURGERY | Age: 58
Setting detail: OPHTHALMOLOGY
End: 2023-05-11
Payer: MEDICARE

## 2023-05-11 DIAGNOSIS — E10.3512: ICD-10-CM

## 2023-05-11 PROCEDURE — 67210 TREATMENT OF RETINAL LESION: CPT | Performed by: SPECIALIST

## 2023-05-11 ASSESSMENT — REFRACTION_AUTOREFRACTION
OD_AXIS: 074
OS_SPHERE: +0.75
OD_SPHERE: -0.25
OD_CYLINDER: -0.50
OS_CYLINDER: -1.00
OS_AXIS: 080

## 2023-05-11 ASSESSMENT — KERATOMETRY
OS_K2POWER_DIOPTERS: 43.00
OS_AXISANGLE_DEGREES: 098
OD_K2POWER_DIOPTERS: 43.25
METHOD_AUTO_MANUAL: AUTO
OS_K1POWER_DIOPTERS: 42.50
OD_AXISANGLE_DEGREES: 098
OD_K1POWER_DIOPTERS: 42.75

## 2023-05-11 ASSESSMENT — AXIALLENGTH_DERIVED
OS_AL: 23.7713
OD_AL: 23.9765

## 2023-05-11 ASSESSMENT — TONOMETRY
OD_IOP_MMHG: 13
OS_IOP_MMHG: 13

## 2023-05-11 ASSESSMENT — CONFRONTATIONAL VISUAL FIELD TEST (CVF)
OS_FINDINGS: FULL
OD_FINDINGS: FULL

## 2023-05-11 ASSESSMENT — SPHEQUIV_DERIVED
OD_SPHEQUIV: -0.5
OS_SPHEQUIV: 0.25

## 2023-05-11 ASSESSMENT — VISUAL ACUITY
OD_BCVA: 20/60-1
OS_BCVA: 20/40-1

## 2023-08-31 ENCOUNTER — ASC (OUTPATIENT)
Dept: URBAN - METROPOLITAN AREA SURGERY 8 | Facility: SURGERY | Age: 58
Setting detail: OPHTHALMOLOGY
End: 2023-08-31
Payer: MEDICARE

## 2023-08-31 ENCOUNTER — OFFICE (OUTPATIENT)
Dept: URBAN - METROPOLITAN AREA CLINIC 94 | Facility: CLINIC | Age: 58
Setting detail: OPHTHALMOLOGY
End: 2023-08-31
Payer: MEDICARE

## 2023-08-31 DIAGNOSIS — E10.3511: ICD-10-CM

## 2023-08-31 DIAGNOSIS — E10.3513: ICD-10-CM

## 2023-08-31 PROCEDURE — 67210 TREATMENT OF RETINAL LESION: CPT | Performed by: SPECIALIST

## 2023-08-31 PROCEDURE — 92134 CPTRZ OPH DX IMG PST SGM RTA: CPT | Performed by: SPECIALIST

## 2023-08-31 ASSESSMENT — TONOMETRY
OD_IOP_MMHG: 12
OS_IOP_MMHG: 14

## 2023-08-31 ASSESSMENT — SPHEQUIV_DERIVED
OD_SPHEQUIV: -0.5
OS_SPHEQUIV: 0.25

## 2023-08-31 ASSESSMENT — REFRACTION_AUTOREFRACTION
OD_SPHERE: -0.25
OD_CYLINDER: -0.50
OS_SPHERE: +0.75
OD_AXIS: 074
OS_AXIS: 080
OS_CYLINDER: -1.00

## 2023-08-31 ASSESSMENT — CONFRONTATIONAL VISUAL FIELD TEST (CVF)
OD_FINDINGS: FULL
OS_FINDINGS: FULL

## 2023-08-31 ASSESSMENT — VISUAL ACUITY
OD_BCVA: 20/70
OS_BCVA: 20/40-1

## 2023-09-21 ENCOUNTER — ASC (OUTPATIENT)
Dept: URBAN - METROPOLITAN AREA SURGERY 8 | Facility: SURGERY | Age: 58
Setting detail: OPHTHALMOLOGY
End: 2023-09-21
Payer: MEDICARE

## 2023-09-21 DIAGNOSIS — E10.3512: ICD-10-CM

## 2023-09-21 PROCEDURE — 67210 TREATMENT OF RETINAL LESION: CPT | Performed by: SPECIALIST

## 2023-09-21 ASSESSMENT — KERATOMETRY
OS_K1POWER_DIOPTERS: 42.50
OS_K2POWER_DIOPTERS: 43.00
OS_AXISANGLE_DEGREES: 095
METHOD_AUTO_MANUAL: AUTO
OD_K1POWER_DIOPTERS: 42.75
OD_K2POWER_DIOPTERS: 43.25
OD_AXISANGLE_DEGREES: 095

## 2023-09-21 ASSESSMENT — AXIALLENGTH_DERIVED
OS_AL: 23.7713
OD_AL: 23.9765

## 2023-09-21 ASSESSMENT — REFRACTION_AUTOREFRACTION
OS_CYLINDER: -1.00
OS_AXIS: 080
OD_AXIS: 075
OD_CYLINDER: -0.50
OD_SPHERE: -0.25
OS_SPHERE: +0.75

## 2023-09-21 ASSESSMENT — TONOMETRY
OD_IOP_MMHG: 19
OS_IOP_MMHG: 17

## 2023-09-21 ASSESSMENT — VISUAL ACUITY
OS_BCVA: 20/40-1
OD_BCVA: 20/70

## 2023-09-21 ASSESSMENT — CONFRONTATIONAL VISUAL FIELD TEST (CVF)
OD_FINDINGS: FULL
OS_FINDINGS: FULL

## 2023-09-21 ASSESSMENT — SPHEQUIV_DERIVED
OS_SPHEQUIV: 0.25
OD_SPHEQUIV: -0.5

## 2023-11-20 ENCOUNTER — OFFICE (OUTPATIENT)
Dept: URBAN - METROPOLITAN AREA CLINIC 94 | Facility: CLINIC | Age: 58
Setting detail: OPHTHALMOLOGY
End: 2023-11-20
Payer: MEDICARE

## 2023-11-20 DIAGNOSIS — E10.3512: ICD-10-CM

## 2023-11-20 DIAGNOSIS — E10.3511: ICD-10-CM

## 2023-11-20 PROCEDURE — 67028 INJECTION EYE DRUG: CPT | Mod: 58,LT | Performed by: SPECIALIST

## 2023-11-20 PROCEDURE — 92134 CPTRZ OPH DX IMG PST SGM RTA: CPT | Performed by: SPECIALIST

## 2023-11-20 ASSESSMENT — REFRACTION_AUTOREFRACTION
OD_SPHERE: +0.25
OS_CYLINDER: -0.75
OS_AXIS: 003
OD_AXIS: 091
OD_CYLINDER: -0.50

## 2023-11-20 ASSESSMENT — CONFRONTATIONAL VISUAL FIELD TEST (CVF)
OD_FINDINGS: FULL
OS_FINDINGS: FULL

## 2023-11-20 ASSESSMENT — SPHEQUIV_DERIVED: OD_SPHEQUIV: 0

## 2023-11-21 ENCOUNTER — EMERGENCY (EMERGENCY)
Facility: HOSPITAL | Age: 58
LOS: 1 days | Discharge: HOME | End: 2023-11-21
Attending: STUDENT IN AN ORGANIZED HEALTH CARE EDUCATION/TRAINING PROGRAM
Payer: MEDICARE

## 2023-11-21 VITALS
RESPIRATION RATE: 20 BRPM | OXYGEN SATURATION: 99 % | TEMPERATURE: 98 F | SYSTOLIC BLOOD PRESSURE: 176 MMHG | DIASTOLIC BLOOD PRESSURE: 73 MMHG | HEART RATE: 68 BPM

## 2023-11-21 VITALS
OXYGEN SATURATION: 99 % | TEMPERATURE: 98 F | WEIGHT: 165.57 LBS | HEIGHT: 69 IN | DIASTOLIC BLOOD PRESSURE: 85 MMHG | SYSTOLIC BLOOD PRESSURE: 158 MMHG | RESPIRATION RATE: 20 BRPM | HEART RATE: 74 BPM

## 2023-11-21 DIAGNOSIS — I77.0 ARTERIOVENOUS FISTULA, ACQUIRED: Chronic | ICD-10-CM

## 2023-11-21 LAB
ALBUMIN SERPL ELPH-MCNC: 4.5 G/DL — SIGNIFICANT CHANGE UP (ref 3.3–5.2)
ALBUMIN SERPL ELPH-MCNC: 4.5 G/DL — SIGNIFICANT CHANGE UP (ref 3.3–5.2)
ALP SERPL-CCNC: 82 U/L — SIGNIFICANT CHANGE UP (ref 40–120)
ALP SERPL-CCNC: 82 U/L — SIGNIFICANT CHANGE UP (ref 40–120)
ALT FLD-CCNC: 25 U/L — SIGNIFICANT CHANGE UP
ALT FLD-CCNC: 25 U/L — SIGNIFICANT CHANGE UP
ANION GAP SERPL CALC-SCNC: 12 MMOL/L — SIGNIFICANT CHANGE UP (ref 5–17)
ANION GAP SERPL CALC-SCNC: 12 MMOL/L — SIGNIFICANT CHANGE UP (ref 5–17)
APTT BLD: 35.2 SEC — SIGNIFICANT CHANGE UP (ref 24.5–35.6)
APTT BLD: 35.2 SEC — SIGNIFICANT CHANGE UP (ref 24.5–35.6)
AST SERPL-CCNC: 33 U/L — SIGNIFICANT CHANGE UP
AST SERPL-CCNC: 33 U/L — SIGNIFICANT CHANGE UP
BASOPHILS # BLD AUTO: 0.03 K/UL — SIGNIFICANT CHANGE UP (ref 0–0.2)
BASOPHILS # BLD AUTO: 0.03 K/UL — SIGNIFICANT CHANGE UP (ref 0–0.2)
BASOPHILS NFR BLD AUTO: 0.7 % — SIGNIFICANT CHANGE UP (ref 0–2)
BASOPHILS NFR BLD AUTO: 0.7 % — SIGNIFICANT CHANGE UP (ref 0–2)
BILIRUB SERPL-MCNC: 0.5 MG/DL — SIGNIFICANT CHANGE UP (ref 0.4–2)
BILIRUB SERPL-MCNC: 0.5 MG/DL — SIGNIFICANT CHANGE UP (ref 0.4–2)
BUN SERPL-MCNC: 44 MG/DL — HIGH (ref 8–20)
BUN SERPL-MCNC: 44 MG/DL — HIGH (ref 8–20)
CALCIUM SERPL-MCNC: 10.7 MG/DL — HIGH (ref 8.4–10.5)
CALCIUM SERPL-MCNC: 10.7 MG/DL — HIGH (ref 8.4–10.5)
CHLORIDE SERPL-SCNC: 92 MMOL/L — LOW (ref 96–108)
CHLORIDE SERPL-SCNC: 92 MMOL/L — LOW (ref 96–108)
CO2 SERPL-SCNC: 25 MMOL/L — SIGNIFICANT CHANGE UP (ref 22–29)
CO2 SERPL-SCNC: 25 MMOL/L — SIGNIFICANT CHANGE UP (ref 22–29)
CREAT SERPL-MCNC: 2.22 MG/DL — HIGH (ref 0.5–1.3)
CREAT SERPL-MCNC: 2.22 MG/DL — HIGH (ref 0.5–1.3)
EGFR: 34 ML/MIN/1.73M2 — LOW
EGFR: 34 ML/MIN/1.73M2 — LOW
EOSINOPHIL # BLD AUTO: 0.08 K/UL — SIGNIFICANT CHANGE UP (ref 0–0.5)
EOSINOPHIL # BLD AUTO: 0.08 K/UL — SIGNIFICANT CHANGE UP (ref 0–0.5)
EOSINOPHIL NFR BLD AUTO: 1.8 % — SIGNIFICANT CHANGE UP (ref 0–6)
EOSINOPHIL NFR BLD AUTO: 1.8 % — SIGNIFICANT CHANGE UP (ref 0–6)
GLUCOSE SERPL-MCNC: 362 MG/DL — HIGH (ref 70–99)
GLUCOSE SERPL-MCNC: 362 MG/DL — HIGH (ref 70–99)
HCT VFR BLD CALC: 42.7 % — SIGNIFICANT CHANGE UP (ref 39–50)
HCT VFR BLD CALC: 42.7 % — SIGNIFICANT CHANGE UP (ref 39–50)
HGB BLD-MCNC: 15.2 G/DL — SIGNIFICANT CHANGE UP (ref 13–17)
HGB BLD-MCNC: 15.2 G/DL — SIGNIFICANT CHANGE UP (ref 13–17)
IMM GRANULOCYTES NFR BLD AUTO: 0.2 % — SIGNIFICANT CHANGE UP (ref 0–0.9)
IMM GRANULOCYTES NFR BLD AUTO: 0.2 % — SIGNIFICANT CHANGE UP (ref 0–0.9)
INR BLD: 0.9 RATIO — SIGNIFICANT CHANGE UP (ref 0.85–1.18)
INR BLD: 0.9 RATIO — SIGNIFICANT CHANGE UP (ref 0.85–1.18)
LYMPHOCYTES # BLD AUTO: 0.72 K/UL — LOW (ref 1–3.3)
LYMPHOCYTES # BLD AUTO: 0.72 K/UL — LOW (ref 1–3.3)
LYMPHOCYTES # BLD AUTO: 16.3 % — SIGNIFICANT CHANGE UP (ref 13–44)
LYMPHOCYTES # BLD AUTO: 16.3 % — SIGNIFICANT CHANGE UP (ref 13–44)
MCHC RBC-ENTMCNC: 30.8 PG — SIGNIFICANT CHANGE UP (ref 27–34)
MCHC RBC-ENTMCNC: 30.8 PG — SIGNIFICANT CHANGE UP (ref 27–34)
MCHC RBC-ENTMCNC: 35.6 GM/DL — SIGNIFICANT CHANGE UP (ref 32–36)
MCHC RBC-ENTMCNC: 35.6 GM/DL — SIGNIFICANT CHANGE UP (ref 32–36)
MCV RBC AUTO: 86.6 FL — SIGNIFICANT CHANGE UP (ref 80–100)
MCV RBC AUTO: 86.6 FL — SIGNIFICANT CHANGE UP (ref 80–100)
MONOCYTES # BLD AUTO: 0.32 K/UL — SIGNIFICANT CHANGE UP (ref 0–0.9)
MONOCYTES # BLD AUTO: 0.32 K/UL — SIGNIFICANT CHANGE UP (ref 0–0.9)
MONOCYTES NFR BLD AUTO: 7.2 % — SIGNIFICANT CHANGE UP (ref 2–14)
MONOCYTES NFR BLD AUTO: 7.2 % — SIGNIFICANT CHANGE UP (ref 2–14)
NEUTROPHILS # BLD AUTO: 3.26 K/UL — SIGNIFICANT CHANGE UP (ref 1.8–7.4)
NEUTROPHILS # BLD AUTO: 3.26 K/UL — SIGNIFICANT CHANGE UP (ref 1.8–7.4)
NEUTROPHILS NFR BLD AUTO: 73.8 % — SIGNIFICANT CHANGE UP (ref 43–77)
NEUTROPHILS NFR BLD AUTO: 73.8 % — SIGNIFICANT CHANGE UP (ref 43–77)
PLATELET # BLD AUTO: 156 K/UL — SIGNIFICANT CHANGE UP (ref 150–400)
PLATELET # BLD AUTO: 156 K/UL — SIGNIFICANT CHANGE UP (ref 150–400)
POTASSIUM SERPL-MCNC: 5.6 MMOL/L — HIGH (ref 3.5–5.3)
POTASSIUM SERPL-MCNC: 5.6 MMOL/L — HIGH (ref 3.5–5.3)
POTASSIUM SERPL-SCNC: 5.6 MMOL/L — HIGH (ref 3.5–5.3)
POTASSIUM SERPL-SCNC: 5.6 MMOL/L — HIGH (ref 3.5–5.3)
PROT SERPL-MCNC: 6.8 G/DL — SIGNIFICANT CHANGE UP (ref 6.6–8.7)
PROT SERPL-MCNC: 6.8 G/DL — SIGNIFICANT CHANGE UP (ref 6.6–8.7)
PROTHROM AB SERPL-ACNC: 10 SEC — SIGNIFICANT CHANGE UP (ref 9.5–13)
PROTHROM AB SERPL-ACNC: 10 SEC — SIGNIFICANT CHANGE UP (ref 9.5–13)
RBC # BLD: 4.93 M/UL — SIGNIFICANT CHANGE UP (ref 4.2–5.8)
RBC # BLD: 4.93 M/UL — SIGNIFICANT CHANGE UP (ref 4.2–5.8)
RBC # FLD: 12.4 % — SIGNIFICANT CHANGE UP (ref 10.3–14.5)
RBC # FLD: 12.4 % — SIGNIFICANT CHANGE UP (ref 10.3–14.5)
SODIUM SERPL-SCNC: 129 MMOL/L — LOW (ref 135–145)
SODIUM SERPL-SCNC: 129 MMOL/L — LOW (ref 135–145)
WBC # BLD: 4.42 K/UL — SIGNIFICANT CHANGE UP (ref 3.8–10.5)
WBC # BLD: 4.42 K/UL — SIGNIFICANT CHANGE UP (ref 3.8–10.5)
WBC # FLD AUTO: 4.42 K/UL — SIGNIFICANT CHANGE UP (ref 3.8–10.5)
WBC # FLD AUTO: 4.42 K/UL — SIGNIFICANT CHANGE UP (ref 3.8–10.5)

## 2023-11-21 PROCEDURE — 82962 GLUCOSE BLOOD TEST: CPT

## 2023-11-21 PROCEDURE — 85610 PROTHROMBIN TIME: CPT

## 2023-11-21 PROCEDURE — 85730 THROMBOPLASTIN TIME PARTIAL: CPT

## 2023-11-21 PROCEDURE — 99284 EMERGENCY DEPT VISIT MOD MDM: CPT

## 2023-11-21 PROCEDURE — 80048 BASIC METABOLIC PNL TOTAL CA: CPT

## 2023-11-21 PROCEDURE — 93010 ELECTROCARDIOGRAM REPORT: CPT

## 2023-11-21 PROCEDURE — 99284 EMERGENCY DEPT VISIT MOD MDM: CPT | Mod: 25

## 2023-11-21 PROCEDURE — 93005 ELECTROCARDIOGRAM TRACING: CPT

## 2023-11-21 PROCEDURE — 80053 COMPREHEN METABOLIC PANEL: CPT

## 2023-11-21 PROCEDURE — 36415 COLL VENOUS BLD VENIPUNCTURE: CPT

## 2023-11-21 PROCEDURE — 81003 URINALYSIS AUTO W/O SCOPE: CPT

## 2023-11-21 PROCEDURE — 85025 COMPLETE CBC W/AUTO DIFF WBC: CPT

## 2023-11-21 RX ORDER — SODIUM CHLORIDE 9 MG/ML
1000 INJECTION INTRAMUSCULAR; INTRAVENOUS; SUBCUTANEOUS
Refills: 0 | Status: DISCONTINUED | OUTPATIENT
Start: 2023-11-21 | End: 2023-11-29

## 2023-11-21 RX ORDER — INSULIN HUMAN 100 [IU]/ML
4 INJECTION, SOLUTION SUBCUTANEOUS ONCE
Refills: 0 | Status: COMPLETED | OUTPATIENT
Start: 2023-11-21 | End: 2023-11-21

## 2023-11-21 RX ORDER — SODIUM CHLORIDE 9 MG/ML
3 INJECTION INTRAMUSCULAR; INTRAVENOUS; SUBCUTANEOUS ONCE
Refills: 0 | Status: COMPLETED | OUTPATIENT
Start: 2023-11-21 | End: 2023-11-21

## 2023-11-21 RX ADMIN — SODIUM CHLORIDE 150 MILLILITER(S): 9 INJECTION INTRAMUSCULAR; INTRAVENOUS; SUBCUTANEOUS at 21:21

## 2023-11-21 RX ADMIN — SODIUM CHLORIDE 3 MILLILITER(S): 9 INJECTION INTRAMUSCULAR; INTRAVENOUS; SUBCUTANEOUS at 19:30

## 2023-11-21 RX ADMIN — INSULIN HUMAN 4 UNIT(S): 100 INJECTION, SOLUTION SUBCUTANEOUS at 21:21

## 2023-11-21 NOTE — ED PROVIDER NOTE - CLINICAL SUMMARY MEDICAL DECISION MAKING FREE TEXT BOX
57-year-old male with history of diabetes, hypertension, HLD, CAD status post coronary stenting status post renal transplant at [facility] January 2023 presents to the ED  after having outpatient lab work done today with elevated creatinine potassium and glucose.  Patient states he had been doing well and denies any complaints.  Patient believes his baseline creatinine is 1.78 denies any prior history of hyperkalemia.  Patient making normal amounts of urine.  Patient denies any associated chest pain, shortness of breath, abdominal pain, nausea, vomiting diaphoresis or syncope.  Patient status post injection in his left eye for retinal detachment complaining of some mild pain after the procedure.   EKG performed with no acute signs of hyperkalemia.   Patient did not take any of his  daily medications as of yet.   Will check labs and reevaluate

## 2023-11-21 NOTE — ED PROVIDER NOTE - NSFOLLOWUPINSTRUCTIONS_ED_ALL_ED_FT
1) Follow up with your doctor in 1-2 days  2) Return to the ER for worsening or concerning symptoms    Hyperkalemia    Hyperkalemia occurs when the level of potassium in your blood is too high. Potassium is an important nutrient that helps the muscles and nerves function normally. It affects how the heart works, and it helps keep fluids and minerals balanced in the body. If there is too much potassium in your blood, it can affect your heart's ability to function normally.    Potassium is normally removed (excreted) from the body by the kidneys. Hyperkalemia can result from various conditions. It can range from mild to severe.    What are the causes?  This condition may be caused by:    Taking in too much potassium. You can do this by:    Using salt substitutes. They contain large amounts of potassium.  Taking potassium supplements.  Eating foods that are high in potassium.  Excreting too little potassium. This can happen if:    Your kidneys are not working properly. Kidney (renal) disease, including short-term or long-term renal failure, is a common cause of hyperkalemia.  You are taking medicines that lower your excretion of potassium.  You have Richardson's disease.  You have a urinary tract blockage, such as kidney stones.  You are on treatment to mechanically clean your blood (dialysis) and you skip a treatment.  Releasing a high amount of potassium from your cells into your blood. This can happen with:    Injury to muscles (rhabdomyolysis) or other tissues. Most potassium is stored in your muscles.  Severe burns or infections.  Acidic blood plasma (acidosis). Acidosis can result from many diseases, such as uncontrolled diabetes.    What increases the risk?  The following factors may make you more likely to develop this condition:    Kidney disease. This puts you at the highest risk.  Richardson's disease. This is a condition where the adrenal glands do not produce enough hormones.  Alcoholism or heavy drug use.  Using certain blood pressure medicines, such as ACE inhibitors, angiotensin II receptor blockers (ARBs), or potassium-sparing diuretics such as spironolactone.  Severe injury or burn.    What are the signs or symptoms?  In many cases, there are no symptoms. However, when your potassium level becomes high enough, you may have symptoms such as:    An irregular or very slow heartbeat.  Nausea.  Tiredness (fatigue).  Confusion.  Tingling of your skin or numbness of your hands or feet.  Muscle cramps.  Muscle weakness.  Not being able to move (paralysis).    How is this diagnosed?  This condition may be diagnosed based on:    Your symptoms and medical history. Your health care provider will ask about your use of prescription and non-prescription drugs.  A physical exam.  Blood tests.  An electrocardiogram (ECG).    How is this treated?  Treatment depends on the cause and severity of your condition. Treatment may need to be done in the hospital setting. Treatment may include:    IV glucose (sugar) along with insulin to shift potassium out of your blood and into your cells.  A medicine called albuterol to shift potassium out of your blood and into your cells.  Medicines to remove the potassium from your body.  Dialysis to remove the potassium from your body.  Calcium to protect your heart from the effects of high potassium, such as irregular rhythms (arrhythmias).    Follow these instructions at home:     Take over-the-counter and prescription medicines only as told by your health care provider.  Do not take any supplements, natural products, herbs, or vitamins without reviewing them with your health care provider. Certain supplements and natural food products contain high amounts of potassium.  Limit your alcohol intake as told by your health care provider.  Do not use drugs. If you need help quitting, ask your health care provider.  If you have kidney disease, you may need to follow a low-potassium diet. A dietitian can help you learn which foods have high or low amounts of potassium.  Keep all follow-up visits as told by your health care provider. This is important.    Contact a health care provider if you:  Have an irregular or very slow heartbeat.  Feel light-headed.  Feel weak.  Are nauseous.  Have tingling or numbness in your hands or feet.    Get help right away if you:  Have shortness of breath.  Have chest pain or discomfort.  Pass out.  Have muscle paralysis.    Summary  Hyperkalemia occurs when the level of potassium in your blood is too high.  This condition may be caused by taking in too much potassium, excreting too little potassium, or releasing a high amount of potassium from your cells into your blood.  Hyperkalemia can result from many underlying conditions, especially chronic kidney disease, or from taking certain medicines.  Treatment of hyperkalemia may include medicine to shift potassium out of your blood and into your cells or to remove the potassium from your body.  If you have kidney disease, you may need to follow a low-potassium diet. A dietitian can help you learn which foods have high or low amounts of potassium.    ADDITIONAL NOTES AND INSTRUCTIONS    Please follow up with your Primary MD in 24-48 hr.  Seek immediate medical care for any new/worsening signs or symptoms.     Document Released: 12/8/2003 Document Revised: 12/3/2018 Document Reviewed: 12/3/2018

## 2023-11-21 NOTE — ED ADULT NURSE NOTE - OBJECTIVE STATEMENT
pt A & Ox4 sent by primary for elevated potassium and blood glucose. Pt denies symptomatic hyperglycemia. Respirations even and unlabored. Denies chest pain or SOB. Pt reports making urine. IV established. Blood specimens sent to lab. Medications administered as ordered. CM and  ongoing.

## 2023-11-21 NOTE — ED PROVIDER NOTE - CHPI ED SYMPTOMS NEG
no lower extremity edema, no change in urinary output/no back pain/no dizziness/no fever/no headache/no loss of consciousness/no nausea/no pain/no vomiting

## 2023-11-21 NOTE — ED PROVIDER NOTE - PATIENT PORTAL LINK FT
You can access the FollowMyHealth Patient Portal offered by [facility] by registering at the following website: [email or web address]. By joining [employer]’s FollowMyHealth portal, you will also be able to view your health information using other applications (apps) compatible with our system.

## 2023-11-21 NOTE — ED ADULT TRIAGE NOTE - CHIEF COMPLAINT QUOTE
Pt A&Ox4, NAD. Pt sent in from nephro for abnormal K, elevated glucose, abnormal BUN/creat. Pt s/p kidney transplant in Feb. Breathing even and unlabored. Denies CP at this time.

## 2023-11-21 NOTE — ED ADULT NURSE NOTE - NSFALLUNIVINTERV_ED_ALL_ED
Bed/Stretcher in lowest position, wheels locked, appropriate side rails in place/Call bell, personal items and telephone in reach/Instruct patient to call for assistance before getting out of bed/chair/stretcher/Non-slip footwear applied when patient is off stretcher/[place] to call system/Physically safe environment - no spills, clutter or unnecessary equipment/Purposeful proactive rounding/Room/bathroom lighting operational, light cord in reach 30-Aug-2021 08:01

## 2023-11-21 NOTE — ED PROVIDER NOTE - OBJECTIVE STATEMENT
57-year-old male with history of diabetes, hypertension, HLD, CAD status post coronary stenting status post renal transplant at [facility] January 2023 presents to the ED  after having outpatient lab work done today with elevated creatinine potassium and glucose.  Patient states he had been doing well and denies any complaints.  Patient believes his baseline creatinine is 1.78 denies any prior history of hyperkalemia.  Patient making normal amounts of urine.  Patient denies any associated chest pain, shortness of breath, abdominal pain, nausea, vomiting diaphoresis or syncope.  Patient status post injection in his left eye for retinal detachment complaining of some mild pain after the procedure.   EKG performed with no acute signs of hyperkalemia.   Patient did not take any of his  daily medications as of yet

## 2023-11-22 LAB
ANION GAP SERPL CALC-SCNC: 12 MMOL/L — SIGNIFICANT CHANGE UP (ref 5–17)
ANION GAP SERPL CALC-SCNC: 12 MMOL/L — SIGNIFICANT CHANGE UP (ref 5–17)
APPEARANCE UR: CLEAR — SIGNIFICANT CHANGE UP
APPEARANCE UR: CLEAR — SIGNIFICANT CHANGE UP
BILIRUB UR-MCNC: NEGATIVE — SIGNIFICANT CHANGE UP
BILIRUB UR-MCNC: NEGATIVE — SIGNIFICANT CHANGE UP
BUN SERPL-MCNC: 42.8 MG/DL — HIGH (ref 8–20)
BUN SERPL-MCNC: 42.8 MG/DL — HIGH (ref 8–20)
CALCIUM SERPL-MCNC: 10.5 MG/DL — SIGNIFICANT CHANGE UP (ref 8.4–10.5)
CALCIUM SERPL-MCNC: 10.5 MG/DL — SIGNIFICANT CHANGE UP (ref 8.4–10.5)
CHLORIDE SERPL-SCNC: 100 MMOL/L — SIGNIFICANT CHANGE UP (ref 96–108)
CHLORIDE SERPL-SCNC: 100 MMOL/L — SIGNIFICANT CHANGE UP (ref 96–108)
CO2 SERPL-SCNC: 22 MMOL/L — SIGNIFICANT CHANGE UP (ref 22–29)
CO2 SERPL-SCNC: 22 MMOL/L — SIGNIFICANT CHANGE UP (ref 22–29)
COLOR SPEC: YELLOW — SIGNIFICANT CHANGE UP
COLOR SPEC: YELLOW — SIGNIFICANT CHANGE UP
CREAT SERPL-MCNC: 1.92 MG/DL — HIGH (ref 0.5–1.3)
CREAT SERPL-MCNC: 1.92 MG/DL — HIGH (ref 0.5–1.3)
DIFF PNL FLD: NEGATIVE — SIGNIFICANT CHANGE UP
DIFF PNL FLD: NEGATIVE — SIGNIFICANT CHANGE UP
EGFR: 40 ML/MIN/1.73M2 — LOW
EGFR: 40 ML/MIN/1.73M2 — LOW
GLUCOSE SERPL-MCNC: 266 MG/DL — HIGH (ref 70–99)
GLUCOSE SERPL-MCNC: 266 MG/DL — HIGH (ref 70–99)
GLUCOSE UR QL: >=1000 MG/DL
GLUCOSE UR QL: >=1000 MG/DL
KETONES UR-MCNC: NEGATIVE MG/DL — SIGNIFICANT CHANGE UP
KETONES UR-MCNC: NEGATIVE MG/DL — SIGNIFICANT CHANGE UP
LEUKOCYTE ESTERASE UR-ACNC: NEGATIVE — SIGNIFICANT CHANGE UP
LEUKOCYTE ESTERASE UR-ACNC: NEGATIVE — SIGNIFICANT CHANGE UP
NITRITE UR-MCNC: NEGATIVE — SIGNIFICANT CHANGE UP
NITRITE UR-MCNC: NEGATIVE — SIGNIFICANT CHANGE UP
PH UR: 6 — SIGNIFICANT CHANGE UP (ref 5–8)
PH UR: 6 — SIGNIFICANT CHANGE UP (ref 5–8)
POTASSIUM SERPL-MCNC: 5.3 MMOL/L — SIGNIFICANT CHANGE UP (ref 3.5–5.3)
POTASSIUM SERPL-MCNC: 5.3 MMOL/L — SIGNIFICANT CHANGE UP (ref 3.5–5.3)
POTASSIUM SERPL-SCNC: 5.3 MMOL/L — SIGNIFICANT CHANGE UP (ref 3.5–5.3)
POTASSIUM SERPL-SCNC: 5.3 MMOL/L — SIGNIFICANT CHANGE UP (ref 3.5–5.3)
PROT UR-MCNC: NEGATIVE MG/DL — SIGNIFICANT CHANGE UP
PROT UR-MCNC: NEGATIVE MG/DL — SIGNIFICANT CHANGE UP
SODIUM SERPL-SCNC: 134 MMOL/L — LOW (ref 135–145)
SODIUM SERPL-SCNC: 134 MMOL/L — LOW (ref 135–145)
SP GR SPEC: 1.02 — SIGNIFICANT CHANGE UP (ref 1–1.03)
SP GR SPEC: 1.02 — SIGNIFICANT CHANGE UP (ref 1–1.03)
UROBILINOGEN FLD QL: 0.2 MG/DL — SIGNIFICANT CHANGE UP (ref 0.2–1)
UROBILINOGEN FLD QL: 0.2 MG/DL — SIGNIFICANT CHANGE UP (ref 0.2–1)

## 2023-11-25 ENCOUNTER — OFFICE (OUTPATIENT)
Dept: URBAN - METROPOLITAN AREA CLINIC 94 | Facility: CLINIC | Age: 58
Setting detail: OPHTHALMOLOGY
End: 2023-11-25
Payer: MEDICARE

## 2023-11-25 DIAGNOSIS — E10.3513: ICD-10-CM

## 2023-11-25 DIAGNOSIS — H43.12: ICD-10-CM

## 2023-11-25 PROBLEM — E10.3512 DM TYPE 1; RIGHT PROLIFERATIVE WITH ME, LEFT PROLIFERATIVE WITH ME: Status: ACTIVE | Noted: 2023-11-25

## 2023-11-25 PROBLEM — E10.3511 DM TYPE 1; RIGHT PROLIFERATIVE WITH ME, LEFT PROLIFERATIVE WITH ME: Status: ACTIVE | Noted: 2023-11-25

## 2023-11-25 PROCEDURE — 92134 CPTRZ OPH DX IMG PST SGM RTA: CPT | Performed by: SPECIALIST

## 2023-11-25 PROCEDURE — 99024 POSTOP FOLLOW-UP VISIT: CPT | Performed by: SPECIALIST

## 2023-11-25 ASSESSMENT — REFRACTION_AUTOREFRACTION
OS_CYLINDER: -0.75
OD_AXIS: 091
OD_CYLINDER: -0.50
OS_AXIS: 003
OD_SPHERE: +0.25

## 2023-11-25 ASSESSMENT — CONFRONTATIONAL VISUAL FIELD TEST (CVF)
OD_FINDINGS: FULL
OS_FINDINGS: FULL

## 2023-11-25 ASSESSMENT — SPHEQUIV_DERIVED: OD_SPHEQUIV: 0

## 2023-12-16 ENCOUNTER — OFFICE (OUTPATIENT)
Dept: URBAN - METROPOLITAN AREA CLINIC 94 | Facility: CLINIC | Age: 58
Setting detail: OPHTHALMOLOGY
End: 2023-12-16
Payer: MEDICARE

## 2023-12-16 ENCOUNTER — ASC (OUTPATIENT)
Dept: URBAN - METROPOLITAN AREA SURGERY 8 | Facility: SURGERY | Age: 58
Setting detail: OPHTHALMOLOGY
End: 2023-12-16
Payer: MEDICARE

## 2023-12-16 DIAGNOSIS — E10.3513: ICD-10-CM

## 2023-12-16 DIAGNOSIS — H43.12: ICD-10-CM

## 2023-12-16 DIAGNOSIS — E10.3511: ICD-10-CM

## 2023-12-16 PROCEDURE — 92134 CPTRZ OPH DX IMG PST SGM RTA: CPT | Performed by: SPECIALIST

## 2023-12-16 PROCEDURE — 67210 TREATMENT OF RETINAL LESION: CPT | Mod: 79,RT | Performed by: SPECIALIST

## 2023-12-16 ASSESSMENT — CONFRONTATIONAL VISUAL FIELD TEST (CVF)
OD_FINDINGS: FULL
OS_FINDINGS: FULL

## 2023-12-16 ASSESSMENT — REFRACTION_AUTOREFRACTION
OS_AXIS: 003
OS_CYLINDER: -0.75
OD_AXIS: 091
OD_CYLINDER: -0.50
OD_SPHERE: +0.25

## 2023-12-16 ASSESSMENT — SPHEQUIV_DERIVED: OD_SPHEQUIV: 0

## 2024-01-11 ENCOUNTER — ASC (OUTPATIENT)
Dept: URBAN - METROPOLITAN AREA SURGERY 8 | Facility: SURGERY | Age: 59
Setting detail: OPHTHALMOLOGY
End: 2024-01-11
Payer: MEDICARE

## 2024-01-11 ENCOUNTER — OFFICE (OUTPATIENT)
Dept: URBAN - METROPOLITAN AREA CLINIC 94 | Facility: CLINIC | Age: 59
Setting detail: OPHTHALMOLOGY
End: 2024-01-11
Payer: MEDICARE

## 2024-01-11 DIAGNOSIS — H43.12: ICD-10-CM

## 2024-01-11 DIAGNOSIS — E10.3512: ICD-10-CM

## 2024-01-11 DIAGNOSIS — E10.3513: ICD-10-CM

## 2024-01-11 PROCEDURE — 67210 TREATMENT OF RETINAL LESION: CPT | Mod: 79,LT | Performed by: SPECIALIST

## 2024-01-11 PROCEDURE — 92134 CPTRZ OPH DX IMG PST SGM RTA: CPT | Performed by: SPECIALIST

## 2024-01-11 ASSESSMENT — REFRACTION_AUTOREFRACTION
OD_SPHERE: +0.25
OS_AXIS: 003
OS_CYLINDER: -0.75
OD_AXIS: 091
OD_CYLINDER: -0.50

## 2024-01-11 ASSESSMENT — SPHEQUIV_DERIVED: OD_SPHEQUIV: 0

## 2024-01-21 ENCOUNTER — INPATIENT (INPATIENT)
Facility: HOSPITAL | Age: 59
LOS: 6 days | Discharge: HOME HEALTH | DRG: 617 | End: 2024-01-28
Attending: FAMILY MEDICINE | Admitting: HOSPITALIST
Payer: MEDICARE

## 2024-01-21 VITALS
HEART RATE: 75 BPM | HEIGHT: 69 IN | SYSTOLIC BLOOD PRESSURE: 131 MMHG | RESPIRATION RATE: 20 BRPM | TEMPERATURE: 98 F | WEIGHT: 167.55 LBS | DIASTOLIC BLOOD PRESSURE: 65 MMHG | OXYGEN SATURATION: 98 %

## 2024-01-21 DIAGNOSIS — M86.10 OTHER ACUTE OSTEOMYELITIS, UNSPECIFIED SITE: ICD-10-CM

## 2024-01-21 DIAGNOSIS — Z94.0 KIDNEY TRANSPLANT STATUS: Chronic | ICD-10-CM

## 2024-01-21 DIAGNOSIS — I77.0 ARTERIOVENOUS FISTULA, ACQUIRED: Chronic | ICD-10-CM

## 2024-01-21 LAB
ALBUMIN SERPL ELPH-MCNC: 3.6 G/DL — SIGNIFICANT CHANGE UP (ref 3.3–5.2)
ALP SERPL-CCNC: 82 U/L — SIGNIFICANT CHANGE UP (ref 40–120)
ALT FLD-CCNC: 11 U/L — SIGNIFICANT CHANGE UP
ANION GAP SERPL CALC-SCNC: 11 MMOL/L — SIGNIFICANT CHANGE UP (ref 5–17)
ANISOCYTOSIS BLD QL: SLIGHT — SIGNIFICANT CHANGE UP
APPEARANCE UR: CLEAR — SIGNIFICANT CHANGE UP
APTT BLD: 36.8 SEC — HIGH (ref 24.5–35.6)
AST SERPL-CCNC: 20 U/L — SIGNIFICANT CHANGE UP
BASOPHILS # BLD AUTO: 0 K/UL — SIGNIFICANT CHANGE UP (ref 0–0.2)
BASOPHILS NFR BLD AUTO: 0 % — SIGNIFICANT CHANGE UP (ref 0–2)
BILIRUB SERPL-MCNC: 0.6 MG/DL — SIGNIFICANT CHANGE UP (ref 0.4–2)
BILIRUB UR-MCNC: NEGATIVE — SIGNIFICANT CHANGE UP
BUN SERPL-MCNC: 22.4 MG/DL — HIGH (ref 8–20)
BURR CELLS BLD QL SMEAR: PRESENT — SIGNIFICANT CHANGE UP
CALCIUM SERPL-MCNC: 10 MG/DL — SIGNIFICANT CHANGE UP (ref 8.4–10.5)
CHLORIDE SERPL-SCNC: 95 MMOL/L — LOW (ref 96–108)
CO2 SERPL-SCNC: 25 MMOL/L — SIGNIFICANT CHANGE UP (ref 22–29)
COLOR SPEC: YELLOW — SIGNIFICANT CHANGE UP
CREAT SERPL-MCNC: 1.14 MG/DL — SIGNIFICANT CHANGE UP (ref 0.5–1.3)
DIFF PNL FLD: NEGATIVE — SIGNIFICANT CHANGE UP
EGFR: 75 ML/MIN/1.73M2 — SIGNIFICANT CHANGE UP
EOSINOPHIL # BLD AUTO: 0 K/UL — SIGNIFICANT CHANGE UP (ref 0–0.5)
EOSINOPHIL NFR BLD AUTO: 0 % — SIGNIFICANT CHANGE UP (ref 0–6)
GLUCOSE BLDC GLUCOMTR-MCNC: 172 MG/DL — HIGH (ref 70–99)
GLUCOSE BLDC GLUCOMTR-MCNC: 187 MG/DL — HIGH (ref 70–99)
GLUCOSE SERPL-MCNC: 194 MG/DL — HIGH (ref 70–99)
GLUCOSE UR QL: >=1000 MG/DL
HCT VFR BLD CALC: 39.7 % — SIGNIFICANT CHANGE UP (ref 39–50)
HGB BLD-MCNC: 13.6 G/DL — SIGNIFICANT CHANGE UP (ref 13–17)
INR BLD: 1.1 RATIO — SIGNIFICANT CHANGE UP (ref 0.85–1.18)
KETONES UR-MCNC: ABNORMAL MG/DL
LEUKOCYTE ESTERASE UR-ACNC: NEGATIVE — SIGNIFICANT CHANGE UP
LYMPHOCYTES # BLD AUTO: 0.3 K/UL — LOW (ref 1–3.3)
LYMPHOCYTES # BLD AUTO: 4.3 % — LOW (ref 13–44)
MANUAL SMEAR VERIFICATION: SIGNIFICANT CHANGE UP
MCHC RBC-ENTMCNC: 29.8 PG — SIGNIFICANT CHANGE UP (ref 27–34)
MCHC RBC-ENTMCNC: 34.3 GM/DL — SIGNIFICANT CHANGE UP (ref 32–36)
MCV RBC AUTO: 86.9 FL — SIGNIFICANT CHANGE UP (ref 80–100)
MONOCYTES # BLD AUTO: 0.43 K/UL — SIGNIFICANT CHANGE UP (ref 0–0.9)
MONOCYTES NFR BLD AUTO: 6.1 % — SIGNIFICANT CHANGE UP (ref 2–14)
NEUTROPHILS # BLD AUTO: 6.07 K/UL — SIGNIFICANT CHANGE UP (ref 1.8–7.4)
NEUTROPHILS NFR BLD AUTO: 87 % — HIGH (ref 43–77)
NITRITE UR-MCNC: NEGATIVE — SIGNIFICANT CHANGE UP
OVALOCYTES BLD QL SMEAR: SLIGHT — SIGNIFICANT CHANGE UP
PH UR: 5.5 — SIGNIFICANT CHANGE UP (ref 5–8)
PLAT MORPH BLD: NORMAL — SIGNIFICANT CHANGE UP
PLATELET # BLD AUTO: 234 K/UL — SIGNIFICANT CHANGE UP (ref 150–400)
POIKILOCYTOSIS BLD QL AUTO: SLIGHT — SIGNIFICANT CHANGE UP
POLYCHROMASIA BLD QL SMEAR: SLIGHT — SIGNIFICANT CHANGE UP
POTASSIUM SERPL-MCNC: 4.5 MMOL/L — SIGNIFICANT CHANGE UP (ref 3.5–5.3)
POTASSIUM SERPL-SCNC: 4.5 MMOL/L — SIGNIFICANT CHANGE UP (ref 3.5–5.3)
PROT SERPL-MCNC: 6.9 G/DL — SIGNIFICANT CHANGE UP (ref 6.6–8.7)
PROT UR-MCNC: NEGATIVE MG/DL — SIGNIFICANT CHANGE UP
PROTHROM AB SERPL-ACNC: 12.2 SEC — SIGNIFICANT CHANGE UP (ref 9.5–13)
RAPID RVP RESULT: SIGNIFICANT CHANGE UP
RBC # BLD: 4.57 M/UL — SIGNIFICANT CHANGE UP (ref 4.2–5.8)
RBC # FLD: 11.9 % — SIGNIFICANT CHANGE UP (ref 10.3–14.5)
RBC BLD AUTO: ABNORMAL
SARS-COV-2 RNA SPEC QL NAA+PROBE: SIGNIFICANT CHANGE UP
SODIUM SERPL-SCNC: 131 MMOL/L — LOW (ref 135–145)
SP GR SPEC: 1.02 — SIGNIFICANT CHANGE UP (ref 1–1.03)
UROBILINOGEN FLD QL: 1 MG/DL — SIGNIFICANT CHANGE UP (ref 0.2–1)
VARIANT LYMPHS # BLD: 2.6 % — SIGNIFICANT CHANGE UP (ref 0–6)
WBC # BLD: 6.98 K/UL — SIGNIFICANT CHANGE UP (ref 3.8–10.5)
WBC # FLD AUTO: 6.98 K/UL — SIGNIFICANT CHANGE UP (ref 3.8–10.5)

## 2024-01-21 PROCEDURE — 99285 EMERGENCY DEPT VISIT HI MDM: CPT

## 2024-01-21 PROCEDURE — 99223 1ST HOSP IP/OBS HIGH 75: CPT

## 2024-01-21 PROCEDURE — 93923 UPR/LXTR ART STDY 3+ LVLS: CPT | Mod: 26

## 2024-01-21 PROCEDURE — 73660 X-RAY EXAM OF TOE(S): CPT | Mod: 26,XE,RT

## 2024-01-21 PROCEDURE — 73630 X-RAY EXAM OF FOOT: CPT | Mod: 26,RT

## 2024-01-21 RX ORDER — HYDRALAZINE HCL 50 MG
2 TABLET ORAL
Qty: 0 | Refills: 0 | DISCHARGE

## 2024-01-21 RX ORDER — ATORVASTATIN CALCIUM 80 MG/1
40 TABLET, FILM COATED ORAL AT BEDTIME
Refills: 0 | Status: DISCONTINUED | OUTPATIENT
Start: 2024-01-21 | End: 2024-01-24

## 2024-01-21 RX ORDER — PYRIDOXINE HCL (VITAMIN B6) 100 MG
25 TABLET ORAL DAILY
Refills: 0 | Status: DISCONTINUED | OUTPATIENT
Start: 2024-01-21 | End: 2024-01-24

## 2024-01-21 RX ORDER — INSULIN LISPRO 100/ML
VIAL (ML) SUBCUTANEOUS
Refills: 0 | Status: DISCONTINUED | OUTPATIENT
Start: 2024-01-21 | End: 2024-01-24

## 2024-01-21 RX ORDER — MYCOPHENOLATE MOFETIL 250 MG/1
500 CAPSULE ORAL
Refills: 0 | Status: DISCONTINUED | OUTPATIENT
Start: 2024-01-21 | End: 2024-01-24

## 2024-01-21 RX ORDER — DEXTROSE 50 % IN WATER 50 %
15 SYRINGE (ML) INTRAVENOUS ONCE
Refills: 0 | Status: DISCONTINUED | OUTPATIENT
Start: 2024-01-21 | End: 2024-01-24

## 2024-01-21 RX ORDER — VANCOMYCIN HCL 1 G
VIAL (EA) INTRAVENOUS
Refills: 0 | Status: DISCONTINUED | OUTPATIENT
Start: 2024-01-21 | End: 2024-01-21

## 2024-01-21 RX ORDER — NIFEDIPINE 30 MG
30 TABLET, EXTENDED RELEASE 24 HR ORAL DAILY
Refills: 0 | Status: DISCONTINUED | OUTPATIENT
Start: 2024-01-21 | End: 2024-01-24

## 2024-01-21 RX ORDER — GLUCAGON INJECTION, SOLUTION 0.5 MG/.1ML
1 INJECTION, SOLUTION SUBCUTANEOUS ONCE
Refills: 0 | Status: DISCONTINUED | OUTPATIENT
Start: 2024-01-21 | End: 2024-01-24

## 2024-01-21 RX ORDER — TAMSULOSIN HYDROCHLORIDE 0.4 MG/1
0.4 CAPSULE ORAL AT BEDTIME
Refills: 0 | Status: DISCONTINUED | OUTPATIENT
Start: 2024-01-21 | End: 2024-01-24

## 2024-01-21 RX ORDER — ONDANSETRON 8 MG/1
4 TABLET, FILM COATED ORAL EVERY 8 HOURS
Refills: 0 | Status: DISCONTINUED | OUTPATIENT
Start: 2024-01-21 | End: 2024-01-24

## 2024-01-21 RX ORDER — ASPIRIN/CALCIUM CARB/MAGNESIUM 324 MG
81 TABLET ORAL DAILY
Refills: 0 | Status: DISCONTINUED | OUTPATIENT
Start: 2024-01-21 | End: 2024-01-24

## 2024-01-21 RX ORDER — VANCOMYCIN HCL 1 G
1250 VIAL (EA) INTRAVENOUS EVERY 12 HOURS
Refills: 0 | Status: DISCONTINUED | OUTPATIENT
Start: 2024-01-21 | End: 2024-01-23

## 2024-01-21 RX ORDER — NIFEDIPINE 30 MG
1 TABLET, EXTENDED RELEASE 24 HR ORAL
Qty: 0 | Refills: 0 | DISCHARGE

## 2024-01-21 RX ORDER — SODIUM CHLORIDE 9 MG/ML
1000 INJECTION, SOLUTION INTRAVENOUS
Refills: 0 | Status: DISCONTINUED | OUTPATIENT
Start: 2024-01-21 | End: 2024-01-24

## 2024-01-21 RX ORDER — DEXTROSE 50 % IN WATER 50 %
12.5 SYRINGE (ML) INTRAVENOUS ONCE
Refills: 0 | Status: DISCONTINUED | OUTPATIENT
Start: 2024-01-21 | End: 2024-01-24

## 2024-01-21 RX ORDER — CLOPIDOGREL BISULFATE 75 MG/1
75 TABLET, FILM COATED ORAL DAILY
Refills: 0 | Status: DISCONTINUED | OUTPATIENT
Start: 2024-01-22 | End: 2024-01-24

## 2024-01-21 RX ORDER — FOLIC ACID 0.8 MG
1 TABLET ORAL DAILY
Refills: 0 | Status: DISCONTINUED | OUTPATIENT
Start: 2024-01-21 | End: 2024-01-24

## 2024-01-21 RX ORDER — CARVEDILOL PHOSPHATE 80 MG/1
12.5 CAPSULE, EXTENDED RELEASE ORAL EVERY 12 HOURS
Refills: 0 | Status: DISCONTINUED | OUTPATIENT
Start: 2024-01-21 | End: 2024-01-24

## 2024-01-21 RX ORDER — HYDRALAZINE HCL 50 MG
25 TABLET ORAL
Refills: 0 | Status: DISCONTINUED | OUTPATIENT
Start: 2024-01-21 | End: 2024-01-22

## 2024-01-21 RX ORDER — PYRIDOXINE HCL (VITAMIN B6) 100 MG
1 TABLET ORAL
Qty: 0 | Refills: 3 | DISCHARGE

## 2024-01-21 RX ORDER — DEXTROSE 50 % IN WATER 50 %
25 SYRINGE (ML) INTRAVENOUS ONCE
Refills: 0 | Status: DISCONTINUED | OUTPATIENT
Start: 2024-01-21 | End: 2024-01-24

## 2024-01-21 RX ORDER — INSULIN LISPRO 100/ML
VIAL (ML) SUBCUTANEOUS AT BEDTIME
Refills: 0 | Status: DISCONTINUED | OUTPATIENT
Start: 2024-01-21 | End: 2024-01-24

## 2024-01-21 RX ORDER — ACETAMINOPHEN 500 MG
650 TABLET ORAL EVERY 6 HOURS
Refills: 0 | Status: DISCONTINUED | OUTPATIENT
Start: 2024-01-21 | End: 2024-01-24

## 2024-01-21 RX ORDER — HEPARIN SODIUM 5000 [USP'U]/ML
5000 INJECTION INTRAVENOUS; SUBCUTANEOUS EVERY 8 HOURS
Refills: 0 | Status: DISCONTINUED | OUTPATIENT
Start: 2024-01-21 | End: 2024-01-24

## 2024-01-21 RX ORDER — VANCOMYCIN HCL 1 G
1000 VIAL (EA) INTRAVENOUS ONCE
Refills: 0 | Status: COMPLETED | OUTPATIENT
Start: 2024-01-21 | End: 2024-01-21

## 2024-01-21 RX ORDER — TACROLIMUS 5 MG/1
1 CAPSULE ORAL
Refills: 0 | Status: DISCONTINUED | OUTPATIENT
Start: 2024-01-21 | End: 2024-01-22

## 2024-01-21 RX ORDER — TICAGRELOR 90 MG/1
1 TABLET ORAL
Qty: 0 | Refills: 0 | DISCHARGE

## 2024-01-21 RX ORDER — LANOLIN ALCOHOL/MO/W.PET/CERES
3 CREAM (GRAM) TOPICAL AT BEDTIME
Refills: 0 | Status: DISCONTINUED | OUTPATIENT
Start: 2024-01-21 | End: 2024-01-24

## 2024-01-21 RX ORDER — INSULIN GLARGINE 100 [IU]/ML
18 INJECTION, SOLUTION SUBCUTANEOUS AT BEDTIME
Refills: 0 | Status: DISCONTINUED | OUTPATIENT
Start: 2024-01-21 | End: 2024-01-24

## 2024-01-21 RX ADMIN — Medication 1: at 18:29

## 2024-01-21 RX ADMIN — Medication 25 MILLIGRAM(S): at 21:02

## 2024-01-21 RX ADMIN — Medication 250 MILLIGRAM(S): at 15:18

## 2024-01-21 RX ADMIN — CARVEDILOL PHOSPHATE 12.5 MILLIGRAM(S): 80 CAPSULE, EXTENDED RELEASE ORAL at 21:03

## 2024-01-21 RX ADMIN — Medication 650 MILLIGRAM(S): at 21:02

## 2024-01-21 RX ADMIN — TACROLIMUS 1 MILLIGRAM(S): 5 CAPSULE ORAL at 21:43

## 2024-01-21 RX ADMIN — TAMSULOSIN HYDROCHLORIDE 0.4 MILLIGRAM(S): 0.4 CAPSULE ORAL at 21:02

## 2024-01-21 RX ADMIN — MYCOPHENOLATE MOFETIL 500 MILLIGRAM(S): 250 CAPSULE ORAL at 21:43

## 2024-01-21 RX ADMIN — Medication 1000 MILLIGRAM(S): at 16:20

## 2024-01-21 RX ADMIN — HEPARIN SODIUM 5000 UNIT(S): 5000 INJECTION INTRAVENOUS; SUBCUTANEOUS at 21:03

## 2024-01-21 RX ADMIN — INSULIN GLARGINE 18 UNIT(S): 100 INJECTION, SOLUTION SUBCUTANEOUS at 21:43

## 2024-01-21 RX ADMIN — ATORVASTATIN CALCIUM 40 MILLIGRAM(S): 80 TABLET, FILM COATED ORAL at 21:02

## 2024-01-21 NOTE — H&P ADULT - HISTORY OF PRESENT ILLNESS
58y/oM CAD, DM, s/p renal transplant presenting with R hallux ulcer  58y/oM CAD, DM, s/p renal transplant presenting with R hallux ulcer. Reports following with his podiatrist weekly last 3 weeks and was recently referred to wound care specialist, however, was told if he noted any drainage to come to ER, which began 2 or 3 days ago. Denies fevers, chills, cp, sob, abd pain, n/v/c/d

## 2024-01-21 NOTE — H&P ADULT - NSHPPHYSICALEXAM_GEN_ALL_CORE
CONSTITUTIONAL: Well appearing, well nourished, NAD  ENMT: moist mucous membranes  EYES: +EOMI  CARDIAC: Regular rate, regular rhythm.  normal +S1, S2.  No murmurs, rubs or gallops.  RESPIRATORY: Clear to auscultation bilaterally, no wheezes noted  GASTROINTESTINAL: Abdomen soft, non-tender, no guarding.  NEUROLOGICAL: Alert and oriented, no focal deficits, no motor or sensory deficits.  SKIN: warm, dry, no rashes noted  PSYCHIATRIC: Alert and oriented to person, place, time/situation. normal mood and affect. no apparent risk to self or others.  HEME LYMPH: No adenopathy or splenomegaly. No cervical or inguinal lymphadenopathy.

## 2024-01-21 NOTE — ED ADULT NURSE NOTE - OBJECTIVE STATEMENT
Pt to ED c/o R 1st toe infection x3wks that has become worse s/p returning to work the past few days. Pt has been seeing podiatry and wound was improving until he returned to work. Tip of toe appears necrotic. +PMS in foot. Denies fever, n/v. Hx DM & kidney transplant 1yrs ago.

## 2024-01-21 NOTE — PATIENT PROFILE ADULT - FALL HARM RISK - TYPE OF ASSESSMENT
Score: Brent Scale Score: 11    LABS    CBC:   Lab Results   Component Value Date    WBC 8.8 02/14/2018    RBC 3.57 02/14/2018    HGB 9.3 02/14/2018     CMP:  Albumin:    Lab Results   Component Value Date    LABALBU 4.0 01/26/2018     PT/INR:    Lab Results   Component Value Date    PROTIME 11.0 01/26/2018    INR 1.0 01/26/2018     HgBA1c:    Lab Results   Component Value Date    LABA1C 5.2 01/27/2018     PTT: No components found for: LABPTT      Assessment:     Patient Active Problem List   Diagnosis    Pneumonia of both lower lobes due to infectious organism    NSTEMI (non-ST elevated myocardial infarction) [facility] [facility]    Acute pulmonary edema ([facility] [address] [facility]    Hypertensive emergency    Acute respiratory failure with hypoxia and hypercapnia (HCC)    Smoker    Morbid obesity ([facility] [address] [facility]    Elevated troponin    Obesity hypoventilation syndrome (HCC)    SOB (shortness of breath)    COPD exacerbation (HCC)    ARDS (adult respiratory distress syndrome) (HCC)    Fever    Disease due to rhinovirus       Measurements:  Wound 02/12/18 Skin tear Buttocks Medial skin tear to mid gluteal crease (Active)   Wound Type Wound 2/14/2018  4:00 AM   Wound Skin Tear 2/13/2018  4:00 PM   Dressing Status Other (Comment) 2/14/2018  4:00 AM   Dressing Changed Other (Comment) 2/13/2018  4:00 AM   Dressing/Treatment Moisture barrier 2/14/2018  4:00 AM   Wound Cleansed Not Cleansed 2/12/2018  8:00 AM   Wound Assessment Pink 2/14/2018  4:00 AM   Drainage Amount None 2/13/2018  4:00 AM   Odor None 2/13/2018  4:00 AM   Vianney-wound Assessment Hyperpigmented 2/13/2018  4:00 AM   Number of days: 2       Response to treatment:  Well tolerated by patient. Patient did develop a short cardiac pause during care. Turned onto left side, she was suctioned, and moved up higher in the bed. Episode resolved quickly and spontaneously. Plan:     Plan of Care:     Turn every 2 hours  Float heels of of bed with pillows under calves     Mepilex sacrum dressing to sacrococcygeal area. Peel back dressing, inspect skin beneath, re-secure. Change every 72 hours and prn wrinkles, soilage. Discontinue Sacral Mepilex if repeatedly soiled by incontinence.      Routine incontinence care with Greg barrier cloths and zinc oxide cream.   Apply zinc oxide cream BID and prn incontinence.    Covidien moisture wicking under pads vs cloth backed briefs  Interdry Ag cloths to torso skin folds.     Use ceiling lift to reposition patient to minimize shear injury     Specialty Bed Required : Yes   [x] Low Air Loss   [x] Pressure Redistribution  [] Fluid Immersion  [x] Bariatric  [] Total Pressure Relief  [] Other:      Discharge Plan:  TBD     Patient/Caregiver Teaching:  Level of patientunderstanding able to:      [] Indicates understanding        [] Needs reinforcement  [] Unsuccessful                          [] Verbal Understanding  [] Demonstrated understanding            [x] No evidence of learning  [] Refused teaching                               [] N/A  Electronically signed by Nagi Saenz RN, Mina Meeks on 2/14/2018 at 2:13 PM Admission

## 2024-01-21 NOTE — PATIENT PROFILE ADULT - NSPROSPHOSPCHAPLAINYN_GEN_A_NUR
no
Quality 130: Documentation Of Current Medications In The Medical Record: Current Medications Documented
Quality 431: Preventive Care And Screening: Unhealthy Alcohol Use - Screening: Patient not identified as an unhealthy alcohol user when screened for unhealthy alcohol use using a systematic screening method
Quality 110: Preventive Care And Screening: Influenza Immunization: Influenza Immunization Administered during Influenza season
Detail Level: Detailed
Quality 226: Preventive Care And Screening: Tobacco Use: Screening And Cessation Intervention: Patient screened for tobacco use, is a smoker AND did not received Cessation Counseling for Unknown Reasons

## 2024-01-21 NOTE — CONSULT NOTE ADULT - SUBJECTIVE AND OBJECTIVE BOX
Patient is a 58y old  Male who presents with a chief complaint of right foot ulcer     HPI: 59 yo male with PMH of DM, kidney transplant, HTN,  CAD presents with right hallux ulcer. Pt states that he noticed the wound about 3 weeks ago, has been following up with outpt podiatrist, Dr. Hill? for wound management. Has been doing local wound care at home but came to ED for foul smelling, draining wound. Denies any pain due to neuropathy. Denies any other complaints. Denies any constitutional symptoms.       PMH: Renal failure    Dialysis patient    Diabetes    Hypertension    CAD (coronary artery disease)    Allergies: No Known Allergies    Medications: vancomycin  IVPB        FH: No pertinent family history in first degree relatives      PSX: No significant past surgical history    AV fistula    SH: Social History:    Vital Signs Last 24 Hrs  T(C): 37.4 (2024 15:47), Max: 37.4 (2024 15:47)  T(F): 99.3 (2024 15:47), Max: 99.3 (2024 15:47)  HR: 75 (2024 15:47) (75 - 75)  BP: 181/77 (2024 15:47) (131/65 - 181/77)  BP(mean): --  RR: 20 (2024 15:47) (20 - 20)  SpO2: 99% (2024 15:47) (98% - 99%)    Parameters below as of 2024 15:47  Patient On (Oxygen Delivery Method): room air    LABS                        13.6   6.98  )-----------( 234      ( 2024 13:55 )             39.7                    131<L>  |  95<L>  |  22.4<H>  ----------------------------<  194<H>  4.5   |  25.0  |  1.14    Ca    10.0      2024 13:55    TPro  6.9  /  Alb  3.6  /  TBili  0.6  /  DBili  x   /  AST  20  /  ALT  11  /  AlkPhos  82         WBC Count: 6.98 K/uL (24 @ 13:55)    PT/INR - ( 2024 13:55 )   PT: 12.2 sec;   INR: 1.10 ratio      PTT - ( 2024 13:55 )  PTT:36.8 sec  Urinalysis Basic - ( 2024 14:30 )    Color: Yellow / Appearance: Clear / S.025 / pH: x  Gluc: x / Ketone: Trace mg/dL  / Bili: Negative / Urobili: 1.0 mg/dL   Blood: x / Protein: Negative mg/dL / Nitrite: Negative   Leuk Esterase: Negative / RBC: x / WBC x   Sq Epi: x / Non Sq Epi: x / Bacteria: x    CAPILLARY BLOOD GLUCOSE    ROS: Negative unless otherwise stated in the HPI       PHYSICAL EXAM  GEN: PRATIK NUR is a pleasant 58y Male in no acute distress, alert awake, and oriented to person, place and time.   LE Focused:    Vasc: DP/PT pulses palpable 1/4 b/l, CFT brisk to digits, TG warm to warm, edema localized to right hallux, no erythema noted   Derm: A full thickness wound noted to the distal aspect of right hallux, wound probes to bone with malodor, mild purulent drainage expressed, soft tissue crepitus to the distal aspect   Neuro: Protective sensation grossly diminished   MSK: Able to wiggle toes, pain deferred due to neuropathy       Imaging:     ACC: [id number] EXAM:  XR TOE(S) MIN 2 VIEWS RT   ORDERED BY: JOSE ANGEL BRIONES     ACC: [id number] EXAM:  XR FOOT COMP MIN 3 VIEWS RT   ORDERED BY: JOSE ANGEL BRIONES     PROCEDURE DATE:  2024        INTERPRETATION:  Right foot and right great toe. Concern is osteomyelitis.    Right foot. 3 views.    There is a mild pes planus and a small posterior calcaneal spur. Arterial   calcifications are noted.    Right great toe. There is destruction of the distal aspect of the great   toe with local air collection consistent with osteomyelitis. There is   also soft tissue swelling.    IMPRESSION: Osteomyelitis of the distal great toe.    Cultures:

## 2024-01-21 NOTE — H&P ADULT - SOCIAL HISTORY
[facility]  H+P  Consult  OP Visit  FU Visit   CC/HPI   CC Here for [facility] [facility] [facility] today   *HTN  Status Advice Plan   Controlled Diet/salt/xcise/wt counseling Continue meds at doses above   *FOLLOWUP  Pending testing
No

## 2024-01-21 NOTE — CONSULT NOTE ADULT - ASSESSMENT
A:   57 yo male with PMH of DM, kidney transplant, HTN,  CAD presents with right hallux ulcer. Being followed to rule out osteomyelitis    P:  Pt seen and evaluated  Pt afebrile, no leukocytosis noted  Right foot xray reviewed, shows soft tissue gas localized to distal aspect of right hallux  Excisional debridement of the wound performed down to and including subQ tissue using #10 blade  Wound probes to bone, expressed scant purulence  Culture obtained   Flushed with sterile saline  Applied betadine, DSD  Please obtain MRI of the right foot to rule out extent of bone infection  Will continue to follow  D/w attending

## 2024-01-21 NOTE — ED ADULT NURSE REASSESSMENT NOTE - NS ED NURSE REASSESS COMMENT FT1
Pt returned to ED from US. Pt laying in bed, A&Ox4 in NAD @ this time. RR even & unlabored. VS as documented. Pt denies any pain, complaints, or needs @ this time. Pt awaiting admission. Safety maintained.     Report given to SEAN Guillory. Pt taken to CDU.
Pt laying in bed, resting. Pt A&Ox4 in NAD @ this time. RR even & unlabored. Pt awaiting US. Pt denies any pain, complaints, or needs @ this time. Safety maintained. Pt taken to US.

## 2024-01-21 NOTE — ED ADULT NURSE NOTE - NSFALLUNIVINTERV_ED_ALL_ED
Bed/Stretcher in lowest position, wheels locked, appropriate side rails in place/Call bell, personal items and telephone in reach/Instruct patient to call for assistance before getting out of bed/chair/stretcher/Non-slip footwear applied when patient is off stretcher/[place] to call system/Physically safe environment - no spills, clutter or unnecessary equipment/Purposeful proactive rounding/Room/bathroom lighting operational, light cord in reach

## 2024-01-21 NOTE — ED PROVIDER NOTE - CLINICAL SUMMARY MEDICAL DECISION MAKING FREE TEXT BOX
HPI: 58-year-old male past medical history of CAD, diabetes, status post renal transplant presents with right hallux ulcer which the patient has been following with podiatrist Dr. Hill for the last 1 week with worsening color change in smell of the toe.  Denies any fevers or chills.  States difficulty walking due to discomfort.  Endorses compliance with medications.  No other current complaints this time.    ROS:   General: No fever, no chills, no malaise, no fatigue  ENT: No earache, no coryza, no sore throat  Neck: No stiffness, no swollen glands, no dysphagia  Respiratory: No cough, no dyspnea, no pleuritic chest pain  Cardiac: no chest pain, no palpitations, no edema, no jvd  Musculoskeletal: No myalgia, no arthralgia  Neurologic: No headache, no vertigo, no paresthesia, no focal deficits, no diplopia  Skin: See HPI  All other ROS are negative    PE:  General: NAD; well appearing; A&O x3   Head: NC/AT  Eyes: PERRL, EOMI  ENT: Airway patent, mmm  Pulmonary: CTA b/l, symmetric breath sounds. No W/R/R.  Cardiac: s1s2, RRR, no M,G,R, No JVD  Back: Normal  spine  Extremities: FROM, symmetric pulses, capillary refill < 2 seconds, no edema, 5/5 strength in b/l UE and LE  Skin: Dry gangrene of the right hallux without appreciable crepitance and dusky extension of the first metatarsal  Neurologic: alert, speech clear, no focal deficits, CN II-XII grossly intact, normal gait, sensation grossly intact  Psych: nl mood/affect, nl insight.    MDM: 58-year-old male past medical history of CAD, diabetes, status post renal transplant presents with right hallux ulcer which the patient has been following with podiatrist Dr. Hill for the last 1 week with worsening color change in smell of the toe.  No current concern for septic infection at this time.  Independent review of x-ray shows evidence of osteomyelitis.  Case discussed with Dr. Solo from podiatry.  I&D performed at bedside by podiatry team showing internalized gas.  Patient started on vancomycin prior to podiatry arrival.  Independent review of labs shows no elevated white count with mild neutrophilia of 87, BUN 22.4 with creatinine of 1.14.  Otherwise unremarkable metabolic profile.  Urine positive for greater than thousand glucose.  Patient to be admitted.  Patient verbalized agreement understanding current treatment plan.  Consult placed with infectious disease for routine follow-up in the morning.

## 2024-01-21 NOTE — H&P ADULT - NSHPLABSRESULTS_GEN_ALL_CORE
13.6   6.98  )-----------( 234      ( 21 Jan 2024 13:55 )             39.7     01-21    131<L>  |  95<L>  |  22.4<H>  ----------------------------<  194<H>  4.5   |  25.0  |  1.14    Ca    10.0      21 Jan 2024 13:55    TPro  6.9  /  Alb  3.6  /  TBili  0.6  /  DBili  x   /  AST  20  /  ALT  11  /  AlkPhos  82  01-21    < from: Xray Toes, Right Foot (01.21.24 @ 13:59) >    There is a mild pes planus and a small posterior calcaneal spur. Arterial   calcifications are noted.    Right great toe. There is destruction of the distal aspect of the great   toe with local air collection consistent with osteomyelitis. There is   also soft tissue swelling.    IMPRESSION: Osteomyelitis of the distal great toe.    < end of copied text >

## 2024-01-21 NOTE — H&P ADULT - ASSESSMENT
58y/oM CAD, DM, s/p renal transplant presenting with R hallux ulcer. In ER, xrays consistent with osteomyelitis, admitted for further work up     R hallux osteomyelitis   -admit to medicine   -xrays reviewed; with OM of distal great toe   -f/u cultures   -cont vanc   -f/u ID consult   -f/u MR R foot   -s/p bedside I&D w/podiatry 1/21   -podiatry recs appreciated   -wound care as per podiatry     ESRD s/p renal transplant   -pt s/p transplant 1/2023   -cont home meds: tacrolimus, mycophenolate   -f/u am bmp, tacro level     DM   -f/u am hgba1c   -cont home lantus, ISS ; titrate as needed     HTN   -cont coreg, hydralazine, nifedipine     CAD s/p stents   -cont asa, plavix, statin     vte ppx: heparin sq

## 2024-01-21 NOTE — ED ADULT TRIAGE NOTE - CHIEF COMPLAINT QUOTE
right foot big toe infection been being treated by podiatrist but came in due to blackish appearance, hx of kidney transplant and diabetes

## 2024-01-21 NOTE — H&P ADULT - NSICDXPASTSURGICALHX_GEN_ALL_CORE_FT
PAST SURGICAL HISTORY:  AV fistula Left arm     PAST SURGICAL HISTORY:  AV fistula Left arm    Renal transplant recipient

## 2024-01-21 NOTE — H&P ADULT - NSICDXFAMILYHX_GEN_ALL_CORE_FT
FAMILY HISTORY:  No pertinent family history in first degree relatives     FAMILY HISTORY:  Mother  Still living? Unknown  FHx: diabetes mellitus, Age at diagnosis: Age Unknown

## 2024-01-22 LAB
A1C WITH ESTIMATED AVERAGE GLUCOSE RESULT: 8.7 % — HIGH (ref 4–5.6)
ALBUMIN SERPL ELPH-MCNC: 3.4 G/DL — SIGNIFICANT CHANGE UP (ref 3.3–5.2)
ALP SERPL-CCNC: 77 U/L — SIGNIFICANT CHANGE UP (ref 40–120)
ALT FLD-CCNC: 14 U/L — SIGNIFICANT CHANGE UP
ANION GAP SERPL CALC-SCNC: 12 MMOL/L — SIGNIFICANT CHANGE UP (ref 5–17)
AST SERPL-CCNC: 21 U/L — SIGNIFICANT CHANGE UP
BASOPHILS # BLD AUTO: 0.03 K/UL — SIGNIFICANT CHANGE UP (ref 0–0.2)
BASOPHILS NFR BLD AUTO: 0.5 % — SIGNIFICANT CHANGE UP (ref 0–2)
BILIRUB SERPL-MCNC: 0.6 MG/DL — SIGNIFICANT CHANGE UP (ref 0.4–2)
BUN SERPL-MCNC: 21.5 MG/DL — HIGH (ref 8–20)
CALCIUM SERPL-MCNC: 9.6 MG/DL — SIGNIFICANT CHANGE UP (ref 8.4–10.5)
CHLORIDE SERPL-SCNC: 96 MMOL/L — SIGNIFICANT CHANGE UP (ref 96–108)
CO2 SERPL-SCNC: 22 MMOL/L — SIGNIFICANT CHANGE UP (ref 22–29)
CREAT SERPL-MCNC: 1.15 MG/DL — SIGNIFICANT CHANGE UP (ref 0.5–1.3)
CULTURE RESULTS: NO GROWTH — SIGNIFICANT CHANGE UP
EGFR: 74 ML/MIN/1.73M2 — SIGNIFICANT CHANGE UP
EOSINOPHIL # BLD AUTO: 0.04 K/UL — SIGNIFICANT CHANGE UP (ref 0–0.5)
EOSINOPHIL NFR BLD AUTO: 0.7 % — SIGNIFICANT CHANGE UP (ref 0–6)
ESTIMATED AVERAGE GLUCOSE: 203 MG/DL — HIGH (ref 68–114)
GLUCOSE BLDC GLUCOMTR-MCNC: 199 MG/DL — HIGH (ref 70–99)
GLUCOSE BLDC GLUCOMTR-MCNC: 207 MG/DL — HIGH (ref 70–99)
GLUCOSE BLDC GLUCOMTR-MCNC: 211 MG/DL — HIGH (ref 70–99)
GLUCOSE BLDC GLUCOMTR-MCNC: 279 MG/DL — HIGH (ref 70–99)
GLUCOSE SERPL-MCNC: 208 MG/DL — HIGH (ref 70–99)
HCT VFR BLD CALC: 39.8 % — SIGNIFICANT CHANGE UP (ref 39–50)
HGB BLD-MCNC: 13.4 G/DL — SIGNIFICANT CHANGE UP (ref 13–17)
IMM GRANULOCYTES NFR BLD AUTO: 0.4 % — SIGNIFICANT CHANGE UP (ref 0–0.9)
LYMPHOCYTES # BLD AUTO: 0.24 K/UL — LOW (ref 1–3.3)
LYMPHOCYTES # BLD AUTO: 4.4 % — LOW (ref 13–44)
MAGNESIUM SERPL-MCNC: 1.8 MG/DL — SIGNIFICANT CHANGE UP (ref 1.6–2.6)
MCHC RBC-ENTMCNC: 29.6 PG — SIGNIFICANT CHANGE UP (ref 27–34)
MCHC RBC-ENTMCNC: 33.7 GM/DL — SIGNIFICANT CHANGE UP (ref 32–36)
MCV RBC AUTO: 88.1 FL — SIGNIFICANT CHANGE UP (ref 80–100)
MONOCYTES # BLD AUTO: 0.45 K/UL — SIGNIFICANT CHANGE UP (ref 0–0.9)
MONOCYTES NFR BLD AUTO: 8.2 % — SIGNIFICANT CHANGE UP (ref 2–14)
NEUTROPHILS # BLD AUTO: 4.68 K/UL — SIGNIFICANT CHANGE UP (ref 1.8–7.4)
NEUTROPHILS NFR BLD AUTO: 85.8 % — HIGH (ref 43–77)
PHOSPHATE SERPL-MCNC: 2.3 MG/DL — LOW (ref 2.4–4.7)
PLATELET # BLD AUTO: 193 K/UL — SIGNIFICANT CHANGE UP (ref 150–400)
POTASSIUM SERPL-MCNC: 4.7 MMOL/L — SIGNIFICANT CHANGE UP (ref 3.5–5.3)
POTASSIUM SERPL-SCNC: 4.7 MMOL/L — SIGNIFICANT CHANGE UP (ref 3.5–5.3)
PROT SERPL-MCNC: 6.6 G/DL — SIGNIFICANT CHANGE UP (ref 6.6–8.7)
RBC # BLD: 4.52 M/UL — SIGNIFICANT CHANGE UP (ref 4.2–5.8)
RBC # FLD: 11.9 % — SIGNIFICANT CHANGE UP (ref 10.3–14.5)
SODIUM SERPL-SCNC: 130 MMOL/L — LOW (ref 135–145)
SPECIMEN SOURCE: SIGNIFICANT CHANGE UP
TACROLIMUS SERPL-MCNC: 7.8 NG/ML — SIGNIFICANT CHANGE UP
WBC # BLD: 5.46 K/UL — SIGNIFICANT CHANGE UP (ref 3.8–10.5)
WBC # FLD AUTO: 5.46 K/UL — SIGNIFICANT CHANGE UP (ref 3.8–10.5)

## 2024-01-22 PROCEDURE — 73718 MRI LOWER EXTREMITY W/O DYE: CPT | Mod: 26,RT

## 2024-01-22 PROCEDURE — 99223 1ST HOSP IP/OBS HIGH 75: CPT

## 2024-01-22 PROCEDURE — 99233 SBSQ HOSP IP/OBS HIGH 50: CPT

## 2024-01-22 RX ORDER — TACROLIMUS 5 MG/1
2 CAPSULE ORAL
Refills: 0 | Status: DISCONTINUED | OUTPATIENT
Start: 2024-01-22 | End: 2024-01-24

## 2024-01-22 RX ORDER — PIPERACILLIN AND TAZOBACTAM 4; .5 G/20ML; G/20ML
3.38 INJECTION, POWDER, LYOPHILIZED, FOR SOLUTION INTRAVENOUS EVERY 8 HOURS
Refills: 0 | Status: DISCONTINUED | OUTPATIENT
Start: 2024-01-22 | End: 2024-01-24

## 2024-01-22 RX ORDER — PIPERACILLIN AND TAZOBACTAM 4; .5 G/20ML; G/20ML
3.38 INJECTION, POWDER, LYOPHILIZED, FOR SOLUTION INTRAVENOUS ONCE
Refills: 0 | Status: COMPLETED | OUTPATIENT
Start: 2024-01-22 | End: 2024-01-22

## 2024-01-22 RX ORDER — HYDRALAZINE HCL 50 MG
50 TABLET ORAL
Refills: 0 | Status: DISCONTINUED | OUTPATIENT
Start: 2024-01-22 | End: 2024-01-24

## 2024-01-22 RX ADMIN — Medication 25 MILLIGRAM(S): at 08:44

## 2024-01-22 RX ADMIN — TAMSULOSIN HYDROCHLORIDE 0.4 MILLIGRAM(S): 0.4 CAPSULE ORAL at 22:13

## 2024-01-22 RX ADMIN — ATORVASTATIN CALCIUM 40 MILLIGRAM(S): 80 TABLET, FILM COATED ORAL at 22:14

## 2024-01-22 RX ADMIN — Medication 166.67 MILLIGRAM(S): at 03:08

## 2024-01-22 RX ADMIN — Medication 2: at 12:54

## 2024-01-22 RX ADMIN — PIPERACILLIN AND TAZOBACTAM 25 GRAM(S): 4; .5 INJECTION, POWDER, LYOPHILIZED, FOR SOLUTION INTRAVENOUS at 22:26

## 2024-01-22 RX ADMIN — Medication 3 MILLIGRAM(S): at 22:13

## 2024-01-22 RX ADMIN — HEPARIN SODIUM 5000 UNIT(S): 5000 INJECTION INTRAVENOUS; SUBCUTANEOUS at 22:16

## 2024-01-22 RX ADMIN — PIPERACILLIN AND TAZOBACTAM 200 GRAM(S): 4; .5 INJECTION, POWDER, LYOPHILIZED, FOR SOLUTION INTRAVENOUS at 10:44

## 2024-01-22 RX ADMIN — Medication 650 MILLIGRAM(S): at 08:44

## 2024-01-22 RX ADMIN — Medication 81 MILLIGRAM(S): at 08:45

## 2024-01-22 RX ADMIN — TACROLIMUS 1 MILLIGRAM(S): 5 CAPSULE ORAL at 08:44

## 2024-01-22 RX ADMIN — CARVEDILOL PHOSPHATE 12.5 MILLIGRAM(S): 80 CAPSULE, EXTENDED RELEASE ORAL at 08:44

## 2024-01-22 RX ADMIN — HEPARIN SODIUM 5000 UNIT(S): 5000 INJECTION INTRAVENOUS; SUBCUTANEOUS at 14:29

## 2024-01-22 RX ADMIN — Medication 1 MILLIGRAM(S): at 08:45

## 2024-01-22 RX ADMIN — Medication 2: at 07:25

## 2024-01-22 RX ADMIN — MYCOPHENOLATE MOFETIL 500 MILLIGRAM(S): 250 CAPSULE ORAL at 08:44

## 2024-01-22 RX ADMIN — PIPERACILLIN AND TAZOBACTAM 25 GRAM(S): 4; .5 INJECTION, POWDER, LYOPHILIZED, FOR SOLUTION INTRAVENOUS at 16:50

## 2024-01-22 RX ADMIN — TACROLIMUS 2 MILLIGRAM(S): 5 CAPSULE ORAL at 22:16

## 2024-01-22 RX ADMIN — INSULIN GLARGINE 18 UNIT(S): 100 INJECTION, SOLUTION SUBCUTANEOUS at 22:15

## 2024-01-22 RX ADMIN — Medication 50 MILLIGRAM(S): at 22:14

## 2024-01-22 RX ADMIN — Medication 25 MILLIGRAM(S): at 08:45

## 2024-01-22 RX ADMIN — HEPARIN SODIUM 5000 UNIT(S): 5000 INJECTION INTRAVENOUS; SUBCUTANEOUS at 05:38

## 2024-01-22 RX ADMIN — Medication 3: at 17:24

## 2024-01-22 RX ADMIN — CLOPIDOGREL BISULFATE 75 MILLIGRAM(S): 75 TABLET, FILM COATED ORAL at 08:45

## 2024-01-22 RX ADMIN — Medication 650 MILLIGRAM(S): at 09:14

## 2024-01-22 RX ADMIN — Medication 166.67 MILLIGRAM(S): at 14:30

## 2024-01-22 NOTE — CONSULT NOTE ADULT - SUBJECTIVE AND OBJECTIVE BOX
HPI:  58y/oM CAD, DM, s/p renal transplant presenting with R hallux ulcer. Reports following with his podiatrist weekly last 3 weeks and was recently referred to wound care specialist, however, was told if he noted any drainage to come to ER, which began 2 or 3 days ago. Denies fevers, chills, cp, sob, abd pain, n/v/c/d     PAST MEDICAL & SURGICAL HISTORY:  Renal failure      Dialysis patient      Diabetes      Hypertension      CAD (coronary artery disease)      AV fistula  Left arm      Renal transplant recipient      FAMILY HISTORY:  FHx: diabetes mellitus (Mother)    NC    Social History:Non smoker    MEDICATIONS  (STANDING):  aspirin enteric coated 81 milliGRAM(s) Oral daily  atorvastatin 40 milliGRAM(s) Oral at bedtime  carvedilol 12.5 milliGRAM(s) Oral every 12 hours  clopidogrel Tablet 75 milliGRAM(s) Oral daily  dextrose 5%. 1000 milliLiter(s) (50 mL/Hr) IV Continuous <Continuous>  dextrose 5%. 1000 milliLiter(s) (100 mL/Hr) IV Continuous <Continuous>  dextrose 50% Injectable 25 Gram(s) IV Push once  dextrose 50% Injectable 25 Gram(s) IV Push once  dextrose 50% Injectable 12.5 Gram(s) IV Push once  folic acid 1 milliGRAM(s) Oral daily  glucagon  Injectable 1 milliGRAM(s) IntraMuscular once  heparin   Injectable 5000 Unit(s) SubCutaneous every 8 hours  hydrALAZINE 25 milliGRAM(s) Oral two times a day  insulin glargine Injectable (LANTUS) 18 Unit(s) SubCutaneous at bedtime  insulin lispro (ADMELOG) corrective regimen sliding scale   SubCutaneous at bedtime  insulin lispro (ADMELOG) corrective regimen sliding scale   SubCutaneous three times a day before meals  mycophenolate mofetil 500 milliGRAM(s) Oral two times a day  NIFEdipine XL 30 milliGRAM(s) Oral daily  piperacillin/tazobactam IVPB. 3.375 Gram(s) IV Intermittent once  piperacillin/tazobactam IVPB.- 3.375 Gram(s) IV Intermittent once  piperacillin/tazobactam IVPB.. 3.375 Gram(s) IV Intermittent every 8 hours  pyridoxine 25 milliGRAM(s) Oral daily  tacrolimus 2 milliGRAM(s) Oral two times a day  tamsulosin 0.4 milliGRAM(s) Oral at bedtime  vancomycin  IVPB 1250 milliGRAM(s) IV Intermittent every 12 hours    MEDICATIONS  (PRN):  acetaminophen     Tablet .. 650 milliGRAM(s) Oral every 6 hours PRN Temp greater or equal to 38C (100.4F), Mild Pain (1 - 3)  aluminum hydroxide/magnesium hydroxide/simethicone Suspension 30 milliLiter(s) Oral every 4 hours PRN Dyspepsia  dextrose Oral Gel 15 Gram(s) Oral once PRN Blood Glucose LESS THAN 70 milliGRAM(s)/deciliter  melatonin 3 milliGRAM(s) Oral at bedtime PRN Insomnia  ondansetron Injectable 4 milliGRAM(s) IV Push every 8 hours PRN Nausea and/or Vomiting   Meds reviewed      Vital Signs Last 24 Hrs  T(C): 38.2 (22 Jan 2024 08:28), Max: 38.4 (21 Jan 2024 20:08)  T(F): 100.8 (22 Jan 2024 08:28), Max: 101.1 (21 Jan 2024 20:08)  HR: 74 (22 Jan 2024 08:28) (70 - 84)  BP: 149/65 (22 Jan 2024 08:28) (131/65 - 181/77)  BP(mean): --  RR: 18 (22 Jan 2024 08:28) (18 - 20)  SpO2: 98% (22 Jan 2024 08:28) (98% - 100%)    Parameters below as of 22 Jan 2024 08:28  Patient On (Oxygen Delivery Method): room air      Daily Height in cm: 175.26 (21 Jan 2024 11:28)    Daily     PHYSICAL EXAM:    GENERAL: NAD  HEAD: NCAT  EYES: EOMI  NECK: Supple  NERVOUS SYSTEM:  Alert & Oriented X3  CHEST/LUNG: Clear to percussion bilaterally  HEART: Regular rate and rhythm  ABDOMEN: Soft, Nontender, Nondistended; +BS  EXTREMITIES:  edema    LABS:                        13.4   5.46  )-----------( 193      ( 22 Jan 2024 05:50 )             39.8     01-22    130<L>  |  96  |  21.5<H>  ----------------------------<  208<H>  4.7   |  22.0  |  1.15    Ca    9.6      22 Jan 2024 05:50  Phos  2.3     01-22  Mg     1.8     01-22    TPro  6.6  /  Alb  3.4  /  TBili  0.6  /  DBili  x   /  AST  21  /  ALT  14  /  AlkPhos  77  01-22    PT/INR - ( 21 Jan 2024 13:55 )   PT: 12.2 sec;   INR: 1.10 ratio         PTT - ( 21 Jan 2024 13:55 )  PTT:36.8 sec  Urinalysis Basic - ( 22 Jan 2024 05:50 )    Color: x / Appearance: x / SG: x / pH: x  Gluc: 208 mg/dL / Ketone: x  / Bili: x / Urobili: x   Blood: x / Protein: x / Nitrite: x   Leuk Esterase: x / RBC: x / WBC x   Sq Epi: x / Non Sq Epi: x / Bacteria: x      Magnesium: 1.8 mg/dL (01-22 @ 05:50)  Phosphorus: 2.3 mg/dL (01-22 @ 05:50)          RADIOLOGY & ADDITIONAL TESTS:

## 2024-01-22 NOTE — PROGRESS NOTE ADULT - ASSESSMENT
A:   59 yo male with PMH of DM, kidney transplant, HTN,  CAD presents with right hallux ulcer. Being followed to rule out osteomyelitis    P:  Pt seen and evaluated  Pt afebrile, no leukocytosis noted  Right foot xray reviewed, shows soft tissue gas localized to distal aspect of right hallux  MRI results reviewed, OM of distal phalanx and likely proximal phalanx  Excisional debridement of the wound performed down to and including subQ tissue using #10 blade  Wound probes to bone, expressed purulence  Culture obtained   Flushed with sterile saline  Applied betadine, DSD  Discussed antibiotic treatment vs surgical removal of infected bone with patient at length with risks and benefits of both. Patient exhibited understanding. Tentative procedure for partial right 1st ray amputation scheduled for Wednesday, 1/24/24 with Dr. Silva. To be further discussed with patient on 1/23/24  D/w attending Dr. Silva

## 2024-01-22 NOTE — CONSULT NOTE ADULT - ASSESSMENT
58y/oM CAD, DM, s/p renal transplant presenting with R hallux ulcer. Reports following with his podiatrist weekly last 3 weeks and was recently referred to wound care specialist, however, was told if he noted any drainage to come to ER, which began 2 or 3 days ago. Denies fevers, chills, cp, sob, abd pain, n/v/c/d (21 Jan 2024 17:31)      Right foot  infected ulcer   Cellulitis  Osteomyelitis  Leukocytosis  Fever  s/p renal transplant    - f/uBlood cultures   - f/u MRi  - f/u wound cx  - zosyn  - c/w vancomycin adjusted for renal function  - monitor vancomycin trough and adjust per pharmacy protocol  - monitor for vancomycin toxicity  - Trend Cr  - on cellcept, MMF, if septic/bacteremic would consider lower immunosuppression  - podiatry f/u  - Trend Fever  - Trend Leukocytosis      Will Follow

## 2024-01-22 NOTE — PROGRESS NOTE ADULT - ASSESSMENT
58y/oM CAD, DM, s/p renal transplant presenting with R hallux ulcer. In ER, xrays consistent with osteomyelitis, admitted for further work up     R hallux osteomyelitis   -xrays reviewed; with OM of distal great toe   -f/u cultures   -cont vanc   -ID recs appreciated   -MR R foot with OM of distal and likely proximal phalanx   -s/p bedside I&D w/podiatry 1/21   -podiatry recs appreciated   -wound care as per podiatry   -plan for likely partial R 1st ray amputation Wed 1/24    ESRD s/p renal transplant   -pt s/p transplant 1/2023   -cont home meds: tacrolimus, mycophenolate   -f/u am bmp, tacro level   -nephro recs appreciated     DM   -f/u am hgba1c   -cont home lantus, ISS ; titrate as needed     HTN   -cont coreg, nifedipine  -hydralazine increased 1/22     CAD s/p stents   -cont asa, plavix, statin      vte ppx: heparin sq      58y/oM CAD, DM, s/p renal transplant presenting with R hallux ulcer. In ER, xrays consistent with osteomyelitis, admitted for further work up     R hallux osteomyelitis   -xrays reviewed; with OM of distal great toe   -f/u cultures   -cont vanc   -ID recs appreciated   -MR R foot with OM of distal and likely proximal phalanx   -s/p bedside I&D w/podiatry 1/21   -podiatry recs appreciated   -wound care as per podiatry   -plan for likely partial R 1st ray amputation Wed 1/24    ESRD s/p renal transplant   -pt s/p transplant 1/2023   -cont home meds: tacrolimus, mycophenolate   -f/u am bmp, tacro level   -nephro recs appreciated     DM   -hgba1c 8.7  -cont home lantus, ISS ; titrate as needed     HTN   -cont coreg, nifedipine  -hydralazine increased 1/22     CAD s/p stents   -cont asa, plavix, statin      vte ppx: heparin sq

## 2024-01-22 NOTE — CONSULT NOTE ADULT - SUBJECTIVE AND OBJECTIVE BOX
[facility]                                                INFECTIOUS DISEASES  =======================================================                     Pratik Garibay#   Lamonte Arita MD#   Karl Nur MD*                           Maryjo Owens MD*   Chrissy Baltazar MD*            Diplomates American Board of Internal Medicine & Infectious Diseases                  # [place] Office - Appt - Tel  [phone] Fax [phone]                * [place] Office - Appt - Tel [phone] Fax [phone]                                  Hospital Consult line:  [phone]  =======================================================      [id number]  PRATIK NUR   HPI:  58y/oM CAD, DM, s/p renal transplant presenting with R hallux ulcer. Reports following with his podiatrist weekly last 3 weeks and was recently referred to wound care specialist, however, was told if he noted any drainage to come to ER, which began 2 or 3 days ago. Denies fevers, chills, cp, sob, abd pain, n/v/c/d (21 Jan 2024 17:31)          I have personally reviewed the labs and data; pertinent labs and data are listed in this note; please see below.   =======================================================  Past Medical & Surgical Hx:  =====================  PAST MEDICAL & SURGICAL HISTORY:  Renal failure      Dialysis patient      Diabetes      Hypertension      CAD (coronary artery disease)      AV fistula  Left arm      Renal transplant recipient        Problem List:  ==========  HEALTH ISSUES - PROBLEM Dx:        Social Hx:  =======  no toxic habits currently    FAMILY HISTORY:  FHx: diabetes mellitus (Mother)    no significant family history of immunosuppressive disorders in mother or father   =======================================================    REVIEW OF SYSTEMS:  CONSTITUTIONAL:  No Fever or chills  HEENT:  No diplopia or blurred vision.  No earache, sore throat or runny nose.  CARDIOVASCULAR:  No pressure, squeezing, strangling, tightness, heaviness or aching about the chest, neck, axilla or epigastrium.  RESPIRATORY:  No cough, shortness of breath  GASTROINTESTINAL:  No nausea, vomiting or diarrhea.  GENITOURINARY:  No dysuria, frequency or urgency. No Blood in urine  MUSCULOSKELETAL:  no joint aches, no muscle pain  SKIN:  No change in skin, hair or nails.  NEUROLOGIC:  No Headaches, seizures or weakness.  PSYCHIATRIC:  No disorder of thought or mood.  ENDOCRINE:  No heat or cold intolerance  HEMATOLOGICAL:  No easy bruising or bleeding.    =======================================================  Allergies    No Known Allergies    Intolerances    Antibiotics:  vancomycin  IVPB 1250 milliGRAM(s) IV Intermittent every 12 hours    Other medications:  aspirin enteric coated 81 milliGRAM(s) Oral daily  atorvastatin 40 milliGRAM(s) Oral at bedtime  carvedilol 12.5 milliGRAM(s) Oral every 12 hours  clopidogrel Tablet 75 milliGRAM(s) Oral daily  dextrose 5%. 1000 milliLiter(s) IV Continuous <Continuous>  dextrose 5%. 1000 milliLiter(s) IV Continuous <Continuous>  dextrose 50% Injectable 25 Gram(s) IV Push once  dextrose 50% Injectable 25 Gram(s) IV Push once  dextrose 50% Injectable 12.5 Gram(s) IV Push once  folic acid 1 milliGRAM(s) Oral daily  glucagon  Injectable 1 milliGRAM(s) IntraMuscular once  heparin   Injectable 5000 Unit(s) SubCutaneous every 8 hours  hydrALAZINE 25 milliGRAM(s) Oral two times a day  insulin glargine Injectable (LANTUS) 18 Unit(s) SubCutaneous at bedtime  insulin lispro (ADMELOG) corrective regimen sliding scale   SubCutaneous three times a day before meals  insulin lispro (ADMELOG) corrective regimen sliding scale   SubCutaneous at bedtime  mycophenolate mofetil 500 milliGRAM(s) Oral two times a day  NIFEdipine XL 30 milliGRAM(s) Oral daily  pyridoxine 25 milliGRAM(s) Oral daily  tacrolimus 1 milliGRAM(s) Oral two times a day  tamsulosin 0.4 milliGRAM(s) Oral at bedtime     vancomycin  IVPB   250 mL/Hr IV Intermittent (01-21-24 @ 15:18)    vancomycin  IVPB   166.67 mL/Hr IV Intermittent (01-22-24 @ 03:08)      ======================================================  Physical Exam:  ============  T(F): 100.8 (22 Jan 2024 08:28), Max: 101.1 (21 Jan 2024 20:08)  HR: 74 (22 Jan 2024 08:28)  BP: 149/65 (22 Jan 2024 08:28)  RR: 18 (22 Jan 2024 08:28)  SpO2: 98% (22 Jan 2024 08:28) (98% - 100%)  temp max in last 48H T(F): , Max: 101.1 (01-21-24 @ 20:08)Height (cm): 175.3 (01-21-24 @ 11:28)  Weight (kg): 76 (01-21-24 @ 11:28)  BMI (kg/m2): 24.7 (01-21-24 @ 11:28)  BSA (m2): 1.92 (01-21-24 @ 11:28)    General:  No acute distress.  Eye: Pupils are equal, round and reactive to light, Normal conjunctiva.  HENT: Normocephalic, Oral mucosa is moist, No pharyngeal erythema, No sinus tenderness.  Neck: Supple, No lymphadenopathy.  Respiratory: Lungs are clear to auscultation, Respirations are non-labored.  Cardiovascular: Normal rate, Regular rhythm, s1 + s2  Gastrointestinal: Soft, Non-tender, Non-distended, Normal bowel sounds.  Genitourinary: No costovertebral angle tenderness.  Lymphatics: No lymphadenopathy neck,   Musculoskeletal: Normal range of motion, Normal strength.  Integumentary: No rash.  Neurologic: Alert, Oriented, No focal deficits  Psychiatric: Appropriate mood & affect.    =======================================================  Labs:                        13.4   5.46  )-----------( 193      ( 22 Jan 2024 05:50 )             39.8     01-22    130<L>  |  96  |  21.5<H>  ----------------------------<  208<H>  4.7   |  22.0  |  1.15    Ca    9.6      22 Jan 2024 05:50  Phos  2.3     01-22  Mg     1.8     01-22    TPro  6.6  /  Alb  3.4  /  TBili  0.6  /  DBili  x   /  AST  21  /  ALT  14  /  AlkPhos  77  01-22      Culture - Blood (collected 09-19-22 @ 20:25)  Source: .Blood Blood-Peripheral  Final Report (09-25-22 @ 01:01):    No Growth Final    Culture - Blood (collected 09-19-22 @ 20:20)  Source: .Blood Blood-Peripheral  Final Report (09-25-22 @ 01:01):    No Growth Final       [facility] (01-21-24 @ 18:20)                                                   [facility]                                                INFECTIOUS DISEASES  =======================================================                     Pratik Garibay#   Lamonte Arita MD#   Karl Nur MD*                           Maryjo wOens MD*   Chrissy Baltazar MD*            Diplomates American Board of Internal Medicine & Infectious Diseases                  # [place] Office - Appt - Tel  [phone] Fax [phone]                * [place] Office - Appt - Tel [phone] Fax [phone]                                  Hospital Consult line:  [phone]  =======================================================      [id number]  PRATIK NUR   HPI:  58y/oM CAD, DM, s/p renal transplant presenting with R hallux ulcer. Reports following with his podiatrist weekly last 3 weeks and was recently referred to wound care specialist, however, was told if he noted any drainage to come to ER, which began 2 or 3 days ago. Denies fevers, chills, cp, sob, abd pain, n/v/c/d (21 Jan 2024 17:31)          I have personally reviewed the labs and data; pertinent labs and data are listed in this note; please see below.   =======================================================  Past Medical & Surgical Hx:  =====================  PAST MEDICAL & SURGICAL HISTORY:  Renal failure      Dialysis patient      Diabetes      Hypertension      CAD (coronary artery disease)      AV fistula  Left arm      Renal transplant recipient        Problem List:  ==========  HEALTH ISSUES - PROBLEM Dx:        Social Hx:  =======  no toxic habits currently    FAMILY HISTORY:  FHx: diabetes mellitus (Mother)    no significant family history of immunosuppressive disorders in mother or father   =======================================================    REVIEW OF SYSTEMS:  CONSTITUTIONAL:  +fever  HEENT:  No diplopia or blurred vision.  No earache, sore throat or runny nose.  CARDIOVASCULAR:  No pressure, squeezing, strangling, tightness, heaviness or aching about the chest, neck, axilla or epigastrium.  RESPIRATORY:  No cough, shortness of breath  GASTROINTESTINAL:  No nausea, vomiting or diarrhea.  GENITOURINARY:  No dysuria, frequency or urgency. No Blood in urine  MUSCULOSKELETAL:  no joint aches, no muscle pain  SKIN:  No change in skin, hair or nails.  NEUROLOGIC:  No Headaches, seizures or weakness.  PSYCHIATRIC:  No disorder of thought or mood.  ENDOCRINE:  No heat or cold intolerance  HEMATOLOGICAL:  No easy bruising or bleeding.    =======================================================  Allergies    No Known Allergies    Intolerances    Antibiotics:  vancomycin  IVPB 1250 milliGRAM(s) IV Intermittent every 12 hours    Other medications:  aspirin enteric coated 81 milliGRAM(s) Oral daily  atorvastatin 40 milliGRAM(s) Oral at bedtime  carvedilol 12.5 milliGRAM(s) Oral every 12 hours  clopidogrel Tablet 75 milliGRAM(s) Oral daily  dextrose 5%. 1000 milliLiter(s) IV Continuous <Continuous>  dextrose 5%. 1000 milliLiter(s) IV Continuous <Continuous>  dextrose 50% Injectable 25 Gram(s) IV Push once  dextrose 50% Injectable 25 Gram(s) IV Push once  dextrose 50% Injectable 12.5 Gram(s) IV Push once  folic acid 1 milliGRAM(s) Oral daily  glucagon  Injectable 1 milliGRAM(s) IntraMuscular once  heparin   Injectable 5000 Unit(s) SubCutaneous every 8 hours  hydrALAZINE 25 milliGRAM(s) Oral two times a day  insulin glargine Injectable (LANTUS) 18 Unit(s) SubCutaneous at bedtime  insulin lispro (ADMELOG) corrective regimen sliding scale   SubCutaneous three times a day before meals  insulin lispro (ADMELOG) corrective regimen sliding scale   SubCutaneous at bedtime  mycophenolate mofetil 500 milliGRAM(s) Oral two times a day  NIFEdipine XL 30 milliGRAM(s) Oral daily  pyridoxine 25 milliGRAM(s) Oral daily  tacrolimus 1 milliGRAM(s) Oral two times a day  tamsulosin 0.4 milliGRAM(s) Oral at bedtime     vancomycin  IVPB   250 mL/Hr IV Intermittent (01-21-24 @ 15:18)    vancomycin  IVPB   166.67 mL/Hr IV Intermittent (01-22-24 @ 03:08)      ======================================================  Physical Exam:  ============  T(F): 100.8 (22 Jan 2024 08:28), Max: 101.1 (21 Jan 2024 20:08)  HR: 74 (22 Jan 2024 08:28)  BP: 149/65 (22 Jan 2024 08:28)  RR: 18 (22 Jan 2024 08:28)  SpO2: 98% (22 Jan 2024 08:28) (98% - 100%)  temp max in last 48H T(F): , Max: 101.1 (01-21-24 @ 20:08)Height (cm): 175.3 (01-21-24 @ 11:28)  Weight (kg): 76 (01-21-24 @ 11:28)  BMI (kg/m2): 24.7 (01-21-24 @ 11:28)  BSA (m2): 1.92 (01-21-24 @ 11:28)    General:  No acute distress.  Eye: Normal conjunctiva.  Neck: Supple, No lymphadenopathy.  Respiratory: Lungs are clear to auscultation, Respirations are non-labored.  Cardiovascular: Normal rate, Regular rhythm, s1 + s2  Gastrointestinal: Soft, Non-tender, Non-distended, Normal bowel sounds.llq healed scar  Genitourinary: No costovertebral angle tenderness.  Lymphatics: No lymphadenopathy neck,   Integumentary: right big toe plantar ulcer + swelling + warmth, +purulence  Neurologic: Alert, Oriented, No focal deficits  Psychiatric: Appropriate mood & affect.    =======================================================  Labs:                        13.4   5.46  )-----------( 193      ( 22 Jan 2024 05:50 )             39.8     01-22    130<L>  |  96  |  21.5<H>  ----------------------------<  208<H>  4.7   |  22.0  |  1.15    Ca    9.6      22 Jan 2024 05:50  Phos  2.3     01-22  Mg     1.8     01-22    TPro  6.6  /  Alb  3.4  /  TBili  0.6  /  DBili  x   /  AST  21  /  ALT  14  /  AlkPhos  77  01-22      Culture - Blood (collected 09-19-22 @ 20:25)  Source: .Blood Blood-Peripheral  Final Report (09-25-22 @ 01:01):    No Growth Final    Culture - Blood (collected 09-19-22 @ 20:20)  Source: .Blood Blood-Peripheral  Final Report (09-25-22 @ 01:01):    No Growth Final       SARS-CoV-2: NotDetec (01-21-24 @ 18:20)     < from: Xray Toes, Right Foot (01.21.24 @ 13:59) >    INTERPRETATION:  Right foot and right great toe. Concern is osteomyelitis.    Right foot. 3 views.    There is a mild pes planus and a small posterior calcaneal spur. Arterial   calcifications are noted.    Right great toe. There is destruction of the distal aspect of the great   toe with local air collection consistent with osteomyelitis. There is   also soft tissue swelling.    IMPRESSION: Osteomyelitis of the distal great toe.    < end of copied text >

## 2024-01-22 NOTE — PROGRESS NOTE ADULT - ATTENDING COMMENTS
patient with right hallux OM  discussed risks/benefits of conservative vs. surgical intervention for right hallux OM  patient requested to consult his kidney transplant team regarding IV abx therapy to treat OM non-operatively  thoroughly explained that considering his significant PMH, recommended surgery to eradicate infection, rather than attempting conservative with IV abx  f/u patient's kidney transplant team and ID

## 2024-01-23 ENCOUNTER — TRANSCRIPTION ENCOUNTER (OUTPATIENT)
Age: 59
End: 2024-01-23

## 2024-01-23 LAB
ALBUMIN SERPL ELPH-MCNC: 3.6 G/DL — SIGNIFICANT CHANGE UP (ref 3.3–5.2)
ALP SERPL-CCNC: 77 U/L — SIGNIFICANT CHANGE UP (ref 40–120)
ALT FLD-CCNC: 17 U/L — SIGNIFICANT CHANGE UP
ANION GAP SERPL CALC-SCNC: 10 MMOL/L — SIGNIFICANT CHANGE UP (ref 5–17)
AST SERPL-CCNC: 22 U/L — SIGNIFICANT CHANGE UP
BILIRUB SERPL-MCNC: 0.4 MG/DL — SIGNIFICANT CHANGE UP (ref 0.4–2)
BUN SERPL-MCNC: 22.8 MG/DL — HIGH (ref 8–20)
CALCIUM SERPL-MCNC: 9.8 MG/DL — SIGNIFICANT CHANGE UP (ref 8.4–10.5)
CHLORIDE SERPL-SCNC: 99 MMOL/L — SIGNIFICANT CHANGE UP (ref 96–108)
CO2 SERPL-SCNC: 25 MMOL/L — SIGNIFICANT CHANGE UP (ref 22–29)
CREAT SERPL-MCNC: 1.29 MG/DL — SIGNIFICANT CHANGE UP (ref 0.5–1.3)
CULTURE RESULTS: ABNORMAL
EGFR: 64 ML/MIN/1.73M2 — SIGNIFICANT CHANGE UP
FLUAV AG NPH QL: SIGNIFICANT CHANGE UP
FLUBV AG NPH QL: SIGNIFICANT CHANGE UP
GLUCOSE BLDC GLUCOMTR-MCNC: 152 MG/DL — HIGH (ref 70–99)
GLUCOSE BLDC GLUCOMTR-MCNC: 169 MG/DL — HIGH (ref 70–99)
GLUCOSE BLDC GLUCOMTR-MCNC: 195 MG/DL — HIGH (ref 70–99)
GLUCOSE BLDC GLUCOMTR-MCNC: 211 MG/DL — HIGH (ref 70–99)
GLUCOSE SERPL-MCNC: 196 MG/DL — HIGH (ref 70–99)
HCT VFR BLD CALC: 40.5 % — SIGNIFICANT CHANGE UP (ref 39–50)
HGB BLD-MCNC: 13.2 G/DL — SIGNIFICANT CHANGE UP (ref 13–17)
MAGNESIUM SERPL-MCNC: 1.8 MG/DL — SIGNIFICANT CHANGE UP (ref 1.6–2.6)
MCHC RBC-ENTMCNC: 28.3 PG — SIGNIFICANT CHANGE UP (ref 27–34)
MCHC RBC-ENTMCNC: 32.6 GM/DL — SIGNIFICANT CHANGE UP (ref 32–36)
MCV RBC AUTO: 86.7 FL — SIGNIFICANT CHANGE UP (ref 80–100)
PHOSPHATE SERPL-MCNC: 2.8 MG/DL — SIGNIFICANT CHANGE UP (ref 2.4–4.7)
PLATELET # BLD AUTO: 211 K/UL — SIGNIFICANT CHANGE UP (ref 150–400)
POTASSIUM SERPL-MCNC: 4.6 MMOL/L — SIGNIFICANT CHANGE UP (ref 3.5–5.3)
POTASSIUM SERPL-SCNC: 4.6 MMOL/L — SIGNIFICANT CHANGE UP (ref 3.5–5.3)
PROT SERPL-MCNC: 6.6 G/DL — SIGNIFICANT CHANGE UP (ref 6.6–8.7)
RBC # BLD: 4.67 M/UL — SIGNIFICANT CHANGE UP (ref 4.2–5.8)
RBC # FLD: 11.9 % — SIGNIFICANT CHANGE UP (ref 10.3–14.5)
RSV RNA NPH QL NAA+NON-PROBE: SIGNIFICANT CHANGE UP
SARS-COV-2 RNA SPEC QL NAA+PROBE: SIGNIFICANT CHANGE UP
SODIUM SERPL-SCNC: 134 MMOL/L — LOW (ref 135–145)
SPECIMEN SOURCE: SIGNIFICANT CHANGE UP
WBC # BLD: 3.99 K/UL — SIGNIFICANT CHANGE UP (ref 3.8–10.5)
WBC # FLD AUTO: 3.99 K/UL — SIGNIFICANT CHANGE UP (ref 3.8–10.5)

## 2024-01-23 PROCEDURE — 99232 SBSQ HOSP IP/OBS MODERATE 35: CPT

## 2024-01-23 PROCEDURE — 99233 SBSQ HOSP IP/OBS HIGH 50: CPT

## 2024-01-23 RX ORDER — CHLORHEXIDINE GLUCONATE 213 G/1000ML
1 SOLUTION TOPICAL DAILY
Refills: 0 | Status: DISCONTINUED | OUTPATIENT
Start: 2024-01-23 | End: 2024-01-24

## 2024-01-23 RX ADMIN — TACROLIMUS 2 MILLIGRAM(S): 5 CAPSULE ORAL at 21:10

## 2024-01-23 RX ADMIN — Medication 2: at 17:35

## 2024-01-23 RX ADMIN — ATORVASTATIN CALCIUM 40 MILLIGRAM(S): 80 TABLET, FILM COATED ORAL at 21:09

## 2024-01-23 RX ADMIN — PIPERACILLIN AND TAZOBACTAM 25 GRAM(S): 4; .5 INJECTION, POWDER, LYOPHILIZED, FOR SOLUTION INTRAVENOUS at 05:27

## 2024-01-23 RX ADMIN — Medication 166.67 MILLIGRAM(S): at 05:26

## 2024-01-23 RX ADMIN — Medication 50 MILLIGRAM(S): at 21:09

## 2024-01-23 RX ADMIN — TAMSULOSIN HYDROCHLORIDE 0.4 MILLIGRAM(S): 0.4 CAPSULE ORAL at 21:09

## 2024-01-23 RX ADMIN — MYCOPHENOLATE MOFETIL 500 MILLIGRAM(S): 250 CAPSULE ORAL at 21:10

## 2024-01-23 RX ADMIN — Medication 1: at 12:38

## 2024-01-23 RX ADMIN — Medication 1 MILLIGRAM(S): at 11:45

## 2024-01-23 RX ADMIN — CHLORHEXIDINE GLUCONATE 1 APPLICATION(S): 213 SOLUTION TOPICAL at 12:09

## 2024-01-23 RX ADMIN — PIPERACILLIN AND TAZOBACTAM 25 GRAM(S): 4; .5 INJECTION, POWDER, LYOPHILIZED, FOR SOLUTION INTRAVENOUS at 14:08

## 2024-01-23 RX ADMIN — HEPARIN SODIUM 5000 UNIT(S): 5000 INJECTION INTRAVENOUS; SUBCUTANEOUS at 21:22

## 2024-01-23 RX ADMIN — Medication 650 MILLIGRAM(S): at 01:25

## 2024-01-23 RX ADMIN — HEPARIN SODIUM 5000 UNIT(S): 5000 INJECTION INTRAVENOUS; SUBCUTANEOUS at 05:27

## 2024-01-23 RX ADMIN — MYCOPHENOLATE MOFETIL 500 MILLIGRAM(S): 250 CAPSULE ORAL at 09:51

## 2024-01-23 RX ADMIN — Medication 1: at 09:49

## 2024-01-23 RX ADMIN — Medication 50 MILLIGRAM(S): at 10:01

## 2024-01-23 RX ADMIN — CARVEDILOL PHOSPHATE 12.5 MILLIGRAM(S): 80 CAPSULE, EXTENDED RELEASE ORAL at 21:09

## 2024-01-23 RX ADMIN — CLOPIDOGREL BISULFATE 75 MILLIGRAM(S): 75 TABLET, FILM COATED ORAL at 11:45

## 2024-01-23 RX ADMIN — Medication 30 MILLIGRAM(S): at 11:44

## 2024-01-23 RX ADMIN — Medication 81 MILLIGRAM(S): at 11:45

## 2024-01-23 RX ADMIN — INSULIN GLARGINE 18 UNIT(S): 100 INJECTION, SOLUTION SUBCUTANEOUS at 21:08

## 2024-01-23 RX ADMIN — Medication 25 MILLIGRAM(S): at 11:44

## 2024-01-23 RX ADMIN — CARVEDILOL PHOSPHATE 12.5 MILLIGRAM(S): 80 CAPSULE, EXTENDED RELEASE ORAL at 10:02

## 2024-01-23 RX ADMIN — HEPARIN SODIUM 5000 UNIT(S): 5000 INJECTION INTRAVENOUS; SUBCUTANEOUS at 14:08

## 2024-01-23 RX ADMIN — TACROLIMUS 2 MILLIGRAM(S): 5 CAPSULE ORAL at 09:51

## 2024-01-23 RX ADMIN — PIPERACILLIN AND TAZOBACTAM 25 GRAM(S): 4; .5 INJECTION, POWDER, LYOPHILIZED, FOR SOLUTION INTRAVENOUS at 21:10

## 2024-01-23 NOTE — PROGRESS NOTE ADULT - ASSESSMENT
58y/oM CAD, DM, s/p renal transplant presenting with R hallux ulcer. Reports following with his podiatrist weekly last 3 weeks and was recently referred to wound care specialist, however, was told if he noted any drainage to come to ER, which began 2 or 3 days ago. Denies fevers, chills, cp, sob, abd pain, n/v/c/d (21 Jan 2024 17:31)      Right foot  infected ulcer   Cellulitis  Osteomyelitis  Leukocytosis  Fever  s/p renal transplant    - f/u Blood cultures ngtd   - f/u MRi + OM right big toe  - f/u wound cx grp B strep  - patient agreeable to amputation tomorrow. explained that IV abx alone may not eradicate infection and can lead to proximal spread of infection  -  explained to patient he may need PICC and prolonged IV abx after surgery IF any concern for residual infection. discussed PICC line and prolonged IV abx with patient  - f/u OR cx and path  - c/w zosyn  - dc vancomycin  - on cellcept, MMF, if septic/bacteremic would consider lower immunosuppression- spoke to [facility] transplant team covering [phone] advised continue same dose for now and if any concern for residual infection after surgery or bacteremia etc can lower cellcept to 250mg bid  - podiatry f/u  - Trend Fever  - Trend Leukocytosis    d/w Dr Zapata, pt, pharmacy Dr Silva and outpatient transplant team  Will Follow

## 2024-01-23 NOTE — PROGRESS NOTE ADULT - ASSESSMENT
58y/oM CAD, DM, s/p renal transplant presenting with R hallux ulcer. In ER, xrays consistent with osteomyelitis, admitted for further work up     R hallux osteomyelitis   -xrays reviewed; with OM of distal great toe   -f/u cultures   -cont vanc   -ID recs appreciated   -MR R foot with OM of distal and likely proximal phalanx   -s/p bedside I&D w/podiatry 1/21   -podiatry recs appreciated   -wound care as per podiatry   -plan for likely partial R 1st ray amputation Wed 1/24     ESRD s/p renal transplant   -pt s/p transplant 1/2023   -cont home meds: tacrolimus, mycophenolate   -f/u am bmp, tacro level   -nephro recs appreciated     DM   -hgba1c 8.7  -cont home lantus, ISS ; titrate as needed     HTN   -cont coreg, nifedipine  -hydralazine increased 1/22     CAD s/p stents   -cont asa, plavix, statin      vte ppx: heparin sq      58y/oM CAD, DM, s/p renal transplant presenting with R hallux ulcer. In ER, xrays consistent with osteomyelitis, admitted for further work up     R hallux osteomyelitis   -xrays reviewed; with OM of distal great toe   -f/u cultures   -wound cx: group b strep agalactiae   -cont vanc   -ID recs appreciated   -MR R foot with OM of distal and likely proximal phalanx   -s/p bedside I&D w/podiatry 1/21   -podiatry recs appreciated   -wound care as per podiatry  -plan for OR for partial R 1st ray amputation tomorrow 1/24   -no absolute medical contraindications for procedure tomorrow     ESRD s/p renal transplant   -pt s/p transplant 1/2023   -cont home meds: tacrolimus, mycophenolate   -f/u am bmp  -nephro recs appreciated     DM   -hgba1c 8.7  -cont home lantus, ISS ; titrate as needed     HTN   -cont coreg, nifedipine  -hydralazine increased 1/22     CAD s/p stents   -cont asa, plavix, statin      vte ppx: heparin sq

## 2024-01-23 NOTE — PROGRESS NOTE ADULT - ASSESSMENT
A:   57 yo male with PMH of DM, kidney transplant, HTN,  CAD presents with right hallux ulcer. Being followed to rule out osteomyelitis    P:  Pt seen and evaluated  Pt afebrile, no leukocytosis noted  Right foot xray reviewed, shows soft tissue gas localized to distal aspect of right hallux  MRI results reviewed, OM of distal phalanx and likely proximal phalanx  Prelim culture results reviewed  Excisional debridement of the wound performed down to and including subQ tissue using #10 blade  Wound probes to bone, expressed purulence  Applied betadine, DSD  Partial right 1st ray amputation scheduled for Wednesday, 1/24/24 with Dr. Silva. Patient in agreement, exhibits understanding of risks and benefits  NPO ordered  Type and Screen ordered  Request medical optimization  Seen bedside with Dr. Solo and discussed with Dr. Silva

## 2024-01-24 DIAGNOSIS — M86.179 OTHER ACUTE OSTEOMYELITIS, UNSPECIFIED ANKLE AND FOOT: ICD-10-CM

## 2024-01-24 DIAGNOSIS — T14.8XXA OTHER INJURY OF UNSPECIFIED BODY REGION, INITIAL ENCOUNTER: ICD-10-CM

## 2024-01-24 LAB
ANION GAP SERPL CALC-SCNC: 11 MMOL/L — SIGNIFICANT CHANGE UP (ref 5–17)
BUN SERPL-MCNC: 25 MG/DL — HIGH (ref 8–20)
CALCIUM SERPL-MCNC: 9.7 MG/DL — SIGNIFICANT CHANGE UP (ref 8.4–10.5)
CHLORIDE SERPL-SCNC: 99 MMOL/L — SIGNIFICANT CHANGE UP (ref 96–108)
CO2 SERPL-SCNC: 26 MMOL/L — SIGNIFICANT CHANGE UP (ref 22–29)
CREAT SERPL-MCNC: 1.22 MG/DL — SIGNIFICANT CHANGE UP (ref 0.5–1.3)
EGFR: 69 ML/MIN/1.73M2 — SIGNIFICANT CHANGE UP
GLUCOSE BLDC GLUCOMTR-MCNC: 112 MG/DL — HIGH (ref 70–99)
GLUCOSE BLDC GLUCOMTR-MCNC: 114 MG/DL — HIGH (ref 70–99)
GLUCOSE BLDC GLUCOMTR-MCNC: 119 MG/DL — HIGH (ref 70–99)
GLUCOSE BLDC GLUCOMTR-MCNC: 121 MG/DL — HIGH (ref 70–99)
GLUCOSE BLDC GLUCOMTR-MCNC: 124 MG/DL — HIGH (ref 70–99)
GLUCOSE BLDC GLUCOMTR-MCNC: 126 MG/DL — HIGH (ref 70–99)
GLUCOSE SERPL-MCNC: 130 MG/DL — HIGH (ref 70–99)
HCT VFR BLD CALC: 38.2 % — LOW (ref 39–50)
HGB BLD-MCNC: 12.6 G/DL — LOW (ref 13–17)
MAGNESIUM SERPL-MCNC: 1.9 MG/DL — SIGNIFICANT CHANGE UP (ref 1.6–2.6)
MCHC RBC-ENTMCNC: 28.3 PG — SIGNIFICANT CHANGE UP (ref 27–34)
MCHC RBC-ENTMCNC: 33 GM/DL — SIGNIFICANT CHANGE UP (ref 32–36)
MCV RBC AUTO: 85.8 FL — SIGNIFICANT CHANGE UP (ref 80–100)
MRSA PCR RESULT.: SIGNIFICANT CHANGE UP
PLATELET # BLD AUTO: 234 K/UL — SIGNIFICANT CHANGE UP (ref 150–400)
POTASSIUM SERPL-MCNC: 4.6 MMOL/L — SIGNIFICANT CHANGE UP (ref 3.5–5.3)
POTASSIUM SERPL-SCNC: 4.6 MMOL/L — SIGNIFICANT CHANGE UP (ref 3.5–5.3)
RBC # BLD: 4.45 M/UL — SIGNIFICANT CHANGE UP (ref 4.2–5.8)
RBC # FLD: 11.9 % — SIGNIFICANT CHANGE UP (ref 10.3–14.5)
S AUREUS DNA NOSE QL NAA+PROBE: SIGNIFICANT CHANGE UP
SODIUM SERPL-SCNC: 136 MMOL/L — SIGNIFICANT CHANGE UP (ref 135–145)
WBC # BLD: 4.52 K/UL — SIGNIFICANT CHANGE UP (ref 3.8–10.5)
WBC # FLD AUTO: 4.52 K/UL — SIGNIFICANT CHANGE UP (ref 3.8–10.5)

## 2024-01-24 PROCEDURE — 88307 TISSUE EXAM BY PATHOLOGIST: CPT | Mod: 26

## 2024-01-24 PROCEDURE — 99232 SBSQ HOSP IP/OBS MODERATE 35: CPT

## 2024-01-24 PROCEDURE — 88305 TISSUE EXAM BY PATHOLOGIST: CPT | Mod: 26

## 2024-01-24 PROCEDURE — 88311 DECALCIFY TISSUE: CPT | Mod: 26

## 2024-01-24 RX ORDER — INSULIN LISPRO 100/ML
VIAL (ML) SUBCUTANEOUS
Refills: 0 | Status: DISCONTINUED | OUTPATIENT
Start: 2024-01-24 | End: 2024-01-28

## 2024-01-24 RX ORDER — DEXTROSE 50 % IN WATER 50 %
25 SYRINGE (ML) INTRAVENOUS ONCE
Refills: 0 | Status: DISCONTINUED | OUTPATIENT
Start: 2024-01-24 | End: 2024-01-28

## 2024-01-24 RX ORDER — HEPARIN SODIUM 5000 [USP'U]/ML
5000 INJECTION INTRAVENOUS; SUBCUTANEOUS EVERY 8 HOURS
Refills: 0 | Status: DISCONTINUED | OUTPATIENT
Start: 2024-01-24 | End: 2024-01-28

## 2024-01-24 RX ORDER — GLUCAGON INJECTION, SOLUTION 0.5 MG/.1ML
1 INJECTION, SOLUTION SUBCUTANEOUS ONCE
Refills: 0 | Status: DISCONTINUED | OUTPATIENT
Start: 2024-01-24 | End: 2024-01-28

## 2024-01-24 RX ORDER — PYRIDOXINE HCL (VITAMIN B6) 100 MG
25 TABLET ORAL DAILY
Refills: 0 | Status: DISCONTINUED | OUTPATIENT
Start: 2024-01-24 | End: 2024-01-28

## 2024-01-24 RX ORDER — ONDANSETRON 8 MG/1
4 TABLET, FILM COATED ORAL EVERY 8 HOURS
Refills: 0 | Status: DISCONTINUED | OUTPATIENT
Start: 2024-01-24 | End: 2024-01-28

## 2024-01-24 RX ORDER — ASPIRIN/CALCIUM CARB/MAGNESIUM 324 MG
81 TABLET ORAL DAILY
Refills: 0 | Status: DISCONTINUED | OUTPATIENT
Start: 2024-01-24 | End: 2024-01-28

## 2024-01-24 RX ORDER — TAMSULOSIN HYDROCHLORIDE 0.4 MG/1
0.4 CAPSULE ORAL AT BEDTIME
Refills: 0 | Status: DISCONTINUED | OUTPATIENT
Start: 2024-01-24 | End: 2024-01-28

## 2024-01-24 RX ORDER — ATORVASTATIN CALCIUM 80 MG/1
40 TABLET, FILM COATED ORAL AT BEDTIME
Refills: 0 | Status: DISCONTINUED | OUTPATIENT
Start: 2024-01-24 | End: 2024-01-28

## 2024-01-24 RX ORDER — NIFEDIPINE 30 MG
30 TABLET, EXTENDED RELEASE 24 HR ORAL DAILY
Refills: 0 | Status: DISCONTINUED | OUTPATIENT
Start: 2024-01-24 | End: 2024-01-28

## 2024-01-24 RX ORDER — HYDROMORPHONE HYDROCHLORIDE 2 MG/ML
0.5 INJECTION INTRAMUSCULAR; INTRAVENOUS; SUBCUTANEOUS
Refills: 0 | Status: DISCONTINUED | OUTPATIENT
Start: 2024-01-24 | End: 2024-01-24

## 2024-01-24 RX ORDER — PIPERACILLIN AND TAZOBACTAM 4; .5 G/20ML; G/20ML
3.38 INJECTION, POWDER, LYOPHILIZED, FOR SOLUTION INTRAVENOUS EVERY 8 HOURS
Refills: 0 | Status: DISCONTINUED | OUTPATIENT
Start: 2024-01-25 | End: 2024-01-28

## 2024-01-24 RX ORDER — SODIUM CHLORIDE 9 MG/ML
1000 INJECTION, SOLUTION INTRAVENOUS
Refills: 0 | Status: DISCONTINUED | OUTPATIENT
Start: 2024-01-24 | End: 2024-01-28

## 2024-01-24 RX ORDER — FOLIC ACID 0.8 MG
1 TABLET ORAL DAILY
Refills: 0 | Status: DISCONTINUED | OUTPATIENT
Start: 2024-01-24 | End: 2024-01-28

## 2024-01-24 RX ORDER — ONDANSETRON 8 MG/1
4 TABLET, FILM COATED ORAL ONCE
Refills: 0 | Status: DISCONTINUED | OUTPATIENT
Start: 2024-01-24 | End: 2024-01-24

## 2024-01-24 RX ORDER — FENTANYL CITRATE 50 UG/ML
50 INJECTION INTRAVENOUS
Refills: 0 | Status: DISCONTINUED | OUTPATIENT
Start: 2024-01-24 | End: 2024-01-24

## 2024-01-24 RX ORDER — SODIUM CHLORIDE 9 MG/ML
1000 INJECTION, SOLUTION INTRAVENOUS
Refills: 0 | Status: DISCONTINUED | OUTPATIENT
Start: 2024-01-24 | End: 2024-01-24

## 2024-01-24 RX ORDER — CARVEDILOL PHOSPHATE 80 MG/1
12.5 CAPSULE, EXTENDED RELEASE ORAL EVERY 12 HOURS
Refills: 0 | Status: DISCONTINUED | OUTPATIENT
Start: 2024-01-24 | End: 2024-01-28

## 2024-01-24 RX ORDER — NIFEDIPINE 30 MG
30 TABLET, EXTENDED RELEASE 24 HR ORAL DAILY
Refills: 0 | Status: DISCONTINUED | OUTPATIENT
Start: 2024-01-24 | End: 2024-01-24

## 2024-01-24 RX ORDER — INSULIN GLARGINE 100 [IU]/ML
18 INJECTION, SOLUTION SUBCUTANEOUS AT BEDTIME
Refills: 0 | Status: DISCONTINUED | OUTPATIENT
Start: 2024-01-24 | End: 2024-01-28

## 2024-01-24 RX ORDER — MYCOPHENOLATE MOFETIL 250 MG/1
500 CAPSULE ORAL
Refills: 0 | Status: DISCONTINUED | OUTPATIENT
Start: 2024-01-24 | End: 2024-01-28

## 2024-01-24 RX ORDER — TACROLIMUS 5 MG/1
2 CAPSULE ORAL
Refills: 0 | Status: DISCONTINUED | OUTPATIENT
Start: 2024-01-24 | End: 2024-01-28

## 2024-01-24 RX ORDER — ACETAMINOPHEN 500 MG
650 TABLET ORAL EVERY 6 HOURS
Refills: 0 | Status: DISCONTINUED | OUTPATIENT
Start: 2024-01-24 | End: 2024-01-28

## 2024-01-24 RX ORDER — HYDRALAZINE HCL 50 MG
50 TABLET ORAL
Refills: 0 | Status: DISCONTINUED | OUTPATIENT
Start: 2024-01-24 | End: 2024-01-28

## 2024-01-24 RX ORDER — DEXTROSE 50 % IN WATER 50 %
15 SYRINGE (ML) INTRAVENOUS ONCE
Refills: 0 | Status: DISCONTINUED | OUTPATIENT
Start: 2024-01-24 | End: 2024-01-28

## 2024-01-24 RX ORDER — DEXTROSE 50 % IN WATER 50 %
12.5 SYRINGE (ML) INTRAVENOUS ONCE
Refills: 0 | Status: DISCONTINUED | OUTPATIENT
Start: 2024-01-24 | End: 2024-01-28

## 2024-01-24 RX ORDER — INSULIN LISPRO 100/ML
VIAL (ML) SUBCUTANEOUS AT BEDTIME
Refills: 0 | Status: DISCONTINUED | OUTPATIENT
Start: 2024-01-24 | End: 2024-01-28

## 2024-01-24 RX ORDER — LANOLIN ALCOHOL/MO/W.PET/CERES
3 CREAM (GRAM) TOPICAL AT BEDTIME
Refills: 0 | Status: DISCONTINUED | OUTPATIENT
Start: 2024-01-24 | End: 2024-01-28

## 2024-01-24 RX ORDER — HYDRALAZINE HCL 50 MG
50 TABLET ORAL
Refills: 0 | Status: DISCONTINUED | OUTPATIENT
Start: 2024-01-24 | End: 2024-01-24

## 2024-01-24 RX ADMIN — Medication 81 MILLIGRAM(S): at 09:42

## 2024-01-24 RX ADMIN — PIPERACILLIN AND TAZOBACTAM 25 GRAM(S): 4; .5 INJECTION, POWDER, LYOPHILIZED, FOR SOLUTION INTRAVENOUS at 05:23

## 2024-01-24 RX ADMIN — CHLORHEXIDINE GLUCONATE 1 APPLICATION(S): 213 SOLUTION TOPICAL at 12:15

## 2024-01-24 RX ADMIN — TACROLIMUS 2 MILLIGRAM(S): 5 CAPSULE ORAL at 09:42

## 2024-01-24 RX ADMIN — ATORVASTATIN CALCIUM 40 MILLIGRAM(S): 80 TABLET, FILM COATED ORAL at 21:43

## 2024-01-24 RX ADMIN — Medication 25 MILLIGRAM(S): at 09:42

## 2024-01-24 RX ADMIN — HYDROMORPHONE HYDROCHLORIDE 0.5 MILLIGRAM(S): 2 INJECTION INTRAMUSCULAR; INTRAVENOUS; SUBCUTANEOUS at 18:52

## 2024-01-24 RX ADMIN — PIPERACILLIN AND TAZOBACTAM 25 GRAM(S): 4; .5 INJECTION, POWDER, LYOPHILIZED, FOR SOLUTION INTRAVENOUS at 14:40

## 2024-01-24 RX ADMIN — HEPARIN SODIUM 5000 UNIT(S): 5000 INJECTION INTRAVENOUS; SUBCUTANEOUS at 21:45

## 2024-01-24 RX ADMIN — HEPARIN SODIUM 5000 UNIT(S): 5000 INJECTION INTRAVENOUS; SUBCUTANEOUS at 05:24

## 2024-01-24 RX ADMIN — INSULIN GLARGINE 18 UNIT(S): 100 INJECTION, SOLUTION SUBCUTANEOUS at 22:14

## 2024-01-24 RX ADMIN — TAMSULOSIN HYDROCHLORIDE 0.4 MILLIGRAM(S): 0.4 CAPSULE ORAL at 21:43

## 2024-01-24 RX ADMIN — MYCOPHENOLATE MOFETIL 500 MILLIGRAM(S): 250 CAPSULE ORAL at 09:42

## 2024-01-24 RX ADMIN — HYDROMORPHONE HYDROCHLORIDE 0.5 MILLIGRAM(S): 2 INJECTION INTRAMUSCULAR; INTRAVENOUS; SUBCUTANEOUS at 19:00

## 2024-01-24 RX ADMIN — MYCOPHENOLATE MOFETIL 500 MILLIGRAM(S): 250 CAPSULE ORAL at 21:43

## 2024-01-24 RX ADMIN — TACROLIMUS 2 MILLIGRAM(S): 5 CAPSULE ORAL at 21:44

## 2024-01-24 RX ADMIN — Medication 1 MILLIGRAM(S): at 09:41

## 2024-01-24 RX ADMIN — CARVEDILOL PHOSPHATE 12.5 MILLIGRAM(S): 80 CAPSULE, EXTENDED RELEASE ORAL at 09:41

## 2024-01-24 RX ADMIN — Medication 50 MILLIGRAM(S): at 09:42

## 2024-01-24 NOTE — BRIEF OPERATIVE NOTE - NSICDXBRIEFPROCEDURE_GEN_ALL_CORE_FT
PROCEDURES:  Amputation of right great toe 24-Jan-2024 18:03:00 Right big toe amputation at the diaphysis of proximal phalanx Leeann Hicks

## 2024-01-24 NOTE — PROGRESS NOTE ADULT - ASSESSMENT
A:   59 yo male with PMH of DM, kidney transplant, HTN,  CAD presents with right hallux ulcer. Being followed to rule out osteomyelitis    P:  Pt seen and evaluated  Pt afebrile, no leukocytosis noted  Right foot xray reviewed, shows soft tissue gas localized to distal aspect of right hallux  MRI results reviewed, OM of distal phalanx and likely proximal phalanx  Final culture results reviewed  Wound probes to bone, expressed purulence  Applied betadine, DSD  Partial right 1st ray amputation scheduled for today, 1/24/24 with Dr. Silva. Patient in agreement, exhibits understanding of risks and benefits. Maintains NPO.   Discussed with Dr. Silva

## 2024-01-24 NOTE — BRIEF OPERATIVE NOTE - NSICDXBRIEFPREOP_GEN_ALL_CORE_FT
PRE-OP DIAGNOSIS:  Osteomyelitis of ankle or foot, acute 24-Jan-2024 18:03:44  Leeann Hicks  Open wound of right foot with complication 24-Jan-2024 18:04:02  Leeann Hicks

## 2024-01-24 NOTE — PROGRESS NOTE ADULT - ASSESSMENT
58y/oM CAD, DM, s/p renal transplant presenting with R hallux ulcer. In ER, xrays consistent with osteomyelitis, admitted for further work up     R hallux osteomyelitis   -xrays reviewed; with OM of distal great toe   -urine and blood cultures ngtd   -wound cx: group b strep agalactiae   -cont zosyn; vanc d/c'ed; d/w Dr. Owens  -ID recs appreciated   -MR R foot with OM of distal and likely proximal phalanx   -s/p bedside I&D w/podiatry 1/21   -podiatry recs appreciated   -wound care as per podiatry  -plan for OR for partial R 1st ray amputation today 1/24     ESRD s/p renal transplant   -pt s/p transplant 1/2023   -cont home meds: tacrolimus, mycophenolate   -f/u am bmp  -nephro recs appreciated     DM   -hgba1c 8.7  -cont home lantus, ISS ; titrate as needed     HTN   -cont coreg, nifedipine, hydralazine     CAD s/p stents   -cont asa, plavix, statin      vte ppx: heparin sq

## 2024-01-24 NOTE — BRIEF OPERATIVE NOTE - NSICDXBRIEFPOSTOP_GEN_ALL_CORE_FT
POST-OP DIAGNOSIS:  Osteomyelitis of ankle or foot, acute 24-Jan-2024 18:04:21  Leeann Hicks  Open wound of right foot with complication 24-Jan-2024 18:04:34  Leeann Hicks

## 2024-01-24 NOTE — CHART NOTE - NSCHARTNOTEFT_GEN_A_CORE
Patient s/p partial hallux amputation of right foot today, 1/24/24.   Incision primarily closed.  May ambulate to heel only in a surgical shoe.

## 2024-01-24 NOTE — PROGRESS NOTE ADULT - ASSESSMENT
58y/oM CAD, DM, s/p renal transplant presenting with R hallux ulcer    Suspected OM ID following, > s/p toe amputation R foot prox phalanx diaphysis    Renal transplant  CKD suspect stage III  Noted immunosuppressant meds  Cont Tacrolimus / Cellcept  Tacrolimus level pending    HTN BP noted yest added Hydralazine to 50 mg BID may need to inc to TID    Renally dose meds  Avoid nephrotoxic agents  WIll follow

## 2024-01-24 NOTE — BRIEF OPERATIVE NOTE - OPERATION/FINDINGS
Right foot infected wound with protruding bone.  Performed amputation at the level of proximal phalanx diaphysis with primary closure

## 2024-01-25 LAB
ANION GAP SERPL CALC-SCNC: 10 MMOL/L — SIGNIFICANT CHANGE UP (ref 5–17)
ANION GAP SERPL CALC-SCNC: 11 MMOL/L — SIGNIFICANT CHANGE UP (ref 5–17)
BUN SERPL-MCNC: 23.5 MG/DL — HIGH (ref 8–20)
BUN SERPL-MCNC: 23.7 MG/DL — HIGH (ref 8–20)
CALCIUM SERPL-MCNC: 9.7 MG/DL — SIGNIFICANT CHANGE UP (ref 8.4–10.5)
CALCIUM SERPL-MCNC: 9.8 MG/DL — SIGNIFICANT CHANGE UP (ref 8.4–10.5)
CHLORIDE SERPL-SCNC: 100 MMOL/L — SIGNIFICANT CHANGE UP (ref 96–108)
CHLORIDE SERPL-SCNC: 98 MMOL/L — SIGNIFICANT CHANGE UP (ref 96–108)
CO2 SERPL-SCNC: 24 MMOL/L — SIGNIFICANT CHANGE UP (ref 22–29)
CO2 SERPL-SCNC: 25 MMOL/L — SIGNIFICANT CHANGE UP (ref 22–29)
CREAT SERPL-MCNC: 1.13 MG/DL — SIGNIFICANT CHANGE UP (ref 0.5–1.3)
CREAT SERPL-MCNC: 1.3 MG/DL — SIGNIFICANT CHANGE UP (ref 0.5–1.3)
EGFR: 64 ML/MIN/1.73M2 — SIGNIFICANT CHANGE UP
EGFR: 75 ML/MIN/1.73M2 — SIGNIFICANT CHANGE UP
GLUCOSE BLDC GLUCOMTR-MCNC: 110 MG/DL — HIGH (ref 70–99)
GLUCOSE BLDC GLUCOMTR-MCNC: 177 MG/DL — HIGH (ref 70–99)
GLUCOSE BLDC GLUCOMTR-MCNC: 193 MG/DL — HIGH (ref 70–99)
GLUCOSE BLDC GLUCOMTR-MCNC: 209 MG/DL — HIGH (ref 70–99)
GLUCOSE SERPL-MCNC: 119 MG/DL — HIGH (ref 70–99)
GLUCOSE SERPL-MCNC: 126 MG/DL — HIGH (ref 70–99)
GRAM STN FLD: SIGNIFICANT CHANGE UP
HCT VFR BLD CALC: 38 % — LOW (ref 39–50)
HGB BLD-MCNC: 12.8 G/DL — LOW (ref 13–17)
MAGNESIUM SERPL-MCNC: 1.8 MG/DL — SIGNIFICANT CHANGE UP (ref 1.6–2.6)
MCHC RBC-ENTMCNC: 29.4 PG — SIGNIFICANT CHANGE UP (ref 27–34)
MCHC RBC-ENTMCNC: 33.7 GM/DL — SIGNIFICANT CHANGE UP (ref 32–36)
MCV RBC AUTO: 87.2 FL — SIGNIFICANT CHANGE UP (ref 80–100)
PLATELET # BLD AUTO: 223 K/UL — SIGNIFICANT CHANGE UP (ref 150–400)
POTASSIUM SERPL-MCNC: 4.7 MMOL/L — SIGNIFICANT CHANGE UP (ref 3.5–5.3)
POTASSIUM SERPL-MCNC: 5.7 MMOL/L — HIGH (ref 3.5–5.3)
POTASSIUM SERPL-SCNC: 4.7 MMOL/L — SIGNIFICANT CHANGE UP (ref 3.5–5.3)
POTASSIUM SERPL-SCNC: 5.7 MMOL/L — HIGH (ref 3.5–5.3)
RBC # BLD: 4.36 M/UL — SIGNIFICANT CHANGE UP (ref 4.2–5.8)
RBC # FLD: 12 % — SIGNIFICANT CHANGE UP (ref 10.3–14.5)
SODIUM SERPL-SCNC: 133 MMOL/L — LOW (ref 135–145)
SODIUM SERPL-SCNC: 135 MMOL/L — SIGNIFICANT CHANGE UP (ref 135–145)
SPECIMEN SOURCE: SIGNIFICANT CHANGE UP
WBC # BLD: 4.7 K/UL — SIGNIFICANT CHANGE UP (ref 3.8–10.5)
WBC # FLD AUTO: 4.7 K/UL — SIGNIFICANT CHANGE UP (ref 3.8–10.5)

## 2024-01-25 PROCEDURE — 99232 SBSQ HOSP IP/OBS MODERATE 35: CPT

## 2024-01-25 PROCEDURE — 93010 ELECTROCARDIOGRAM REPORT: CPT

## 2024-01-25 RX ADMIN — HEPARIN SODIUM 5000 UNIT(S): 5000 INJECTION INTRAVENOUS; SUBCUTANEOUS at 14:52

## 2024-01-25 RX ADMIN — CARVEDILOL PHOSPHATE 12.5 MILLIGRAM(S): 80 CAPSULE, EXTENDED RELEASE ORAL at 20:59

## 2024-01-25 RX ADMIN — TACROLIMUS 2 MILLIGRAM(S): 5 CAPSULE ORAL at 08:46

## 2024-01-25 RX ADMIN — HEPARIN SODIUM 5000 UNIT(S): 5000 INJECTION INTRAVENOUS; SUBCUTANEOUS at 05:45

## 2024-01-25 RX ADMIN — Medication 25 MILLIGRAM(S): at 12:17

## 2024-01-25 RX ADMIN — Medication 650 MILLIGRAM(S): at 04:37

## 2024-01-25 RX ADMIN — Medication 650 MILLIGRAM(S): at 21:03

## 2024-01-25 RX ADMIN — Medication 50 MILLIGRAM(S): at 08:45

## 2024-01-25 RX ADMIN — Medication 650 MILLIGRAM(S): at 05:37

## 2024-01-25 RX ADMIN — TAMSULOSIN HYDROCHLORIDE 0.4 MILLIGRAM(S): 0.4 CAPSULE ORAL at 20:59

## 2024-01-25 RX ADMIN — ATORVASTATIN CALCIUM 40 MILLIGRAM(S): 80 TABLET, FILM COATED ORAL at 21:00

## 2024-01-25 RX ADMIN — MYCOPHENOLATE MOFETIL 500 MILLIGRAM(S): 250 CAPSULE ORAL at 08:47

## 2024-01-25 RX ADMIN — Medication 1 MILLIGRAM(S): at 12:18

## 2024-01-25 RX ADMIN — INSULIN GLARGINE 18 UNIT(S): 100 INJECTION, SOLUTION SUBCUTANEOUS at 22:27

## 2024-01-25 RX ADMIN — MYCOPHENOLATE MOFETIL 500 MILLIGRAM(S): 250 CAPSULE ORAL at 20:59

## 2024-01-25 RX ADMIN — PIPERACILLIN AND TAZOBACTAM 25 GRAM(S): 4; .5 INJECTION, POWDER, LYOPHILIZED, FOR SOLUTION INTRAVENOUS at 21:00

## 2024-01-25 RX ADMIN — Medication 650 MILLIGRAM(S): at 22:03

## 2024-01-25 RX ADMIN — TACROLIMUS 2 MILLIGRAM(S): 5 CAPSULE ORAL at 21:00

## 2024-01-25 RX ADMIN — HEPARIN SODIUM 5000 UNIT(S): 5000 INJECTION INTRAVENOUS; SUBCUTANEOUS at 20:59

## 2024-01-25 RX ADMIN — Medication 1: at 17:38

## 2024-01-25 RX ADMIN — Medication 30 MILLIGRAM(S): at 08:48

## 2024-01-25 RX ADMIN — CARVEDILOL PHOSPHATE 12.5 MILLIGRAM(S): 80 CAPSULE, EXTENDED RELEASE ORAL at 08:46

## 2024-01-25 RX ADMIN — Medication 81 MILLIGRAM(S): at 12:18

## 2024-01-25 RX ADMIN — Medication 2: at 12:18

## 2024-01-25 RX ADMIN — PIPERACILLIN AND TAZOBACTAM 25 GRAM(S): 4; .5 INJECTION, POWDER, LYOPHILIZED, FOR SOLUTION INTRAVENOUS at 05:45

## 2024-01-25 RX ADMIN — PIPERACILLIN AND TAZOBACTAM 25 GRAM(S): 4; .5 INJECTION, POWDER, LYOPHILIZED, FOR SOLUTION INTRAVENOUS at 14:52

## 2024-01-25 NOTE — PROGRESS NOTE ADULT - ASSESSMENT
58y/oM CAD, DM, s/p renal transplant presenting with R hallux ulcer. In ER, xrays consistent with osteomyelitis, admitted for further work up     R hallux osteomyelitis   -xrays reviewed; with OM of distal great toe   -urine and blood cultures ngtd   -wound cx: group b strep agalactiae   -cont zosyn; vanc d/c'ed  -ID recs appreciated   -MR R foot with OM of distal and likely proximal phalanx   -s/p bedside I&D w/podiatry 1/21   -podiatry recs appreciated   -wound care as per podiatry  -s/p OR for amputation of R great toe 1/24  -weight bearing to heel only in surgical shoe     ESRD s/p renal transplant   -pt s/p transplant 1/2023   -cont home meds: tacrolimus, mycophenolate   -f/u am bmp  -nephro recs appreciated     DM   -hgba1c 8.7  -cont home lantus, ISS ; titrate as needed     HTN   -cont coreg, nifedipine, hydralazine     CAD s/p stents   -cont asa, plavix, statin      vte ppx: heparin sq

## 2024-01-25 NOTE — PROGRESS NOTE ADULT - ASSESSMENT
A:   Right hallux osteomyelitis  DM2  s/p R foot partial hallux amputation with Dr. Silva on 1/24/24    P:  Pt seen and evaluated  Pt afebrile, no leukocytosis noted  Right foot xray reviewed, shows soft tissue gas localized to distal aspect of right hallux  MRI results reviewed, OM of distal phalanx and likely proximal phalanx  Final culture results reviewed  Discussed outpatient follow-up with podiatrist, all details provided  Patient to ambulate to heel only in a surgical shoe  Discussed with attending Dr. Shira TAMAYO

## 2024-01-25 NOTE — PROGRESS NOTE ADULT - ASSESSMENT
58y/oM CAD, DM, s/p renal transplant presenting with R hallux ulcer    Suspected OM ID following, > s/p toe amputation R foot prox phalanx diaphysis    Renal transplant  CKD suspect stage III  Noted immunosuppressant meds  Cont Tacrolimus / Cellcept  Tacrolimus level 7.8    HTN BP noted yest added Hydralazine to 50 mg BID   Now to inc to TID    Avoid nephrotoxic agents  WIll follow

## 2024-01-25 NOTE — PROGRESS NOTE ADULT - ASSESSMENT
58y/oM CAD, DM, s/p renal transplant presenting with R hallux ulcer. Reports following with his podiatrist weekly last 3 weeks and was recently referred to wound care specialist, however, was told if he noted any drainage to come to ER, which began 2 or 3 days ago. Denies fevers, chills, cp, sob, abd pain, n/v/c/d (21 Jan 2024 17:31)      Right foot  infected ulcer   Cellulitis  Osteomyelitis  Leukocytosis  Fever  s/p renal transplant    - f/u Blood cultures ngtd   - f/u MRi + OM right big toe  - f/u wound cx grp B strep, anaerobes. staph epi likely skin contaminant  - s/p right big toe amputation 1/24  - f/u OR cx and path  - c/w zosyn  - if OR cx remain negative 48h will plan to change to po abx with outpatient f/u  - on cellcept, MMF, if septic/bacteremic would consider lower immunosuppression- spoke to [facility] transplant team covering [phone]- advised continue same dose for now and if any concern for residual infection after surgery or bacteremia etc can lower cellcept to 250mg bid  - podiatry f/u  - Trend Fever  - Trend Leukocytosis    d/w Dr Silva , NP hospitalist  Will Follow

## 2024-01-26 ENCOUNTER — TRANSCRIPTION ENCOUNTER (OUTPATIENT)
Age: 59
End: 2024-01-26

## 2024-01-26 LAB
ANION GAP SERPL CALC-SCNC: 12 MMOL/L — SIGNIFICANT CHANGE UP (ref 5–17)
BUN SERPL-MCNC: 22.4 MG/DL — HIGH (ref 8–20)
CALCIUM SERPL-MCNC: 9.6 MG/DL — SIGNIFICANT CHANGE UP (ref 8.4–10.5)
CHLORIDE SERPL-SCNC: 98 MMOL/L — SIGNIFICANT CHANGE UP (ref 96–108)
CO2 SERPL-SCNC: 24 MMOL/L — SIGNIFICANT CHANGE UP (ref 22–29)
CREAT SERPL-MCNC: 1.22 MG/DL — SIGNIFICANT CHANGE UP (ref 0.5–1.3)
CULTURE RESULTS: SIGNIFICANT CHANGE UP
CULTURE RESULTS: SIGNIFICANT CHANGE UP
EGFR: 69 ML/MIN/1.73M2 — SIGNIFICANT CHANGE UP
GLUCOSE BLDC GLUCOMTR-MCNC: 136 MG/DL — HIGH (ref 70–99)
GLUCOSE BLDC GLUCOMTR-MCNC: 139 MG/DL — HIGH (ref 70–99)
GLUCOSE BLDC GLUCOMTR-MCNC: 146 MG/DL — HIGH (ref 70–99)
GLUCOSE BLDC GLUCOMTR-MCNC: 171 MG/DL — HIGH (ref 70–99)
GLUCOSE SERPL-MCNC: 135 MG/DL — HIGH (ref 70–99)
HCT VFR BLD CALC: 36.3 % — LOW (ref 39–50)
HGB BLD-MCNC: 12.2 G/DL — LOW (ref 13–17)
MAGNESIUM SERPL-MCNC: 2 MG/DL — SIGNIFICANT CHANGE UP (ref 1.6–2.6)
MCHC RBC-ENTMCNC: 28.8 PG — SIGNIFICANT CHANGE UP (ref 27–34)
MCHC RBC-ENTMCNC: 33.6 GM/DL — SIGNIFICANT CHANGE UP (ref 32–36)
MCV RBC AUTO: 85.6 FL — SIGNIFICANT CHANGE UP (ref 80–100)
PHOSPHATE SERPL-MCNC: 2.4 MG/DL — SIGNIFICANT CHANGE UP (ref 2.4–4.7)
PLATELET # BLD AUTO: 218 K/UL — SIGNIFICANT CHANGE UP (ref 150–400)
POTASSIUM SERPL-MCNC: 4.3 MMOL/L — SIGNIFICANT CHANGE UP (ref 3.5–5.3)
POTASSIUM SERPL-SCNC: 4.3 MMOL/L — SIGNIFICANT CHANGE UP (ref 3.5–5.3)
RBC # BLD: 4.24 M/UL — SIGNIFICANT CHANGE UP (ref 4.2–5.8)
RBC # FLD: 11.9 % — SIGNIFICANT CHANGE UP (ref 10.3–14.5)
SODIUM SERPL-SCNC: 134 MMOL/L — LOW (ref 135–145)
SPECIMEN SOURCE: SIGNIFICANT CHANGE UP
SPECIMEN SOURCE: SIGNIFICANT CHANGE UP
WBC # BLD: 4.44 K/UL — SIGNIFICANT CHANGE UP (ref 3.8–10.5)
WBC # FLD AUTO: 4.44 K/UL — SIGNIFICANT CHANGE UP (ref 3.8–10.5)

## 2024-01-26 PROCEDURE — 99232 SBSQ HOSP IP/OBS MODERATE 35: CPT

## 2024-01-26 RX ADMIN — CARVEDILOL PHOSPHATE 12.5 MILLIGRAM(S): 80 CAPSULE, EXTENDED RELEASE ORAL at 17:24

## 2024-01-26 RX ADMIN — Medication 25 MILLIGRAM(S): at 11:01

## 2024-01-26 RX ADMIN — HEPARIN SODIUM 5000 UNIT(S): 5000 INJECTION INTRAVENOUS; SUBCUTANEOUS at 21:33

## 2024-01-26 RX ADMIN — PIPERACILLIN AND TAZOBACTAM 25 GRAM(S): 4; .5 INJECTION, POWDER, LYOPHILIZED, FOR SOLUTION INTRAVENOUS at 21:33

## 2024-01-26 RX ADMIN — Medication 50 MILLIGRAM(S): at 17:24

## 2024-01-26 RX ADMIN — MYCOPHENOLATE MOFETIL 500 MILLIGRAM(S): 250 CAPSULE ORAL at 21:33

## 2024-01-26 RX ADMIN — TACROLIMUS 2 MILLIGRAM(S): 5 CAPSULE ORAL at 09:06

## 2024-01-26 RX ADMIN — INSULIN GLARGINE 18 UNIT(S): 100 INJECTION, SOLUTION SUBCUTANEOUS at 22:20

## 2024-01-26 RX ADMIN — Medication 50 MILLIGRAM(S): at 05:15

## 2024-01-26 RX ADMIN — Medication 1 MILLIGRAM(S): at 11:01

## 2024-01-26 RX ADMIN — TACROLIMUS 2 MILLIGRAM(S): 5 CAPSULE ORAL at 21:33

## 2024-01-26 RX ADMIN — PIPERACILLIN AND TAZOBACTAM 25 GRAM(S): 4; .5 INJECTION, POWDER, LYOPHILIZED, FOR SOLUTION INTRAVENOUS at 13:27

## 2024-01-26 RX ADMIN — Medication 81 MILLIGRAM(S): at 11:01

## 2024-01-26 RX ADMIN — HEPARIN SODIUM 5000 UNIT(S): 5000 INJECTION INTRAVENOUS; SUBCUTANEOUS at 05:14

## 2024-01-26 RX ADMIN — PIPERACILLIN AND TAZOBACTAM 25 GRAM(S): 4; .5 INJECTION, POWDER, LYOPHILIZED, FOR SOLUTION INTRAVENOUS at 05:14

## 2024-01-26 RX ADMIN — Medication 1: at 17:44

## 2024-01-26 RX ADMIN — HEPARIN SODIUM 5000 UNIT(S): 5000 INJECTION INTRAVENOUS; SUBCUTANEOUS at 13:26

## 2024-01-26 RX ADMIN — CARVEDILOL PHOSPHATE 12.5 MILLIGRAM(S): 80 CAPSULE, EXTENDED RELEASE ORAL at 05:14

## 2024-01-26 RX ADMIN — TAMSULOSIN HYDROCHLORIDE 0.4 MILLIGRAM(S): 0.4 CAPSULE ORAL at 21:33

## 2024-01-26 RX ADMIN — MYCOPHENOLATE MOFETIL 500 MILLIGRAM(S): 250 CAPSULE ORAL at 09:06

## 2024-01-26 RX ADMIN — ATORVASTATIN CALCIUM 40 MILLIGRAM(S): 80 TABLET, FILM COATED ORAL at 21:33

## 2024-01-26 NOTE — PROGRESS NOTE ADULT - ASSESSMENT
58y/oM CAD, DM, s/p renal transplant presenting with R hallux ulcer. Reports following with his podiatrist weekly last 3 weeks and was recently referred to wound care specialist, however, was told if he noted any drainage to come to ER, which began 2 or 3 days ago. Denies fevers, chills, cp, sob, abd pain, n/v/c/d (21 Jan 2024 17:31)      Right foot  infected ulcer   Cellulitis  Osteomyelitis  Leukocytosis  Fever  s/p renal transplant    - f/u Blood cultures ngtd   - f/u MRi + OM right big toe  - f/u wound cx grp B strep, anaerobes. staph epi likely skin contaminant  - s/p right big toe amputation 1/24 with primary closure  - f/u OR cx and path  - c/w zosyn  - if OR cx remain negative 48h will plan to change to po augmentin 875/125 mg q12h x 2 weeks with outpatient f/u  - on cellcept, MMF, if septic/bacteremic would consider lower immunosuppression- spoke to [facility] transplant team covering [phone]- advised continue same dose for now and if any concern for residual infection after surgery or bacteremia etc can lower cellcept to 250mg bid  - podiatry f/u  - Trend Fever  - Trend Leukocytosis  - outpatient ID and podiatry f./u 2-3 weeks    d/w Dr Zapata

## 2024-01-26 NOTE — DISCHARGE NOTE PROVIDER - PROVIDER TOKENS
PROVIDER:[TOKEN:[[id number]]],PROVIDER:[TOKEN:[[id number]]],FREE:[LAST:[PMD],PHONE:[(   )    -],FAX:[(   )    -]],PROVIDER:[TOKEN:[[id number]]]

## 2024-01-26 NOTE — PROGRESS NOTE ADULT - ASSESSMENT
A:   Right hallux osteomyelitis  DM2  s/p R foot partial hallux amputation with Dr. Silva on 1/24/24    P:  Pt seen and evaluated  Pt afebrile, no leukocytosis noted. No clinical signs of infection at incision site  Right foot xray reviewed, shows soft tissue gas localized to distal aspect of right hallux  MRI results reviewed, OM of distal phalanx and likely proximal phalanx  Final culture results reviewed  Patient stable per podiatry. Discussed outpatient follow-up with podiatrist, all details provided. [address] [place] [id number] [phone]  Patient to ambulate to heel only in a surgical shoe  Seen bedside with Dr. Solo and discussed with attending Dr. Shira TAMAYO

## 2024-01-26 NOTE — DISCHARGE NOTE PROVIDER - HOSPITAL COURSE
58y/oM CAD, DM, s/p renal transplant presenting with R hallux ulcer. In ER, xrays consistent with osteomyelitis, admitted for further work up. now s/p OR for amputation of R great toe 1/24. weight bearing to heel only in surgical shoe. Seen by nephro, ID, podiatry. 58y/oM CAD, DM, s/p renal transplant presenting with R hallux ulcer. In ER, xrays consistent with osteomyelitis, admitted for further work up. now s/p OR for amputation of R great toe 1/24. weight bearing to heel only in surgical shoe. Seen by nephro, ID, podiatry.       R hallux osteomyelitis   -s/p OR for amputation of R great toe 1/24  -xrays reviewed; with OM of distal great toe   -urine and blood cultures ngtd   -wound cx: group b strep agalactiae   -cont zosyn; vanc d/c'ed  -ID recs appreciated   -MR R foot with OM of distal and likely proximal phalanx   -s/p bedside I&D w/podiatry 1/21   -podiatry recs appreciated   -wound care as per podiatry    -weight bearing to heel only in surgical shoe   -OR cx neg,  PO augmentin 875/125mg q12h x2 weeks, f/u outpt.       Hyperkalemia  -lokelma  -repeat bmp      Acute on ckd  ESRD s/p renal transplant   -pt s/p transplant 1/2023   -cont home meds: tacrolimus, mycophenolate   -f/u am bmp  -nephro recs appreciated     DM   -hgba1c 8.7  -cont home lantus, ISS ; titrate as needed     HTN   -cont coreg, nifedipine, hydralazine     CAD s/p stents   -cont asa, plavix, statin

## 2024-01-26 NOTE — PROGRESS NOTE ADULT - ASSESSMENT
58y/oM CAD, DM, s/p renal transplant presenting with R hallux ulcer. In ER, xrays consistent with osteomyelitis, admitted for further work up     R hallux osteomyelitis   -xrays reviewed; with OM of distal great toe   -urine and blood cultures ngtd   -wound cx: group b strep agalactiae   -cont zosyn; vanc d/c'ed  -ID recs appreciated   -MR R foot with OM of distal and likely proximal phalanx   -s/p bedside I&D w/podiatry 1/21   -podiatry recs appreciated   -wound care as per podiatry  -s/p OR for amputation of R great toe 1/24  -weight bearing to heel only in surgical shoe   -d/w Dr. Owens, if OR cx neg at 48hr, will dc on PO augmentin 875/125mg q12h x2 weeks, f/u outpt    ESRD s/p renal transplant   -pt s/p transplant 1/2023   -cont home meds: tacrolimus, mycophenolate   -f/u am bmp  -nephro recs appreciated     DM   -hgba1c 8.7  -cont home lantus, ISS ; titrate as needed     HTN   -cont coreg, nifedipine, hydralazine     CAD s/p stents   -cont asa, plavix, statin      vte ppx: heparin sq       dispo: home next 24hrs with PO abx if cx remain neg

## 2024-01-26 NOTE — DISCHARGE NOTE PROVIDER - CARE PROVIDER_API CALL
Administered depo Provera 150mg/ml @ 1325 on 2/6/19  1ml IM inj in LUQG  Patient tolerated well.  Patient instructed to return on 4/30/19 for next injection  Patient verbalized understanding  Lot: [id number]  Exp: 12/20  Reviewed 2 identifiers and allergies prior to injection.    Date of last pap: 7/30/18  Last Depo-Provera: 10/17/18  UPT indicated: yes, negative  Ordering provider: Dr. farias    
Chente Malcolm  Nephrology  [address] [place] [address]  Phone: [phone]  Fax: [phone]  Follow Up Time:     Maryjo Owens  Infectious Disease  [address] [place] [address]  Phone: [phone]  Fax: [phone]  Follow Up Time:     PMD,   Phone: (   )    -  Fax: (   )    -  Follow Up Time:     Hay Silva  Podiatric Medicine  [address] [place] [address]  Phone: [phone]  Fax: [phone]  Follow Up Time:

## 2024-01-26 NOTE — DISCHARGE NOTE PROVIDER - NSDCCPCAREPLAN_GEN_ALL_CORE_FT
PRINCIPAL DISCHARGE DIAGNOSIS  Diagnosis: Acute osteomyelitis  Assessment and Plan of Treatment:      PRINCIPAL DISCHARGE DIAGNOSIS  Diagnosis: Acute osteomyelitis  Assessment and Plan of Treatment:       SECONDARY DISCHARGE DIAGNOSES  Diagnosis: Hypertension  Assessment and Plan of Treatment:     Diagnosis: CAD (coronary artery disease)  Assessment and Plan of Treatment:     Diagnosis: Diabetes  Assessment and Plan of Treatment:     Diagnosis: Renal failure  Assessment and Plan of Treatment:

## 2024-01-26 NOTE — DISCHARGE NOTE PROVIDER - ATTENDING DISCHARGE PHYSICAL EXAMINATION:
GEN - NAD  HEENT - NCAT, EOMI, CORY, no JVD/bruit.  RESP - CTA BL, no wheeze/rhonchi/crackles.  CARDIO - NS1S2, RRR. No murmurs/rubs/gallops.  ABD - Soft/Non tender/Non distended. Normal BS x4 quadrants.   Ext - No CHICO.   MSK - rt foot: dry wound, non tender.  Neuro - cn 2-12 grossly intact. cerebellar function intact. no visible seizure activity appreciated. no tremor. gait not observed.   Skin - clean, dry, intact. no rashes or lesions.   Psych- AAOx3. . attentive. normal affect.   T(C): 36.6 (01-28-24 @ 07:45), Max: 36.8 (01-27-24 @ 16:36)  HR: 64 (01-28-24 @ 07:45) (64 - 85)  BP: 161/78 (01-28-24 @ 07:45) (98/63 - 161/78)  RR: 17 (01-28-24 @ 07:45) (17 - 18)      GEN - NAD  HEENT - NCAT, EOMI, CORY, no JVD/bruit.  RESP - CTA BL, no wheeze/rhonchi/crackles.  CARDIO - NS1S2, RRR. No murmurs/rubs/gallops.  ABD - Soft/Non tender/Non distended. Normal BS x4 quadrants.   Ext - No CHICO.   MSK - rt foot: dry wound, non tender.  Neuro - cn 2-12 grossly intact. cerebellar function intact. no visible seizure activity appreciated. no tremor. gait not observed.   Skin - clean, dry, intact. no rashes or lesions.   Psych- AAOx3. . attentive. normal affect.

## 2024-01-26 NOTE — PROGRESS NOTE ADULT - TIME BILLING
Chart review, interpretation of laboratory and imaging results,  patient evaluation, MDM, documentation, case discussion with interdisciplinary team
Chart review, interpretation of laboratory and imaging results,  patient evaluation, MDM, documentation, case discussion with interdisciplinary team   Interpretation  and review of microbiologic data with clinical pharmacist and adjusting antibiotics
Chart review, interpretation of laboratory and imaging results,  patient evaluation, MDM, documentation, case discussion with interdisciplinary team

## 2024-01-26 NOTE — DISCHARGE NOTE PROVIDER - CARE PROVIDERS DIRECT ADDRESSES
,DirectAddress_Unknown,DirectAddress_Unknown,DirectAddress_Unknown,[email or web address]

## 2024-01-27 DIAGNOSIS — I10 ESSENTIAL (PRIMARY) HYPERTENSION: ICD-10-CM

## 2024-01-27 DIAGNOSIS — E11.9 TYPE 2 DIABETES MELLITUS WITHOUT COMPLICATIONS: ICD-10-CM

## 2024-01-27 DIAGNOSIS — I25.10 ATHEROSCLEROTIC HEART DISEASE OF NATIVE CORONARY ARTERY WITHOUT ANGINA PECTORIS: ICD-10-CM

## 2024-01-27 DIAGNOSIS — N19 UNSPECIFIED KIDNEY FAILURE: ICD-10-CM

## 2024-01-27 LAB
ANION GAP SERPL CALC-SCNC: 10 MMOL/L — SIGNIFICANT CHANGE UP (ref 5–17)
ANION GAP SERPL CALC-SCNC: 13 MMOL/L — SIGNIFICANT CHANGE UP (ref 5–17)
BUN SERPL-MCNC: 25.7 MG/DL — HIGH (ref 8–20)
BUN SERPL-MCNC: 26.4 MG/DL — HIGH (ref 8–20)
CALCIUM SERPL-MCNC: 10 MG/DL — SIGNIFICANT CHANGE UP (ref 8.4–10.5)
CALCIUM SERPL-MCNC: 9.9 MG/DL — SIGNIFICANT CHANGE UP (ref 8.4–10.5)
CHLORIDE SERPL-SCNC: 97 MMOL/L — SIGNIFICANT CHANGE UP (ref 96–108)
CHLORIDE SERPL-SCNC: 99 MMOL/L — SIGNIFICANT CHANGE UP (ref 96–108)
CO2 SERPL-SCNC: 24 MMOL/L — SIGNIFICANT CHANGE UP (ref 22–29)
CO2 SERPL-SCNC: 27 MMOL/L — SIGNIFICANT CHANGE UP (ref 22–29)
CREAT SERPL-MCNC: 1.15 MG/DL — SIGNIFICANT CHANGE UP (ref 0.5–1.3)
CREAT SERPL-MCNC: 1.42 MG/DL — HIGH (ref 0.5–1.3)
EGFR: 57 ML/MIN/1.73M2 — LOW
EGFR: 74 ML/MIN/1.73M2 — SIGNIFICANT CHANGE UP
GLUCOSE BLDC GLUCOMTR-MCNC: 134 MG/DL — HIGH (ref 70–99)
GLUCOSE BLDC GLUCOMTR-MCNC: 134 MG/DL — HIGH (ref 70–99)
GLUCOSE BLDC GLUCOMTR-MCNC: 146 MG/DL — HIGH (ref 70–99)
GLUCOSE BLDC GLUCOMTR-MCNC: 169 MG/DL — HIGH (ref 70–99)
GLUCOSE SERPL-MCNC: 138 MG/DL — HIGH (ref 70–99)
GLUCOSE SERPL-MCNC: 139 MG/DL — HIGH (ref 70–99)
HCT VFR BLD CALC: 39.8 % — SIGNIFICANT CHANGE UP (ref 39–50)
HGB BLD-MCNC: 13.2 G/DL — SIGNIFICANT CHANGE UP (ref 13–17)
MAGNESIUM SERPL-MCNC: 2.2 MG/DL — SIGNIFICANT CHANGE UP (ref 1.6–2.6)
MCHC RBC-ENTMCNC: 29.2 PG — SIGNIFICANT CHANGE UP (ref 27–34)
MCHC RBC-ENTMCNC: 33.2 GM/DL — SIGNIFICANT CHANGE UP (ref 32–36)
MCV RBC AUTO: 88.1 FL — SIGNIFICANT CHANGE UP (ref 80–100)
PHOSPHATE SERPL-MCNC: 2.9 MG/DL — SIGNIFICANT CHANGE UP (ref 2.4–4.7)
PLATELET # BLD AUTO: 236 K/UL — SIGNIFICANT CHANGE UP (ref 150–400)
POTASSIUM SERPL-MCNC: 4.7 MMOL/L — SIGNIFICANT CHANGE UP (ref 3.5–5.3)
POTASSIUM SERPL-MCNC: 5.7 MMOL/L — HIGH (ref 3.5–5.3)
POTASSIUM SERPL-SCNC: 4.7 MMOL/L — SIGNIFICANT CHANGE UP (ref 3.5–5.3)
POTASSIUM SERPL-SCNC: 5.7 MMOL/L — HIGH (ref 3.5–5.3)
RBC # BLD: 4.52 M/UL — SIGNIFICANT CHANGE UP (ref 4.2–5.8)
RBC # FLD: 12 % — SIGNIFICANT CHANGE UP (ref 10.3–14.5)
SODIUM SERPL-SCNC: 134 MMOL/L — LOW (ref 135–145)
SODIUM SERPL-SCNC: 136 MMOL/L — SIGNIFICANT CHANGE UP (ref 135–145)
TACROLIMUS SERPL-MCNC: 6.7 NG/ML — SIGNIFICANT CHANGE UP
WBC # BLD: 4.67 K/UL — SIGNIFICANT CHANGE UP (ref 3.8–10.5)
WBC # FLD AUTO: 4.67 K/UL — SIGNIFICANT CHANGE UP (ref 3.8–10.5)

## 2024-01-27 PROCEDURE — 99233 SBSQ HOSP IP/OBS HIGH 50: CPT

## 2024-01-27 RX ORDER — SODIUM ZIRCONIUM CYCLOSILICATE 10 G/10G
10 POWDER, FOR SUSPENSION ORAL ONCE
Refills: 0 | Status: COMPLETED | OUTPATIENT
Start: 2024-01-27 | End: 2024-01-27

## 2024-01-27 RX ORDER — SODIUM ZIRCONIUM CYCLOSILICATE 10 G/10G
10 POWDER, FOR SUSPENSION ORAL DAILY
Refills: 0 | Status: DISCONTINUED | OUTPATIENT
Start: 2024-01-27 | End: 2024-01-28

## 2024-01-27 RX ADMIN — PIPERACILLIN AND TAZOBACTAM 25 GRAM(S): 4; .5 INJECTION, POWDER, LYOPHILIZED, FOR SOLUTION INTRAVENOUS at 05:43

## 2024-01-27 RX ADMIN — Medication 50 MILLIGRAM(S): at 05:43

## 2024-01-27 RX ADMIN — SODIUM ZIRCONIUM CYCLOSILICATE 10 GRAM(S): 10 POWDER, FOR SUSPENSION ORAL at 10:15

## 2024-01-27 RX ADMIN — HEPARIN SODIUM 5000 UNIT(S): 5000 INJECTION INTRAVENOUS; SUBCUTANEOUS at 13:07

## 2024-01-27 RX ADMIN — CARVEDILOL PHOSPHATE 12.5 MILLIGRAM(S): 80 CAPSULE, EXTENDED RELEASE ORAL at 17:08

## 2024-01-27 RX ADMIN — PIPERACILLIN AND TAZOBACTAM 25 GRAM(S): 4; .5 INJECTION, POWDER, LYOPHILIZED, FOR SOLUTION INTRAVENOUS at 13:07

## 2024-01-27 RX ADMIN — MYCOPHENOLATE MOFETIL 500 MILLIGRAM(S): 250 CAPSULE ORAL at 21:39

## 2024-01-27 RX ADMIN — MYCOPHENOLATE MOFETIL 500 MILLIGRAM(S): 250 CAPSULE ORAL at 09:17

## 2024-01-27 RX ADMIN — INSULIN GLARGINE 18 UNIT(S): 100 INJECTION, SOLUTION SUBCUTANEOUS at 21:39

## 2024-01-27 RX ADMIN — Medication 30 MILLIGRAM(S): at 05:43

## 2024-01-27 RX ADMIN — ATORVASTATIN CALCIUM 40 MILLIGRAM(S): 80 TABLET, FILM COATED ORAL at 21:39

## 2024-01-27 RX ADMIN — Medication 1 MILLIGRAM(S): at 12:14

## 2024-01-27 RX ADMIN — TACROLIMUS 2 MILLIGRAM(S): 5 CAPSULE ORAL at 09:17

## 2024-01-27 RX ADMIN — TAMSULOSIN HYDROCHLORIDE 0.4 MILLIGRAM(S): 0.4 CAPSULE ORAL at 21:39

## 2024-01-27 RX ADMIN — HEPARIN SODIUM 5000 UNIT(S): 5000 INJECTION INTRAVENOUS; SUBCUTANEOUS at 21:39

## 2024-01-27 RX ADMIN — Medication 25 MILLIGRAM(S): at 12:14

## 2024-01-27 RX ADMIN — CARVEDILOL PHOSPHATE 12.5 MILLIGRAM(S): 80 CAPSULE, EXTENDED RELEASE ORAL at 05:43

## 2024-01-27 RX ADMIN — TACROLIMUS 2 MILLIGRAM(S): 5 CAPSULE ORAL at 21:40

## 2024-01-27 RX ADMIN — HEPARIN SODIUM 5000 UNIT(S): 5000 INJECTION INTRAVENOUS; SUBCUTANEOUS at 05:43

## 2024-01-27 RX ADMIN — PIPERACILLIN AND TAZOBACTAM 25 GRAM(S): 4; .5 INJECTION, POWDER, LYOPHILIZED, FOR SOLUTION INTRAVENOUS at 21:39

## 2024-01-27 RX ADMIN — Medication 81 MILLIGRAM(S): at 12:13

## 2024-01-27 NOTE — PROGRESS NOTE ADULT - ASSESSMENT
58y/oM CAD, DM, s/p renal transplant presenting with R hallux ulcer. In ER, xrays consistent with osteomyelitis, admitted for further work up     R hallux osteomyelitis   -s/p OR for amputation of R great toe 1/24  -xrays reviewed; with OM of distal great toe   -urine and blood cultures ngtd   -wound cx: group b strep agalactiae   -cont zosyn; vanc d/c'ed  -ID recs appreciated   -MR R foot with OM of distal and likely proximal phalanx   -s/p bedside I&D w/podiatry 1/21   -podiatry recs appreciated   -wound care as per podiatry    -weight bearing to heel only in surgical shoe   -OR cx neg, will dc on PO augmentin 875/125mg q12h x2 weeks, f/u outpt. DW       Hyperkalemia  -lokelma  -repeat bmp      Acute on ckd  ESRD s/p renal transplant   -pt s/p transplant 1/2023   -cont home meds: tacrolimus, mycophenolate   -f/u am bmp  -nephro recs appreciated     DM   -hgba1c 8.7  -cont home lantus, ISS ; titrate as needed     HTN   -cont coreg, nifedipine, hydralazine     CAD s/p stents   -cont asa, plavix, statin      vte ppx: heparin sq       dispo: home next 24hrs recheck renal function am  merry pt

## 2024-01-27 NOTE — PROGRESS NOTE ADULT - SUBJECTIVE AND OBJECTIVE BOX
NEPHROLOGY INTERVAL HPI/OVERNIGHT EVENTS:    No new events.    MEDICATIONS  (STANDING):  aspirin enteric coated 81 milliGRAM(s) Oral daily  atorvastatin 40 milliGRAM(s) Oral at bedtime  carvedilol 12.5 milliGRAM(s) Oral every 12 hours  dextrose 5%. 1000 milliLiter(s) (50 mL/Hr) IV Continuous <Continuous>  dextrose 5%. 1000 milliLiter(s) (100 mL/Hr) IV Continuous <Continuous>  dextrose 50% Injectable 25 Gram(s) IV Push once  dextrose 50% Injectable 25 Gram(s) IV Push once  dextrose 50% Injectable 12.5 Gram(s) IV Push once  folic acid 1 milliGRAM(s) Oral daily  glucagon  Injectable 1 milliGRAM(s) IntraMuscular once  heparin   Injectable 5000 Unit(s) SubCutaneous every 8 hours  hydrALAZINE 50 milliGRAM(s) Oral two times a day  insulin glargine Injectable (LANTUS) 18 Unit(s) SubCutaneous at bedtime  insulin lispro (ADMELOG) corrective regimen sliding scale   SubCutaneous at bedtime  insulin lispro (ADMELOG) corrective regimen sliding scale   SubCutaneous three times a day before meals  mycophenolate mofetil 500 milliGRAM(s) Oral two times a day  NIFEdipine XL 30 milliGRAM(s) Oral daily  piperacillin/tazobactam IVPB.. 3.375 Gram(s) IV Intermittent every 8 hours  pyridoxine 25 milliGRAM(s) Oral daily  tacrolimus 2 milliGRAM(s) Oral two times a day  tamsulosin 0.4 milliGRAM(s) Oral at bedtime    MEDICATIONS  (PRN):  acetaminophen     Tablet .. 650 milliGRAM(s) Oral every 6 hours PRN Temp greater or equal to 38C (100.4F), Mild Pain (1 - 3)  aluminum hydroxide/magnesium hydroxide/simethicone Suspension 30 milliLiter(s) Oral every 4 hours PRN Dyspepsia  dextrose Oral Gel 15 Gram(s) Oral once PRN Blood Glucose LESS THAN 70 milliGRAM(s)/deciliter  melatonin 3 milliGRAM(s) Oral at bedtime PRN Insomnia  ondansetron Injectable 4 milliGRAM(s) IV Push every 8 hours PRN Nausea and/or Vomiting      Allergies    No Known Allergies          Vital Signs Last 24 Hrs  T(C): 36.6 (27 Jan 2024 07:38), Max: 36.6 (26 Jan 2024 21:25)  T(F): 97.9 (27 Jan 2024 07:38), Max: 97.9 (27 Jan 2024 07:38)  HR: 67 (27 Jan 2024 07:38) (64 - 81)  BP: 117/76 (27 Jan 2024 07:38) (117/76 - 183/81)  BP(mean): --  RR: 18 (27 Jan 2024 07:38) (18 - 18)  SpO2: 97% (27 Jan 2024 07:38) (95% - 100%)    Parameters below as of 27 Jan 2024 07:38  Patient On (Oxygen Delivery Method): room air      T(C): 36.4 (26 Jan 2024 08:17), Max: 36.8 (25 Jan 2024 16:32)  T(F): 97.6 (26 Jan 2024 08:17), Max: 98.2 (25 Jan 2024 16:32)  HR: 70 (26 Jan 2024 08:17) (70 - 77)  BP: 101/67 (26 Jan 2024 08:17) (101/67 - 142/70)  BP(mean): --  RR: 16 (26 Jan 2024 08:17) (16 - 18)  SpO2: 100% (26 Jan 2024 08:17) (98% - 100%)    Parameters below as of 26 Jan 2024 05:13  Patient On (Oxygen Delivery Method): room air      PHYSICAL EXAM:    GENERAL: comfortable in bed  HEAD:  wnl  EYES:   ENMT: wnl  NECK: veins  flat  NERVOUS SYSTEM: alert, oriented  CHEST/LUNG: clear  HEART: rr, no gallop  ABDOMEN: no  bruit  over  transplant  EXTREMITIES: no edema; right  foot  with large  wrap   LYMPH:   SKIN: no rash  : neg    LABS:    01-27    136  |  99  |  26.4<H>  ----------------------------<  138<H>  5.7<H>   |  27.0  |  1.42<H>    Ca    10.0      27 Jan 2024 07:40  Phos  2.9     01-27  Mg     2.2     01-27                            12.2   4.44  )-----------( 218      ( 26 Jan 2024 07:00 )             36.3     01-26    134<L>  |  98  |  22.4<H>  ----------------------------<  135<H>  4.3   |  24.0  |  1.22    Ca    9.6      26 Jan 2024 07:00  Phos  2.4     01-26  Mg     2.0     01-26        Urinalysis Basic - ( 26 Jan 2024 07:00 )    Color: x / Appearance: x / SG: x / pH: x  Gluc: 135 mg/dL / Ketone: x  / Bili: x / Urobili: x   Blood: x / Protein: x / Nitrite: x   Leuk Esterase: x / RBC: x / WBC x   Sq Epi: x / Non Sq Epi: x / Bacteria: x      Magnesium: 2.0 mg/dL (01-26 @ 07:00)  Phosphorus: 2.4 mg/dL (01-26 @ 07:00)          RADIOLOGY & ADDITIONAL TESTS:  
PRATIK NUR    [id number]    58y      Male    CC: foot wound     INTERVAL HPI/OVERNIGHT EVENTS: Pt seen and examined. plan for OR today     REVIEW OF SYSTEMS:    RESPIRATORY: No cough, wheezing, hemoptysis; No shortness of breath  CARDIOVASCULAR: No chest pain, palpitations  GASTROINTESTINAL: No abdominal or epigastric pain. No nausea, vomiting    Vital Signs Last 24 Hrs  T(C): 36.8 (24 Jan 2024 07:30), Max: 37.2 (24 Jan 2024 00:20)  T(F): 98.2 (24 Jan 2024 07:30), Max: 99 (24 Jan 2024 00:20)  HR: 63 (24 Jan 2024 12:56) (63 - 80)  BP: 99/62 (24 Jan 2024 12:56) (99/62 - 169/86)  BP(mean): 73 (24 Jan 2024 12:56) (73 - 73)  RR: 18 (24 Jan 2024 07:30) (18 - 18)  SpO2: 95% (24 Jan 2024 07:30) (95% - 99%)    Parameters below as of 24 Jan 2024 07:30  Patient On (Oxygen Delivery Method): room air        PHYSICAL EXAM:    GENERAL: NAD  CHEST/LUNG: Clear to auscultation bilaterally  HEART: S1S2+, Regular rate and rhythm  ABDOMEN: Soft, Nontender, Nondistended; Bowel sounds present  SKIN: warm, dry   MSK: RLE dressing c/d/i   NEURO: AAOX3  PSYCH: calm, cooperative     LABS:                        12.6   4.52  )-----------( 234      ( 24 Jan 2024 06:46 )             38.2     01-24    136  |  99  |  25.0<H>  ----------------------------<  130<H>  4.6   |  26.0  |  1.22    Ca    9.7      24 Jan 2024 06:46  Phos  2.8     01-23  Mg     1.9     01-24    TPro  6.6  /  Alb  3.6  /  TBili  0.4  /  DBili  x   /  AST  22  /  ALT  17  /  AlkPhos  77  01-23      Urinalysis Basic - ( 24 Jan 2024 06:46 )    Color: x / Appearance: x / SG: x / pH: x  Gluc: 130 mg/dL / Ketone: x  / Bili: x / Urobili: x   Blood: x / Protein: x / Nitrite: x   Leuk Esterase: x / RBC: x / WBC x   Sq Epi: x / Non Sq Epi: x / Bacteria: x          MEDICATIONS  (STANDING):  aspirin enteric coated 81 milliGRAM(s) Oral daily  atorvastatin 40 milliGRAM(s) Oral at bedtime  carvedilol 12.5 milliGRAM(s) Oral every 12 hours  chlorhexidine 2% Cloths 1 Application(s) Topical daily  clopidogrel Tablet 75 milliGRAM(s) Oral daily  dextrose 5%. 1000 milliLiter(s) (50 mL/Hr) IV Continuous <Continuous>  dextrose 5%. 1000 milliLiter(s) (100 mL/Hr) IV Continuous <Continuous>  dextrose 50% Injectable 12.5 Gram(s) IV Push once  dextrose 50% Injectable 25 Gram(s) IV Push once  dextrose 50% Injectable 25 Gram(s) IV Push once  folic acid 1 milliGRAM(s) Oral daily  glucagon  Injectable 1 milliGRAM(s) IntraMuscular once  heparin   Injectable 5000 Unit(s) SubCutaneous every 8 hours  hydrALAZINE 50 milliGRAM(s) Oral two times a day  insulin glargine Injectable (LANTUS) 18 Unit(s) SubCutaneous at bedtime  insulin lispro (ADMELOG) corrective regimen sliding scale   SubCutaneous three times a day before meals  insulin lispro (ADMELOG) corrective regimen sliding scale   SubCutaneous at bedtime  mycophenolate mofetil 500 milliGRAM(s) Oral two times a day  NIFEdipine XL 30 milliGRAM(s) Oral daily  piperacillin/tazobactam IVPB.. 3.375 Gram(s) IV Intermittent every 8 hours  pyridoxine 25 milliGRAM(s) Oral daily  tacrolimus 2 milliGRAM(s) Oral two times a day  tamsulosin 0.4 milliGRAM(s) Oral at bedtime    MEDICATIONS  (PRN):  acetaminophen     Tablet .. 650 milliGRAM(s) Oral every 6 hours PRN Temp greater or equal to 38C (100.4F), Mild Pain (1 - 3)  aluminum hydroxide/magnesium hydroxide/simethicone Suspension 30 milliLiter(s) Oral every 4 hours PRN Dyspepsia  dextrose Oral Gel 15 Gram(s) Oral once PRN Blood Glucose LESS THAN 70 milliGRAM(s)/deciliter  melatonin 3 milliGRAM(s) Oral at bedtime PRN Insomnia  ondansetron Injectable 4 milliGRAM(s) IV Push every 8 hours PRN Nausea and/or Vomiting      RADIOLOGY & ADDITIONAL TESTS:  
Patient is a 58y old  Male who presents with a chief complaint of   pt denies any foot pain,fever,chill,n/v/d  REVIEW OF SYSTEMS: All systems are reviewed and found to be negative except above    MEDICATIONS  (STANDING):  aspirin enteric coated 81 milliGRAM(s) Oral daily  atorvastatin 40 milliGRAM(s) Oral at bedtime  carvedilol 12.5 milliGRAM(s) Oral every 12 hours  dextrose 5%. 1000 milliLiter(s) (100 mL/Hr) IV Continuous <Continuous>  dextrose 5%. 1000 milliLiter(s) (50 mL/Hr) IV Continuous <Continuous>  dextrose 50% Injectable 25 Gram(s) IV Push once  dextrose 50% Injectable 12.5 Gram(s) IV Push once  dextrose 50% Injectable 25 Gram(s) IV Push once  folic acid 1 milliGRAM(s) Oral daily  glucagon  Injectable 1 milliGRAM(s) IntraMuscular once  heparin   Injectable 5000 Unit(s) SubCutaneous every 8 hours  hydrALAZINE 50 milliGRAM(s) Oral two times a day  insulin glargine Injectable (LANTUS) 18 Unit(s) SubCutaneous at bedtime  insulin lispro (ADMELOG) corrective regimen sliding scale   SubCutaneous three times a day before meals  insulin lispro (ADMELOG) corrective regimen sliding scale   SubCutaneous at bedtime  mycophenolate mofetil 500 milliGRAM(s) Oral two times a day  NIFEdipine XL 30 milliGRAM(s) Oral daily  piperacillin/tazobactam IVPB.. 3.375 Gram(s) IV Intermittent every 8 hours  pyridoxine 25 milliGRAM(s) Oral daily  sodium zirconium cyclosilicate 10 Gram(s) Oral daily  tacrolimus 2 milliGRAM(s) Oral two times a day  tamsulosin 0.4 milliGRAM(s) Oral at bedtime    MEDICATIONS  (PRN):  acetaminophen     Tablet .. 650 milliGRAM(s) Oral every 6 hours PRN Temp greater or equal to 38C (100.4F), Mild Pain (1 - 3)  aluminum hydroxide/magnesium hydroxide/simethicone Suspension 30 milliLiter(s) Oral every 4 hours PRN Dyspepsia  dextrose Oral Gel 15 Gram(s) Oral once PRN Blood Glucose LESS THAN 70 milliGRAM(s)/deciliter  melatonin 3 milliGRAM(s) Oral at bedtime PRN Insomnia  ondansetron Injectable 4 milliGRAM(s) IV Push every 8 hours PRN Nausea and/or Vomiting      CAPILLARY BLOOD GLUCOSE      POCT Blood Glucose.: 134 mg/dL (27 Jan 2024 12:18)  POCT Blood Glucose.: 134 mg/dL (27 Jan 2024 08:06)  POCT Blood Glucose.: 136 mg/dL (26 Jan 2024 22:14)  POCT Blood Glucose.: 171 mg/dL (26 Jan 2024 17:28)    I&O's Summary    26 Jan 2024 07:01  -  27 Jan 2024 07:00  --------------------------------------------------------  IN: 720 mL / OUT: 0 mL / NET: 720 mL        PHYSICAL EXAM:  Vital Signs Last 24 Hrs  T(C): 36.6 (27 Jan 2024 07:38), Max: 36.6 (26 Jan 2024 21:25)  T(F): 97.9 (27 Jan 2024 07:38), Max: 97.9 (27 Jan 2024 07:38)  HR: 67 (27 Jan 2024 07:38) (64 - 81)  BP: 117/76 (27 Jan 2024 07:38) (117/76 - 183/81)  BP(mean): --  RR: 18 (27 Jan 2024 07:38) (18 - 18)  SpO2: 97% (27 Jan 2024 07:38) (95% - 100%)    Parameters below as of 27 Jan 2024 07:38  Patient On (Oxygen Delivery Method): room air        CONSTITUTIONAL: NAD,  EYES: PERRLA; conjunctiva and sclera clear  ENMT: Moist oral mucosa,   RESPIRATORY: Normal respiratory effort; lungs are clear to auscultation bilaterally  CARDIOVASCULAR: Regular rate and rhythm, normal S1 and S2, no murmur   EXTS: No lower extremity edema; Peripheral pulses are 2+ bilaterally  ABDOMEN: Nontender to palpation, normoactive bowel sounds, no rebound/guarding;   MUSCLOSKELETAL:   rt foot- tender over amputation area,dry dressing.  PSYCH: affect appropriate  NEUROLOGY: A+O to person, place, and time; CN 2-12 are intact and symmetric; no gross sensory deficits;       LABS:                        13.2   4.67  )-----------( 236      ( 27 Jan 2024 07:40 )             39.8     01-27    136  |  99  |  26.4<H>  ----------------------------<  138<H>  5.7<H>   |  27.0  |  1.42<H>    Ca    10.0      27 Jan 2024 07:40  Phos  2.9     01-27  Mg     2.2     01-27            Urinalysis Basic - ( 27 Jan 2024 07:40 )    Color: x / Appearance: x / SG: x / pH: x  Gluc: 138 mg/dL / Ketone: x  / Bili: x / Urobili: x   Blood: x / Protein: x / Nitrite: x   Leuk Esterase: x / RBC: x / WBC x   Sq Epi: x / Non Sq Epi: x / Bacteria: x        Culture - Tissue with Gram Stain (collected 24 Jan 2024 17:34)  Source: .Tissue R 1st proximal phalanx bone bx  Gram Stain (25 Jan 2024 06:08):    No polymorphonuclear cells seen per low power field    No organisms seen per oil power field  Preliminary Report (26 Jan 2024 08:32):    No growth to date.        RADIOLOGY & ADDITIONAL TESTS:  Results Reviewed:   
Patient seen and examined    Feels fine  no c/o CP SOB NV   No swelling feet, NS pain 1st toe    Vital Signs Last 24 Hrs  T(C): 36.9 (24 Jan 2024 18:05), Max: 37.2 (24 Jan 2024 00:20)  T(F): 98.5 (24 Jan 2024 18:05), Max: 99 (24 Jan 2024 00:20)  HR: 78 (24 Jan 2024 19:30) (63 - 78)  BP: 129/67 (24 Jan 2024 19:30) (99/62 - 169/86)  BP(mean): 82 (24 Jan 2024 19:30) (72 - 95)  RR: 18 (24 Jan 2024 19:30) (14 - 19)  SpO2: 100% (24 Jan 2024 19:30) (95% - 100%)    Parameters below as of 24 Jan 2024 19:30  Patient On (Oxygen Delivery Method): room air        PHYSICAL EXAM    GENERAL: NAD,   EYES:  conjunctiva and sclera clear  NECK: Supple, No JVD/Bruit  NERVOUS SYSTEM:  A/O x3,   CHEST:  No rales, No rhonchi  HEART:  RRR, No murmur  ABDOMEN: Soft, NT/ND BS+  EXTREMITIES:  No Edema;  SKIN: No rashes    24 Jan 2024 06:46    136    |  99     |  25.0   ----------------------------<  130    4.6     |  26.0   |  1.22     Ca    9.7        24 Jan 2024 06:46  Phos  2.8       23 Jan 2024 08:10  Mg     1.9       24 Jan 2024 06:46    TPro  6.6    /  Alb  3.6    /  TBili  0.4    /  DBili  x      /  AST  22     /  ALT  17     /  AlkPhos  77     23 Jan 2024 08:10                          12.6   4.52  )-----------( 234      ( 24 Jan 2024 06:46 )             38.2         Continue present treatment    
Podiatry Interval: patient is seen in CDU holding, resting. Reports he has been a diabetic for 5 years. Understands the seriousness of his infection and the results of the MRI. Has not spoken to his transplant team. Informs he would like to proceed with the amputation on Wednesday 1/24/24. No further complaints at this time.    HPI: 59 yo male with PMH of DM, kidney transplant, HTN,  CAD presents with right hallux ulcer. Pt states that he noticed the wound about 3 weeks ago, has been following up with outpt podiatrist, Dr. Hill? for wound management. Has been doing local wound care at home but came to ED for foul smelling, draining wound. Denies any pain due to neuropathy. Denies any other complaints. Denies any constitutional symptoms.     Medications acetaminophen     Tablet .. 650 milliGRAM(s) Oral every 6 hours PRN  aluminum hydroxide/magnesium hydroxide/simethicone Suspension 30 milliLiter(s) Oral every 4 hours PRN  aspirin enteric coated 81 milliGRAM(s) Oral daily  atorvastatin 40 milliGRAM(s) Oral at bedtime  carvedilol 12.5 milliGRAM(s) Oral every 12 hours  chlorhexidine 2% Cloths 1 Application(s) Topical daily  clopidogrel Tablet 75 milliGRAM(s) Oral daily  dextrose 5%. 1000 milliLiter(s) IV Continuous <Continuous>  dextrose 5%. 1000 milliLiter(s) IV Continuous <Continuous>  dextrose 50% Injectable 25 Gram(s) IV Push once  dextrose 50% Injectable 25 Gram(s) IV Push once  dextrose 50% Injectable 12.5 Gram(s) IV Push once  dextrose Oral Gel 15 Gram(s) Oral once PRN  folic acid 1 milliGRAM(s) Oral daily  glucagon  Injectable 1 milliGRAM(s) IntraMuscular once  heparin   Injectable 5000 Unit(s) SubCutaneous every 8 hours  hydrALAZINE 50 milliGRAM(s) Oral two times a day  insulin glargine Injectable (LANTUS) 18 Unit(s) SubCutaneous at bedtime  insulin lispro (ADMELOG) corrective regimen sliding scale   SubCutaneous three times a day before meals  insulin lispro (ADMELOG) corrective regimen sliding scale   SubCutaneous at bedtime  melatonin 3 milliGRAM(s) Oral at bedtime PRN  mycophenolate mofetil 500 milliGRAM(s) Oral two times a day  NIFEdipine XL 30 milliGRAM(s) Oral daily  ondansetron Injectable 4 milliGRAM(s) IV Push every 8 hours PRN  piperacillin/tazobactam IVPB.. 3.375 Gram(s) IV Intermittent every 8 hours  pyridoxine 25 milliGRAM(s) Oral daily  tacrolimus 2 milliGRAM(s) Oral two times a day  tamsulosin 0.4 milliGRAM(s) Oral at bedtime  vancomycin  IVPB 1250 milliGRAM(s) IV Intermittent every 12 hours    FHx: diabetes mellitus (Mother)    PMH: Renal failure  Dialysis patient  Diabetes  Hypertension  CAD (coronary artery disease)    PSH: AV fistula  Renal transplant recipient    Labs                          13.2   3.99  )-----------( 211      ( 23 Jan 2024 08:10 )             40.5      01-23    134<L>  |  99  |  22.8<H>  ----------------------------<  196<H>  4.6   |  25.0  |  1.29    Ca    9.8      23 Jan 2024 08:10  Phos  2.8     01-23  Mg     1.8     01-23    TPro  6.6  /  Alb  3.6  /  TBili  0.4  /  DBili  x   /  AST  22  /  ALT  17  /  AlkPhos  77  01-23     Vital Signs Last 24 Hrs  T(C): 36.6 (23 Jan 2024 08:20), Max: 37.6 (22 Jan 2024 11:09)  T(F): 97.9 (23 Jan 2024 08:20), Max: 99.6 (22 Jan 2024 11:09)  HR: 58 (23 Jan 2024 08:20) (58 - 71)  BP: 144/72 (23 Jan 2024 08:20) (119/71 - 162/66)  BP(mean): --  RR: 16 (23 Jan 2024 08:20) (16 - 18)  SpO2: 97% (23 Jan 2024 08:20) (97% - 99%)    Parameters below as of 23 Jan 2024 08:20  Patient On (Oxygen Delivery Method): room air    WBC Count: 3.99 K/uL (01-23-24 @ 08:10)    ROS: Negative unless otherwise stated in the HPI       PHYSICAL EXAM  GEN: PRATIK NUR is a pleasant 58y Male in no acute distress, alert awake, and oriented to person, place and time.   LE Focused:    Vasc: DP/PT pulses palpable 1/4 b/l, CFT brisk to digits, TG warm to warm, edema localized to right hallux, no erythema noted   Derm: A full thickness wound noted to the distal aspect of right hallux, wound probes to bone with malodor, further purulent drainage expressed on exam today, soft tissue crepitus to the distal aspect. Further necrosis noted to distal portion of the phalanx   Neuro: Protective sensation grossly diminished   MSK: Able to wiggle toes, pain deferred due to neuropathy       Imaging:     ACC: [id number] EXAM:  XR TOE(S) MIN 2 VIEWS RT   ORDERED BY: JOSE ANGEL BRIONES     ACC: [id number] EXAM:  XR FOOT COMP MIN 3 VIEWS RT   ORDERED BY: JOSE ANGEL BRIONES     PROCEDURE DATE:  01/21/2024        INTERPRETATION:  Right foot and right great toe. Concern is osteomyelitis.    Right foot. 3 views.    There is a mild pes planus and a small posterior calcaneal spur. Arterial   calcifications are noted.    Right great toe. There is destruction of the distal aspect of the great   toe with local air collection consistent with osteomyelitis. There is   also soft tissue swelling.    IMPRESSION: Osteomyelitis of the distal great toe.    Cultures: Culture - Surgical Swab (01.21.24 @ 16:30)   Specimen Source: .Surgical Swab right hallux wound  Culture Results:   Numerous Streptococcus agalactiae (Group B)   Streptococcus agalactiae (Group B) isolated   Group B streptococci are susceptible to ampicillin,   penicillin and cefazolin, but may be resistant to   erythromycin and clindamycin.          
NEPHROLOGY INTERVAL HPI/OVERNIGHT EVENTS:    No new events.    MEDICATIONS  (STANDING):  aspirin enteric coated 81 milliGRAM(s) Oral daily  atorvastatin 40 milliGRAM(s) Oral at bedtime  carvedilol 12.5 milliGRAM(s) Oral every 12 hours  dextrose 5%. 1000 milliLiter(s) (50 mL/Hr) IV Continuous <Continuous>  dextrose 5%. 1000 milliLiter(s) (100 mL/Hr) IV Continuous <Continuous>  dextrose 50% Injectable 25 Gram(s) IV Push once  dextrose 50% Injectable 25 Gram(s) IV Push once  dextrose 50% Injectable 12.5 Gram(s) IV Push once  folic acid 1 milliGRAM(s) Oral daily  glucagon  Injectable 1 milliGRAM(s) IntraMuscular once  heparin   Injectable 5000 Unit(s) SubCutaneous every 8 hours  hydrALAZINE 50 milliGRAM(s) Oral two times a day  insulin glargine Injectable (LANTUS) 18 Unit(s) SubCutaneous at bedtime  insulin lispro (ADMELOG) corrective regimen sliding scale   SubCutaneous at bedtime  insulin lispro (ADMELOG) corrective regimen sliding scale   SubCutaneous three times a day before meals  mycophenolate mofetil 500 milliGRAM(s) Oral two times a day  NIFEdipine XL 30 milliGRAM(s) Oral daily  piperacillin/tazobactam IVPB.. 3.375 Gram(s) IV Intermittent every 8 hours  pyridoxine 25 milliGRAM(s) Oral daily  tacrolimus 2 milliGRAM(s) Oral two times a day  tamsulosin 0.4 milliGRAM(s) Oral at bedtime    MEDICATIONS  (PRN):  acetaminophen     Tablet .. 650 milliGRAM(s) Oral every 6 hours PRN Temp greater or equal to 38C (100.4F), Mild Pain (1 - 3)  aluminum hydroxide/magnesium hydroxide/simethicone Suspension 30 milliLiter(s) Oral every 4 hours PRN Dyspepsia  dextrose Oral Gel 15 Gram(s) Oral once PRN Blood Glucose LESS THAN 70 milliGRAM(s)/deciliter  melatonin 3 milliGRAM(s) Oral at bedtime PRN Insomnia  ondansetron Injectable 4 milliGRAM(s) IV Push every 8 hours PRN Nausea and/or Vomiting      Allergies    No Known Allergies            Vital Signs Last 24 Hrs  T(C): 36.4 (26 Jan 2024 08:17), Max: 36.8 (25 Jan 2024 16:32)  T(F): 97.6 (26 Jan 2024 08:17), Max: 98.2 (25 Jan 2024 16:32)  HR: 70 (26 Jan 2024 08:17) (70 - 77)  BP: 101/67 (26 Jan 2024 08:17) (101/67 - 142/70)  BP(mean): --  RR: 16 (26 Jan 2024 08:17) (16 - 18)  SpO2: 100% (26 Jan 2024 08:17) (98% - 100%)    Parameters below as of 26 Jan 2024 05:13  Patient On (Oxygen Delivery Method): room air      PHYSICAL EXAM:    GENERAL: comfortable in bed  HEAD:  wnl  EYES:   ENMT: wnl  NECK: veins  flat  NERVOUS SYSTEM: alert, oriented  CHEST/LUNG: clear  HEART: rr, no gallop  ABDOMEN: no  bruit  over  transplant  EXTREMITIES: no edema; right  foot  with large  wrap   LYMPH:   SKIN: no rash  : neg    LABS:                        12.2   4.44  )-----------( 218      ( 26 Jan 2024 07:00 )             36.3     01-26    134<L>  |  98  |  22.4<H>  ----------------------------<  135<H>  4.3   |  24.0  |  1.22    Ca    9.6      26 Jan 2024 07:00  Phos  2.4     01-26  Mg     2.0     01-26        Urinalysis Basic - ( 26 Jan 2024 07:00 )    Color: x / Appearance: x / SG: x / pH: x  Gluc: 135 mg/dL / Ketone: x  / Bili: x / Urobili: x   Blood: x / Protein: x / Nitrite: x   Leuk Esterase: x / RBC: x / WBC x   Sq Epi: x / Non Sq Epi: x / Bacteria: x      Magnesium: 2.0 mg/dL (01-26 @ 07:00)  Phosphorus: 2.4 mg/dL (01-26 @ 07:00)          RADIOLOGY & ADDITIONAL TESTS:  
PRATIK NUR    [id number]    58y      Male    CC: foot wound     INTERVAL HPI/OVERNIGHT EVENTS: pt seen and examined. no new complaints     REVIEW OF SYSTEMS:    CONSTITUTIONAL: No fever, weight los  RESPIRATORY: No cough, wheezing, hemoptysis; No shortness of breath  CARDIOVASCULAR: No chest pain, palpitations  GASTROINTESTINAL: No abdominal or epigastric pain. No nausea, vomiting    Vital Signs Last 24 Hrs  T(C): 36.4 (26 Jan 2024 12:34), Max: 36.6 (25 Jan 2024 19:28)  T(F): 97.5 (26 Jan 2024 12:34), Max: 97.8 (25 Jan 2024 19:28)  HR: 73 (26 Jan 2024 12:34) (70 - 77)  BP: 121/74 (26 Jan 2024 12:34) (101/67 - 142/70)  BP(mean): --  RR: 18 (26 Jan 2024 12:34) (16 - 18)  SpO2: 100% (26 Jan 2024 12:34) (98% - 100%)    Parameters below as of 26 Jan 2024 12:34  Patient On (Oxygen Delivery Method): room air        PHYSICAL EXAM:  GENERAL: NAD  CHEST/LUNG: Clear to auscultation bilaterally  HEART: S1S2+, Regular rate and rhythm  ABDOMEN: Soft, Nontender, Nondistended; Bowel sounds present  SKIN: warm, dry   MSK: RLE dressing c/d/i   NEURO: AAOX3  PSYCH: calm, cooperative     LABS:                        12.2   4.44  )-----------( 218      ( 26 Jan 2024 07:00 )             36.3     01-26    134<L>  |  98  |  22.4<H>  ----------------------------<  135<H>  4.3   |  24.0  |  1.22    Ca    9.6      26 Jan 2024 07:00  Phos  2.4     01-26  Mg     2.0     01-26        Urinalysis Basic - ( 26 Jan 2024 07:00 )    Color: x / Appearance: x / SG: x / pH: x  Gluc: 135 mg/dL / Ketone: x  / Bili: x / Urobili: x   Blood: x / Protein: x / Nitrite: x   Leuk Esterase: x / RBC: x / WBC x   Sq Epi: x / Non Sq Epi: x / Bacteria: x          MEDICATIONS  (STANDING):  aspirin enteric coated 81 milliGRAM(s) Oral daily  atorvastatin 40 milliGRAM(s) Oral at bedtime  carvedilol 12.5 milliGRAM(s) Oral every 12 hours  dextrose 5%. 1000 milliLiter(s) (50 mL/Hr) IV Continuous <Continuous>  dextrose 5%. 1000 milliLiter(s) (100 mL/Hr) IV Continuous <Continuous>  dextrose 50% Injectable 25 Gram(s) IV Push once  dextrose 50% Injectable 12.5 Gram(s) IV Push once  dextrose 50% Injectable 25 Gram(s) IV Push once  folic acid 1 milliGRAM(s) Oral daily  glucagon  Injectable 1 milliGRAM(s) IntraMuscular once  heparin   Injectable 5000 Unit(s) SubCutaneous every 8 hours  hydrALAZINE 50 milliGRAM(s) Oral two times a day  insulin glargine Injectable (LANTUS) 18 Unit(s) SubCutaneous at bedtime  insulin lispro (ADMELOG) corrective regimen sliding scale   SubCutaneous three times a day before meals  insulin lispro (ADMELOG) corrective regimen sliding scale   SubCutaneous at bedtime  mycophenolate mofetil 500 milliGRAM(s) Oral two times a day  NIFEdipine XL 30 milliGRAM(s) Oral daily  piperacillin/tazobactam IVPB.. 3.375 Gram(s) IV Intermittent every 8 hours  pyridoxine 25 milliGRAM(s) Oral daily  tacrolimus 2 milliGRAM(s) Oral two times a day  tamsulosin 0.4 milliGRAM(s) Oral at bedtime    MEDICATIONS  (PRN):  acetaminophen     Tablet .. 650 milliGRAM(s) Oral every 6 hours PRN Temp greater or equal to 38C (100.4F), Mild Pain (1 - 3)  aluminum hydroxide/magnesium hydroxide/simethicone Suspension 30 milliLiter(s) Oral every 4 hours PRN Dyspepsia  dextrose Oral Gel 15 Gram(s) Oral once PRN Blood Glucose LESS THAN 70 milliGRAM(s)/deciliter  melatonin 3 milliGRAM(s) Oral at bedtime PRN Insomnia  ondansetron Injectable 4 milliGRAM(s) IV Push every 8 hours PRN Nausea and/or Vomiting      RADIOLOGY & ADDITIONAL TESTS:  
Podiatry Interval: patient is seen in CDU holding note resting. Reports he has been a diabetic for 5 years. Understands the seriousness of his infection and the results of the MRI. No further complaints at this time.    HPI: 57 yo male with PMH of DM, kidney transplant, HTN,  CAD presents with right hallux ulcer. Pt states that he noticed the wound about 3 weeks ago, has been following up with outpt podiatrist, Dr. Hill? for wound management. Has been doing local wound care at home but came to ED for foul smelling, draining wound. Denies any pain due to neuropathy. Denies any other complaints. Denies any constitutional symptoms.     Medications acetaminophen     Tablet .. 650 milliGRAM(s) Oral every 6 hours PRN  aluminum hydroxide/magnesium hydroxide/simethicone Suspension 30 milliLiter(s) Oral every 4 hours PRN  aspirin enteric coated 81 milliGRAM(s) Oral daily  atorvastatin 40 milliGRAM(s) Oral at bedtime  carvedilol 12.5 milliGRAM(s) Oral every 12 hours  clopidogrel Tablet 75 milliGRAM(s) Oral daily  dextrose 5%. 1000 milliLiter(s) IV Continuous <Continuous>  dextrose 5%. 1000 milliLiter(s) IV Continuous <Continuous>  dextrose 50% Injectable 25 Gram(s) IV Push once  dextrose 50% Injectable 25 Gram(s) IV Push once  dextrose 50% Injectable 12.5 Gram(s) IV Push once  dextrose Oral Gel 15 Gram(s) Oral once PRN  folic acid 1 milliGRAM(s) Oral daily  glucagon  Injectable 1 milliGRAM(s) IntraMuscular once  heparin   Injectable 5000 Unit(s) SubCutaneous every 8 hours  hydrALAZINE 50 milliGRAM(s) Oral two times a day  insulin glargine Injectable (LANTUS) 18 Unit(s) SubCutaneous at bedtime  insulin lispro (ADMELOG) corrective regimen sliding scale   SubCutaneous at bedtime  insulin lispro (ADMELOG) corrective regimen sliding scale   SubCutaneous three times a day before meals  melatonin 3 milliGRAM(s) Oral at bedtime PRN  mycophenolate mofetil 500 milliGRAM(s) Oral two times a day  NIFEdipine XL 30 milliGRAM(s) Oral daily  ondansetron Injectable 4 milliGRAM(s) IV Push every 8 hours PRN  piperacillin/tazobactam IVPB.- 3.375 Gram(s) IV Intermittent once  piperacillin/tazobactam IVPB.. 3.375 Gram(s) IV Intermittent every 8 hours  pyridoxine 25 milliGRAM(s) Oral daily  tacrolimus 2 milliGRAM(s) Oral two times a day  tamsulosin 0.4 milliGRAM(s) Oral at bedtime  vancomycin  IVPB 1250 milliGRAM(s) IV Intermittent every 12 hours    FHx: diabetes mellitus (Mother)    PMH: Renal failure  Dialysis patient  Diabetes  Hypertension  CAD (coronary artery disease)     PSH: AV fistula  Renal transplant recipient      Labs                          13.4   5.46  )-----------( 193      ( 22 Jan 2024 05:50 )             39.8      01-22    130<L>  |  96  |  21.5<H>  ----------------------------<  208<H>  4.7   |  22.0  |  1.15    Ca    9.6      22 Jan 2024 05:50  Phos  2.3     01-22  Mg     1.8     01-22    TPro  6.6  /  Alb  3.4  /  TBili  0.6  /  DBili  x   /  AST  21  /  ALT  14  /  AlkPhos  77  01-22     Vital Signs Last 24 Hrs  T(C): 37.6 (22 Jan 2024 11:09), Max: 38.4 (21 Jan 2024 20:08)  T(F): 99.6 (22 Jan 2024 11:09), Max: 101.1 (21 Jan 2024 20:08)  HR: 71 (22 Jan 2024 11:09) (70 - 84)  BP: 129/61 (22 Jan 2024 11:09) (129/61 - 181/77)  BP(mean): --  RR: 18 (22 Jan 2024 11:09) (18 - 20)  SpO2: 99% (22 Jan 2024 11:09) (98% - 100%)    Parameters below as of 22 Jan 2024 11:09  Patient On (Oxygen Delivery Method): room air      WBC Count: 5.46 K/uL (01-22-24 @ 05:50)  WBC Count: 6.98 K/uL (01-21-24 @ 13:55)    ROS: Negative unless otherwise stated in the HPI       PHYSICAL EXAM  GEN: PRATIK NUR is a pleasant 58y Male in no acute distress, alert awake, and oriented to person, place and time.   LE Focused:    Vasc: DP/PT pulses palpable 1/4 b/l, CFT brisk to digits, TG warm to warm, edema localized to right hallux, no erythema noted   Derm: A full thickness wound noted to the distal aspect of right hallux, wound probes to bone with malodor, further purulent drainage expressed on exam today, soft tissue crepitus to the distal aspect  Neuro: Protective sensation grossly diminished   MSK: Able to wiggle toes, pain deferred due to neuropathy       Imaging:     ACC: [id number] EXAM:  XR TOE(S) MIN 2 VIEWS RT   ORDERED BY: JOSE ANGEL BRIONES     ACC: [id number] EXAM:  XR FOOT COMP MIN 3 VIEWS RT   ORDERED BY: JOSE ANGEL BRIONES     PROCEDURE DATE:  01/21/2024        INTERPRETATION:  Right foot and right great toe. Concern is osteomyelitis.    Right foot. 3 views.    There is a mild pes planus and a small posterior calcaneal spur. Arterial   calcifications are noted.    Right great toe. There is destruction of the distal aspect of the great   toe with local air collection consistent with osteomyelitis. There is   also soft tissue swelling.    IMPRESSION: Osteomyelitis of the distal great toe.    Cultures: pending          
Podiatry Interval: patient is seen in hospital bed resting, family members bedside. Reports he has been a diabetic for 5 years. Understands the seriousness of his infection and the results of the MRI. Has spoken to his transplant team who agree with podiatry plan. Informs he would like to proceed with the amputation today 1/24/24. Maintains NPO status. No further complaints at this time.    HPI: 57 yo male with PMH of DM, kidney transplant, HTN,  CAD presents with right hallux ulcer. Pt states that he noticed the wound about 3 weeks ago, has been following up with outpt podiatrist, Dr. Hill? for wound management. Has been doing local wound care at home but came to ED for foul smelling, draining wound. Denies any pain due to neuropathy. Denies any other complaints. Denies any constitutional symptoms.     Medications acetaminophen     Tablet .. 650 milliGRAM(s) Oral every 6 hours PRN  aluminum hydroxide/magnesium hydroxide/simethicone Suspension 30 milliLiter(s) Oral every 4 hours PRN  aspirin enteric coated 81 milliGRAM(s) Oral daily  atorvastatin 40 milliGRAM(s) Oral at bedtime  carvedilol 12.5 milliGRAM(s) Oral every 12 hours  chlorhexidine 2% Cloths 1 Application(s) Topical daily  clopidogrel Tablet 75 milliGRAM(s) Oral daily  dextrose 5%. 1000 milliLiter(s) IV Continuous <Continuous>  dextrose 5%. 1000 milliLiter(s) IV Continuous <Continuous>  dextrose 50% Injectable 12.5 Gram(s) IV Push once  dextrose 50% Injectable 25 Gram(s) IV Push once  dextrose 50% Injectable 25 Gram(s) IV Push once  dextrose Oral Gel 15 Gram(s) Oral once PRN  folic acid 1 milliGRAM(s) Oral daily  glucagon  Injectable 1 milliGRAM(s) IntraMuscular once  heparin   Injectable 5000 Unit(s) SubCutaneous every 8 hours  hydrALAZINE 50 milliGRAM(s) Oral two times a day  insulin glargine Injectable (LANTUS) 18 Unit(s) SubCutaneous at bedtime  insulin lispro (ADMELOG) corrective regimen sliding scale   SubCutaneous three times a day before meals  insulin lispro (ADMELOG) corrective regimen sliding scale   SubCutaneous at bedtime  melatonin 3 milliGRAM(s) Oral at bedtime PRN  mycophenolate mofetil 500 milliGRAM(s) Oral two times a day  NIFEdipine XL 30 milliGRAM(s) Oral daily  ondansetron Injectable 4 milliGRAM(s) IV Push every 8 hours PRN  piperacillin/tazobactam IVPB.. 3.375 Gram(s) IV Intermittent every 8 hours  pyridoxine 25 milliGRAM(s) Oral daily  tacrolimus 2 milliGRAM(s) Oral two times a day  tamsulosin 0.4 milliGRAM(s) Oral at bedtime    FH: diabetes mellitus (Mother)    PMH: Renal failure  Dialysis patient  Diabetes  Hypertension  CAD (coronary artery disease)    PSHNo significant past surgical history    AV fistula    Renal transplant recipient        Labs                          12.6   4.52  )-----------( 234      ( 24 Jan 2024 06:46 )             38.2      01-24    136  |  99  |  25.0<H>  ----------------------------<  130<H>  4.6   |  26.0  |  1.22    Ca    9.7      24 Jan 2024 06:46  Phos  2.8     01-23  Mg     1.9     01-24    TPro  6.6  /  Alb  3.6  /  TBili  0.4  /  DBili  x   /  AST  22  /  ALT  17  /  AlkPhos  77  01-23     Vital Signs Last 24 Hrs  T(C): 36.8 (24 Jan 2024 07:30), Max: 37.2 (24 Jan 2024 00:20)  T(F): 98.2 (24 Jan 2024 07:30), Max: 99 (24 Jan 2024 00:20)  HR: 63 (24 Jan 2024 12:56) (63 - 80)  BP: 99/62 (24 Jan 2024 12:56) (99/62 - 169/86)  BP(mean): 73 (24 Jan 2024 12:56) (73 - 73)  RR: 18 (24 Jan 2024 07:30) (18 - 18)  SpO2: 95% (24 Jan 2024 07:30) (95% - 99%)    Parameters below as of 24 Jan 2024 07:30  Patient On (Oxygen Delivery Method): room air       WBC Count: 4.52 K/uL (01-24-24 @ 06:46)    ROS: Negative unless otherwise stated in the HPI       PHYSICAL EXAM - dressing left clean dry and intact preop  GEN: PRATIK NUR is a pleasant 58y Male in no acute distress, alert awake, and oriented to person, place and time.   LE Focused:    Vasc: DP/PT pulses palpable 1/4 b/l, CFT brisk to digits, TG warm to warm, edema localized to right hallux, no erythema noted   Derm: A full thickness wound noted to the distal aspect of right hallux, wound probes to bone with malodor, further purulent drainage expressed on exam today, soft tissue crepitus to the distal aspect. Further necrosis noted to distal portion of the phalanx   Neuro: Protective sensation grossly diminished   MSK: Able to wiggle toes, pain deferred due to neuropathy       Imaging:     ACC: [id number] EXAM:  XR TOE(S) MIN 2 VIEWS RT   ORDERED BY: JOSE ANGEL BRIONES     ACC: [id number] EXAM:  XR FOOT COMP MIN 3 VIEWS RT   ORDERED BY: JOSE ANGEL BRIONES     PROCEDURE DATE:  01/21/2024        INTERPRETATION:  Right foot and right great toe. Concern is osteomyelitis.    Right foot. 3 views.    There is a mild pes planus and a small posterior calcaneal spur. Arterial   calcifications are noted.    Right great toe. There is destruction of the distal aspect of the great   toe with local air collection consistent with osteomyelitis. There is   also soft tissue swelling.    IMPRESSION: Osteomyelitis of the distal great toe.    Cultures: Culture - Surgical Swab (01.21.24 @ 16:30)   Specimen Source: .Surgical Swab right hallux wound  Culture Results:   Numerous Streptococcus agalactiae (Group B)   Streptococcus agalactiae (Group B) isolated   Group B streptococci are susceptible to ampicillin,   penicillin and cefazolin, but may be resistant to   erythromycin and clindamycin.          
                                           [facility]                                                INFECTIOUS DISEASES  =======================================================                   Pratik Garibay#   Lamonte Arita MD#    Karl Nur MD*                           Maryjo Owens MD*   Chrissy Baltazar MD*           Diplomates American Board of Internal Medicine & Infectious Diseases                  # [place] Office - Appt - Tel  [phone] Fax [phone]                * [place] Office - Appt - Tel [phone] Fax [phone]                                  Hospital Consult line:  [phone]  =======================================================      [facility]-[id number]  PRATIK LIUDMILA   follow up for: right big toe OM  afebrile  feels well  s/p amputation 1/24    patient seen and examined.       I have personally reviewed the labs and data; pertinent labs and data are listed in this note; please see below.   ===================================================  REVIEW OF SYSTEMS:  CONSTITUTIONAL:  No Fever or chills  HEENT:  No diplopia or blurred vision.  No earache, sore throat or runny nose.  CARDIOVASCULAR:  No pressure, squeezing, strangling, tightness, heaviness or aching about the chest, neck, axilla or epigastrium.  RESPIRATORY:  No cough, shortness of breath  GASTROINTESTINAL:  No nausea, vomiting or diarrhea.  GENITOURINARY:  No dysuria, frequency or urgency. No Blood in urine  MUSCULOSKELETAL:  no joint aches, no muscle pain  SKIN:  No change in skin, hair or nails.  NEUROLOGIC:  No Headaches, seizures or weakness.  PSYCHIATRIC:  No disorder of thought or mood.  ENDOCRINE:  No heat or cold intolerance  HEMATOLOGICAL:  No easy bruising or bleeding.    =======================================================  Allergies    No Known Allergies    Intolerances    Antibiotics:  piperacillin/tazobactam IVPB.. 3.375 Gram(s) IV Intermittent every 8 hours    Other medications:  aspirin enteric coated 81 milliGRAM(s) Oral daily  atorvastatin 40 milliGRAM(s) Oral at bedtime  carvedilol 12.5 milliGRAM(s) Oral every 12 hours  chlorhexidine 2% Cloths 1 Application(s) Topical daily  clopidogrel Tablet 75 milliGRAM(s) Oral daily  dextrose 5%. 1000 milliLiter(s) IV Continuous <Continuous>  dextrose 5%. 1000 milliLiter(s) IV Continuous <Continuous>  dextrose 50% Injectable 12.5 Gram(s) IV Push once  dextrose 50% Injectable 25 Gram(s) IV Push once  dextrose 50% Injectable 25 Gram(s) IV Push once  folic acid 1 milliGRAM(s) Oral daily  glucagon  Injectable 1 milliGRAM(s) IntraMuscular once  heparin   Injectable 5000 Unit(s) SubCutaneous every 8 hours  hydrALAZINE 50 milliGRAM(s) Oral two times a day  insulin glargine Injectable (LANTUS) 18 Unit(s) SubCutaneous at bedtime  insulin lispro (ADMELOG) corrective regimen sliding scale   SubCutaneous three times a day before meals  insulin lispro (ADMELOG) corrective regimen sliding scale   SubCutaneous at bedtime  mycophenolate mofetil 500 milliGRAM(s) Oral two times a day  NIFEdipine XL 30 milliGRAM(s) Oral daily  pyridoxine 25 milliGRAM(s) Oral daily  tacrolimus 2 milliGRAM(s) Oral two times a day  tamsulosin 0.4 milliGRAM(s) Oral at bedtime    ======================================================  Physical Exam:  ============  Vital Signs Last 24 Hrs  T(C): 36.4 (26 Jan 2024 08:17), Max: 36.8 (25 Jan 2024 16:32)  T(F): 97.6 (26 Jan 2024 08:17), Max: 98.2 (25 Jan 2024 16:32)  HR: 70 (26 Jan 2024 08:17) (70 - 77)  BP: 101/67 (26 Jan 2024 08:17) (101/67 - 142/70)  BP(mean): --  RR: 16 (26 Jan 2024 08:17) (16 - 18)  SpO2: 100% (26 Jan 2024 08:17) (98% - 100%)    Parameters below as of 26 Jan 2024 05:13  Patient On (Oxygen Delivery Method): room air            General:  No acute distress.  Eye: no conjunctival pallor, no scleral icterus  Respiratory: Lungs are clear to auscultation, Respirations are non-labored.  Cardiovascular: Normal rate, Regular rhythm,  s1+s2  Gastrointestinal: Soft, Non-tender, Non-distended, Normal bowel sounds.  Musculoskeletal: rt foot dressing intact  Integumentary: No rash.  Neurologic: Alert, Oriented, No focal deficits  Psychiatric: Appropriate mood & affect.  =======================================================  Labs:                                                       12.2   4.44  )-----------( 218      ( 26 Jan 2024 07:00 )             36.3       01-26    134<L>  |  98  |  22.4<H>  ----------------------------<  135<H>  4.3   |  24.0  |  1.22    Ca    9.6      26 Jan 2024 07:00  Phos  2.4     01-26  Mg     2.0     01-26                Urinalysis Basic - ( 26 Jan 2024 07:00 )    Color: x / Appearance: x / SG: x / pH: x  Gluc: 135 mg/dL / Ketone: x  / Bili: x / Urobili: x   Blood: x / Protein: x / Nitrite: x   Leuk Esterase: x / RBC: x / WBC x   Sq Epi: x / Non Sq Epi: x / Bacteria: x                  CAPILLARY BLOOD GLUCOSE      POCT Blood Glucose.: 139 mg/dL (26 Jan 2024 08:14)                Urinalysis Basic - ( 25 Jan 2024 08:24 )    Color: x / Appearance: x / SG: x / pH: x  Gluc: 119 mg/dL / Ketone: x  / Bili: x / Urobili: x   Blood: x / Protein: x / Nitrite: x   Leuk Esterase: x / RBC: x / WBC x   Sq Epi: x / Non Sq Epi: x / Bacteria: x                  CAPILLARY BLOOD GLUCOSE      POCT Blood Glucose.: 177 mg/dL (25 Jan 2024 17:11)              Culture - Surgical Swab (collected 01-21-24 @ 16:30)  Source: .Surgical Swab right hallux wound  Final Report (01-23-24 @ 19:18):    Numerous Streptococcus agalactiae (Group B)    Streptococcus agalactiae (Group B) isolated    Group B streptococci are susceptible to ampicillin,    penicillin and cefazolin, but may be resistant to    erythromycin and clindamycin.    Few Staphylococcus epidermidis "Susceptibilities not performed"    Mixed Anaerobic Yudelka including    Numerous Finegoldia magna "Susceptibilities not performed"    Few Anaerobic Gram Negative Reynaldo Most closely resembling Prevotella    species "Susceptibilities not performed"    Culture - Urine (collected 01-21-24 @ 14:30)  Source: Clean Catch Clean Catch (Midstream)  Final Report (01-22-24 @ 13:01):    No growth    Culture - Blood (collected 01-21-24 @ 14:10)  Source: .Blood Blood-Peripheral  Preliminary Report (01-23-24 @ 19:01):    No growth at 48 Hours    Culture - Blood (collected 01-21-24 @ 13:55)  Source: .Blood Blood-Peripheral  Preliminary Report (01-23-24 @ 19:01):    No growth at 48 Hours    Culture - Blood (collected 09-19-22 @ 20:25)  Source: .Blood Blood-Peripheral  Final Report (09-25-22 @ 01:01):    No Growth Final    Culture - Blood (collected 09-19-22 @ 20:20)  Source: .Blood Blood-Peripheral  Final Report (09-25-22 @ 01:01):    No Growth Final      < from: MR Foot No Cont, Right (01.22.24 @ 07:22) >  IMPRESSION:    Acute on chronic osteomyelitis of the distal phalanx of right greattoe.   Nonspecific mild acute osseous stress reaction within adjacent head of   the first proximal phalanx, favored to represent hyperacute osteomyelitis.    --- End of Report ---        < end of copied text >    
NEPHROLOGY INTERVAL HPI/OVERNIGHT EVENTS:    Examined  Feeling better  Denies HA CP no SOB    MEDICATIONS  (STANDING):  aspirin enteric coated 81 milliGRAM(s) Oral daily  atorvastatin 40 milliGRAM(s) Oral at bedtime  carvedilol 12.5 milliGRAM(s) Oral every 12 hours  chlorhexidine 2% Cloths 1 Application(s) Topical daily  clopidogrel Tablet 75 milliGRAM(s) Oral daily  dextrose 5%. 1000 milliLiter(s) (50 mL/Hr) IV Continuous <Continuous>  dextrose 5%. 1000 milliLiter(s) (100 mL/Hr) IV Continuous <Continuous>  dextrose 50% Injectable 25 Gram(s) IV Push once  dextrose 50% Injectable 25 Gram(s) IV Push once  dextrose 50% Injectable 12.5 Gram(s) IV Push once  folic acid 1 milliGRAM(s) Oral daily  glucagon  Injectable 1 milliGRAM(s) IntraMuscular once  heparin   Injectable 5000 Unit(s) SubCutaneous every 8 hours  hydrALAZINE 50 milliGRAM(s) Oral two times a day  insulin glargine Injectable (LANTUS) 18 Unit(s) SubCutaneous at bedtime  insulin lispro (ADMELOG) corrective regimen sliding scale   SubCutaneous at bedtime  insulin lispro (ADMELOG) corrective regimen sliding scale   SubCutaneous three times a day before meals  mycophenolate mofetil 500 milliGRAM(s) Oral two times a day  NIFEdipine XL 30 milliGRAM(s) Oral daily  piperacillin/tazobactam IVPB.. 3.375 Gram(s) IV Intermittent every 8 hours  pyridoxine 25 milliGRAM(s) Oral daily  tacrolimus 2 milliGRAM(s) Oral two times a day  tamsulosin 0.4 milliGRAM(s) Oral at bedtime  vancomycin  IVPB 1250 milliGRAM(s) IV Intermittent every 12 hours    MEDICATIONS  (PRN):  acetaminophen     Tablet .. 650 milliGRAM(s) Oral every 6 hours PRN Temp greater or equal to 38C (100.4F), Mild Pain (1 - 3)  aluminum hydroxide/magnesium hydroxide/simethicone Suspension 30 milliLiter(s) Oral every 4 hours PRN Dyspepsia  dextrose Oral Gel 15 Gram(s) Oral once PRN Blood Glucose LESS THAN 70 milliGRAM(s)/deciliter  melatonin 3 milliGRAM(s) Oral at bedtime PRN Insomnia  ondansetron Injectable 4 milliGRAM(s) IV Push every 8 hours PRN Nausea and/or Vomiting      Allergies    No Known Allergies    Intolerances        Vital Signs Last 24 Hrs  T(C): 36.7 (23 Jan 2024 12:25), Max: 37.1 (23 Jan 2024 00:07)  T(F): 98.1 (23 Jan 2024 12:25), Max: 98.7 (23 Jan 2024 00:07)  HR: 67 (23 Jan 2024 12:25) (58 - 73)  BP: 145/67 (23 Jan 2024 12:25) (110/71 - 162/66)  BP(mean): --  RR: 18 (23 Jan 2024 12:25) (16 - 18)  SpO2: 99% (23 Jan 2024 12:25) (97% - 100%)    Parameters below as of 23 Jan 2024 10:47  Patient On (Oxygen Delivery Method): room air      Daily     Daily     PHYSICAL EXAM:  GENERAL: NAD  HEAD: NCAT  EYES: EOMI  NECK: Supple  NERVOUS SYSTEM:  Alert & Oriented X3  CHEST/LUNG: Clear to percussion bilaterally  HEART: Regular rate and rhythm  ABDOMEN: Soft, Nontender, Nondistended; +BS  EXTREMITIES:  edema    LABS:                        13.2   3.99  )-----------( 211      ( 23 Jan 2024 08:10 )             40.5     01-23    134<L>  |  99  |  22.8<H>  ----------------------------<  196<H>  4.6   |  25.0  |  1.29    Ca    9.8      23 Jan 2024 08:10  Phos  2.8     01-23  Mg     1.8     01-23    TPro  6.6  /  Alb  3.6  /  TBili  0.4  /  DBili  x   /  AST  22  /  ALT  17  /  AlkPhos  77  01-23      Urinalysis Basic - ( 23 Jan 2024 08:10 )    Color: x / Appearance: x / SG: x / pH: x  Gluc: 196 mg/dL / Ketone: x  / Bili: x / Urobili: x   Blood: x / Protein: x / Nitrite: x   Leuk Esterase: x / RBC: x / WBC x   Sq Epi: x / Non Sq Epi: x / Bacteria: x      Magnesium: 1.8 mg/dL (01-23 @ 08:10)  Phosphorus: 2.8 mg/dL (01-23 @ 08:10)          RADIOLOGY & ADDITIONAL TESTS:  
PRATIK NUR    [id number]    58y      Male    CC: foot wound     INTERVAL HPI/OVERNIGHT EVENTS: Pt seen and examined. no new complaints     REVIEW OF SYSTEMS:    RESPIRATORY: No cough, wheezing, hemoptysis; No shortness of breath  CARDIOVASCULAR: No chest pain, palpitations  GASTROINTESTINAL: No abdominal or epigastric pain. No nausea, vomiting    Vital Signs Last 24 Hrs  T(C): 36.7 (23 Jan 2024 12:25), Max: 37.1 (23 Jan 2024 00:07)  T(F): 98.1 (23 Jan 2024 12:25), Max: 98.7 (23 Jan 2024 00:07)  HR: 67 (23 Jan 2024 12:25) (58 - 73)  BP: 145/67 (23 Jan 2024 12:25) (110/71 - 162/66)  BP(mean): --  RR: 18 (23 Jan 2024 12:25) (16 - 18)  SpO2: 99% (23 Jan 2024 12:25) (97% - 100%)    Parameters below as of 23 Jan 2024 10:47  Patient On (Oxygen Delivery Method): room air        PHYSICAL EXAM:    GENERAL: NAD  CHEST/LUNG: Clear to auscultation bilaterally  HEART: S1S2+, Regular rate and rhythm  ABDOMEN: Soft, Nontender, Nondistended; Bowel sounds present  SKIN: warm, dry   MSK: RLE dressing c/d/i   NEURO: AAOX3  PSYCH: calm, cooperative     LABS:                        13.2   3.99  )-----------( 211      ( 23 Jan 2024 08:10 )             40.5     01-23    134<L>  |  99  |  22.8<H>  ----------------------------<  196<H>  4.6   |  25.0  |  1.29    Ca    9.8      23 Jan 2024 08:10  Phos  2.8     01-23  Mg     1.8     01-23    TPro  6.6  /  Alb  3.6  /  TBili  0.4  /  DBili  x   /  AST  22  /  ALT  17  /  AlkPhos  77  01-23    PT/INR - ( 21 Jan 2024 13:55 )   PT: 12.2 sec;   INR: 1.10 ratio         PTT - ( 21 Jan 2024 13:55 )  PTT:36.8 sec  Urinalysis Basic - ( 23 Jan 2024 08:10 )    Color: x / Appearance: x / SG: x / pH: x  Gluc: 196 mg/dL / Ketone: x  / Bili: x / Urobili: x   Blood: x / Protein: x / Nitrite: x   Leuk Esterase: x / RBC: x / WBC x   Sq Epi: x / Non Sq Epi: x / Bacteria: x          MEDICATIONS  (STANDING):  aspirin enteric coated 81 milliGRAM(s) Oral daily  atorvastatin 40 milliGRAM(s) Oral at bedtime  carvedilol 12.5 milliGRAM(s) Oral every 12 hours  chlorhexidine 2% Cloths 1 Application(s) Topical daily  clopidogrel Tablet 75 milliGRAM(s) Oral daily  dextrose 5%. 1000 milliLiter(s) (50 mL/Hr) IV Continuous <Continuous>  dextrose 5%. 1000 milliLiter(s) (100 mL/Hr) IV Continuous <Continuous>  dextrose 50% Injectable 25 Gram(s) IV Push once  dextrose 50% Injectable 25 Gram(s) IV Push once  dextrose 50% Injectable 12.5 Gram(s) IV Push once  folic acid 1 milliGRAM(s) Oral daily  glucagon  Injectable 1 milliGRAM(s) IntraMuscular once  heparin   Injectable 5000 Unit(s) SubCutaneous every 8 hours  hydrALAZINE 50 milliGRAM(s) Oral two times a day  insulin glargine Injectable (LANTUS) 18 Unit(s) SubCutaneous at bedtime  insulin lispro (ADMELOG) corrective regimen sliding scale   SubCutaneous at bedtime  insulin lispro (ADMELOG) corrective regimen sliding scale   SubCutaneous three times a day before meals  mycophenolate mofetil 500 milliGRAM(s) Oral two times a day  NIFEdipine XL 30 milliGRAM(s) Oral daily  piperacillin/tazobactam IVPB.. 3.375 Gram(s) IV Intermittent every 8 hours  pyridoxine 25 milliGRAM(s) Oral daily  tacrolimus 2 milliGRAM(s) Oral two times a day  tamsulosin 0.4 milliGRAM(s) Oral at bedtime  vancomycin  IVPB 1250 milliGRAM(s) IV Intermittent every 12 hours    MEDICATIONS  (PRN):  acetaminophen     Tablet .. 650 milliGRAM(s) Oral every 6 hours PRN Temp greater or equal to 38C (100.4F), Mild Pain (1 - 3)  aluminum hydroxide/magnesium hydroxide/simethicone Suspension 30 milliLiter(s) Oral every 4 hours PRN Dyspepsia  dextrose Oral Gel 15 Gram(s) Oral once PRN Blood Glucose LESS THAN 70 milliGRAM(s)/deciliter  melatonin 3 milliGRAM(s) Oral at bedtime PRN Insomnia  ondansetron Injectable 4 milliGRAM(s) IV Push every 8 hours PRN Nausea and/or Vomiting      RADIOLOGY & ADDITIONAL TESTS:  
PRATIK NUR    [id number]    58y      Male    CC: foot wound     INTERVAL HPI/OVERNIGHT EVENTS: pt seen and examined. s/p R great toe amputation     REVIEW OF SYSTEMS:    CONSTITUTIONAL: No fever, weight loss  RESPIRATORY: No cough, wheezing, hemoptysis; No shortness of breath  CARDIOVASCULAR: No chest pain, palpitations  GASTROINTESTINAL: No abdominal or epigastric pain. No nausea, vomiting    Vital Signs Last 24 Hrs  T(C): 36.8 (25 Jan 2024 16:32), Max: 36.9 (24 Jan 2024 18:05)  T(F): 98.2 (25 Jan 2024 16:32), Max: 98.5 (24 Jan 2024 18:05)  HR: 70 (25 Jan 2024 16:32) (63 - 78)  BP: 132/72 (25 Jan 2024 16:32) (113/58 - 178/71)  BP(mean): 83 (24 Jan 2024 20:00) (72 - 95)  RR: 18 (25 Jan 2024 16:32) (14 - 19)  SpO2: 100% (25 Jan 2024 16:32) (93% - 100%)    Parameters below as of 25 Jan 2024 16:32  Patient On (Oxygen Delivery Method): room air        PHYSICAL EXAM:    GENERAL: NAD  CHEST/LUNG: Clear to auscultation bilaterally  HEART: S1S2+, Regular rate and rhythm  ABDOMEN: Soft, Nontender, Nondistended; Bowel sounds present  SKIN: warm, dry   MSK: RLE dressing c/d/i   NEURO: AAOX3  PSYCH: calm, cooperative     LABS:                        12.8   4.70  )-----------( 223      ( 25 Jan 2024 06:21 )             38.0     01-25    133<L>  |  98  |  23.5<H>  ----------------------------<  119<H>  4.7   |  25.0  |  1.13    Ca    9.7      25 Jan 2024 08:24  Mg     1.8     01-25        Urinalysis Basic - ( 25 Jan 2024 08:24 )    Color: x / Appearance: x / SG: x / pH: x  Gluc: 119 mg/dL / Ketone: x  / Bili: x / Urobili: x   Blood: x / Protein: x / Nitrite: x   Leuk Esterase: x / RBC: x / WBC x   Sq Epi: x / Non Sq Epi: x / Bacteria: x          MEDICATIONS  (STANDING):  aspirin enteric coated 81 milliGRAM(s) Oral daily  atorvastatin 40 milliGRAM(s) Oral at bedtime  carvedilol 12.5 milliGRAM(s) Oral every 12 hours  dextrose 5%. 1000 milliLiter(s) (50 mL/Hr) IV Continuous <Continuous>  dextrose 5%. 1000 milliLiter(s) (100 mL/Hr) IV Continuous <Continuous>  dextrose 50% Injectable 12.5 Gram(s) IV Push once  dextrose 50% Injectable 25 Gram(s) IV Push once  dextrose 50% Injectable 25 Gram(s) IV Push once  folic acid 1 milliGRAM(s) Oral daily  glucagon  Injectable 1 milliGRAM(s) IntraMuscular once  heparin   Injectable 5000 Unit(s) SubCutaneous every 8 hours  hydrALAZINE 50 milliGRAM(s) Oral two times a day  insulin glargine Injectable (LANTUS) 18 Unit(s) SubCutaneous at bedtime  insulin lispro (ADMELOG) corrective regimen sliding scale   SubCutaneous three times a day before meals  insulin lispro (ADMELOG) corrective regimen sliding scale   SubCutaneous at bedtime  mycophenolate mofetil 500 milliGRAM(s) Oral two times a day  NIFEdipine XL 30 milliGRAM(s) Oral daily  piperacillin/tazobactam IVPB.. 3.375 Gram(s) IV Intermittent every 8 hours  pyridoxine 25 milliGRAM(s) Oral daily  tacrolimus 2 milliGRAM(s) Oral two times a day  tamsulosin 0.4 milliGRAM(s) Oral at bedtime    MEDICATIONS  (PRN):  acetaminophen     Tablet .. 650 milliGRAM(s) Oral every 6 hours PRN Temp greater or equal to 38C (100.4F), Mild Pain (1 - 3)  aluminum hydroxide/magnesium hydroxide/simethicone Suspension 30 milliLiter(s) Oral every 4 hours PRN Dyspepsia  dextrose Oral Gel 15 Gram(s) Oral once PRN Blood Glucose LESS THAN 70 milliGRAM(s)/deciliter  melatonin 3 milliGRAM(s) Oral at bedtime PRN Insomnia  ondansetron Injectable 4 milliGRAM(s) IV Push every 8 hours PRN Nausea and/or Vomiting      RADIOLOGY & ADDITIONAL TESTS:  
PRATIK NUR    [id number]    58y      Male    CC: foot wound     INTERVAL HPI/OVERNIGHT EVENTS: pt seen and examined. no new complaints     REVIEW OF SYSTEMS:    CONSTITUTIONAL: No weight loss  RESPIRATORY: No cough, wheezing, hemoptysis; No shortness of breath  CARDIOVASCULAR: No chest pain, palpitations  GASTROINTESTINAL: No abdominal or epigastric pain. No nausea, vomiting    Vital Signs Last 24 Hrs  T(C): 37.6 (22 Jan 2024 11:09), Max: 38.4 (21 Jan 2024 20:08)  T(F): 99.6 (22 Jan 2024 11:09), Max: 101.1 (21 Jan 2024 20:08)  HR: 71 (22 Jan 2024 11:09) (70 - 84)  BP: 129/61 (22 Jan 2024 11:09) (129/61 - 181/77)  BP(mean): --  RR: 18 (22 Jan 2024 11:09) (18 - 20)  SpO2: 99% (22 Jan 2024 11:09) (98% - 100%)    Parameters below as of 22 Jan 2024 11:09  Patient On (Oxygen Delivery Method): room air        PHYSICAL EXAM:    GENERAL: NAD  CHEST/LUNG: Clear to auscultation bilaterally  HEART: S1S2+, Regular rate and rhythm  ABDOMEN: Soft, Nontender, Nondistended; Bowel sounds present  SKIN: warm, dry   MSK: RLE dressing c/d/i   NEURO: AAOX3  PSYCH: calm, cooperative     LABS:                        13.4   5.46  )-----------( 193      ( 22 Jan 2024 05:50 )             39.8     01-22    130<L>  |  96  |  21.5<H>  ----------------------------<  208<H>  4.7   |  22.0  |  1.15    Ca    9.6      22 Jan 2024 05:50  Phos  2.3     01-22  Mg     1.8     01-22    TPro  6.6  /  Alb  3.4  /  TBili  0.6  /  DBili  x   /  AST  21  /  ALT  14  /  AlkPhos  77  01-22    PT/INR - ( 21 Jan 2024 13:55 )   PT: 12.2 sec;   INR: 1.10 ratio         PTT - ( 21 Jan 2024 13:55 )  PTT:36.8 sec  Urinalysis Basic - ( 22 Jan 2024 05:50 )    Color: x / Appearance: x / SG: x / pH: x  Gluc: 208 mg/dL / Ketone: x  / Bili: x / Urobili: x   Blood: x / Protein: x / Nitrite: x   Leuk Esterase: x / RBC: x / WBC x   Sq Epi: x / Non Sq Epi: x / Bacteria: x          MEDICATIONS  (STANDING):  aspirin enteric coated 81 milliGRAM(s) Oral daily  atorvastatin 40 milliGRAM(s) Oral at bedtime  carvedilol 12.5 milliGRAM(s) Oral every 12 hours  clopidogrel Tablet 75 milliGRAM(s) Oral daily  dextrose 5%. 1000 milliLiter(s) (50 mL/Hr) IV Continuous <Continuous>  dextrose 5%. 1000 milliLiter(s) (100 mL/Hr) IV Continuous <Continuous>  dextrose 50% Injectable 25 Gram(s) IV Push once  dextrose 50% Injectable 25 Gram(s) IV Push once  dextrose 50% Injectable 12.5 Gram(s) IV Push once  folic acid 1 milliGRAM(s) Oral daily  glucagon  Injectable 1 milliGRAM(s) IntraMuscular once  heparin   Injectable 5000 Unit(s) SubCutaneous every 8 hours  hydrALAZINE 50 milliGRAM(s) Oral two times a day  insulin glargine Injectable (LANTUS) 18 Unit(s) SubCutaneous at bedtime  insulin lispro (ADMELOG) corrective regimen sliding scale   SubCutaneous at bedtime  insulin lispro (ADMELOG) corrective regimen sliding scale   SubCutaneous three times a day before meals  mycophenolate mofetil 500 milliGRAM(s) Oral two times a day  NIFEdipine XL 30 milliGRAM(s) Oral daily  piperacillin/tazobactam IVPB.- 3.375 Gram(s) IV Intermittent once  piperacillin/tazobactam IVPB.. 3.375 Gram(s) IV Intermittent every 8 hours  pyridoxine 25 milliGRAM(s) Oral daily  tacrolimus 2 milliGRAM(s) Oral two times a day  tamsulosin 0.4 milliGRAM(s) Oral at bedtime  vancomycin  IVPB 1250 milliGRAM(s) IV Intermittent every 12 hours    MEDICATIONS  (PRN):  acetaminophen     Tablet .. 650 milliGRAM(s) Oral every 6 hours PRN Temp greater or equal to 38C (100.4F), Mild Pain (1 - 3)  aluminum hydroxide/magnesium hydroxide/simethicone Suspension 30 milliLiter(s) Oral every 4 hours PRN Dyspepsia  dextrose Oral Gel 15 Gram(s) Oral once PRN Blood Glucose LESS THAN 70 milliGRAM(s)/deciliter  melatonin 3 milliGRAM(s) Oral at bedtime PRN Insomnia  ondansetron Injectable 4 milliGRAM(s) IV Push every 8 hours PRN Nausea and/or Vomiting      RADIOLOGY & ADDITIONAL TESTS:  
Patient seen and examined    I&O's Summary    24 Jan 2024 07:01  -  25 Jan 2024 07:00  --------------------------------------------------------  IN: 0 mL / OUT: 200 mL / NET: -200 mL        REVIEW OF SYSTEMS:    CONSTITUTIONAL: No F/C  RESPIRATORY: No cough,  No SOB  CARDIOVASCULAR: No CP/palpitations,    GASTROINTESTINAL: No abdominal pain  or NVD   GENITOURINARY: No UTI sx  NEUROLOGICAL: No headaches, NO wk/numbness  MUSCULOSKELETAL: s/p partial 1st toe amputation  EXTREMITIES : no swelling,    Vital Signs Last 24 Hrs  T(C): 36.6 (25 Jan 2024 07:30), Max: 36.9 (24 Jan 2024 18:05)  T(F): 97.9 (25 Jan 2024 07:30), Max: 98.5 (24 Jan 2024 18:05)  HR: 63 (25 Jan 2024 07:30) (63 - 78)  BP: 160/76 (25 Jan 2024 07:30) (113/58 - 178/71)  BP(mean): 83 (24 Jan 2024 20:00) (72 - 95)  RR: 18 (25 Jan 2024 07:30) (14 - 19)  SpO2: 93% (25 Jan 2024 07:30) (93% - 100%)    Parameters below as of 25 Jan 2024 07:30  Patient On (Oxygen Delivery Method): room air        PHYSICAL EXAM:    GENERAL: NAD,   EYES:  conjunctiva and sclera clear  NECK: Supple, No JVD/Bruit  NERVOUS SYSTEM:  A/O x3,   CHEST:  No rales or rhonchi  HEART:  RRR, No murmur  ABDOMEN: Soft, NT/ND BS+  EXTREMITIES:  No Edema; R foot in dressing  SKIN: No rashes    LABS:                          12.8   4.70  )-----------( 223      ( 25 Jan 2024 06:21 )             38.0     01-25    133<L>  |  98  |  23.5<H>  ----------------------------<  119<H>  4.7   |  25.0  |  1.13    Ca    9.7      25 Jan 2024 08:24  Mg     1.8     01-25        MEDICATIONS  (STANDING):  acetaminophen     Tablet .. PRN  aluminum hydroxide/magnesium hydroxide/simethicone Suspension PRN  aspirin enteric coated  atorvastatin  carvedilol  dextrose 5%.  dextrose 5%.  dextrose 50% Injectable  dextrose 50% Injectable  dextrose 50% Injectable  dextrose Oral Gel PRN  folic acid  glucagon  Injectable  heparin   Injectable  hydrALAZINE  insulin glargine Injectable (LANTUS)  insulin lispro (ADMELOG) corrective regimen sliding scale  insulin lispro (ADMELOG) corrective regimen sliding scale  melatonin PRN  mycophenolate mofetil  NIFEdipine XL  ondansetron Injectable PRN  piperacillin/tazobactam IVPB..  pyridoxine  tacrolimus  tamsulosin      
Podiatry Interval: patient is seen in hospital bed sitting up and wearing surgical shoe to right foot, family members bedside. s/p R foot partial hallux amputation with Dr. Silva on 1/24/24. Reports feeling much better overall. Able to tolerate solid food No further complaints at this time.    HPI: 59 yo male with PMH of DM, kidney transplant, HTN,  CAD presents with right hallux ulcer. Pt states that he noticed the wound about 3 weeks ago, has been following up with outpt podiatrist, Dr. Hill? for wound management. Has been doing local wound care at home but came to ED for foul smelling, draining wound. Denies any pain due to neuropathy. Denies any other complaints. Denies any constitutional symptoms.     Medications acetaminophen     Tablet .. 650 milliGRAM(s) Oral every 6 hours PRN  aluminum hydroxide/magnesium hydroxide/simethicone Suspension 30 milliLiter(s) Oral every 4 hours PRN  aspirin enteric coated 81 milliGRAM(s) Oral daily  atorvastatin 40 milliGRAM(s) Oral at bedtime  carvedilol 12.5 milliGRAM(s) Oral every 12 hours  dextrose 5%. 1000 milliLiter(s) IV Continuous <Continuous>  dextrose 5%. 1000 milliLiter(s) IV Continuous <Continuous>  dextrose 50% Injectable 12.5 Gram(s) IV Push once  dextrose 50% Injectable 25 Gram(s) IV Push once  dextrose 50% Injectable 25 Gram(s) IV Push once  dextrose Oral Gel 15 Gram(s) Oral once PRN  folic acid 1 milliGRAM(s) Oral daily  glucagon  Injectable 1 milliGRAM(s) IntraMuscular once  heparin   Injectable 5000 Unit(s) SubCutaneous every 8 hours  hydrALAZINE 50 milliGRAM(s) Oral two times a day  insulin glargine Injectable (LANTUS) 18 Unit(s) SubCutaneous at bedtime  insulin lispro (ADMELOG) corrective regimen sliding scale   SubCutaneous at bedtime  insulin lispro (ADMELOG) corrective regimen sliding scale   SubCutaneous three times a day before meals  melatonin 3 milliGRAM(s) Oral at bedtime PRN  mycophenolate mofetil 500 milliGRAM(s) Oral two times a day  NIFEdipine XL 30 milliGRAM(s) Oral daily  ondansetron Injectable 4 milliGRAM(s) IV Push every 8 hours PRN  piperacillin/tazobactam IVPB.. 3.375 Gram(s) IV Intermittent every 8 hours  pyridoxine 25 milliGRAM(s) Oral daily  tacrolimus 2 milliGRAM(s) Oral two times a day  tamsulosin 0.4 milliGRAM(s) Oral at bedtime    FH: No pertinent family history in first degree relatives  diabetes mellitus (Mother)    PMH: Renal failure  Dialysis patient  Diabetes  Hypertension  CAD (coronary artery disease)     PSH: AV fistula  Renal transplant recipient    Labs                          12.8   4.70  )-----------( 223      ( 25 Jan 2024 06:21 )             38.0      01-25    133<L>  |  98  |  23.5<H>  ----------------------------<  119<H>  4.7   |  25.0  |  1.13    Ca    9.7      25 Jan 2024 08:24  Mg     1.8     01-25       Vital Signs Last 24 Hrs  T(C): 36.6 (25 Jan 2024 07:30), Max: 36.9 (24 Jan 2024 18:05)  T(F): 97.9 (25 Jan 2024 07:30), Max: 98.5 (24 Jan 2024 18:05)  HR: 63 (25 Jan 2024 07:30) (63 - 78)  BP: 160/76 (25 Jan 2024 07:30) (113/58 - 178/71)  BP(mean): 83 (24 Jan 2024 20:00) (72 - 95)  RR: 18 (25 Jan 2024 07:30) (14 - 19)  SpO2: 93% (25 Jan 2024 07:30) (93% - 100%)    Parameters below as of 25 Jan 2024 07:30  Patient On (Oxygen Delivery Method): room air    WBC Count: 4.70 K/uL (01-25-24 @ 06:21)  ROS: Negative unless otherwise stated in the HPI       PHYSICAL EXAM - dressing left clean dry and intact preop  GEN: PRATIK NUR is a pleasant 58y Male in no acute distress, alert awake, and oriented to person, place and time.   LE Focused:    Vasc: DP/PT pulses palpable 1/4 b/l, CFT brisk to digits, TG warm to warm, edema localized to right hallux, no erythema noted   Derm: pre op: A full thickness wound noted to the distal aspect of right hallux, wound probes to bone with malodor, further purulent drainage expressed on exam today, soft tissue crepitus to the distal aspect. Further necrosis noted to distal portion of the phalanx   post op: dressing left clean, dry, and intact post op  Neuro: Protective sensation grossly diminished   MSK: Able to wiggle toes, pain deferred due to neuropathy       Imaging:     ACC: [id number] EXAM:  XR TOE(S) MIN 2 VIEWS RT   ORDERED BY: JOSE ANGEL BRIONES     ACC: [id number] EXAM:  XR FOOT COMP MIN 3 VIEWS RT   ORDERED BY: JOSE ANGEL BRIONES     PROCEDURE DATE:  01/21/2024        INTERPRETATION:  Right foot and right great toe. Concern is osteomyelitis.    Right foot. 3 views.    There is a mild pes planus and a small posterior calcaneal spur. Arterial   calcifications are noted.    Right great toe. There is destruction of the distal aspect of the great   toe with local air collection consistent with osteomyelitis. There is   also soft tissue swelling.    IMPRESSION: Osteomyelitis of the distal great toe.    Cultures: Culture - Surgical Swab (01.21.24 @ 16:30)   Specimen Source: .Surgical Swab right hallux wound  Culture Results:   Numerous Streptococcus agalactiae (Group B)   Streptococcus agalactiae (Group B) isolated   Group B streptococci are susceptible to ampicillin,   penicillin and cefazolin, but may be resistant to   erythromycin and clindamycin.          
Podiatry Interval: patient is seen in hospital bed sitting up and wearing surgical shoe to right foot. s/p R foot partial hallux amputation with Dr. Silva on 1/24/24. Reports feeling much better overall. Able to tolerate solid food. No further complaints at this time.    HPI: 57 yo male with PMH of DM, kidney transplant, HTN,  CAD presents with right hallux ulcer. Pt states that he noticed the wound about 3 weeks ago, has been following up with outpt podiatrist, Dr. Hill? for wound management. Has been doing local wound care at home but came to ED for foul smelling, draining wound. Denies any pain due to neuropathy. Denies any other complaints. Denies any constitutional symptoms.     Medications acetaminophen     Tablet .. 650 milliGRAM(s) Oral every 6 hours PRN  aluminum hydroxide/magnesium hydroxide/simethicone Suspension 30 milliLiter(s) Oral every 4 hours PRN  aspirin enteric coated 81 milliGRAM(s) Oral daily  atorvastatin 40 milliGRAM(s) Oral at bedtime  carvedilol 12.5 milliGRAM(s) Oral every 12 hours  dextrose 5%. 1000 milliLiter(s) IV Continuous <Continuous>  dextrose 5%. 1000 milliLiter(s) IV Continuous <Continuous>  dextrose 50% Injectable 25 Gram(s) IV Push once  dextrose 50% Injectable 25 Gram(s) IV Push once  dextrose 50% Injectable 12.5 Gram(s) IV Push once  dextrose Oral Gel 15 Gram(s) Oral once PRN  folic acid 1 milliGRAM(s) Oral daily  glucagon  Injectable 1 milliGRAM(s) IntraMuscular once  heparin   Injectable 5000 Unit(s) SubCutaneous every 8 hours  hydrALAZINE 50 milliGRAM(s) Oral two times a day  insulin glargine Injectable (LANTUS) 18 Unit(s) SubCutaneous at bedtime  insulin lispro (ADMELOG) corrective regimen sliding scale   SubCutaneous at bedtime  insulin lispro (ADMELOG) corrective regimen sliding scale   SubCutaneous three times a day before meals  melatonin 3 milliGRAM(s) Oral at bedtime PRN  mycophenolate mofetil 500 milliGRAM(s) Oral two times a day  NIFEdipine XL 30 milliGRAM(s) Oral daily  ondansetron Injectable 4 milliGRAM(s) IV Push every 8 hours PRN  piperacillin/tazobactam IVPB.. 3.375 Gram(s) IV Intermittent every 8 hours  pyridoxine 25 milliGRAM(s) Oral daily  tacrolimus 2 milliGRAM(s) Oral two times a day  tamsulosin 0.4 milliGRAM(s) Oral at bedtime    FHx: diabetes mellitus (Mother)     PMH: Renal failure  Dialysis patient  Diabetes  Hypertension  CAD (coronary artery disease)     PSH: AV fistula  Renal transplant recipient      Labs                          12.2   4.44  )-----------( 218      ( 26 Jan 2024 07:00 )             36.3      01-26    134<L>  |  98  |  22.4<H>  ----------------------------<  135<H>  4.3   |  24.0  |  1.22    Ca    9.6      26 Jan 2024 07:00  Phos  2.4     01-26  Mg     2.0     01-26       Vital Signs Last 24 Hrs  T(C): 36.4 (26 Jan 2024 12:34), Max: 36.4 (26 Jan 2024 05:13)  T(F): 97.5 (26 Jan 2024 12:34), Max: 97.6 (26 Jan 2024 05:13)  HR: 81 (26 Jan 2024 17:20) (70 - 81)  BP: 183/81 (26 Jan 2024 17:20) (101/67 - 183/81)  BP(mean): --  RR: 18 (26 Jan 2024 17:20) (16 - 18)  SpO2: 100% (26 Jan 2024 17:20) (98% - 100%)    Parameters below as of 26 Jan 2024 17:20  Patient On (Oxygen Delivery Method): room air        WBC Count: 4.44 K/uL (01-26-24 @ 07:00)      PHYSICAL EXAM  GEN: PRATIK NUR is a pleasant 58y Male in no acute distress, alert awake, and oriented to person, place and time.   LE Focused:    Vasc: DP/PT pulses palpable 1/4 b/l, CFT brisk to digits, TG warm to warm, edema localized to right hallux, no erythema noted   Derm: pre op: A full thickness wound noted to the distal aspect of right hallux, wound probes to bone with malodor, further purulent drainage expressed on exam today, soft tissue crepitus to the distal aspect. Further necrosis noted to distal portion of the phalanx   post op: incision site at hallux wound well-coapted and sutures in tact. No edema or erythema. No drainage/purulence. No clinical signs of infection on exam  Neuro: Protective sensation grossly diminished   MSK: Able to wiggle toes, pain deferred due to neuropathy       Imaging:     ACC: [id number] EXAM:  XR TOE(S) MIN 2 VIEWS RT   ORDERED BY: JOSE ANGEL BRIONES     ACC: [id number] EXAM:  XR FOOT COMP MIN 3 VIEWS RT   ORDERED BY: JOSE ANGEL BRIONES     PROCEDURE DATE:  01/21/2024        INTERPRETATION:  Right foot and right great toe. Concern is osteomyelitis.    Right foot. 3 views.    There is a mild pes planus and a small posterior calcaneal spur. Arterial   calcifications are noted.    Right great toe. There is destruction of the distal aspect of the great   toe with local air collection consistent with osteomyelitis. There is   also soft tissue swelling.    IMPRESSION: Osteomyelitis of the distal great toe.    Cultures: Culture - Surgical Swab (01.21.24 @ 16:30)   Specimen Source: .Surgical Swab right hallux wound  Culture Results:   Numerous Streptococcus agalactiae (Group B)   Streptococcus agalactiae (Group B) isolated   Group B streptococci are susceptible to ampicillin,   penicillin and cefazolin, but may be resistant to   erythromycin and clindamycin.          
                                           [facility]                                                INFECTIOUS DISEASES  =======================================================                   Pratik Garibay#   Lamonte Arita MD#    Karl Nur MD*                           Maryjo Owens MD*   Chrissy Baltazar MD*           Diplomates American Board of Internal Medicine & Infectious Diseases                  # [place] Office - Appt - Tel  [phone] Fax [phone]                * [place] Office - Appt - Tel [phone] Fax [phone]                                  Hospital Consult line:  [phone]  =======================================================      [id number]  PRATIK LIUDMILA   follow up for: right big toe OM  afebrile  feels well  MRi + right big toe OM, podiatry recommending amputation  pt spoke to transplant team and they agree with surgery    patient seen and examined.       I have personally reviewed the labs and data; pertinent labs and data are listed in this note; please see below.   ===================================================  REVIEW OF SYSTEMS:  CONSTITUTIONAL:  No Fever or chills  HEENT:  No diplopia or blurred vision.  No earache, sore throat or runny nose.  CARDIOVASCULAR:  No pressure, squeezing, strangling, tightness, heaviness or aching about the chest, neck, axilla or epigastrium.  RESPIRATORY:  No cough, shortness of breath  GASTROINTESTINAL:  No nausea, vomiting or diarrhea.  GENITOURINARY:  No dysuria, frequency or urgency. No Blood in urine  MUSCULOSKELETAL:  no joint aches, no muscle pain  SKIN:  No change in skin, hair or nails.  NEUROLOGIC:  No Headaches, seizures or weakness.  PSYCHIATRIC:  No disorder of thought or mood.  ENDOCRINE:  No heat or cold intolerance  HEMATOLOGICAL:  No easy bruising or bleeding.    =======================================================  Allergies    No Known Allergies    Intolerances    Antibiotics:  piperacillin/tazobactam IVPB.. 3.375 Gram(s) IV Intermittent every 8 hours    Other medications:  aspirin enteric coated 81 milliGRAM(s) Oral daily  atorvastatin 40 milliGRAM(s) Oral at bedtime  carvedilol 12.5 milliGRAM(s) Oral every 12 hours  chlorhexidine 2% Cloths 1 Application(s) Topical daily  clopidogrel Tablet 75 milliGRAM(s) Oral daily  dextrose 5%. 1000 milliLiter(s) IV Continuous <Continuous>  dextrose 5%. 1000 milliLiter(s) IV Continuous <Continuous>  dextrose 50% Injectable 12.5 Gram(s) IV Push once  dextrose 50% Injectable 25 Gram(s) IV Push once  dextrose 50% Injectable 25 Gram(s) IV Push once  folic acid 1 milliGRAM(s) Oral daily  glucagon  Injectable 1 milliGRAM(s) IntraMuscular once  heparin   Injectable 5000 Unit(s) SubCutaneous every 8 hours  hydrALAZINE 50 milliGRAM(s) Oral two times a day  insulin glargine Injectable (LANTUS) 18 Unit(s) SubCutaneous at bedtime  insulin lispro (ADMELOG) corrective regimen sliding scale   SubCutaneous three times a day before meals  insulin lispro (ADMELOG) corrective regimen sliding scale   SubCutaneous at bedtime  mycophenolate mofetil 500 milliGRAM(s) Oral two times a day  NIFEdipine XL 30 milliGRAM(s) Oral daily  pyridoxine 25 milliGRAM(s) Oral daily  tacrolimus 2 milliGRAM(s) Oral two times a day  tamsulosin 0.4 milliGRAM(s) Oral at bedtime    ======================================================  Physical Exam:  ============  T(F): 98.5 (23 Jan 2024 19:30), Max: 98.7 (23 Jan 2024 00:07)  HR: 80 (23 Jan 2024 19:30)  BP: 117/67 (23 Jan 2024 19:30)  RR: 18 (23 Jan 2024 19:30)  SpO2: 99% (23 Jan 2024 19:30) (97% - 100%)  temp max in last 48H T(F): , Max: 100.8 (01-22-24 @ 08:28)    General:  No acute distress.  Eye: no conjunctival pallor, no scleral icterus  Neck: Supple, No lymphadenopathy.  Respiratory: Lungs are clear to auscultation, Respirations are non-labored.  Cardiovascular: Normal rate, Regular rhythm,  s1+s2  Gastrointestinal: Soft, Non-tender, Non-distended, Normal bowel sounds.  Genitourinary: No costovertebral angle tenderness.  Lymphatics: No lymphadenopathy neck  Musculoskeletal: rt foot dressing intact  Integumentary: No rash.  Neurologic: Alert, Oriented, No focal deficits  Psychiatric: Appropriate mood & affect.  =======================================================  Labs:                        13.2   3.99  )-----------( 211      ( 23 Jan 2024 08:10 )             40.5     01-23    134<L>  |  99  |  22.8<H>  ----------------------------<  196<H>  4.6   |  25.0  |  1.29    Ca    9.8      23 Jan 2024 08:10  Phos  2.8     01-23  Mg     1.8     01-23    TPro  6.6  /  Alb  3.6  /  TBili  0.4  /  DBili  x   /  AST  22  /  ALT  17  /  AlkPhos  77  01-23      Culture - Surgical Swab (collected 01-21-24 @ 16:30)  Source: .Surgical Swab right hallux wound  Final Report (01-23-24 @ 19:18):    Numerous Streptococcus agalactiae (Group B)    Streptococcus agalactiae (Group B) isolated    Group B streptococci are susceptible to ampicillin,    penicillin and cefazolin, but may be resistant to    erythromycin and clindamycin.    Few Staphylococcus epidermidis "Susceptibilities not performed"    Mixed Anaerobic Yudelka including    Numerous Finegoldia magna "Susceptibilities not performed"    Few Anaerobic Gram Negative Reynaldo Most closely resembling Prevotella    species "Susceptibilities not performed"    Culture - Urine (collected 01-21-24 @ 14:30)  Source: Clean Catch Clean Catch (Midstream)  Final Report (01-22-24 @ 13:01):    No growth    Culture - Blood (collected 01-21-24 @ 14:10)  Source: .Blood Blood-Peripheral  Preliminary Report (01-23-24 @ 19:01):    No growth at 48 Hours    Culture - Blood (collected 01-21-24 @ 13:55)  Source: .Blood Blood-Peripheral  Preliminary Report (01-23-24 @ 19:01):    No growth at 48 Hours    Culture - Blood (collected 09-19-22 @ 20:25)  Source: .Blood Blood-Peripheral  Final Report (09-25-22 @ 01:01):    No Growth Final    Culture - Blood (collected 09-19-22 @ 20:20)  Source: .Blood Blood-Peripheral  Final Report (09-25-22 @ 01:01):    No Growth Final      < from: MR Foot No Cont, Right (01.22.24 @ 07:22) >  IMPRESSION:    Acute on chronic osteomyelitis of the distal phalanx of right greattoe.   Nonspecific mild acute osseous stress reaction within adjacent head of   the first proximal phalanx, favored to represent hyperacute osteomyelitis.    --- End of Report ---        < end of copied text >    
                                           [facility]                                                INFECTIOUS DISEASES  =======================================================                   Pratik Garibay#   Lamonte Arita MD#    Karl Nur MD*                           Maryjo Owens MD*   Chrissy Baltazar MD*           Diplomates American Board of Internal Medicine & Infectious Diseases                  # [place] Office - Appt - Tel  [phone] Fax [phone]                * [place] Office - Appt - Tel [phone] Fax [phone]                                  Hospital Consult line:  [phone]  =======================================================      [facility]-[id number]  PRATIK LIUDMILA   follow up for: right big toe OM  afebrile  feels well  s/p amputation 1/24    patient seen and examined.       I have personally reviewed the labs and data; pertinent labs and data are listed in this note; please see below.   ===================================================  REVIEW OF SYSTEMS:  CONSTITUTIONAL:  No Fever or chills  HEENT:  No diplopia or blurred vision.  No earache, sore throat or runny nose.  CARDIOVASCULAR:  No pressure, squeezing, strangling, tightness, heaviness or aching about the chest, neck, axilla or epigastrium.  RESPIRATORY:  No cough, shortness of breath  GASTROINTESTINAL:  No nausea, vomiting or diarrhea.  GENITOURINARY:  No dysuria, frequency or urgency. No Blood in urine  MUSCULOSKELETAL:  no joint aches, no muscle pain  SKIN:  No change in skin, hair or nails.  NEUROLOGIC:  No Headaches, seizures or weakness.  PSYCHIATRIC:  No disorder of thought or mood.  ENDOCRINE:  No heat or cold intolerance  HEMATOLOGICAL:  No easy bruising or bleeding.    =======================================================  Allergies    No Known Allergies    Intolerances    Antibiotics:  piperacillin/tazobactam IVPB.. 3.375 Gram(s) IV Intermittent every 8 hours    Other medications:  aspirin enteric coated 81 milliGRAM(s) Oral daily  atorvastatin 40 milliGRAM(s) Oral at bedtime  carvedilol 12.5 milliGRAM(s) Oral every 12 hours  chlorhexidine 2% Cloths 1 Application(s) Topical daily  clopidogrel Tablet 75 milliGRAM(s) Oral daily  dextrose 5%. 1000 milliLiter(s) IV Continuous <Continuous>  dextrose 5%. 1000 milliLiter(s) IV Continuous <Continuous>  dextrose 50% Injectable 12.5 Gram(s) IV Push once  dextrose 50% Injectable 25 Gram(s) IV Push once  dextrose 50% Injectable 25 Gram(s) IV Push once  folic acid 1 milliGRAM(s) Oral daily  glucagon  Injectable 1 milliGRAM(s) IntraMuscular once  heparin   Injectable 5000 Unit(s) SubCutaneous every 8 hours  hydrALAZINE 50 milliGRAM(s) Oral two times a day  insulin glargine Injectable (LANTUS) 18 Unit(s) SubCutaneous at bedtime  insulin lispro (ADMELOG) corrective regimen sliding scale   SubCutaneous three times a day before meals  insulin lispro (ADMELOG) corrective regimen sliding scale   SubCutaneous at bedtime  mycophenolate mofetil 500 milliGRAM(s) Oral two times a day  NIFEdipine XL 30 milliGRAM(s) Oral daily  pyridoxine 25 milliGRAM(s) Oral daily  tacrolimus 2 milliGRAM(s) Oral two times a day  tamsulosin 0.4 milliGRAM(s) Oral at bedtime    ======================================================  Physical Exam:  ============  Vital Signs Last 24 Hrs  T(C): 36.6 (25 Jan 2024 19:28), Max: 36.8 (25 Jan 2024 16:32)  T(F): 97.8 (25 Jan 2024 19:28), Max: 98.2 (25 Jan 2024 16:32)  HR: 77 (25 Jan 2024 19:28) (63 - 77)  BP: 118/74 (25 Jan 2024 19:28) (118/74 - 178/71)  BP(mean): --  RR: 18 (25 Jan 2024 19:28) (18 - 18)  SpO2: 98% (25 Jan 2024 19:28) (93% - 100%)    Parameters below as of 25 Jan 2024 19:28  Patient On (Oxygen Delivery Method): room air        General:  No acute distress.  Eye: no conjunctival pallor, no scleral icterus  Neck: Supple, No lymphadenopathy.  Respiratory: Lungs are clear to auscultation, Respirations are non-labored.  Cardiovascular: Normal rate, Regular rhythm,  s1+s2  Gastrointestinal: Soft, Non-tender, Non-distended, Normal bowel sounds.  Genitourinary: No costovertebral angle tenderness.  Lymphatics: No lymphadenopathy neck  Musculoskeletal: rt foot dressing intact  Integumentary: No rash.  Neurologic: Alert, Oriented, No focal deficits  Psychiatric: Appropriate mood & affect.  =======================================================  Labs:                                     12.8   4.70  )-----------( 223      ( 25 Jan 2024 06:21 )             38.0       01-25    133<L>  |  98  |  23.5<H>  ----------------------------<  119<H>  4.7   |  25.0  |  1.13    Ca    9.7      25 Jan 2024 08:24  Mg     1.8     01-25                Urinalysis Basic - ( 25 Jan 2024 08:24 )    Color: x / Appearance: x / SG: x / pH: x  Gluc: 119 mg/dL / Ketone: x  / Bili: x / Urobili: x   Blood: x / Protein: x / Nitrite: x   Leuk Esterase: x / RBC: x / WBC x   Sq Epi: x / Non Sq Epi: x / Bacteria: x                  CAPILLARY BLOOD GLUCOSE      POCT Blood Glucose.: 177 mg/dL (25 Jan 2024 17:11)              Culture - Surgical Swab (collected 01-21-24 @ 16:30)  Source: .Surgical Swab right hallux wound  Final Report (01-23-24 @ 19:18):    Numerous Streptococcus agalactiae (Group B)    Streptococcus agalactiae (Group B) isolated    Group B streptococci are susceptible to ampicillin,    penicillin and cefazolin, but may be resistant to    erythromycin and clindamycin.    Few Staphylococcus epidermidis "Susceptibilities not performed"    Mixed Anaerobic Yudelka including    Numerous Finegoldia magna "Susceptibilities not performed"    Few Anaerobic Gram Negative Reynaldo Most closely resembling Prevotella    species "Susceptibilities not performed"    Culture - Urine (collected 01-21-24 @ 14:30)  Source: Clean Catch Clean Catch (Midstream)  Final Report (01-22-24 @ 13:01):    No growth    Culture - Blood (collected 01-21-24 @ 14:10)  Source: .Blood Blood-Peripheral  Preliminary Report (01-23-24 @ 19:01):    No growth at 48 Hours    Culture - Blood (collected 01-21-24 @ 13:55)  Source: .Blood Blood-Peripheral  Preliminary Report (01-23-24 @ 19:01):    No growth at 48 Hours    Culture - Blood (collected 09-19-22 @ 20:25)  Source: .Blood Blood-Peripheral  Final Report (09-25-22 @ 01:01):    No Growth Final    Culture - Blood (collected 09-19-22 @ 20:20)  Source: .Blood Blood-Peripheral  Final Report (09-25-22 @ 01:01):    No Growth Final      < from: MR Foot No Cont, Right (01.22.24 @ 07:22) >  IMPRESSION:    Acute on chronic osteomyelitis of the distal phalanx of right greattoe.   Nonspecific mild acute osseous stress reaction within adjacent head of   the first proximal phalanx, favored to represent hyperacute osteomyelitis.    --- End of Report ---        < end of copied text >

## 2024-01-27 NOTE — CHART NOTE - NSCHARTNOTEFT_GEN_A_CORE
Pt called dietitians office requesting diet education for consistent cho/renal diet. Met with pt at bedside, provided handouts for both diets, answered questions and nutrition related concerns, assist with menu options. Pt receptive. RD remains available.

## 2024-01-27 NOTE — PROGRESS NOTE ADULT - PROVIDER SPECIALTY LIST ADULT
Hospitalist
Infectious Disease
Infectious Disease
Nephrology
Nephrology
Podiatry
Podiatry
Hospitalist
Hospitalist
Nephrology
Podiatry
Hospitalist
Podiatry
Podiatry
Infectious Disease

## 2024-01-28 ENCOUNTER — TRANSCRIPTION ENCOUNTER (OUTPATIENT)
Age: 59
End: 2024-01-28

## 2024-01-28 VITALS
OXYGEN SATURATION: 100 % | RESPIRATION RATE: 17 BRPM | TEMPERATURE: 98 F | HEART RATE: 70 BPM | DIASTOLIC BLOOD PRESSURE: 78 MMHG | SYSTOLIC BLOOD PRESSURE: 153 MMHG

## 2024-01-28 LAB
ANION GAP SERPL CALC-SCNC: 12 MMOL/L — SIGNIFICANT CHANGE UP (ref 5–17)
BUN SERPL-MCNC: 26.9 MG/DL — HIGH (ref 8–20)
CALCIUM SERPL-MCNC: 10 MG/DL — SIGNIFICANT CHANGE UP (ref 8.4–10.5)
CHLORIDE SERPL-SCNC: 98 MMOL/L — SIGNIFICANT CHANGE UP (ref 96–108)
CO2 SERPL-SCNC: 23 MMOL/L — SIGNIFICANT CHANGE UP (ref 22–29)
CREAT SERPL-MCNC: 1.2 MG/DL — SIGNIFICANT CHANGE UP (ref 0.5–1.3)
EGFR: 70 ML/MIN/1.73M2 — SIGNIFICANT CHANGE UP
GLUCOSE BLDC GLUCOMTR-MCNC: 110 MG/DL — HIGH (ref 70–99)
GLUCOSE BLDC GLUCOMTR-MCNC: 138 MG/DL — HIGH (ref 70–99)
GLUCOSE SERPL-MCNC: 106 MG/DL — HIGH (ref 70–99)
HCT VFR BLD CALC: 39.2 % — SIGNIFICANT CHANGE UP (ref 39–50)
HGB BLD-MCNC: 12.6 G/DL — LOW (ref 13–17)
MCHC RBC-ENTMCNC: 27.9 PG — SIGNIFICANT CHANGE UP (ref 27–34)
MCHC RBC-ENTMCNC: 32.1 GM/DL — SIGNIFICANT CHANGE UP (ref 32–36)
MCV RBC AUTO: 86.9 FL — SIGNIFICANT CHANGE UP (ref 80–100)
PLATELET # BLD AUTO: 243 K/UL — SIGNIFICANT CHANGE UP (ref 150–400)
POTASSIUM SERPL-MCNC: 5.1 MMOL/L — SIGNIFICANT CHANGE UP (ref 3.5–5.3)
POTASSIUM SERPL-SCNC: 5.1 MMOL/L — SIGNIFICANT CHANGE UP (ref 3.5–5.3)
RBC # BLD: 4.51 M/UL — SIGNIFICANT CHANGE UP (ref 4.2–5.8)
RBC # FLD: 12.1 % — SIGNIFICANT CHANGE UP (ref 10.3–14.5)
SODIUM SERPL-SCNC: 133 MMOL/L — LOW (ref 135–145)
WBC # BLD: 5.48 K/UL — SIGNIFICANT CHANGE UP (ref 3.8–10.5)
WBC # FLD AUTO: 5.48 K/UL — SIGNIFICANT CHANGE UP (ref 3.8–10.5)

## 2024-01-28 PROCEDURE — 73630 X-RAY EXAM OF FOOT: CPT

## 2024-01-28 PROCEDURE — 85730 THROMBOPLASTIN TIME PARTIAL: CPT

## 2024-01-28 PROCEDURE — 81003 URINALYSIS AUTO W/O SCOPE: CPT

## 2024-01-28 PROCEDURE — 80053 COMPREHEN METABOLIC PANEL: CPT

## 2024-01-28 PROCEDURE — 96365 THER/PROPH/DIAG IV INF INIT: CPT

## 2024-01-28 PROCEDURE — 85610 PROTHROMBIN TIME: CPT

## 2024-01-28 PROCEDURE — 99239 HOSP IP/OBS DSCHRG MGMT >30: CPT

## 2024-01-28 PROCEDURE — 85025 COMPLETE CBC W/AUTO DIFF WBC: CPT

## 2024-01-28 PROCEDURE — 83735 ASSAY OF MAGNESIUM: CPT

## 2024-01-28 PROCEDURE — 82962 GLUCOSE BLOOD TEST: CPT

## 2024-01-28 PROCEDURE — 80048 BASIC METABOLIC PNL TOTAL CA: CPT

## 2024-01-28 PROCEDURE — 87640 STAPH A DNA AMP PROBE: CPT

## 2024-01-28 PROCEDURE — 87637 SARSCOV2&INF A&B&RSV AMP PRB: CPT

## 2024-01-28 PROCEDURE — 36415 COLL VENOUS BLD VENIPUNCTURE: CPT

## 2024-01-28 PROCEDURE — 93005 ELECTROCARDIOGRAM TRACING: CPT

## 2024-01-28 PROCEDURE — 0225U NFCT DS DNA&RNA 21 SARSCOV2: CPT

## 2024-01-28 PROCEDURE — 86900 BLOOD TYPING SEROLOGIC ABO: CPT

## 2024-01-28 PROCEDURE — 88307 TISSUE EXAM BY PATHOLOGIST: CPT

## 2024-01-28 PROCEDURE — 86901 BLOOD TYPING SEROLOGIC RH(D): CPT

## 2024-01-28 PROCEDURE — 88311 DECALCIFY TISSUE: CPT

## 2024-01-28 PROCEDURE — G1004: CPT

## 2024-01-28 PROCEDURE — 88305 TISSUE EXAM BY PATHOLOGIST: CPT

## 2024-01-28 PROCEDURE — 87077 CULTURE AEROBIC IDENTIFY: CPT

## 2024-01-28 PROCEDURE — 73718 MRI LOWER EXTREMITY W/O DYE: CPT | Mod: ME

## 2024-01-28 PROCEDURE — 87641 MR-STAPH DNA AMP PROBE: CPT

## 2024-01-28 PROCEDURE — 80180 DRUG SCRN QUAN MYCOPHENOLATE: CPT

## 2024-01-28 PROCEDURE — 73660 X-RAY EXAM OF TOE(S): CPT

## 2024-01-28 PROCEDURE — 83036 HEMOGLOBIN GLYCOSYLATED A1C: CPT

## 2024-01-28 PROCEDURE — 87040 BLOOD CULTURE FOR BACTERIA: CPT

## 2024-01-28 PROCEDURE — 87070 CULTURE OTHR SPECIMN AEROBIC: CPT

## 2024-01-28 PROCEDURE — 87086 URINE CULTURE/COLONY COUNT: CPT

## 2024-01-28 PROCEDURE — 99285 EMERGENCY DEPT VISIT HI MDM: CPT

## 2024-01-28 PROCEDURE — 84100 ASSAY OF PHOSPHORUS: CPT

## 2024-01-28 PROCEDURE — 85027 COMPLETE CBC AUTOMATED: CPT

## 2024-01-28 PROCEDURE — 80197 ASSAY OF TACROLIMUS: CPT

## 2024-01-28 PROCEDURE — 86850 RBC ANTIBODY SCREEN: CPT

## 2024-01-28 PROCEDURE — 87075 CULTR BACTERIA EXCEPT BLOOD: CPT

## 2024-01-28 PROCEDURE — 93923 UPR/LXTR ART STDY 3+ LVLS: CPT

## 2024-01-28 RX ORDER — HYDRALAZINE HCL 50 MG
1 TABLET ORAL
Qty: 60 | Refills: 0
Start: 2024-01-28 | End: 2024-02-26

## 2024-01-28 RX ORDER — HYDRALAZINE HCL 50 MG
1 TABLET ORAL
Refills: 0 | DISCHARGE

## 2024-01-28 RX ADMIN — HEPARIN SODIUM 5000 UNIT(S): 5000 INJECTION INTRAVENOUS; SUBCUTANEOUS at 05:35

## 2024-01-28 RX ADMIN — MYCOPHENOLATE MOFETIL 500 MILLIGRAM(S): 250 CAPSULE ORAL at 08:45

## 2024-01-28 RX ADMIN — PIPERACILLIN AND TAZOBACTAM 25 GRAM(S): 4; .5 INJECTION, POWDER, LYOPHILIZED, FOR SOLUTION INTRAVENOUS at 05:35

## 2024-01-28 RX ADMIN — TACROLIMUS 2 MILLIGRAM(S): 5 CAPSULE ORAL at 08:45

## 2024-01-28 RX ADMIN — Medication 50 MILLIGRAM(S): at 08:36

## 2024-01-28 RX ADMIN — CARVEDILOL PHOSPHATE 12.5 MILLIGRAM(S): 80 CAPSULE, EXTENDED RELEASE ORAL at 08:36

## 2024-01-28 NOTE — DISCHARGE NOTE NURSING/CASE MANAGEMENT/SOCIAL WORK - PATIENT PORTAL LINK FT
Discharge Instructions


Date of Service


Nov 22, 2017.





Admission


Reason for Admission:  Dka, Acute Renal Failure





Discharge


Discharge Diagnosis / Problem:  Hypergycemia upon admission, Labile blood 

glucose while on prednisone, impr





Discharge Goals


Goal(s):  Decrease discomfort, Improve function, Increase independence, Improve 

disease control, Improve nutritional status, Learn about illness, Diagnostic 

testing, Therapeutic intervention, Prevent Disease Progression, Specific goals





Activity Recommendations


Activity Limitations:  resume your previous activity (fall precaution)





.





Instructions / Follow-Up


Instructions / Follow-Up


You have Hypergycemia upon admission, Labile blood glucose while on prednisone, 

improving,


You need to continue Lantus insulin plus insulin sliding scale as ordered


You have Acute on chronic respiratory failure with hypercapnia, likely 

secondary to COPD exacerbation 


You need to continue continue current o2 regime with BiPAP at night, she has 

BiPAP machine in the nursing home 


We are doing Prednisone  taper dose by 5mg q 3 days, today will be secondary 

day  of 15 mg by mouth daily, 


Continue daily Lasix 80mg, continue inhaler





You have Gait disturbance with leg weakness and tremor, possible multiple 

factorial, possible worsening from restless leg syndrome , or is amiodarone 

related, detail see below Restless leg syndrome neuro saw patient, continue 

current medication, need to Continue PT/OT, and skilled nursing facility 

rehabilitation, follow-up with neurologist





You have Atrial fibrillation, likely proximal atrial fibrillation, we have 

discontinue amiodarone completely, continue on Toprol, 


We have started Eliquis  5 mg by mouth twice a day per recommend CARDIOLOGY, 

patient really pay attention about fall precaution








- you need to follow up with your primary care physician in 1 week,


- take medication as instructed, never overdose or any misuse, or take with 

alcohol,   because misuse of medicine may  cause organ damage or death, call 

your primary care physician if have questions of medicaitons.


- call your primary care physician OR go to local emergency room if has any 

fever/chill, chest pain, shortness of breathing, nausea/vomiting/abdominal pain

, facial droop/slurry speech/local weakness, or if has any questions. 


- fall precaution


- diet as instructed


- you need to follow up with your subspecialists, such as pulmonologist, 

cardiologist and neurologist 


- you should understand that it is important to follow up the above instruction

, and "not following the above instruction" may cause delayed or missed care of 

your medical conditions which may cause permanent organ damage and even death.





Current Hospital Diet


Patient's current hospital diet: Diabetes Type 2 Diet, Low Sodium Diet (2gm Na)





Discharge Diet


Recommended Diet:  Low Sodium Diet (2gm Na), Diabetes Type 2 Diet





Procedures


Procedures Performed:  


no





Pending Studies


Studies pending at discharge:  no





Laboratory Results





Hemoglobin A1c








Test


  10/29/17


05:34 Range/Units


 


 


Estimated Average Glucose 157   mg/dl


 


Hemoglobin A1c 7.1 H 4.5-5.6  %








Lipid Panel








Test


  10/28/17


06:25 Range/Units


 


 


Triglycerides Level 131  0-150  mg/dl


 


Cholesterol Level 115  0-200  mg/dl


 


HDL Cholesterol 46   mg/dl


 


Cholesterol/HDL Ratio 2.5   


 


LDL Cholesterol, Calculated 43   mg/dl











Medical Emergencies








.


Who to Call and When:





Medical Emergencies:  If at any time you feel your situation is an emergency, 

please call 911 immediately.





.





Non-Emergent Contact


Non-Emergency issues call your:  Primary Care Provider, Cardiologist, 

Pulmonologist





.


.








"Provider Documentation" section prepared by Jorge Ceron.








.





VTE Core Measure


Inpt VTE Proph given/why not?:  Other Anticoagulation, SCD's
You can access the FollowMyHealth Patient Portal offered by [facility] by registering at the following website: [email or web address]. By joining [facility]’s FollowMyHealth portal, you will also be able to view your health information using other applications (apps) compatible with our system.

## 2024-01-30 ENCOUNTER — NON-APPOINTMENT (OUTPATIENT)
Age: 59
End: 2024-01-30

## 2024-01-30 LAB
CULTURE RESULTS: SIGNIFICANT CHANGE UP
MYCOPHENOLATE SERPL-MCNC: 1.1 UG/ML — SIGNIFICANT CHANGE UP (ref 1–3.5)
MYCOPHENOLIC ACID GLUCURONIDE: 50 UG/ML — SIGNIFICANT CHANGE UP (ref 15–125)
SPECIMEN SOURCE: SIGNIFICANT CHANGE UP
SURGICAL PATHOLOGY STUDY: SIGNIFICANT CHANGE UP

## 2024-01-31 ENCOUNTER — APPOINTMENT (OUTPATIENT)
Dept: INTERNAL MEDICINE | Facility: CLINIC | Age: 59
End: 2024-01-31
Payer: MEDICARE

## 2024-01-31 VITALS
TEMPERATURE: 96.6 F | DIASTOLIC BLOOD PRESSURE: 76 MMHG | OXYGEN SATURATION: 98 % | HEART RATE: 74 BPM | BODY MASS INDEX: 26.06 KG/M2 | WEIGHT: 166 LBS | HEIGHT: 67 IN | RESPIRATION RATE: 16 BRPM | SYSTOLIC BLOOD PRESSURE: 137 MMHG

## 2024-01-31 PROCEDURE — 99495 TRANSJ CARE MGMT MOD F2F 14D: CPT

## 2024-01-31 NOTE — HISTORY OF PRESENT ILLNESS
[FreeTextEntry1] :  Hospital Course: Discharge Date	28-Jan-2024 Admission Date	21-Jan-2024 17:31 Hospital Course	 58y/oM CAD, DM, s/p renal transplant presenting with R hallux ulcer. In ER, xrays /MRI consistent with osteomyelitis, admitted for further work up. now s/p OR for amputation of R great toe 1/24. weight bearing to heel only in surgical shoe. Seen by nephro, ID, podiatry.     Interval f/u 1/31/24- pt doing well tolerating augmentin was advised by podiatry not to change dressing until first visit with Dr Silva, scheduled for today no fever no diarrhea has not seen transplant team yet

## 2024-01-31 NOTE — PHYSICAL EXAM
[General Appearance - Alert] : alert [General Appearance - In No Acute Distress] : in no acute distress [Auscultation Breath Sounds / Voice Sounds] : lungs were clear to auscultation bilaterally [Heart Rate And Rhythm] : heart rate was normal and rhythm regular [Heart Sounds] : normal S1 and S2 [Heart Sounds Gallop] : no gallops [Murmurs] : no murmurs [Heart Sounds Pericardial Friction Rub] : no pericardial rub [Bowel Sounds] : normal bowel sounds [Abdomen Soft] : soft [Abdomen Tenderness] : non-tender [] : no hepato-splenomegaly [Abdomen Mass (___ Cm)] : no abdominal mass palpated [Costovertebral Angle Tenderness] : no CVA tenderness [FreeTextEntry1] : right foot dresing intact RLE calf edema nontender no erythema

## 2024-01-31 NOTE — ASSESSMENT
[Treatment Education] : treatment education [Treatment Adherence] : treatment adherence [Anticipatory Guidance] : anticipatory guidance [FreeTextEntry1] : Right foot OM s/p amputation of big toe, here for post hospitalization f/u for pending cultures and path   -MRi + OM right big toe - f/u wound cx grp B strep, anaerobes. staph epi likely skin contaminant - s/p right big toe amputation 1/24 with primary closure  - OR cultures were negative - path  of right proximal phalanx biopsy reviewed- negative for inflammation, benign bone with hemorrhage - complete 2 weeks augmentin as planned - Seeing Dr Silva today for first dressing change asked for images of wound to be sent to me - local wound care and weight bearing status as per podiatry - if any concern for poor wound healing, drainage, erythema wound infection, should return to see ID - remains on same doses of tacrolimus and cellcept as per recommendations of transplant team- f/u transplant team for further recommendations  f/u Id as needed

## 2024-03-04 ENCOUNTER — ASC (OUTPATIENT)
Dept: URBAN - METROPOLITAN AREA SURGERY 8 | Facility: SURGERY | Age: 59
Setting detail: OPHTHALMOLOGY
End: 2024-03-04
Payer: MEDICARE

## 2024-03-04 DIAGNOSIS — E10.3512: ICD-10-CM

## 2024-03-04 DIAGNOSIS — E10.3511: ICD-10-CM

## 2024-03-04 DIAGNOSIS — E10.3513: ICD-10-CM

## 2024-03-04 PROCEDURE — 67228 TREATMENT X10SV RETINOPATHY: CPT | Mod: 79,RT | Performed by: SPECIALIST

## 2024-03-04 PROCEDURE — 99024 POSTOP FOLLOW-UP VISIT: CPT | Performed by: SPECIALIST

## 2024-04-23 ENCOUNTER — INPATIENT (INPATIENT)
Facility: HOSPITAL | Age: 59
LOS: 8 days | Discharge: HOME HEALTH | DRG: 617 | End: 2024-05-02
Attending: STUDENT IN AN ORGANIZED HEALTH CARE EDUCATION/TRAINING PROGRAM | Admitting: FAMILY MEDICINE
Payer: MEDICARE

## 2024-04-23 VITALS
SYSTOLIC BLOOD PRESSURE: 178 MMHG | WEIGHT: 178.57 LBS | OXYGEN SATURATION: 98 % | DIASTOLIC BLOOD PRESSURE: 83 MMHG | TEMPERATURE: 98 F | RESPIRATION RATE: 18 BRPM | HEART RATE: 71 BPM

## 2024-04-23 DIAGNOSIS — E11.621 TYPE 2 DIABETES MELLITUS WITH FOOT ULCER: ICD-10-CM

## 2024-04-23 DIAGNOSIS — Z94.0 KIDNEY TRANSPLANT STATUS: Chronic | ICD-10-CM

## 2024-04-23 DIAGNOSIS — I77.0 ARTERIOVENOUS FISTULA, ACQUIRED: Chronic | ICD-10-CM

## 2024-04-23 LAB
ALBUMIN SERPL ELPH-MCNC: 4.3 G/DL — SIGNIFICANT CHANGE UP (ref 3.3–5.2)
ALP SERPL-CCNC: 103 U/L — SIGNIFICANT CHANGE UP (ref 40–120)
ALT FLD-CCNC: 24 U/L — SIGNIFICANT CHANGE UP
ANION GAP SERPL CALC-SCNC: 9 MMOL/L — SIGNIFICANT CHANGE UP (ref 5–17)
APTT BLD: 36.3 SEC — HIGH (ref 24.5–35.6)
AST SERPL-CCNC: 27 U/L — SIGNIFICANT CHANGE UP
BASE EXCESS BLDV CALC-SCNC: 1.7 MMOL/L — SIGNIFICANT CHANGE UP (ref -2–3)
BASOPHILS # BLD AUTO: 0.05 K/UL — SIGNIFICANT CHANGE UP (ref 0–0.2)
BASOPHILS NFR BLD AUTO: 0.8 % — SIGNIFICANT CHANGE UP (ref 0–2)
BILIRUB SERPL-MCNC: 0.6 MG/DL — SIGNIFICANT CHANGE UP (ref 0.4–2)
BUN SERPL-MCNC: 25 MG/DL — HIGH (ref 8–20)
CA-I SERPL-SCNC: 1.44 MMOL/L — HIGH (ref 1.15–1.33)
CALCIUM SERPL-MCNC: 10.7 MG/DL — HIGH (ref 8.4–10.5)
CHLORIDE BLDV-SCNC: 100 MMOL/L — SIGNIFICANT CHANGE UP (ref 96–108)
CHLORIDE SERPL-SCNC: 99 MMOL/L — SIGNIFICANT CHANGE UP (ref 96–108)
CO2 SERPL-SCNC: 26 MMOL/L — SIGNIFICANT CHANGE UP (ref 22–29)
CREAT SERPL-MCNC: 1.11 MG/DL — SIGNIFICANT CHANGE UP (ref 0.5–1.3)
CRP SERPL-MCNC: 13 MG/L — HIGH
EGFR: 77 ML/MIN/1.73M2 — SIGNIFICANT CHANGE UP
EOSINOPHIL # BLD AUTO: 0.13 K/UL — SIGNIFICANT CHANGE UP (ref 0–0.5)
EOSINOPHIL NFR BLD AUTO: 2 % — SIGNIFICANT CHANGE UP (ref 0–6)
ERYTHROCYTE [SEDIMENTATION RATE] IN BLOOD: 30 MM/HR — HIGH (ref 0–15)
GAS PNL BLDV: 133 MMOL/L — LOW (ref 136–145)
GAS PNL BLDV: SIGNIFICANT CHANGE UP
GLUCOSE BLDV-MCNC: 109 MG/DL — HIGH (ref 70–99)
GLUCOSE SERPL-MCNC: 105 MG/DL — HIGH (ref 70–99)
HCO3 BLDV-SCNC: 29 MMOL/L — SIGNIFICANT CHANGE UP (ref 22–29)
HCT VFR BLD CALC: 42.2 % — SIGNIFICANT CHANGE UP (ref 39–50)
HCT VFR BLDA CALC: 43 % — SIGNIFICANT CHANGE UP
HGB BLD CALC-MCNC: 14.3 G/DL — SIGNIFICANT CHANGE UP (ref 12.6–17.4)
HGB BLD-MCNC: 14 G/DL — SIGNIFICANT CHANGE UP (ref 13–17)
IMM GRANULOCYTES NFR BLD AUTO: 0.3 % — SIGNIFICANT CHANGE UP (ref 0–0.9)
INR BLD: 0.97 RATIO — SIGNIFICANT CHANGE UP (ref 0.85–1.18)
LACTATE BLDV-MCNC: 1.2 MMOL/L — SIGNIFICANT CHANGE UP (ref 0.5–2)
LYMPHOCYTES # BLD AUTO: 0.75 K/UL — LOW (ref 1–3.3)
LYMPHOCYTES # BLD AUTO: 11.5 % — LOW (ref 13–44)
MCHC RBC-ENTMCNC: 28.7 PG — SIGNIFICANT CHANGE UP (ref 27–34)
MCHC RBC-ENTMCNC: 33.2 GM/DL — SIGNIFICANT CHANGE UP (ref 32–36)
MCV RBC AUTO: 86.7 FL — SIGNIFICANT CHANGE UP (ref 80–100)
MONOCYTES # BLD AUTO: 0.51 K/UL — SIGNIFICANT CHANGE UP (ref 0–0.9)
MONOCYTES NFR BLD AUTO: 7.8 % — SIGNIFICANT CHANGE UP (ref 2–14)
NEUTROPHILS # BLD AUTO: 5.09 K/UL — SIGNIFICANT CHANGE UP (ref 1.8–7.4)
NEUTROPHILS NFR BLD AUTO: 77.6 % — HIGH (ref 43–77)
PCO2 BLDV: 68 MMHG — HIGH (ref 42–55)
PH BLDV: 7.24 — LOW (ref 7.32–7.43)
PLATELET # BLD AUTO: 216 K/UL — SIGNIFICANT CHANGE UP (ref 150–400)
PO2 BLDV: 44 MMHG — SIGNIFICANT CHANGE UP (ref 25–45)
POTASSIUM BLDV-SCNC: 5.3 MMOL/L — HIGH (ref 3.5–5.1)
POTASSIUM SERPL-MCNC: 5.2 MMOL/L — SIGNIFICANT CHANGE UP (ref 3.5–5.3)
POTASSIUM SERPL-SCNC: 5.2 MMOL/L — SIGNIFICANT CHANGE UP (ref 3.5–5.3)
PROT SERPL-MCNC: 7.2 G/DL — SIGNIFICANT CHANGE UP (ref 6.6–8.7)
PROTHROM AB SERPL-ACNC: 10.8 SEC — SIGNIFICANT CHANGE UP (ref 9.5–13)
RBC # BLD: 4.87 M/UL — SIGNIFICANT CHANGE UP (ref 4.2–5.8)
RBC # FLD: 12.9 % — SIGNIFICANT CHANGE UP (ref 10.3–14.5)
SAO2 % BLDV: 65.8 % — SIGNIFICANT CHANGE UP
SODIUM SERPL-SCNC: 134 MMOL/L — LOW (ref 135–145)
WBC # BLD: 6.55 K/UL — SIGNIFICANT CHANGE UP (ref 3.8–10.5)
WBC # FLD AUTO: 6.55 K/UL — SIGNIFICANT CHANGE UP (ref 3.8–10.5)

## 2024-04-23 PROCEDURE — 93010 ELECTROCARDIOGRAM REPORT: CPT

## 2024-04-23 PROCEDURE — 73630 X-RAY EXAM OF FOOT: CPT | Mod: 26,RT

## 2024-04-23 PROCEDURE — 99223 1ST HOSP IP/OBS HIGH 75: CPT

## 2024-04-23 PROCEDURE — 71045 X-RAY EXAM CHEST 1 VIEW: CPT | Mod: 26

## 2024-04-23 PROCEDURE — 99285 EMERGENCY DEPT VISIT HI MDM: CPT | Mod: FS

## 2024-04-23 RX ORDER — PIPERACILLIN AND TAZOBACTAM 4; .5 G/20ML; G/20ML
3.38 INJECTION, POWDER, LYOPHILIZED, FOR SOLUTION INTRAVENOUS ONCE
Refills: 0 | Status: COMPLETED | OUTPATIENT
Start: 2024-04-23 | End: 2024-04-23

## 2024-04-23 RX ORDER — ACETAMINOPHEN 500 MG
650 TABLET ORAL EVERY 6 HOURS
Refills: 0 | Status: DISCONTINUED | OUTPATIENT
Start: 2024-04-23 | End: 2024-04-29

## 2024-04-23 RX ORDER — PIPERACILLIN AND TAZOBACTAM 4; .5 G/20ML; G/20ML
3.38 INJECTION, POWDER, LYOPHILIZED, FOR SOLUTION INTRAVENOUS EVERY 8 HOURS
Refills: 0 | Status: DISCONTINUED | OUTPATIENT
Start: 2024-04-23 | End: 2024-04-29

## 2024-04-23 RX ORDER — ONDANSETRON 8 MG/1
4 TABLET, FILM COATED ORAL EVERY 8 HOURS
Refills: 0 | Status: DISCONTINUED | OUTPATIENT
Start: 2024-04-23 | End: 2024-04-29

## 2024-04-23 RX ORDER — VANCOMYCIN HCL 1 G
1000 VIAL (EA) INTRAVENOUS ONCE
Refills: 0 | Status: COMPLETED | OUTPATIENT
Start: 2024-04-23 | End: 2024-04-23

## 2024-04-23 RX ORDER — LANOLIN ALCOHOL/MO/W.PET/CERES
3 CREAM (GRAM) TOPICAL AT BEDTIME
Refills: 0 | Status: DISCONTINUED | OUTPATIENT
Start: 2024-04-23 | End: 2024-04-29

## 2024-04-23 RX ADMIN — Medication 250 MILLIGRAM(S): at 16:11

## 2024-04-23 RX ADMIN — PIPERACILLIN AND TAZOBACTAM 200 GRAM(S): 4; .5 INJECTION, POWDER, LYOPHILIZED, FOR SOLUTION INTRAVENOUS at 15:09

## 2024-04-23 RX ADMIN — PIPERACILLIN AND TAZOBACTAM 25 GRAM(S): 4; .5 INJECTION, POWDER, LYOPHILIZED, FOR SOLUTION INTRAVENOUS at 18:37

## 2024-04-23 NOTE — ED ADULT NURSE REASSESSMENT NOTE - NS ED NURSE REASSESS COMMENT FT1
Pt admitted. report given to SEAN Hunter for CDU 16Alc. pt transported, RN aware of transfer of care. AOx4, VSs.
pt AOx4, breathing even and unlabored. pt received 1500. xray completed, podiatry at bedside. pending transfer to admitted bed.
PAST SURGICAL HISTORY:  Injury of arm, right, sequela

## 2024-04-23 NOTE — H&P ADULT - HISTORY OF PRESENT ILLNESS
57 yo male with ESRD s/p renal transplant (1/2023), CAD s/p PCI, IDDM presenting to the ED with complaint of right foot pain and swelling.  57 yo male with ESRD s/p renal transplant (1/2023), CAD s/p PCI, IDDM presenting to the ED with complaint of right 2nd toe pain and swelling. Patient follows with Dr. Silva for podiatry and is recently s/p right great toe amputation for OM. He saw Dr. Silva on 4/19 and was started on Doxycycline, however, swelling was worsening so patient came to the ED. He reports compliance with PO antibiotic. He denies fever or chills, N/V/D. States there is not much pain at the site as he pas poor sensation.

## 2024-04-23 NOTE — ED PROVIDER NOTE - NS ED ROS FT
Constitutional: (-) fever  (-)chills  (-)sweats  Eyes/ENT: (-)   Cardiovascular: (-) chest pain, (-) palpitations (-) edema   Respiratory: (-) cough, (-) shortness of breath   Gastrointestinal: (-)nausea  (-)vomiting, (-) diarrhea  (-) abdominal pain   :  (-)dysuria, (-)frequency, (-)urgency, (-)hematuria  Musculoskeletal: (-) neck pain, (-) back pain, (-) joint pain  Integumentary: (-) rash, (+) edema  Neurological: (-) headache, (-) altered mental status  (-)LOC

## 2024-04-23 NOTE — H&P ADULT - NSHPPHYSICALEXAM_GEN_ALL_CORE
Vital Signs Last 24 Hrs  T(C): 36.7 (23 Apr 2024 22:05), Max: 36.7 (23 Apr 2024 13:54)  T(F): 98 (23 Apr 2024 22:05), Max: 98 (23 Apr 2024 13:54)  HR: 69 (23 Apr 2024 22:05) (69 - 80)  BP: 161/82 (23 Apr 2024 22:05) (152/83 - 178/83)  BP(mean): --  RR: 18 (23 Apr 2024 22:05) (18 - 18)  SpO2: 98% (23 Apr 2024 22:05) (98% - 98%)    Parameters below as of 23 Apr 2024 22:05  Patient On (Oxygen Delivery Method): room air    General: Age-appearing, in no acute distress  Head: Normocephalic, atraumatic  ENMT: EOMI, neck supple  Cardiovascular: +S1, S2; Regular rate and rhythm, no murmurs, rubs, gallops  Respiratory: CTA BL, no wheezes, rales, rhonchi  Gastrointestinal: Abdomen soft, non-tender, +BS in all 4 quadrants  Extremities: No clubbing, cyanosis, or edema; edema to right 2nd toe and foot extending to the midfoot.  Vascular: 2+ pulses, cap refill < 2 seconds  Neuro: Non-focal, AAOx4, sensation intact BL  Musculoskeletal: Normal tone, no deformities  Skin: Warm, dry; no acute rash seen; Right 2nd digit distal ulcer without purulence  Psych: Appropriate, cooperative

## 2024-04-23 NOTE — H&P ADULT - NSICDXPASTSURGICALHX_GEN_ALL_CORE_FT
PAST SURGICAL HISTORY:  AV fistula Left arm    History of amputation of hallux     Renal transplant recipient

## 2024-04-23 NOTE — ED PROVIDER NOTE - OBJECTIVE STATEMENT
58-year-old male; with PMH significant for CAD (s/p stents), DM (insulin-dependent), ESRD s/p renal transplant January 2023 (on mycophenolate, tacrolimus); now presenting with right foot pain with swelling.  Patient reports having an ingrown infected toenail 2 weeks ago.  Patient had removal of toenail at that time with his podiatrist.  Reports going back to podiatry 1 week ago and was noted to have small blister formation, was debrided at that time and placed on topical treatment.  Patient then again returned to podiatrist 4 days ago and was placed on oral doxycycline.  Patient reports increasing swelling over the foot.  Denies any fever, chills, sweats.  Denies nausea or vomiting.  Reports increasing swelling of the foot now.

## 2024-04-23 NOTE — H&P ADULT - NSHPLABSRESULTS_GEN_ALL_CORE
14.0   6.55  )-----------( 216      ( 23 Apr 2024 15:10 )             42.2     23 Apr 2024 15:10    134    |  99     |  25.0   ----------------------------<  105    5.2     |  26.0   |  1.11     Ca    10.7       23 Apr 2024 15:10    TPro  7.2    /  Alb  4.3    /  TBili  0.6    /  DBili  x      /  AST  27     /  ALT  24     /  AlkPhos  103    23 Apr 2024 15:10    PT/INR - ( 23 Apr 2024 15:10 )   PT: 10.8 sec;   INR: 0.97 ratio         PTT - ( 23 Apr 2024 15:10 )  PTT:36.3 sec  CAPILLARY BLOOD GLUCOSE      POCT Blood Glucose.: 136 mg/dL (23 Apr 2024 14:57)    LIVER FUNCTIONS - ( 23 Apr 2024 15:10 )  Alb: 4.3 g/dL / Pro: 7.2 g/dL / ALK PHOS: 103 U/L / ALT: 24 U/L / AST: 27 U/L / GGT: x           Urinalysis Basic - ( 23 Apr 2024 15:10 )    Color: x / Appearance: x / SG: x / pH: x  Gluc: 105 mg/dL / Ketone: x  / Bili: x / Urobili: x   Blood: x / Protein: x / Nitrite: x   Leuk Esterase: x / RBC: x / WBC x   Sq Epi: x / Non Sq Epi: x / Bacteria: x      < from: Xray Chest 1 View-PORTABLE IMMEDIATE (04.23.24 @ 16:47) >    IMPRESSION:  1. Clear lungs with no acute cardiopulmonary abnormalities.  2. Prominent cardiac silhouette, perhaps magnified by technique, but   without CHF.  3. Left coronary artery stent.  4. Elevated right hemidiaphragm.    < end of copied text >    Foot xray pending read

## 2024-04-23 NOTE — ED PROVIDER NOTE - PROGRESS NOTE DETAILS
HPI and intake orders and PE reviewed, podiatry note reviewed, concern for osteo and amputation of possible second digit, started with blister formation and nail removal by podiatrist MD Carnes.  Plan for continued IV abx, and MRI.  Case to be discussed with hospitalist MD Bourgeois.

## 2024-04-23 NOTE — H&P ADULT - NSHPSOURCEINFOTX_GEN_ALL_CORE
Patient see jah evaluated prior to midnight on 4/23/24 Patient seen and evaluated prior to midnight on 4/23/24

## 2024-04-23 NOTE — ED PROVIDER NOTE - CLINICAL SUMMARY MEDICAL DECISION MAKING FREE TEXT BOX
(SUMEET Manriquez MD) Initial Assessment: 58-year-old male; with PMH significant for CAD (s/p stents), DM (insulin-dependent), ESRD s/p renal transplant January 2023 (on mycophenolate, tacrolimus); now presenting with right foot pain with swelling.  will do labs, xray, abx, and podiatry consult.      ACP to complete summary of medical encounter below.  Summary of Clinical Encounter: (SUMEET Manriquez MD) Initial Assessment: 58-year-old male; with PMH significant for CAD (s/p stents), DM (insulin-dependent), ESRD s/p renal transplant January 2023 (on mycophenolate, tacrolimus); now presenting with right foot pain with swelling.  will do labs, xray, abx, and podiatry consult.      ACP to complete summary of medical encounter below.  Summary of Clinical Encounter:    concern for osteo, podiatry note reccomends MRI and IV abx  plan for admission, to be d/w hospitalist Bourgeois

## 2024-04-23 NOTE — CONSULT NOTE ADULT - SUBJECTIVE AND OBJECTIVE BOX
Podiatry HPI: 58M with complaint of worsening swelling to the right foot with associated 2nd digit ulcer. The patient states that he has recently seen Dr. Silva outpatient for a wound to the distal aspect of the right 2nd digit. He last saw Dr. Silva on Friday and was started on Doxycycline, which he has taken as directed since prescribed. The patient states that today, he noticed his right 2nd digit and the right foot were becoming more swollen, and there was some discoloration to the 2nd digit, so he presented to the ED. The patient denies any constitutional symptoms. Denies other pedal complaints. History of hallux amputation earlier this year.      HPI: Pending      PMH:Renal failure    Dialysis patient    Diabetes    Hypertension    CAD (coronary artery disease)      Allergies: No Known Allergies    Medications: piperacillin/tazobactam IVPB.. 3.375 Gram(s) IV Intermittent once    FH:No pertinent family history in first degree relatives    FHx: diabetes mellitus (Mother)      PSX: No significant past surgical history    AV fistula    Renal transplant recipient      SH: piperacillin/tazobactam IVPB.. 3.375 Gram(s) IV Intermittent once      Vital Signs Last 24 Hrs  T(C): 36.7 (23 Apr 2024 13:54), Max: 36.7 (23 Apr 2024 13:54)  T(F): 98 (23 Apr 2024 13:54), Max: 98 (23 Apr 2024 13:54)  HR: 71 (23 Apr 2024 13:54) (71 - 71)  BP: 178/83 (23 Apr 2024 13:54) (178/83 - 178/83)  BP(mean): --  RR: 18 (23 Apr 2024 13:54) (18 - 18)  SpO2: 98% (23 Apr 2024 13:54) (98% - 98%)    Parameters below as of 23 Apr 2024 13:54  Patient On (Oxygen Delivery Method): room air        LABS                        14.0   6.55  )-----------( 216      ( 23 Apr 2024 15:10 )             42.2               04-23    134<L>  |  99  |  25.0<H>  ----------------------------<  105<H>  5.2   |  26.0  |  1.11    Ca    10.7<H>      23 Apr 2024 15:10    TPro  7.2  /  Alb  4.3  /  TBili  0.6  /  DBili  x   /  AST  27  /  ALT  24  /  AlkPhos  103  04-23      ROS  unremarkable outside HPI      PHYSICAL EXAM  LE Focused:    Vasc:  DP and PT pulses palpable 1/4 b/l. TG warm to warm with increased warmth noted to right foot. Non-pitting edema to right 2nd toe and foot extending to the midfoot.  Derm: Right 2nd digit distal tuft ulcer without purulence, -PTB, mild malodor, no fluctuance, mild dusky discoloration to the 2nd digit. No other wounds or lesions noted  Neuro: Protective sensation diminished  MSK: s/p R hallux amputation January 2024      IMAGING:   Pending    CULTURES:   Pending    A:  R 2nd digit ulcer  Cellulitis    P:   Patient evaluated and Chart reviewed   Discussed diagnosis and treatment with patient  Wound flushed with normal saline  Obtained wound culture to be sent to Lab  Applied betadine, DSD  Right foot xrays pending  Recommend right foot MRI  IV Abx  Offloading to bilateral Heels while in bed  Discussed importance of daily foot examinations and proper shoe gear and to importance of lower Fasting Blood Glucose levels.   Podiatry to follow while in house.  Discussed with Attending Dr. Silva

## 2024-04-23 NOTE — ED ADULT TRIAGE NOTE - HEART RATE (BEATS/MIN)
71 Atrial fibrillation Atrial fibrillation Atrial fibrillation Atrial fibrillation Atrial fibrillation Atrial fibrillation

## 2024-04-23 NOTE — ED PROVIDER NOTE - PHYSICAL EXAMINATION
General:     NAD  Head:     NC/AT, EOMI, oral mucosa moist  Neck:     trachea midline  Lungs:     CTA b/l, no w/r/r  CVS:     S1S2, RRR, no m/g/r  Abd:     +BS, s/nt/nd, no organomegaly  Ext:    2+ radial and pedal pulses, R FOOT:  great toe s/p amputation.  2nd toe: drainage from distal tip, dry gangrene over distal tip, black color changes with fluctuance over proximal 2nd toe, plantar aspect. no crepitus.  mild edema over distal dorsum of foot.   Neuro: grossly intact

## 2024-04-23 NOTE — H&P ADULT - ASSESSMENT
57 yo male with ESRD s/p renal transplant (1/2023), CAD s/p PCI, IDDM presenting to the ED with complaint of right foot pain and swelling.     Infected diabetic foot ulcer   -Admit to medicine  -SIRS criteria not met on admission  -Elevated inflammatory markers -CRP 13, ESR 30  -Foot X ray pending read; check MRI r/o osteo  -Given Vanc x1, Zosyn x 1 in the ED  -No MRSA risk factors so will hold Vanc, continue Zosyn IV for empiric coverage including Pseudomonas  -Follow up wound and blood cultures; tailor antibiotic therapy as appropriate  -Podiatry following, recs appreciated and incorporated into plan    ESRD s/p Renal Transplant  -Relatively recent transplant, 1/2023  -Continue Tacrolimus 2 mg BID  -Continue Mycophenolate 1000 mg BID    CAD, HTN      IDDM      DVTppx: Lovenox 57 yo male with ESRD s/p renal transplant (1/2023), CAD s/p PCI, IDDM presenting to the ED with complaint of right foot pain and swelling.     Infected diabetic foot ulcer   -Admit to medicine  -SIRS criteria not met on admission  -Elevated inflammatory markers -CRP 13, ESR 30  -Foot X ray pending read; check MRI r/o osteo  -Given Vanc x1, Zosyn x 1 in the ED  -No MRSA risk factors so will hold Vanc, continue Zosyn IV for empiric coverage including Pseudomonas  -Follow up wound and blood cultures; tailor antibiotic therapy as appropriate  -Podiatry following, recs appreciated and incorporated into plan    ESRD s/p Renal Transplant  -Relatively recent transplant, 1/2023  -Continue Tacrolimus 2 mg BID  -Continue Mycophenolate 1000 mg BID    CAD, HTN  -Continue Atorvastatin 40 mg qhs and Plavix 75 mg daily  -Continue Coreg 12.5 mg BID  -Continue Hydralazine 25 mg BID  -Continue Nifedipine 30 mg daily    IDDM  -Continue 18 u Lantus qhs with 7 units mealtime insulin TID   -ISS/Accuchecks/A1c  -Carb controlled diet    BPH  -Continue Flomax 0.4 mg qhs    DVTppx: Lovenox

## 2024-04-23 NOTE — ED ADULT NURSE NOTE - NSFALLHARMRISKINTERV_ED_ALL_ED

## 2024-04-23 NOTE — ED ADULT NURSE NOTE - OBJECTIVE STATEMENT
Patient presented to ED c/o wound check on right foot pain, and swelling. Patient reports having an ingrown infected toe nail 2 weeks ago. Patient had toe nail removed by Podiatry. Patient went for follow up appointment 1 week ago with blister formation. Patient had debridment, placed on ABXs, now returned to ED for Redness, and swelling.

## 2024-04-24 DIAGNOSIS — Z89.419 ACQUIRED ABSENCE OF UNSPECIFIED GREAT TOE: Chronic | ICD-10-CM

## 2024-04-24 LAB
ANION GAP SERPL CALC-SCNC: 13 MMOL/L — SIGNIFICANT CHANGE UP (ref 5–17)
BASOPHILS # BLD AUTO: 0.04 K/UL — SIGNIFICANT CHANGE UP (ref 0–0.2)
BASOPHILS NFR BLD AUTO: 0.8 % — SIGNIFICANT CHANGE UP (ref 0–2)
BUN SERPL-MCNC: 23.7 MG/DL — HIGH (ref 8–20)
CALCIUM SERPL-MCNC: 9.8 MG/DL — SIGNIFICANT CHANGE UP (ref 8.4–10.5)
CHLORIDE SERPL-SCNC: 97 MMOL/L — SIGNIFICANT CHANGE UP (ref 96–108)
CO2 SERPL-SCNC: 21 MMOL/L — LOW (ref 22–29)
CREAT SERPL-MCNC: 1.21 MG/DL — SIGNIFICANT CHANGE UP (ref 0.5–1.3)
CULTURE RESULTS: NO GROWTH — SIGNIFICANT CHANGE UP
EGFR: 69 ML/MIN/1.73M2 — SIGNIFICANT CHANGE UP
EOSINOPHIL # BLD AUTO: 0.14 K/UL — SIGNIFICANT CHANGE UP (ref 0–0.5)
EOSINOPHIL NFR BLD AUTO: 2.9 % — SIGNIFICANT CHANGE UP (ref 0–6)
GLUCOSE BLDC GLUCOMTR-MCNC: 114 MG/DL — HIGH (ref 70–99)
GLUCOSE BLDC GLUCOMTR-MCNC: 117 MG/DL — HIGH (ref 70–99)
GLUCOSE BLDC GLUCOMTR-MCNC: 180 MG/DL — HIGH (ref 70–99)
GLUCOSE BLDC GLUCOMTR-MCNC: 189 MG/DL — HIGH (ref 70–99)
GLUCOSE SERPL-MCNC: 178 MG/DL — HIGH (ref 70–99)
HCT VFR BLD CALC: 40.4 % — SIGNIFICANT CHANGE UP (ref 39–50)
HGB BLD-MCNC: 13.4 G/DL — SIGNIFICANT CHANGE UP (ref 13–17)
IMM GRANULOCYTES NFR BLD AUTO: 0.2 % — SIGNIFICANT CHANGE UP (ref 0–0.9)
LYMPHOCYTES # BLD AUTO: 0.85 K/UL — LOW (ref 1–3.3)
LYMPHOCYTES # BLD AUTO: 17.5 % — SIGNIFICANT CHANGE UP (ref 13–44)
MCHC RBC-ENTMCNC: 29.1 PG — SIGNIFICANT CHANGE UP (ref 27–34)
MCHC RBC-ENTMCNC: 33.2 GM/DL — SIGNIFICANT CHANGE UP (ref 32–36)
MCV RBC AUTO: 87.6 FL — SIGNIFICANT CHANGE UP (ref 80–100)
MONOCYTES # BLD AUTO: 0.5 K/UL — SIGNIFICANT CHANGE UP (ref 0–0.9)
MONOCYTES NFR BLD AUTO: 10.3 % — SIGNIFICANT CHANGE UP (ref 2–14)
NEUTROPHILS # BLD AUTO: 3.33 K/UL — SIGNIFICANT CHANGE UP (ref 1.8–7.4)
NEUTROPHILS NFR BLD AUTO: 68.3 % — SIGNIFICANT CHANGE UP (ref 43–77)
PLATELET # BLD AUTO: 184 K/UL — SIGNIFICANT CHANGE UP (ref 150–400)
POTASSIUM SERPL-MCNC: 4.6 MMOL/L — SIGNIFICANT CHANGE UP (ref 3.5–5.3)
POTASSIUM SERPL-SCNC: 4.6 MMOL/L — SIGNIFICANT CHANGE UP (ref 3.5–5.3)
RBC # BLD: 4.61 M/UL — SIGNIFICANT CHANGE UP (ref 4.2–5.8)
RBC # FLD: 12.9 % — SIGNIFICANT CHANGE UP (ref 10.3–14.5)
SODIUM SERPL-SCNC: 131 MMOL/L — LOW (ref 135–145)
SPECIMEN SOURCE: SIGNIFICANT CHANGE UP
TACROLIMUS SERPL-MCNC: 10.6 NG/ML — SIGNIFICANT CHANGE UP
WBC # BLD: 4.87 K/UL — SIGNIFICANT CHANGE UP (ref 3.8–10.5)
WBC # FLD AUTO: 4.87 K/UL — SIGNIFICANT CHANGE UP (ref 3.8–10.5)

## 2024-04-24 PROCEDURE — 99233 SBSQ HOSP IP/OBS HIGH 50: CPT

## 2024-04-24 RX ORDER — TACROLIMUS 5 MG/1
2 CAPSULE ORAL
Refills: 0 | Status: DISCONTINUED | OUTPATIENT
Start: 2024-04-24 | End: 2024-04-24

## 2024-04-24 RX ORDER — DEXTROSE 50 % IN WATER 50 %
25 SYRINGE (ML) INTRAVENOUS ONCE
Refills: 0 | Status: DISCONTINUED | OUTPATIENT
Start: 2024-04-24 | End: 2024-04-29

## 2024-04-24 RX ORDER — TAMSULOSIN HYDROCHLORIDE 0.4 MG/1
0.4 CAPSULE ORAL AT BEDTIME
Refills: 0 | Status: DISCONTINUED | OUTPATIENT
Start: 2024-04-24 | End: 2024-04-29

## 2024-04-24 RX ORDER — INSULIN ASPART 100 [IU]/ML
7 INJECTION, SOLUTION SUBCUTANEOUS
Refills: 0 | DISCHARGE

## 2024-04-24 RX ORDER — CLOPIDOGREL BISULFATE 75 MG/1
75 TABLET, FILM COATED ORAL DAILY
Refills: 0 | Status: DISCONTINUED | OUTPATIENT
Start: 2024-04-24 | End: 2024-04-29

## 2024-04-24 RX ORDER — ASPIRIN/CALCIUM CARB/MAGNESIUM 324 MG
81 TABLET ORAL DAILY
Refills: 0 | Status: DISCONTINUED | OUTPATIENT
Start: 2024-04-24 | End: 2024-04-29

## 2024-04-24 RX ORDER — PYRIDOXINE HCL (VITAMIN B6) 100 MG
1 TABLET ORAL
Refills: 0 | DISCHARGE

## 2024-04-24 RX ORDER — FOLIC ACID 0.8 MG
1 TABLET ORAL DAILY
Refills: 0 | Status: DISCONTINUED | OUTPATIENT
Start: 2024-04-24 | End: 2024-04-29

## 2024-04-24 RX ORDER — MYCOPHENOLATE MOFETIL 250 MG/1
500 CAPSULE ORAL
Refills: 0 | Status: DISCONTINUED | OUTPATIENT
Start: 2024-04-24 | End: 2024-04-29

## 2024-04-24 RX ORDER — TACROLIMUS 5 MG/1
1 CAPSULE ORAL
Refills: 0 | DISCHARGE

## 2024-04-24 RX ORDER — INSULIN LISPRO 100/ML
7 VIAL (ML) SUBCUTANEOUS
Refills: 0 | Status: DISCONTINUED | OUTPATIENT
Start: 2024-04-24 | End: 2024-04-29

## 2024-04-24 RX ORDER — NIFEDIPINE 30 MG
30 TABLET, EXTENDED RELEASE 24 HR ORAL DAILY
Refills: 0 | Status: DISCONTINUED | OUTPATIENT
Start: 2024-04-24 | End: 2024-04-29

## 2024-04-24 RX ORDER — ATORVASTATIN CALCIUM 80 MG/1
40 TABLET, FILM COATED ORAL AT BEDTIME
Refills: 0 | Status: DISCONTINUED | OUTPATIENT
Start: 2024-04-24 | End: 2024-04-29

## 2024-04-24 RX ORDER — CARVEDILOL PHOSPHATE 80 MG/1
12.5 CAPSULE, EXTENDED RELEASE ORAL EVERY 12 HOURS
Refills: 0 | Status: DISCONTINUED | OUTPATIENT
Start: 2024-04-24 | End: 2024-04-29

## 2024-04-24 RX ORDER — DEXTROSE 50 % IN WATER 50 %
12.5 SYRINGE (ML) INTRAVENOUS ONCE
Refills: 0 | Status: DISCONTINUED | OUTPATIENT
Start: 2024-04-24 | End: 2024-04-29

## 2024-04-24 RX ORDER — INSULIN GLARGINE 100 [IU]/ML
24 INJECTION, SOLUTION SUBCUTANEOUS
Refills: 0 | DISCHARGE

## 2024-04-24 RX ORDER — CLOPIDOGREL BISULFATE 75 MG/1
1 TABLET, FILM COATED ORAL
Refills: 0 | DISCHARGE

## 2024-04-24 RX ORDER — GLUCAGON INJECTION, SOLUTION 0.5 MG/.1ML
1 INJECTION, SOLUTION SUBCUTANEOUS ONCE
Refills: 0 | Status: DISCONTINUED | OUTPATIENT
Start: 2024-04-24 | End: 2024-04-29

## 2024-04-24 RX ORDER — PATIROMER 8.4 G/1
16.8 POWDER, FOR SUSPENSION ORAL
Refills: 0 | DISCHARGE

## 2024-04-24 RX ORDER — INSULIN GLARGINE 100 [IU]/ML
18 INJECTION, SOLUTION SUBCUTANEOUS AT BEDTIME
Refills: 0 | Status: DISCONTINUED | OUTPATIENT
Start: 2024-04-24 | End: 2024-04-28

## 2024-04-24 RX ORDER — MYCOPHENOLATE MOFETIL 250 MG/1
1000 CAPSULE ORAL
Refills: 0 | Status: DISCONTINUED | OUTPATIENT
Start: 2024-04-24 | End: 2024-04-24

## 2024-04-24 RX ORDER — SODIUM CHLORIDE 9 MG/ML
1000 INJECTION, SOLUTION INTRAVENOUS
Refills: 0 | Status: DISCONTINUED | OUTPATIENT
Start: 2024-04-24 | End: 2024-04-29

## 2024-04-24 RX ORDER — PYRIDOXINE HCL (VITAMIN B6) 100 MG
25 TABLET ORAL DAILY
Refills: 0 | Status: DISCONTINUED | OUTPATIENT
Start: 2024-04-24 | End: 2024-04-29

## 2024-04-24 RX ORDER — DEXTROSE 10 % IN WATER 10 %
125 INTRAVENOUS SOLUTION INTRAVENOUS ONCE
Refills: 0 | Status: DISCONTINUED | OUTPATIENT
Start: 2024-04-24 | End: 2024-04-29

## 2024-04-24 RX ORDER — INSULIN LISPRO 100/ML
VIAL (ML) SUBCUTANEOUS
Refills: 0 | Status: DISCONTINUED | OUTPATIENT
Start: 2024-04-24 | End: 2024-04-29

## 2024-04-24 RX ORDER — CARVEDILOL PHOSPHATE 80 MG/1
1 CAPSULE, EXTENDED RELEASE ORAL
Refills: 0 | DISCHARGE

## 2024-04-24 RX ORDER — TACROLIMUS 5 MG/1
1 CAPSULE ORAL
Refills: 0 | Status: DISCONTINUED | OUTPATIENT
Start: 2024-04-24 | End: 2024-04-29

## 2024-04-24 RX ORDER — MYCOPHENOLATE MOFETIL 250 MG/1
1 CAPSULE ORAL
Refills: 0 | DISCHARGE

## 2024-04-24 RX ORDER — HYDRALAZINE HCL 50 MG
25 TABLET ORAL
Refills: 0 | Status: DISCONTINUED | OUTPATIENT
Start: 2024-04-24 | End: 2024-04-29

## 2024-04-24 RX ORDER — DEXTROSE 50 % IN WATER 50 %
15 SYRINGE (ML) INTRAVENOUS ONCE
Refills: 0 | Status: DISCONTINUED | OUTPATIENT
Start: 2024-04-24 | End: 2024-04-29

## 2024-04-24 RX ORDER — NIFEDIPINE 30 MG
1 TABLET, EXTENDED RELEASE 24 HR ORAL
Refills: 0 | DISCHARGE

## 2024-04-24 RX ADMIN — MYCOPHENOLATE MOFETIL 1000 MILLIGRAM(S): 250 CAPSULE ORAL at 09:12

## 2024-04-24 RX ADMIN — Medication 7 UNIT(S): at 12:32

## 2024-04-24 RX ADMIN — PIPERACILLIN AND TAZOBACTAM 25 GRAM(S): 4; .5 INJECTION, POWDER, LYOPHILIZED, FOR SOLUTION INTRAVENOUS at 05:22

## 2024-04-24 RX ADMIN — Medication 25 MILLIGRAM(S): at 13:23

## 2024-04-24 RX ADMIN — Medication 7 UNIT(S): at 09:13

## 2024-04-24 RX ADMIN — INSULIN GLARGINE 18 UNIT(S): 100 INJECTION, SOLUTION SUBCUTANEOUS at 21:46

## 2024-04-24 RX ADMIN — CLOPIDOGREL BISULFATE 75 MILLIGRAM(S): 75 TABLET, FILM COATED ORAL at 13:23

## 2024-04-24 RX ADMIN — Medication 7 UNIT(S): at 18:24

## 2024-04-24 RX ADMIN — CARVEDILOL PHOSPHATE 12.5 MILLIGRAM(S): 80 CAPSULE, EXTENDED RELEASE ORAL at 05:23

## 2024-04-24 RX ADMIN — TACROLIMUS 2 MILLIGRAM(S): 5 CAPSULE ORAL at 09:12

## 2024-04-24 RX ADMIN — Medication 81 MILLIGRAM(S): at 13:23

## 2024-04-24 RX ADMIN — CARVEDILOL PHOSPHATE 12.5 MILLIGRAM(S): 80 CAPSULE, EXTENDED RELEASE ORAL at 18:18

## 2024-04-24 RX ADMIN — MYCOPHENOLATE MOFETIL 500 MILLIGRAM(S): 250 CAPSULE ORAL at 21:41

## 2024-04-24 RX ADMIN — ATORVASTATIN CALCIUM 40 MILLIGRAM(S): 80 TABLET, FILM COATED ORAL at 21:42

## 2024-04-24 RX ADMIN — Medication 1 MILLIGRAM(S): at 13:23

## 2024-04-24 RX ADMIN — Medication 25 MILLIGRAM(S): at 05:23

## 2024-04-24 RX ADMIN — TACROLIMUS 1 MILLIGRAM(S): 5 CAPSULE ORAL at 21:41

## 2024-04-24 RX ADMIN — Medication 30 MILLIGRAM(S): at 05:23

## 2024-04-24 RX ADMIN — Medication 25 MILLIGRAM(S): at 17:54

## 2024-04-24 RX ADMIN — Medication 2: at 12:32

## 2024-04-24 RX ADMIN — PIPERACILLIN AND TAZOBACTAM 25 GRAM(S): 4; .5 INJECTION, POWDER, LYOPHILIZED, FOR SOLUTION INTRAVENOUS at 21:41

## 2024-04-24 RX ADMIN — TAMSULOSIN HYDROCHLORIDE 0.4 MILLIGRAM(S): 0.4 CAPSULE ORAL at 21:41

## 2024-04-24 RX ADMIN — Medication 2: at 09:13

## 2024-04-24 RX ADMIN — PIPERACILLIN AND TAZOBACTAM 25 GRAM(S): 4; .5 INJECTION, POWDER, LYOPHILIZED, FOR SOLUTION INTRAVENOUS at 13:23

## 2024-04-24 NOTE — PATIENT PROFILE ADULT - FALL HARM RISK - RISK INTERVENTIONS
Assistance OOB with selected safe patient handling equipment/Assistance with ambulation/Communicate Fall Risk and Risk Factors to all staff, patient, and family/Discuss with provider need for PT consult/Monitor gait and stability/Reinforce activity limits and safety measures with patient and family/Visual Cue: Yellow wristband/Bed in lowest position, wheels locked, appropriate side rails in place/Call bell, personal items and telephone in reach/Instruct patient to call for assistance before getting out of bed or chair/Non-slip footwear when patient is out of bed/[place] to call system/Physically safe environment - no spills, clutter or unnecessary equipment/Purposeful Proactive Rounding/Room/bathroom lighting operational, light cord in reach

## 2024-04-24 NOTE — PROGRESS NOTE ADULT - SUBJECTIVE AND OBJECTIVE BOX
Interval: No acute overnight events. Patient seen resting in bed comfortably. Denies constitutional symptoms. No pain to right foot. No other pedal complaints at this time,     Podiatry HPI: 58M with complaint of worsening swelling to the right foot with associated 2nd digit ulcer. The patient states that he has recently seen Dr. Silva outpatient for a wound to the distal aspect of the right 2nd digit. He last saw Dr. Silva on Friday and was started on Doxycycline, which he has taken as directed since prescribed. The patient states that today, he noticed his right 2nd digit and the right foot were becoming more swollen, and there was some discoloration to the 2nd digit, so he presented to the ED. The patient denies any constitutional symptoms. Denies other pedal complaints. History of hallux amputation earlier this year.      HPI: 57 yo male with ESRD s/p renal transplant (1/2023), CAD s/p PCI, IDDM presenting to the ED with complaint of right 2nd toe pain and swelling. Patient follows with Dr. Silva for podiatry and is recently s/p right great toe amputation for OM. He saw Dr. Silva on 4/19 and was started on Doxycycline, however, swelling was worsening so patient came to the ED. He reports compliance with PO antibiotic. He denies fever or chills, N/V/D. States there is not much pain at the site as he pas poor sensation.      Medications acetaminophen     Tablet .. 650 milliGRAM(s) Oral every 6 hours PRN  aluminum hydroxide/magnesium hydroxide/simethicone Suspension 30 milliLiter(s) Oral every 4 hours PRN  aspirin enteric coated 81 milliGRAM(s) Oral daily  atorvastatin 40 milliGRAM(s) Oral at bedtime  carvedilol 12.5 milliGRAM(s) Oral every 12 hours  clopidogrel Tablet 75 milliGRAM(s) Oral daily  dextrose 10% Bolus 125 milliLiter(s) IV Bolus once  dextrose 5%. 1000 milliLiter(s) IV Continuous <Continuous>  dextrose 5%. 1000 milliLiter(s) IV Continuous <Continuous>  dextrose 50% Injectable 25 Gram(s) IV Push once  dextrose 50% Injectable 12.5 Gram(s) IV Push once  dextrose Oral Gel 15 Gram(s) Oral once PRN  folic acid 1 milliGRAM(s) Oral daily  glucagon  Injectable 1 milliGRAM(s) IntraMuscular once  hydrALAZINE 25 milliGRAM(s) Oral two times a day  insulin glargine Injectable (LANTUS) 18 Unit(s) SubCutaneous at bedtime  insulin lispro (ADMELOG) corrective regimen sliding scale   SubCutaneous three times a day before meals  insulin lispro Injectable (ADMELOG) 7 Unit(s) SubCutaneous three times a day before meals  melatonin 3 milliGRAM(s) Oral at bedtime PRN  mycophenolate mofetil 500 milliGRAM(s) Oral two times a day  NIFEdipine XL 30 milliGRAM(s) Oral daily  ondansetron Injectable 4 milliGRAM(s) IV Push every 8 hours PRN  piperacillin/tazobactam IVPB.. 3.375 Gram(s) IV Intermittent every 8 hours  pyridoxine 25 milliGRAM(s) Oral daily  tacrolimus 1 milliGRAM(s) Oral two times a day  tamsulosin 0.4 milliGRAM(s) Oral at bedtime    FHNo pertinent family history in first degree relatives    FHx: diabetes mellitus (Mother)    ,   PMHRenal failure    Dialysis patient    Diabetes    Hypertension    CAD (coronary artery disease)       PSHNo significant past surgical history    AV fistula    Renal transplant recipient    History of amputation of hallux        Labs                          13.4   4.87  )-----------( 184      ( 24 Apr 2024 05:05 )             40.4      04-24    131<L>  |  97  |  23.7<H>  ----------------------------<  178<H>  4.6   |  21.0<L>  |  1.21    Ca    9.8      24 Apr 2024 05:05    TPro  7.2  /  Alb  4.3  /  TBili  0.6  /  DBili  x   /  AST  27  /  ALT  24  /  AlkPhos  103  04-23     Vital Signs Last 24 Hrs  T(C): 36.6 (24 Apr 2024 21:21), Max: 36.7 (24 Apr 2024 04:18)  T(F): 97.9 (24 Apr 2024 21:21), Max: 98.1 (24 Apr 2024 16:13)  HR: 69 (24 Apr 2024 21:38) (63 - 79)  BP: 159/78 (24 Apr 2024 21:38) (121/74 - 168/78)  BP(mean): --  RR: 17 (24 Apr 2024 21:21) (17 - 18)  SpO2: 98% (24 Apr 2024 21:21) (98% - 100%)    Parameters below as of 24 Apr 2024 21:21  Patient On (Oxygen Delivery Method): room air      Sedimentation Rate, Erythrocyte: 30 mm/hr (04-23-24 @ 15:10)         C-Reactive Protein, Serum: 13 mg/L (04-23-24 @ 15:10)   WBC Count: 4.87 K/uL (04-24-24 @ 05:05)      ROS: Unremarkable outside HPI      PHYSICAL EXAM  LE Focused:    Vasc:  DP and PT pulses palpable 1/4 b/l. TG warm to warm with increased warmth noted to right foot. Non-pitting edema to right 2nd toe and foot extending to the midfoot.  Derm: Right 2nd digit distal tuft ulcer, purulence drainage noted, -PTB, mild malodor, no fluctuance, dusky discoloration to the 2nd digit. No other wounds or lesions noted  Neuro: Protective sensation diminished  MSK: s/p R hallux amputation January 2024      IMAGING:   < from: Xray Foot AP + Lateral + Oblique, Right (04.23.24 @ 16:47) >  IMPRESSION:    Second distal digit tuft erosion/pathologic   fractures/osteolysis/osteomyelitis.  Confirmation MRI recommended      CULTURES:   Pending

## 2024-04-24 NOTE — PROGRESS NOTE ADULT - ASSESSMENT
A:  R 2nd digit ulcer  R 2nd digit OM  Cellulitis    P:   Patient evaluated and Chart reviewed   Discussed diagnosis and treatment with patient  Reviewed right foot x-rays- OM of 2nd digit  Pending right foot MRI  Pending wound culture  Applied betadine, DSD to right toe wound  C/w IV abx   Offloading to bilateral Heels while in bed  Discussed importance of daily foot examinations and proper shoe gear and to importance of lower Fasting Blood Glucose levels.   Podiatry to follow while in house.  Discussed with Attending Dr. Silva

## 2024-04-24 NOTE — PROGRESS NOTE ADULT - SUBJECTIVE AND OBJECTIVE BOX
Hospitalist Daily Progress Note    Chief Complaint:  Patient is a 58y old  Male who presents with a chief complaint of Diabetic foot infection (23 Apr 2024 21:55)      SUBJECTIVE / OVERNIGHT EVENTS:  Patient was seen and examined at bedside. No active complaints.   Patient denies chest pain, SOB, abd pain, N/V, fever, chills, dysuria or any other complaints. All remainder ROS negative.     MEDICATIONS  (STANDING):  aspirin enteric coated 81 milliGRAM(s) Oral daily  atorvastatin 40 milliGRAM(s) Oral at bedtime  carvedilol 12.5 milliGRAM(s) Oral every 12 hours  clopidogrel Tablet 75 milliGRAM(s) Oral daily  dextrose 10% Bolus 125 milliLiter(s) IV Bolus once  dextrose 5%. 1000 milliLiter(s) (100 mL/Hr) IV Continuous <Continuous>  dextrose 5%. 1000 milliLiter(s) (50 mL/Hr) IV Continuous <Continuous>  dextrose 50% Injectable 25 Gram(s) IV Push once  dextrose 50% Injectable 12.5 Gram(s) IV Push once  folic acid 1 milliGRAM(s) Oral daily  glucagon  Injectable 1 milliGRAM(s) IntraMuscular once  hydrALAZINE 25 milliGRAM(s) Oral two times a day  insulin glargine Injectable (LANTUS) 18 Unit(s) SubCutaneous at bedtime  insulin lispro (ADMELOG) corrective regimen sliding scale   SubCutaneous three times a day before meals  insulin lispro Injectable (ADMELOG) 7 Unit(s) SubCutaneous three times a day before meals  mycophenolate mofetil 1000 milliGRAM(s) Oral two times a day  NIFEdipine XL 30 milliGRAM(s) Oral daily  piperacillin/tazobactam IVPB.. 3.375 Gram(s) IV Intermittent every 8 hours  pyridoxine 25 milliGRAM(s) Oral daily  tacrolimus 2 milliGRAM(s) Oral two times a day  tamsulosin 0.4 milliGRAM(s) Oral at bedtime    MEDICATIONS  (PRN):  acetaminophen     Tablet .. 650 milliGRAM(s) Oral every 6 hours PRN Temp greater or equal to 38C (100.4F), Mild Pain (1 - 3)  aluminum hydroxide/magnesium hydroxide/simethicone Suspension 30 milliLiter(s) Oral every 4 hours PRN Dyspepsia  dextrose Oral Gel 15 Gram(s) Oral once PRN Blood Glucose LESS THAN 70 milliGRAM(s)/deciliter  melatonin 3 milliGRAM(s) Oral at bedtime PRN Insomnia  ondansetron Injectable 4 milliGRAM(s) IV Push every 8 hours PRN Nausea and/or Vomiting        I&O's Summary      PHYSICAL EXAM:  Vital Signs Last 24 Hrs  T(C): 36.6 (24 Apr 2024 08:13), Max: 36.7 (23 Apr 2024 13:54)  T(F): 97.9 (24 Apr 2024 08:13), Max: 98 (23 Apr 2024 13:54)  HR: 63 (24 Apr 2024 08:13) (63 - 80)  BP: 121/74 (24 Apr 2024 08:13) (121/74 - 178/83)  BP(mean): --  RR: 18 (24 Apr 2024 08:13) (18 - 18)  SpO2: 98% (24 Apr 2024 08:13) (98% - 99%)    Parameters below as of 24 Apr 2024 08:13  Patient On (Oxygen Delivery Method): room air          Constitutional: NAD, Resting  ENT: Supple, No JVD  Lungs: CTA B/L, Non-labored breathing  Cardio: RRR, S1/S2, No murmur  Abdomen: Soft, Nontender, Nondistended; Bowel sounds present  Extremities: No calf tenderness, No pitting edema, rle wrapped  Musculoskeletal:   No joint swelling  Psych: Calm, cooperative affect appropriate  Neuro: Awake and alert, oriented x 4  Skin: No rashes; no palpable lesions    LABS:                        13.4   4.87  )-----------( 184      ( 24 Apr 2024 05:05 )             40.4     04-24    131<L>  |  97  |  23.7<H>  ----------------------------<  178<H>  4.6   |  21.0<L>  |  1.21    Ca    9.8      24 Apr 2024 05:05    TPro  7.2  /  Alb  4.3  /  TBili  0.6  /  DBili  x   /  AST  27  /  ALT  24  /  AlkPhos  103  04-23    PT/INR - ( 23 Apr 2024 15:10 )   PT: 10.8 sec;   INR: 0.97 ratio         PTT - ( 23 Apr 2024 15:10 )  PTT:36.3 sec      Urinalysis Basic - ( 24 Apr 2024 05:05 )    Color: x / Appearance: x / SG: x / pH: x  Gluc: 178 mg/dL / Ketone: x  / Bili: x / Urobili: x   Blood: x / Protein: x / Nitrite: x   Leuk Esterase: x / RBC: x / WBC x   Sq Epi: x / Non Sq Epi: x / Bacteria: x        CAPILLARY BLOOD GLUCOSE      POCT Blood Glucose.: 180 mg/dL (24 Apr 2024 12:29)  POCT Blood Glucose.: 189 mg/dL (24 Apr 2024 09:09)  POCT Blood Glucose.: 136 mg/dL (23 Apr 2024 14:57)        RADIOLOGY REVIEWED

## 2024-04-24 NOTE — PROGRESS NOTE ADULT - ASSESSMENT
59 yo male with ESRD s/p renal transplant (1/2023), CAD s/p PCI, IDDM presenting to the ED with complaint of right foot pain and swelling.     #Infected diabetic foot ulcer   SIRS criteria not met on admission  Elevated inflammatory markers -CRP 13, ESR 30  MRI ordered  C/w zosyn  Follow up wound and blood cultures; tailor antibiotic therapy as appropriate  Podiatry following, recs appreciated and incorporated into plan    #ESRD s/p Renal Transplant  Relatively recent transplant, 1/2023  Continue Tacrolimus 1 mg BID  Continue Mycophenolate 500 mg BID    #CAD, HTN  Continue Atorvastatin 40 mg qhs and Plavix 75 mg daily  Continue Coreg 12.5 mg BID  Continue Hydralazine 25 mg BID  Continue Nifedipine 30 mg daily    #IDDM  Continue 18 u Lantus qhs with 7 units mealtime insulin TID   Carb controlled diet    #BPH  Continue Flomax 0.4 mg qhs    DVTppx: Lovenox    Dispo: Pending course

## 2024-04-25 LAB
A1C WITH ESTIMATED AVERAGE GLUCOSE RESULT: 8.6 % — HIGH (ref 4–5.6)
ANION GAP SERPL CALC-SCNC: 10 MMOL/L — SIGNIFICANT CHANGE UP (ref 5–17)
BASOPHILS # BLD AUTO: 0.05 K/UL — SIGNIFICANT CHANGE UP (ref 0–0.2)
BASOPHILS NFR BLD AUTO: 0.9 % — SIGNIFICANT CHANGE UP (ref 0–2)
BUN SERPL-MCNC: 26.5 MG/DL — HIGH (ref 8–20)
CALCIUM SERPL-MCNC: 9.9 MG/DL — SIGNIFICANT CHANGE UP (ref 8.4–10.5)
CHLORIDE SERPL-SCNC: 101 MMOL/L — SIGNIFICANT CHANGE UP (ref 96–108)
CO2 SERPL-SCNC: 24 MMOL/L — SIGNIFICANT CHANGE UP (ref 22–29)
CREAT SERPL-MCNC: 1.19 MG/DL — SIGNIFICANT CHANGE UP (ref 0.5–1.3)
EGFR: 71 ML/MIN/1.73M2 — SIGNIFICANT CHANGE UP
EOSINOPHIL # BLD AUTO: 0.14 K/UL — SIGNIFICANT CHANGE UP (ref 0–0.5)
EOSINOPHIL NFR BLD AUTO: 2.5 % — SIGNIFICANT CHANGE UP (ref 0–6)
ESTIMATED AVERAGE GLUCOSE: 200 MG/DL — HIGH (ref 68–114)
GLUCOSE BLDC GLUCOMTR-MCNC: 104 MG/DL — HIGH (ref 70–99)
GLUCOSE BLDC GLUCOMTR-MCNC: 113 MG/DL — HIGH (ref 70–99)
GLUCOSE BLDC GLUCOMTR-MCNC: 136 MG/DL — HIGH (ref 70–99)
GLUCOSE BLDC GLUCOMTR-MCNC: 176 MG/DL — HIGH (ref 70–99)
GLUCOSE SERPL-MCNC: 94 MG/DL — SIGNIFICANT CHANGE UP (ref 70–99)
HCT VFR BLD CALC: 40.7 % — SIGNIFICANT CHANGE UP (ref 39–50)
HGB BLD-MCNC: 13.6 G/DL — SIGNIFICANT CHANGE UP (ref 13–17)
IMM GRANULOCYTES NFR BLD AUTO: 0.4 % — SIGNIFICANT CHANGE UP (ref 0–0.9)
LYMPHOCYTES # BLD AUTO: 0.86 K/UL — LOW (ref 1–3.3)
LYMPHOCYTES # BLD AUTO: 15.5 % — SIGNIFICANT CHANGE UP (ref 13–44)
MCHC RBC-ENTMCNC: 28.8 PG — SIGNIFICANT CHANGE UP (ref 27–34)
MCHC RBC-ENTMCNC: 33.4 GM/DL — SIGNIFICANT CHANGE UP (ref 32–36)
MCV RBC AUTO: 86 FL — SIGNIFICANT CHANGE UP (ref 80–100)
MONOCYTES # BLD AUTO: 0.62 K/UL — SIGNIFICANT CHANGE UP (ref 0–0.9)
MONOCYTES NFR BLD AUTO: 11.2 % — SIGNIFICANT CHANGE UP (ref 2–14)
MRSA PCR RESULT.: SIGNIFICANT CHANGE UP
NEUTROPHILS # BLD AUTO: 3.85 K/UL — SIGNIFICANT CHANGE UP (ref 1.8–7.4)
NEUTROPHILS NFR BLD AUTO: 69.5 % — SIGNIFICANT CHANGE UP (ref 43–77)
PLATELET # BLD AUTO: 184 K/UL — SIGNIFICANT CHANGE UP (ref 150–400)
POTASSIUM SERPL-MCNC: 4.6 MMOL/L — SIGNIFICANT CHANGE UP (ref 3.5–5.3)
POTASSIUM SERPL-SCNC: 4.6 MMOL/L — SIGNIFICANT CHANGE UP (ref 3.5–5.3)
RBC # BLD: 4.73 M/UL — SIGNIFICANT CHANGE UP (ref 4.2–5.8)
RBC # FLD: 12.7 % — SIGNIFICANT CHANGE UP (ref 10.3–14.5)
S AUREUS DNA NOSE QL NAA+PROBE: SIGNIFICANT CHANGE UP
SODIUM SERPL-SCNC: 135 MMOL/L — SIGNIFICANT CHANGE UP (ref 135–145)
WBC # BLD: 5.54 K/UL — SIGNIFICANT CHANGE UP (ref 3.8–10.5)
WBC # FLD AUTO: 5.54 K/UL — SIGNIFICANT CHANGE UP (ref 3.8–10.5)

## 2024-04-25 PROCEDURE — 99232 SBSQ HOSP IP/OBS MODERATE 35: CPT

## 2024-04-25 PROCEDURE — 73720 MRI LWR EXTREMITY W/O&W/DYE: CPT | Mod: 26,RT

## 2024-04-25 RX ADMIN — PIPERACILLIN AND TAZOBACTAM 25 GRAM(S): 4; .5 INJECTION, POWDER, LYOPHILIZED, FOR SOLUTION INTRAVENOUS at 06:06

## 2024-04-25 RX ADMIN — Medication 1 MILLIGRAM(S): at 14:02

## 2024-04-25 RX ADMIN — ATORVASTATIN CALCIUM 40 MILLIGRAM(S): 80 TABLET, FILM COATED ORAL at 22:02

## 2024-04-25 RX ADMIN — PIPERACILLIN AND TAZOBACTAM 25 GRAM(S): 4; .5 INJECTION, POWDER, LYOPHILIZED, FOR SOLUTION INTRAVENOUS at 22:03

## 2024-04-25 RX ADMIN — Medication 7 UNIT(S): at 09:52

## 2024-04-25 RX ADMIN — MYCOPHENOLATE MOFETIL 500 MILLIGRAM(S): 250 CAPSULE ORAL at 09:53

## 2024-04-25 RX ADMIN — TACROLIMUS 1 MILLIGRAM(S): 5 CAPSULE ORAL at 10:04

## 2024-04-25 RX ADMIN — CLOPIDOGREL BISULFATE 75 MILLIGRAM(S): 75 TABLET, FILM COATED ORAL at 14:02

## 2024-04-25 RX ADMIN — TACROLIMUS 1 MILLIGRAM(S): 5 CAPSULE ORAL at 22:02

## 2024-04-25 RX ADMIN — INSULIN GLARGINE 18 UNIT(S): 100 INJECTION, SOLUTION SUBCUTANEOUS at 22:10

## 2024-04-25 RX ADMIN — CARVEDILOL PHOSPHATE 12.5 MILLIGRAM(S): 80 CAPSULE, EXTENDED RELEASE ORAL at 06:05

## 2024-04-25 RX ADMIN — Medication 25 MILLIGRAM(S): at 06:05

## 2024-04-25 RX ADMIN — Medication 25 MILLIGRAM(S): at 14:03

## 2024-04-25 RX ADMIN — Medication 81 MILLIGRAM(S): at 14:01

## 2024-04-25 RX ADMIN — CARVEDILOL PHOSPHATE 12.5 MILLIGRAM(S): 80 CAPSULE, EXTENDED RELEASE ORAL at 18:17

## 2024-04-25 RX ADMIN — Medication 25 MILLIGRAM(S): at 18:17

## 2024-04-25 RX ADMIN — Medication 30 MILLIGRAM(S): at 06:05

## 2024-04-25 RX ADMIN — Medication 7 UNIT(S): at 14:01

## 2024-04-25 RX ADMIN — MYCOPHENOLATE MOFETIL 500 MILLIGRAM(S): 250 CAPSULE ORAL at 22:02

## 2024-04-25 RX ADMIN — PIPERACILLIN AND TAZOBACTAM 25 GRAM(S): 4; .5 INJECTION, POWDER, LYOPHILIZED, FOR SOLUTION INTRAVENOUS at 15:57

## 2024-04-25 RX ADMIN — TAMSULOSIN HYDROCHLORIDE 0.4 MILLIGRAM(S): 0.4 CAPSULE ORAL at 22:02

## 2024-04-25 NOTE — PROGRESS NOTE ADULT - SUBJECTIVE AND OBJECTIVE BOX
HPI  Pt is a 59yo M admitted to [facility] for DM foot ulcer  Pt was seen and examined at bedside. No overnight complaints.     Vital Signs Last 24 Hrs  T(C): 36.7 (25 Apr 2024 08:59), Max: 36.7 (24 Apr 2024 16:13)  T(F): 98.1 (25 Apr 2024 08:59), Max: 98.1 (24 Apr 2024 16:13)  HR: 61 (25 Apr 2024 08:59) (61 - 79)  BP: 106/68 (25 Apr 2024 08:59) (106/68 - 168/78)  BP(mean): --  RR: 18 (25 Apr 2024 08:59) (17 - 18)  SpO2: 99% (25 Apr 2024 08:59) (97% - 100%)    Parameters below as of 25 Apr 2024 08:59  Patient On (Oxygen Delivery Method): room air        I&O's Summary    24 Apr 2024 07:01  -  25 Apr 2024 07:00  --------------------------------------------------------  IN: 120 mL / OUT: 200 mL / NET: -80 mL        CAPILLARY BLOOD GLUCOSE      POCT Blood Glucose.: 113 mg/dL (25 Apr 2024 08:39)  POCT Blood Glucose.: 114 mg/dL (24 Apr 2024 21:45)  POCT Blood Glucose.: 117 mg/dL (24 Apr 2024 18:18)  POCT Blood Glucose.: 180 mg/dL (24 Apr 2024 12:29)      PHYSICAL EXAM:    Constitutional: NAD, awake    HEENT: PERR, EOMI, Normal Hearing, MMM  Neck: Soft and supple,   Respiratory: Breath sounds are clear bilaterally, No wheezing, rales or rhonchi  Cardiovascular: S1 and S2,   Gastrointestinal: Bowel Sounds present, soft,    Extremities: No peripheral edema, right foot dressed   Vascular: 2+ peripheral pulses  Neurological: A/O x 3,   Musculoskeletal: 5/5 strength b/l upper and lower extremities  Skin: No rashes    MEDICATIONS:  MEDICATIONS  (STANDING):  aspirin enteric coated 81 milliGRAM(s) Oral daily  atorvastatin 40 milliGRAM(s) Oral at bedtime  carvedilol 12.5 milliGRAM(s) Oral every 12 hours  clopidogrel Tablet 75 milliGRAM(s) Oral daily  dextrose 10% Bolus 125 milliLiter(s) IV Bolus once  dextrose 5%. 1000 milliLiter(s) (100 mL/Hr) IV Continuous <Continuous>  dextrose 5%. 1000 milliLiter(s) (50 mL/Hr) IV Continuous <Continuous>  dextrose 50% Injectable 25 Gram(s) IV Push once  dextrose 50% Injectable 12.5 Gram(s) IV Push once  folic acid 1 milliGRAM(s) Oral daily  glucagon  Injectable 1 milliGRAM(s) IntraMuscular once  hydrALAZINE 25 milliGRAM(s) Oral two times a day  insulin glargine Injectable (LANTUS) 18 Unit(s) SubCutaneous at bedtime  insulin lispro (ADMELOG) corrective regimen sliding scale   SubCutaneous three times a day before meals  insulin lispro Injectable (ADMELOG) 7 Unit(s) SubCutaneous three times a day before meals  mycophenolate mofetil 500 milliGRAM(s) Oral two times a day  NIFEdipine XL 30 milliGRAM(s) Oral daily  piperacillin/tazobactam IVPB.. 3.375 Gram(s) IV Intermittent every 8 hours  pyridoxine 25 milliGRAM(s) Oral daily  tacrolimus 1 milliGRAM(s) Oral two times a day  tamsulosin 0.4 milliGRAM(s) Oral at bedtime      LABS: All Labs Reviewed:                        13.6   5.54  )-----------( 184      ( 25 Apr 2024 05:43 )             40.7     04-25    135  |  101  |  26.5<H>  ----------------------------<  94  4.6   |  24.0  |  1.19    Ca    9.9      25 Apr 2024 05:43    TPro  7.2  /  Alb  4.3  /  TBili  0.6  /  DBili  x   /  AST  27  /  ALT  24  /  AlkPhos  103  04-23    PT/INR - ( 23 Apr 2024 15:10 )   PT: 10.8 sec;   INR: 0.97 ratio         PTT - ( 23 Apr 2024 15:10 )  PTT:36.3 sec      Blood Culture: 04-23 @ 16:49  Organism --  Gram Stain Blood -- Gram Stain --  Specimen Source .Surgical Swab right 2nd toe  Culture-Blood --    04-23 @ 15:15  Organism --  Gram Stain Blood -- Gram Stain --  Specimen Source .Blood Blood-Peripheral  Culture-Blood --    04-23 @ 15:10  Organism --  Gram Stain Blood -- Gram Stain --  Specimen Source .Blood Blood-Peripheral  Culture-Blood --        RADIOLOGY/EKG:    DVT PPX:    ADVANCED DIRECTIVE:    DISPOSITION:

## 2024-04-25 NOTE — PROGRESS NOTE ADULT - ASSESSMENT
59 yo male with ESRD s/p renal transplant (1/2023), CAD s/p PCI, IDDM presenting to the ED with complaint of right foot pain and swelling.     *Infected diabetic foot ulcer   Elevated inflammatory markers -CRP 13, ESR 30  Foot X ray noted  pending MR for confimation of OM   s/p Vanc x1, Zosyn x 1 in the ED  No MRSA risk factors so will hold Vanc, continue Zosyn IV for empiric coverage including Pseudomonas  Follow up wound and blood cultures  Podiatry following    *ESRD s/p Renal Transplant 1/2023  Continue Tacrolimus 2 mg BID  Continue Mycophenolate 1000 mg BID  Crt stable     *CAD, HTN  Continue Atorvastatin 40 mg qhs and Plavix 75 mg daily  Continue Coreg 12.5 mg BID  Continue Hydralazine 25 mg BID  Continue Nifedipine 30 mg daily    *IDDM  Continue 18 u Lantus qhs with 7 units mealtime insulin TID   ISS/Accuchecks/A1c  Carb controlled diet    *BPH  Continue Flomax 0.4 mg qhs    DVTppx: Lovenox

## 2024-04-26 LAB
-  CLINDAMYCIN: SIGNIFICANT CHANGE UP
-  ERYTHROMYCIN: SIGNIFICANT CHANGE UP
-  GENTAMICIN: SIGNIFICANT CHANGE UP
-  OXACILLIN: SIGNIFICANT CHANGE UP
-  RIFAMPIN: SIGNIFICANT CHANGE UP
-  TETRACYCLINE: SIGNIFICANT CHANGE UP
-  TRIMETHOPRIM/SULFAMETHOXAZOLE: SIGNIFICANT CHANGE UP
-  VANCOMYCIN: SIGNIFICANT CHANGE UP
ANION GAP SERPL CALC-SCNC: 13 MMOL/L — SIGNIFICANT CHANGE UP (ref 5–17)
BUN SERPL-MCNC: 25.1 MG/DL — HIGH (ref 8–20)
CALCIUM SERPL-MCNC: 9.7 MG/DL — SIGNIFICANT CHANGE UP (ref 8.4–10.5)
CHLORIDE SERPL-SCNC: 100 MMOL/L — SIGNIFICANT CHANGE UP (ref 96–108)
CO2 SERPL-SCNC: 23 MMOL/L — SIGNIFICANT CHANGE UP (ref 22–29)
CREAT SERPL-MCNC: 1.21 MG/DL — SIGNIFICANT CHANGE UP (ref 0.5–1.3)
CULTURE RESULTS: ABNORMAL
EGFR: 69 ML/MIN/1.73M2 — SIGNIFICANT CHANGE UP
GLUCOSE BLDC GLUCOMTR-MCNC: 106 MG/DL — HIGH (ref 70–99)
GLUCOSE BLDC GLUCOMTR-MCNC: 111 MG/DL — HIGH (ref 70–99)
GLUCOSE BLDC GLUCOMTR-MCNC: 130 MG/DL — HIGH (ref 70–99)
GLUCOSE BLDC GLUCOMTR-MCNC: 131 MG/DL — HIGH (ref 70–99)
GLUCOSE SERPL-MCNC: 111 MG/DL — HIGH (ref 70–99)
HCT VFR BLD CALC: 41.6 % — SIGNIFICANT CHANGE UP (ref 39–50)
HGB BLD-MCNC: 13.7 G/DL — SIGNIFICANT CHANGE UP (ref 13–17)
MCHC RBC-ENTMCNC: 28.2 PG — SIGNIFICANT CHANGE UP (ref 27–34)
MCHC RBC-ENTMCNC: 32.9 GM/DL — SIGNIFICANT CHANGE UP (ref 32–36)
MCV RBC AUTO: 85.6 FL — SIGNIFICANT CHANGE UP (ref 80–100)
METHOD TYPE: SIGNIFICANT CHANGE UP
MYCOPHENOLATE SERPL-MCNC: 1.9 UG/ML — SIGNIFICANT CHANGE UP (ref 1–3.5)
MYCOPHENOLIC ACID GLUCURONIDE: 42 UG/ML — SIGNIFICANT CHANGE UP (ref 15–125)
ORGANISM # SPEC MICROSCOPIC CNT: ABNORMAL
ORGANISM # SPEC MICROSCOPIC CNT: SIGNIFICANT CHANGE UP
PLATELET # BLD AUTO: 199 K/UL — SIGNIFICANT CHANGE UP (ref 150–400)
POTASSIUM SERPL-MCNC: 4.8 MMOL/L — SIGNIFICANT CHANGE UP (ref 3.5–5.3)
POTASSIUM SERPL-SCNC: 4.8 MMOL/L — SIGNIFICANT CHANGE UP (ref 3.5–5.3)
RBC # BLD: 4.86 M/UL — SIGNIFICANT CHANGE UP (ref 4.2–5.8)
RBC # FLD: 12.8 % — SIGNIFICANT CHANGE UP (ref 10.3–14.5)
SODIUM SERPL-SCNC: 136 MMOL/L — SIGNIFICANT CHANGE UP (ref 135–145)
SPECIMEN SOURCE: SIGNIFICANT CHANGE UP
WBC # BLD: 5.08 K/UL — SIGNIFICANT CHANGE UP (ref 3.8–10.5)
WBC # FLD AUTO: 5.08 K/UL — SIGNIFICANT CHANGE UP (ref 3.8–10.5)

## 2024-04-26 PROCEDURE — 99233 SBSQ HOSP IP/OBS HIGH 50: CPT

## 2024-04-26 PROCEDURE — 99223 1ST HOSP IP/OBS HIGH 75: CPT

## 2024-04-26 RX ADMIN — TAMSULOSIN HYDROCHLORIDE 0.4 MILLIGRAM(S): 0.4 CAPSULE ORAL at 21:54

## 2024-04-26 RX ADMIN — Medication 7 UNIT(S): at 17:30

## 2024-04-26 RX ADMIN — Medication 1 MILLIGRAM(S): at 12:46

## 2024-04-26 RX ADMIN — Medication 30 MILLIGRAM(S): at 05:30

## 2024-04-26 RX ADMIN — CARVEDILOL PHOSPHATE 12.5 MILLIGRAM(S): 80 CAPSULE, EXTENDED RELEASE ORAL at 17:30

## 2024-04-26 RX ADMIN — MYCOPHENOLATE MOFETIL 500 MILLIGRAM(S): 250 CAPSULE ORAL at 21:54

## 2024-04-26 RX ADMIN — CARVEDILOL PHOSPHATE 12.5 MILLIGRAM(S): 80 CAPSULE, EXTENDED RELEASE ORAL at 05:30

## 2024-04-26 RX ADMIN — PIPERACILLIN AND TAZOBACTAM 25 GRAM(S): 4; .5 INJECTION, POWDER, LYOPHILIZED, FOR SOLUTION INTRAVENOUS at 13:39

## 2024-04-26 RX ADMIN — MYCOPHENOLATE MOFETIL 500 MILLIGRAM(S): 250 CAPSULE ORAL at 08:16

## 2024-04-26 RX ADMIN — Medication 7 UNIT(S): at 08:13

## 2024-04-26 RX ADMIN — INSULIN GLARGINE 18 UNIT(S): 100 INJECTION, SOLUTION SUBCUTANEOUS at 21:54

## 2024-04-26 RX ADMIN — Medication 25 MILLIGRAM(S): at 12:46

## 2024-04-26 RX ADMIN — Medication 3 MILLIGRAM(S): at 21:55

## 2024-04-26 RX ADMIN — TACROLIMUS 1 MILLIGRAM(S): 5 CAPSULE ORAL at 21:55

## 2024-04-26 RX ADMIN — Medication 7 UNIT(S): at 12:45

## 2024-04-26 RX ADMIN — CLOPIDOGREL BISULFATE 75 MILLIGRAM(S): 75 TABLET, FILM COATED ORAL at 12:46

## 2024-04-26 RX ADMIN — Medication 25 MILLIGRAM(S): at 05:30

## 2024-04-26 RX ADMIN — TACROLIMUS 1 MILLIGRAM(S): 5 CAPSULE ORAL at 08:16

## 2024-04-26 RX ADMIN — Medication 25 MILLIGRAM(S): at 17:30

## 2024-04-26 RX ADMIN — PIPERACILLIN AND TAZOBACTAM 25 GRAM(S): 4; .5 INJECTION, POWDER, LYOPHILIZED, FOR SOLUTION INTRAVENOUS at 21:54

## 2024-04-26 RX ADMIN — Medication 81 MILLIGRAM(S): at 12:48

## 2024-04-26 RX ADMIN — ATORVASTATIN CALCIUM 40 MILLIGRAM(S): 80 TABLET, FILM COATED ORAL at 21:55

## 2024-04-26 RX ADMIN — PIPERACILLIN AND TAZOBACTAM 25 GRAM(S): 4; .5 INJECTION, POWDER, LYOPHILIZED, FOR SOLUTION INTRAVENOUS at 05:30

## 2024-04-26 NOTE — CONSULT NOTE ADULT - SUBJECTIVE AND OBJECTIVE BOX
[facility]                                                INFECTIOUS DISEASES  =======================================================                     Pratik Garibay#   Lamonte Arita MD#   Karl Nur MD*                           Maryjo Owens MD*   Chrissy Baltazar MD*            Diplomates American Board of Internal Medicine & Infectious Diseases                  # [place] Office - Appt - Tel  [phone] Fax [phone]                * [place] Office - Appt - Tel [phone] Fax [phone]                                  Hospital Consult line:  [phone]  =======================================================      [id number]  PRATIK NUR   HPI:  57 yo male with ESRD s/p renal transplant (1/2023), CAD s/p PCI, IDDM presenting to the ED with complaint of right 2nd toe pain and swelling. Patient follows with Dr. Silva for podiatry and is recently s/p right great toe amputation for OM. He saw Dr. Silva on 4/19 and was started on Doxycycline, however, swelling was worsening so patient came to the ED. He reports compliance with PO antibiotic. He denies fever or chills, N/V/D. States there is not much pain at the site as he pas poor sensation. (23 Apr 2024 21:55)          I have personally reviewed the labs and data; pertinent labs and data are listed in this note; please see below.   =======================================================  Past Medical & Surgical Hx:  =====================  PAST MEDICAL & SURGICAL HISTORY:  Renal failure      Dialysis patient      Diabetes      Hypertension      CAD (coronary artery disease)      AV fistula  Left arm      Renal transplant recipient      History of amputation of hallux        Problem List:  ==========  HEALTH ISSUES - PROBLEM Dx:        Social Hx:  =======  no toxic habits currently    FAMILY HISTORY:  FHx: diabetes mellitus (Mother)    no significant family history of immunosuppressive disorders in mother or father   =======================================================    REVIEW OF SYSTEMS:  CONSTITUTIONAL:  No Fever or chills  HEENT:  No diplopia or blurred vision.  No earache, sore throat or runny nose.  CARDIOVASCULAR:  No pressure, squeezing, strangling, tightness, heaviness or aching about the chest, neck, axilla or epigastrium.  RESPIRATORY:  No cough, shortness of breath  GASTROINTESTINAL:  No nausea, vomiting or diarrhea.  GENITOURINARY:  No dysuria, frequency or urgency. No Blood in urine  MUSCULOSKELETAL:  no joint aches, no muscle pain  SKIN:  No change in skin, hair or nails.  NEUROLOGIC:  No Headaches, seizures or weakness.  PSYCHIATRIC:  No disorder of thought or mood.  ENDOCRINE:  No heat or cold intolerance  HEMATOLOGICAL:  No easy bruising or bleeding.    =======================================================  Allergies    No Known Allergies    Intolerances    Antibiotics:  piperacillin/tazobactam IVPB.. 3.375 Gram(s) IV Intermittent every 8 hours    Other medications:  aspirin enteric coated 81 milliGRAM(s) Oral daily  atorvastatin 40 milliGRAM(s) Oral at bedtime  carvedilol 12.5 milliGRAM(s) Oral every 12 hours  clopidogrel Tablet 75 milliGRAM(s) Oral daily  dextrose 10% Bolus 125 milliLiter(s) IV Bolus once  dextrose 5%. 1000 milliLiter(s) IV Continuous <Continuous>  dextrose 5%. 1000 milliLiter(s) IV Continuous <Continuous>  dextrose 50% Injectable 25 Gram(s) IV Push once  dextrose 50% Injectable 12.5 Gram(s) IV Push once  folic acid 1 milliGRAM(s) Oral daily  glucagon  Injectable 1 milliGRAM(s) IntraMuscular once  hydrALAZINE 25 milliGRAM(s) Oral two times a day  insulin glargine Injectable (LANTUS) 18 Unit(s) SubCutaneous at bedtime  insulin lispro (ADMELOG) corrective regimen sliding scale   SubCutaneous three times a day before meals  insulin lispro Injectable (ADMELOG) 7 Unit(s) SubCutaneous three times a day before meals  mycophenolate mofetil 500 milliGRAM(s) Oral two times a day  NIFEdipine XL 30 milliGRAM(s) Oral daily  pyridoxine 25 milliGRAM(s) Oral daily  tacrolimus 1 milliGRAM(s) Oral two times a day  tamsulosin 0.4 milliGRAM(s) Oral at bedtime     piperacillin/tazobactam IVPB.   200 mL/Hr IV Intermittent (04-23-24 @ 15:09)    piperacillin/tazobactam IVPB..   25 mL/Hr IV Intermittent (04-23-24 @ 18:37)    piperacillin/tazobactam IVPB..   25 mL/Hr IV Intermittent (04-24-24 @ 05:22)   25 mL/Hr IV Intermittent (04-24-24 @ 13:23)   25 mL/Hr IV Intermittent (04-24-24 @ 21:41)   25 mL/Hr IV Intermittent (04-25-24 @ 06:06)   25 mL/Hr IV Intermittent (04-25-24 @ 15:57)   25 mL/Hr IV Intermittent (04-25-24 @ 22:03)   25 mL/Hr IV Intermittent (04-26-24 @ 05:30)   25 mL/Hr IV Intermittent (04-26-24 @ 13:39)    vancomycin  IVPB.   250 mL/Hr IV Intermittent (04-23-24 @ 16:11)      ======================================================  Physical Exam:  ============  T(F): 98.1 (26 Apr 2024 09:11), Max: 98.5 (26 Apr 2024 04:44)  HR: 66 (26 Apr 2024 09:11)  BP: 152/74 (26 Apr 2024 09:11)  RR: 18 (26 Apr 2024 09:11)  SpO2: 96% (26 Apr 2024 09:11) (93% - 98%)  temp max in last 48H T(F): , Max: 98.5 (04-26-24 @ 04:44)    General:  No acute distress.  Eye: Pupils are equal, round and reactive to light, Normal conjunctiva.  HENT: Normocephalic, Oral mucosa is moist, No pharyngeal erythema, No sinus tenderness.  Neck: Supple, No lymphadenopathy.  Respiratory: Lungs are clear to auscultation, Respirations are non-labored.  Cardiovascular: Normal rate, Regular rhythm, s1 + s2  Gastrointestinal: Soft, Non-tender, Non-distended, Normal bowel sounds.  Genitourinary: No costovertebral angle tenderness.  Lymphatics: No lymphadenopathy neck,   Musculoskeletal: Normal range of motion, Normal strength.  Integumentary: No rash.  Neurologic: Alert, Oriented, No focal deficits  Psychiatric: Appropriate mood & affect.    =======================================================  Labs:                        13.7   5.08  )-----------( 199      ( 26 Apr 2024 05:28 )             41.6     04-26    136  |  100  |  25.1<H>  ----------------------------<  111<H>  4.8   |  23.0  |  1.21    Ca    9.7      26 Apr 2024 05:28        Culture - Surgical Swab (collected 04-23-24 @ 16:49)  Source: .Surgical Swab right 2nd toe  Final Report (04-26-24 @ 15:17):    Moderate Staphylococcus aureus    Few Bacteroides fragilis "Susceptibilities not performed"  Organism: Staphylococcus aureus (04-26-24 @ 15:17)  Organism: Staphylococcus aureus (04-26-24 @ 15:17)    Sensitivities:      -  Clindamycin: S <=0.25      -  Oxacillin: S <=0.25 Oxacillin predicts susceptibility for dicloxacillin, methicillin, and nafcillin      -  Gentamicin: S <=1 Should not be used as monotherapy      -  Vancomycin: S 2      -  Tetracycline: S <=1      Method Type: LUIS MANUEL      -  Rifampin: S <=1 Should not be used as monotherapy      -  Erythromycin: S <=0.25      -  Trimethoprim/Sulfamethoxazole: S <=0.5/9.5    Culture - Blood (collected 04-23-24 @ 15:15)  Source: .Blood Blood-Peripheral  Preliminary Report (04-25-24 @ 23:01):    No growth at 48 Hours    Culture - Blood (collected 04-23-24 @ 15:10)  Source: .Blood Blood-Peripheral  Preliminary Report (04-25-24 @ 23:01):    No growth at 48 Hours    Culture - Urine (collected 04-23-24 @ 15:10)  Source: Clean Catch Clean Catch (Midstream)  Final Report (04-24-24 @ 18:37):    No growth    Culture - Tissue with Gram Stain (collected 01-24-24 @ 17:34)  Source: .Tissue R 1st proximal phalanx bone bx  Gram Stain (01-25-24 @ 06:08):    No polymorphonuclear cells seen per low power field    No organisms seen per oil power field  Final Report (01-30-24 @ 07:41):    No growth at 5 days    Culture - Surgical Swab (collected 01-21-24 @ 16:30)  Source: .Surgical Swab right hallux wound  Final Report (01-23-24 @ 19:18):    Numerous Streptococcus agalactiae (Group B)    Streptococcus agalactiae (Group B) isolated    Group B streptococci are susceptible to ampicillin,    penicillin and cefazolin, but may be resistant to    erythromycin and clindamycin.    Few Staphylococcus epidermidis "Susceptibilities not performed"    Mixed Anaerobic Yudelka including    Numerous Finegoldia magna "Susceptibilities not performed"    Few Anaerobic Gram Negative Reynaldo Most closely resembling Prevotella    species "Susceptibilities not performed"    Culture - Urine (collected 01-21-24 @ 14:30)  Source: Clean Catch Clean Catch (Midstream)  Final Report (01-22-24 @ 13:01):    No growth    Culture - Blood (collected 01-21-24 @ 14:10)  Source: .Blood Blood-Peripheral  Final Report (01-26-24 @ 19:00):    No growth at 5 days    Culture - Blood (collected 01-21-24 @ 13:55)  Source: .Blood Blood-Peripheral  Final Report (01-26-24 @ 19:00):    No growth at 5 days    Culture - Blood (collected 09-19-22 @ 20:25)  Source: .Blood Blood-Peripheral  Final Report (09-25-22 @ 01:01):    No Growth Final    Culture - Blood (collected 09-19-22 @ 20:20)  Source: .Blood Blood-Peripheral  Final Report (09-25-22 @ 01:01):    No Growth Final                                                        [facility]                                                INFECTIOUS DISEASES  =======================================================                     Pratik Garibay#   Lamonte Arita MD#   Karl Nur MD*                           Maryjo Owens MD*   Chrissy Baltazar MD*            Diplomates American Board of Internal Medicine & Infectious Diseases                  # [place] Office - Appt - Tel  [phone] Fax [phone]                * [place] Office - Appt - Tel [phone] Fax [phone]                                  Hospital Consult line:  [phone]  =======================================================      [id number]  PRATIK NUR   HPI:  57 yo male with ESRD s/p renal transplant (1/2023), CAD s/p PCI, IDDM presenting to the ED with complaint of right 2nd toe pain and swelling. Patient follows with Dr. Silva for podiatry and is recently s/p right great toe amputation for OM. He saw Dr. Silva on 4/19 and was started on Doxycycline, however, swelling was worsening so patient came to the ED. He reports compliance with PO antibiotic. He denies fever or chills, N/V/D. States there is not much pain at the site as he pas poor sensation. (23 Apr 2024 21:55)          I have personally reviewed the labs and data; pertinent labs and data are listed in this note; please see below.   =======================================================  Past Medical & Surgical Hx:  =====================  PAST MEDICAL & SURGICAL HISTORY:  Renal failure      Dialysis patient      Diabetes      Hypertension      CAD (coronary artery disease)      AV fistula  Left arm      Renal transplant recipient      History of amputation of hallux        Problem List:  ==========  HEALTH ISSUES - PROBLEM Dx:        Social Hx:  =======  no toxic habits currently    FAMILY HISTORY:  FHx: diabetes mellitus (Mother)    no significant family history of immunosuppressive disorders in mother or father   =======================================================    REVIEW OF SYSTEMS:  CONSTITUTIONAL:  No Fever or chills  HEENT:  No diplopia or blurred vision.  No earache, sore throat or runny nose.  CARDIOVASCULAR:  No pressure, squeezing, strangling, tightness, heaviness or aching about the chest, neck, axilla or epigastrium.  RESPIRATORY:  No cough, shortness of breath  GASTROINTESTINAL:  No nausea, vomiting or diarrhea.  GENITOURINARY:  No dysuria, frequency or urgency. No Blood in urine  MUSCULOSKELETAL:  no joint aches, no muscle pain  SKIN:  No change in skin, hair or nails.  NEUROLOGIC:  No Headaches, seizures or weakness.  PSYCHIATRIC:  No disorder of thought or mood.  ENDOCRINE:  No heat or cold intolerance  HEMATOLOGICAL:  No easy bruising or bleeding.    =======================================================  Allergies    No Known Allergies    Intolerances    Antibiotics:  piperacillin/tazobactam IVPB.. 3.375 Gram(s) IV Intermittent every 8 hours    Other medications:  aspirin enteric coated 81 milliGRAM(s) Oral daily  atorvastatin 40 milliGRAM(s) Oral at bedtime  carvedilol 12.5 milliGRAM(s) Oral every 12 hours  clopidogrel Tablet 75 milliGRAM(s) Oral daily  dextrose 10% Bolus 125 milliLiter(s) IV Bolus once  dextrose 5%. 1000 milliLiter(s) IV Continuous <Continuous>  dextrose 5%. 1000 milliLiter(s) IV Continuous <Continuous>  dextrose 50% Injectable 25 Gram(s) IV Push once  dextrose 50% Injectable 12.5 Gram(s) IV Push once  folic acid 1 milliGRAM(s) Oral daily  glucagon  Injectable 1 milliGRAM(s) IntraMuscular once  hydrALAZINE 25 milliGRAM(s) Oral two times a day  insulin glargine Injectable (LANTUS) 18 Unit(s) SubCutaneous at bedtime  insulin lispro (ADMELOG) corrective regimen sliding scale   SubCutaneous three times a day before meals  insulin lispro Injectable (ADMELOG) 7 Unit(s) SubCutaneous three times a day before meals  mycophenolate mofetil 500 milliGRAM(s) Oral two times a day  NIFEdipine XL 30 milliGRAM(s) Oral daily  pyridoxine 25 milliGRAM(s) Oral daily  tacrolimus 1 milliGRAM(s) Oral two times a day  tamsulosin 0.4 milliGRAM(s) Oral at bedtime     piperacillin/tazobactam IVPB.   200 mL/Hr IV Intermittent (04-23-24 @ 15:09)    piperacillin/tazobactam IVPB..   25 mL/Hr IV Intermittent (04-23-24 @ 18:37)    piperacillin/tazobactam IVPB..   25 mL/Hr IV Intermittent (04-24-24 @ 05:22)   25 mL/Hr IV Intermittent (04-24-24 @ 13:23)   25 mL/Hr IV Intermittent (04-24-24 @ 21:41)   25 mL/Hr IV Intermittent (04-25-24 @ 06:06)   25 mL/Hr IV Intermittent (04-25-24 @ 15:57)   25 mL/Hr IV Intermittent (04-25-24 @ 22:03)   25 mL/Hr IV Intermittent (04-26-24 @ 05:30)   25 mL/Hr IV Intermittent (04-26-24 @ 13:39)    vancomycin  IVPB.   250 mL/Hr IV Intermittent (04-23-24 @ 16:11)      ======================================================  Physical Exam:  ============  T(F): 98.1 (26 Apr 2024 09:11), Max: 98.5 (26 Apr 2024 04:44)  HR: 66 (26 Apr 2024 09:11)  BP: 152/74 (26 Apr 2024 09:11)  RR: 18 (26 Apr 2024 09:11)  SpO2: 96% (26 Apr 2024 09:11) (93% - 98%)  temp max in last 48H T(F): , Max: 98.5 (04-26-24 @ 04:44)    General:  No acute distress.  Eye:  Normal conjunctiva.  Neck: Supple, No lymphadenopathy.  Respiratory: Lungs are clear to auscultation, Respirations are non-labored.  Cardiovascular: Normal rate, Regular rhythm, s1 + s2  Gastrointestinal: Soft, Non-tender, Non-distended, Normal bowel sounds.  Musculoskeletal: right foot big toe stump intact, 2nd toe + swelling mild warmth, desuamating skin, absent nail, wound at tip with packing, no drainage  Integumentary: No rash.  Neurologic: Alert, Oriented, No focal deficits  Psychiatric: Appropriate mood & affect.    =======================================================  Labs:                        13.7   5.08  )-----------( 199      ( 26 Apr 2024 05:28 )             41.6     04-26    136  |  100  |  25.1<H>  ----------------------------<  111<H>  4.8   |  23.0  |  1.21    Ca    9.7      26 Apr 2024 05:28        Culture - Surgical Swab (collected 04-23-24 @ 16:49)  Source: .Surgical Swab right 2nd toe  Final Report (04-26-24 @ 15:17):    Moderate Staphylococcus aureus    Few Bacteroides fragilis "Susceptibilities not performed"  Organism: Staphylococcus aureus (04-26-24 @ 15:17)  Organism: Staphylococcus aureus (04-26-24 @ 15:17)    Sensitivities:      -  Clindamycin: S <=0.25      -  Oxacillin: S <=0.25 Oxacillin predicts susceptibility for dicloxacillin, methicillin, and nafcillin      -  Gentamicin: S <=1 Should not be used as monotherapy      -  Vancomycin: S 2      -  Tetracycline: S <=1      Method Type: LUIS MANUEL      -  Rifampin: S <=1 Should not be used as monotherapy      -  Erythromycin: S <=0.25      -  Trimethoprim/Sulfamethoxazole: S <=0.5/9.5    Culture - Blood (collected 04-23-24 @ 15:15)  Source: .Blood Blood-Peripheral  Preliminary Report (04-25-24 @ 23:01):    No growth at 48 Hours    Culture - Blood (collected 04-23-24 @ 15:10)  Source: .Blood Blood-Peripheral  Preliminary Report (04-25-24 @ 23:01):    No growth at 48 Hours    Culture - Urine (collected 04-23-24 @ 15:10)  Source: Clean Catch Clean Catch (Midstream)  Final Report (04-24-24 @ 18:37):    No growth    Culture - Tissue with Gram Stain (collected 01-24-24 @ 17:34)  Source: .Tissue R 1st proximal phalanx bone bx  Gram Stain (01-25-24 @ 06:08):    No polymorphonuclear cells seen per low power field    No organisms seen per oil power field  Final Report (01-30-24 @ 07:41):    No growth at 5 days    Culture - Surgical Swab (collected 01-21-24 @ 16:30)  Source: .Surgical Swab right hallux wound  Final Report (01-23-24 @ 19:18):    Numerous Streptococcus agalactiae (Group B)    Streptococcus agalactiae (Group B) isolated    Group B streptococci are susceptible to ampicillin,    penicillin and cefazolin, but may be resistant to    erythromycin and clindamycin.    Few Staphylococcus epidermidis "Susceptibilities not performed"    Mixed Anaerobic Yudelka including    Numerous Finegoldia magna "Susceptibilities not performed"    Few Anaerobic Gram Negative Reynaldo Most closely resembling Prevotella    species "Susceptibilities not performed"    Culture - Urine (collected 01-21-24 @ 14:30)  Source: Clean Catch Clean Catch (Midstream)  Final Report (01-22-24 @ 13:01):    No growth    Culture - Blood (collected 01-21-24 @ 14:10)  Source: .Blood Blood-Peripheral  Final Report (01-26-24 @ 19:00):    No growth at 5 days    Culture - Blood (collected 01-21-24 @ 13:55)  Source: .Blood Blood-Peripheral  Final Report (01-26-24 @ 19:00):    No growth at 5 days    Culture - Blood (collected 09-19-22 @ 20:25)  Source: .Blood Blood-Peripheral  Final Report (09-25-22 @ 01:01):    No Growth Final    Culture - Blood (collected 09-19-22 @ 20:20)  Source: .Blood Blood-Peripheral  Final Report (09-25-22 @ 01:01):    No Growth Final       < from: MR Foot w/wo IV Cont, Right (04.25.24 @ 13:49) >    IMPRESSION:    1.  Acute on chronic osteomyelitis of the distal phalanx of right second   toe. Nonspecific acute osseous stress reaction throughout the adjacent   second middle phalanx favored to represent hyperacute osteomyelitis.  2.  Status post amputation of right great toe with nonspecific mild   subchondral stress reaction throughout the residual first proximal  phalanx base, favored to be on the basis of altered weightbearing but   with differential including hyperacute osteomyelitis in the proper   clinical setting.    --- End of Report ---    < end of copied text >

## 2024-04-26 NOTE — PROGRESS NOTE ADULT - SUBJECTIVE AND OBJECTIVE BOX
Interval: No acute overnight events. Patient seen resting in bed comfortably. MRI completed. Denies constitutional symptoms. No pain to right foot. No other pedal complaints at this time,     Podiatry HPI: 58M with complaint of worsening swelling to the right foot with associated 2nd digit ulcer. The patient states that he has recently seen Dr. Silva outpatient for a wound to the distal aspect of the right 2nd digit. He last saw Dr. Silva on Friday and was started on Doxycycline, which he has taken as directed since prescribed. The patient states that today, he noticed his right 2nd digit and the right foot were becoming more swollen, and there was some discoloration to the 2nd digit, so he presented to the ED. The patient denies any constitutional symptoms. Denies other pedal complaints. History of hallux amputation earlier this year.      HPI: 57 yo male with ESRD s/p renal transplant (1/2023), CAD s/p PCI, IDDM presenting to the ED with complaint of right 2nd toe pain and swelling. Patient follows with Dr. Silva for podiatry and is recently s/p right great toe amputation for OM. He saw Dr. Silva on 4/19 and was started on Doxycycline, however, swelling was worsening so patient came to the ED. He reports compliance with PO antibiotic. He denies fever or chills, N/V/D. States there is not much pain at the site as he pas poor sensation.      Medications acetaminophen     Tablet .. 650 milliGRAM(s) Oral every 6 hours PRN  aluminum hydroxide/magnesium hydroxide/simethicone Suspension 30 milliLiter(s) Oral every 4 hours PRN  aspirin enteric coated 81 milliGRAM(s) Oral daily  atorvastatin 40 milliGRAM(s) Oral at bedtime  carvedilol 12.5 milliGRAM(s) Oral every 12 hours  clopidogrel Tablet 75 milliGRAM(s) Oral daily  dextrose 10% Bolus 125 milliLiter(s) IV Bolus once  dextrose 5%. 1000 milliLiter(s) IV Continuous <Continuous>  dextrose 5%. 1000 milliLiter(s) IV Continuous <Continuous>  dextrose 50% Injectable 25 Gram(s) IV Push once  dextrose 50% Injectable 12.5 Gram(s) IV Push once  dextrose Oral Gel 15 Gram(s) Oral once PRN  folic acid 1 milliGRAM(s) Oral daily  glucagon  Injectable 1 milliGRAM(s) IntraMuscular once  hydrALAZINE 25 milliGRAM(s) Oral two times a day  insulin glargine Injectable (LANTUS) 18 Unit(s) SubCutaneous at bedtime  insulin lispro (ADMELOG) corrective regimen sliding scale   SubCutaneous three times a day before meals  insulin lispro Injectable (ADMELOG) 7 Unit(s) SubCutaneous three times a day before meals  melatonin 3 milliGRAM(s) Oral at bedtime PRN  mycophenolate mofetil 500 milliGRAM(s) Oral two times a day  NIFEdipine XL 30 milliGRAM(s) Oral daily  ondansetron Injectable 4 milliGRAM(s) IV Push every 8 hours PRN  piperacillin/tazobactam IVPB.. 3.375 Gram(s) IV Intermittent every 8 hours  pyridoxine 25 milliGRAM(s) Oral daily  tacrolimus 1 milliGRAM(s) Oral two times a day  tamsulosin 0.4 milliGRAM(s) Oral at bedtime    FHNo pertinent family history in first degree relatives    FHx: diabetes mellitus (Mother)    ,   PMHRenal failure    Dialysis patient    Diabetes    Hypertension    CAD (coronary artery disease)       PSHNo significant past surgical history    AV fistula    Renal transplant recipient    History of amputation of hallux        Labs                          13.7   5.08  )-----------( 199      ( 26 Apr 2024 05:28 )             41.6      04-26    136  |  100  |  25.1<H>  ----------------------------<  111<H>  4.8   |  23.0  |  1.21    Ca    9.7      26 Apr 2024 05:28       Vital Signs Last 24 Hrs  T(C): 36.7 (26 Apr 2024 16:30), Max: 36.9 (26 Apr 2024 04:44)  T(F): 98 (26 Apr 2024 16:30), Max: 98.5 (26 Apr 2024 04:44)  HR: 65 (26 Apr 2024 16:30) (58 - 67)  BP: 152/75 (26 Apr 2024 16:30) (146/79 - 158/75)  BP(mean): --  RR: 18 (26 Apr 2024 16:30) (18 - 18)  SpO2: 98% (26 Apr 2024 16:30) (94% - 98%)    Parameters below as of 26 Apr 2024 16:30  Patient On (Oxygen Delivery Method): room air      Sedimentation Rate, Erythrocyte: 30 mm/hr (04-23-24 @ 15:10)         C-Reactive Protein, Serum: 13 mg/L (04-23-24 @ 15:10)   WBC Count: 5.08 K/uL (04-26-24 @ 05:28)      ROS: Unremarkable outside HPI      PHYSICAL EXAM  LE Focused:    Vasc:  DP and PT pulses palpable 1/4 b/l. TG warm to warm with increased warmth noted to right foot. Non-pitting edema to right 2nd toe and foot extending to the midfoot.  Derm: Right 2nd digit distal tuft ulcer, purulence previously noted but none today, -PTB, mild malodor, no fluctuance, dusky discoloration to the 2nd digit. No other wounds or lesions noted  Neuro: Protective sensation diminished  MSK: s/p R hallux amputation January 2024      IMAGING:   < from: Xray Foot AP + Lateral + Oblique, Right (04.23.24 @ 16:47) >  IMPRESSION:    Second distal digit tuft erosion/pathologic   fractures/osteolysis/osteomyelitis.  Confirmation MRI recommended    ACC: [id number] EXAM:  MR FOOT WAW IC RT   ORDERED BY: REGULO ZAYAS     PROCEDURE DATE:  04/25/2024          INTERPRETATION:  RIGHT FOOT MRI    CLINICAL INDICATION: Infection, for evaluation of osteomyelitis.    COMPARISON: None available.    TECHNIQUE: Multiplanar, multisequence MRI was obtained of the right foot   both prior to and following administration of 8 cc Gadavist intravenous   contrast. Additional 2 cc Gadavist contrast was discarded.    FINDINGS:    OSSEOUS: Acute on chronic osteomyelitis of the distal phalanx of the   second toe. Patchy enhancing osteitis throughout adjacent second middle   phalanx, but without corresponding change in T1 marrow signal intensity.   Status post amputation of the great toe at the level of the base of the   proximal phalanx with patchy enhancing subchondral osteitis throughout   the residual, but without corresponding change in T1 marrow signal   intensity. Mild to moderate midfoot arthrosis most advanced at the second   tarsometatarsal joint.    SYNOVIUM: No effusions.    TENDONS: Postsurgical changes of the great toe. Visualized flexor and   extensor tendons are otherwise intact.    LIGAMENTS: Lisfranc ligament is mostly intact.    GENERAL: Expected postsurgical changes overlying the great toe amputation   stump. No significant intermetatarsal bursal fluid distension. No   drainable encapsulated fluid collection.    IMPRESSION:    1.  Acute on chronic osteomyelitis of the distal phalanx of right second   toe. Nonspecific acute osseous stress reaction throughout the adjacent   second middle phalanx favored to represent hyperacute osteomyelitis.  2.  Status post amputation of right great toe with nonspecific mild   subchondral stress reaction throughout the residual first proximal  phalanx base, favored to be on the basis of altered weightbearing but   with differential including hyperacute osteomyelitis in the proper   clinical setting.    --- End of Report ---        CULTURES:   Pending

## 2024-04-26 NOTE — PROGRESS NOTE ADULT - ASSESSMENT
57 yo male with ESRD s/p renal transplant (1/2023), CAD s/p PCI, IDDM presenting to the ED with complaint of right foot pain and swelling.     Infected diabetic foot ulcer / Acute ch OM   Elevated inflammatory markers -CRP 13, ESR 30  MRI results noted, Acute on ch OM   s/p Vanc x1, Zosyn x 1 in the ED  No MRSA risk factors so will hold Vanc, continue Zosyn IV for empiric coverage including Pseudomonas  Follow up wound and blood cultures  Podiatry following    *ESRD s/p Renal Transplant 1/2023  Continue Tacrolimus 2 mg BID  Continue Mycophenolate 1000 mg BID  Crt stable     *CAD, HTN  Continue Atorvastatin 40 mg qhs and Plavix 75 mg daily  Continue Coreg 12.5 mg BID  Continue Hydralazine 25 mg BID  Continue Nifedipine 30 mg daily    *IDDM  Continue 18 u Lantus qhs with 7 units mealtime insulin TID   ISS/Accuchecks/A1c  Carb controlled diet    *BPH  Continue Flomax 0.4 mg qhs    DVTppx: Lovenox 59 yo male with ESRD s/p renal transplant (1/2023), CAD s/p PCI, IDDM presenting to the ED with complaint of right foot pain and swelling.     Infected diabetic foot ulcer / Acute ch OM   Elevated inflammatory markers -CRP 13, ESR 30  MRI results noted, Acute on ch OM   s/p Vanc x1, Zosyn x 1 in the ED  Continue PipTazo   d/w ID, will follow recs  d/w Podiatry, will final definitive recs  Podiatry following    ESRD s/p Renal Transplant 1/2023  Continue Tacrolimus 2 mg BID  Continue Mycophenolate 1000 mg BID  Crt stable     CAD, HTN  Continue Atorvastatin 40 mg qhs and Plavix 75 mg daily  Continue Coreg 12.5 mg BID  Continue Hydralazine 25 mg BID  Continue Nifedipine 30 mg daily    IDDM  Continue 18 u Lantus qhs with 7 units mealtime insulin TID   ISS/Accuchecks/A1c  Carb controlled diet    BPH  Continue Flomax 0.4 mg qhs    DVTppx: Lovenox

## 2024-04-26 NOTE — CONSULT NOTE ADULT - ASSESSMENT
59 yo male with ESRD s/p renal transplant (1/2023), CAD s/p PCI, IDDM presenting to the ED with complaint of right 2nd toe pain and swelling. Patient follows with Dr. Silva for podiatry and is recently s/p right great toe amputation for OM. He saw Dr. Silva on 4/19 and was started on Doxycycline, however, swelling was worsening so patient came to the ED. MRI + 2nd toe OM      - bcx ngtd  - wound cx mssa, b.frag  - podiatry f/u  - discussed with pt IV abx may not eradicate infection  - Continue zosyn  - Trend Fever  - Trend Leukocytosis  - on cellcelpt/tacrolimus  - optimize glycemic control    d/w Dr Patel  Will Follow

## 2024-04-26 NOTE — PROGRESS NOTE ADULT - SUBJECTIVE AND OBJECTIVE BOX
[facility] Division of Hospital Medicine    Chief Complaint:  DM foot OM     SUBJECTIVE / OVERNIGHT EVENTS:  Pt examined lying in bed  Concerned about MRI results  Patient denies chest pain, SOB, abd pain, N/V, fever, chills, dysuria or any other complaints. All remainder ROS negative.     MEDICATIONS  (STANDING):  aspirin enteric coated 81 milliGRAM(s) Oral daily  atorvastatin 40 milliGRAM(s) Oral at bedtime  carvedilol 12.5 milliGRAM(s) Oral every 12 hours  clopidogrel Tablet 75 milliGRAM(s) Oral daily  dextrose 10% Bolus 125 milliLiter(s) IV Bolus once  dextrose 5%. 1000 milliLiter(s) (100 mL/Hr) IV Continuous <Continuous>  dextrose 5%. 1000 milliLiter(s) (50 mL/Hr) IV Continuous <Continuous>  dextrose 50% Injectable 25 Gram(s) IV Push once  dextrose 50% Injectable 12.5 Gram(s) IV Push once  folic acid 1 milliGRAM(s) Oral daily  glucagon  Injectable 1 milliGRAM(s) IntraMuscular once  hydrALAZINE 25 milliGRAM(s) Oral two times a day  insulin glargine Injectable (LANTUS) 18 Unit(s) SubCutaneous at bedtime  insulin lispro (ADMELOG) corrective regimen sliding scale   SubCutaneous three times a day before meals  insulin lispro Injectable (ADMELOG) 7 Unit(s) SubCutaneous three times a day before meals  mycophenolate mofetil 500 milliGRAM(s) Oral two times a day  NIFEdipine XL 30 milliGRAM(s) Oral daily  piperacillin/tazobactam IVPB.. 3.375 Gram(s) IV Intermittent every 8 hours  pyridoxine 25 milliGRAM(s) Oral daily  tacrolimus 1 milliGRAM(s) Oral two times a day  tamsulosin 0.4 milliGRAM(s) Oral at bedtime    MEDICATIONS  (PRN):  acetaminophen     Tablet .. 650 milliGRAM(s) Oral every 6 hours PRN Temp greater or equal to 38C (100.4F), Mild Pain (1 - 3)  aluminum hydroxide/magnesium hydroxide/simethicone Suspension 30 milliLiter(s) Oral every 4 hours PRN Dyspepsia  dextrose Oral Gel 15 Gram(s) Oral once PRN Blood Glucose LESS THAN 70 milliGRAM(s)/deciliter  melatonin 3 milliGRAM(s) Oral at bedtime PRN Insomnia  ondansetron Injectable 4 milliGRAM(s) IV Push every 8 hours PRN Nausea and/or Vomiting        I&O's Summary      PHYSICAL EXAM:  Vital Signs Last 24 Hrs  T(C): 36.7 (26 Apr 2024 09:11), Max: 36.9 (26 Apr 2024 04:44)  T(F): 98.1 (26 Apr 2024 09:11), Max: 98.5 (26 Apr 2024 04:44)  HR: 66 (26 Apr 2024 09:11) (58 - 106)  BP: 152/74 (26 Apr 2024 09:11) (118/65 - 158/75)  BP(mean): --  RR: 18 (26 Apr 2024 09:11) (17 - 18)  SpO2: 96% (26 Apr 2024 09:11) (93% - 98%)    Parameters below as of 26 Apr 2024 09:11  Patient On (Oxygen Delivery Method): room air            CONSTITUTIONAL: Non toxic appearing, obese male lying in bed  ENMT: Moist oral mucosa, no pharyngeal injection or exudates; normal dentition  RESPIRATORY: Normal respiratory effort; lungs are clear to auscultation bilaterally  CARDIOVASCULAR: Regular rate and rhythm, normal S1 and S2, no murmur/rub/gallop; No lower extremity edema; Peripheral pulses are 2+ bilaterally  ABDOMEN: Nontender to palpation, normoactive bowel sounds, no rebound/guarding; No hepatosplenomegaly  MUSCLOSKELETAL:  RLE dressing, no soaking of gauze appreciated . no clubbing or cyanosis of digits; no joint swelling or tenderness to palpation  PSYCH: A+O to person, place, and time; affect appropriate  NEUROLOGY: CN 2-12 are intact and symmetric; no gross sensory deficits;   SKIN: No rashes; no palpable lesions    LABS:                        13.7   5.08  )-----------( 199      ( 26 Apr 2024 05:28 )             41.6     04-26    136  |  100  |  25.1<H>  ----------------------------<  111<H>  4.8   |  23.0  |  1.21    Ca    9.7      26 Apr 2024 05:28            Urinalysis Basic - ( 26 Apr 2024 05:28 )    Color: x / Appearance: x / SG: x / pH: x  Gluc: 111 mg/dL / Ketone: x  / Bili: x / Urobili: x   Blood: x / Protein: x / Nitrite: x   Leuk Esterase: x / RBC: x / WBC x   Sq Epi: x / Non Sq Epi: x / Bacteria: x        Culture - Surgical Swab (collected 23 Apr 2024 16:49)  Source: .Surgical Swab right 2nd toe  Preliminary Report (25 Apr 2024 10:18):    Moderate Staphylococcus aureus  Organism: Staphylococcus aureus (26 Apr 2024 10:42)  Organism: Staphylococcus aureus (26 Apr 2024 10:42)    Culture - Blood (collected 23 Apr 2024 15:15)  Source: .Blood Blood-Peripheral  Preliminary Report (25 Apr 2024 23:01):    No growth at 48 Hours      CAPILLARY BLOOD GLUCOSE      POCT Blood Glucose.: 131 mg/dL (26 Apr 2024 12:45)  POCT Blood Glucose.: 106 mg/dL (26 Apr 2024 07:35)  POCT Blood Glucose.: 176 mg/dL (25 Apr 2024 22:08)  POCT Blood Glucose.: 104 mg/dL (25 Apr 2024 16:55)        RADIOLOGY & ADDITIONAL TESTS:  Results Reviewed:   Imaging Personally Reviewed:  Electrocardiogram Personally Reviewed:                                           [facility] Division of Hospital Medicine    Chief Complaint:  DM foot OM     SUBJECTIVE / OVERNIGHT EVENTS:  Pt examined lying in bed  Concerned about MRI results  Patient denies chest pain, SOB, abd pain, N/V, fever, chills, dysuria or any other complaints. All remainder ROS negative.     MEDICATIONS  (STANDING):  aspirin enteric coated 81 milliGRAM(s) Oral daily  atorvastatin 40 milliGRAM(s) Oral at bedtime  carvedilol 12.5 milliGRAM(s) Oral every 12 hours  clopidogrel Tablet 75 milliGRAM(s) Oral daily  dextrose 10% Bolus 125 milliLiter(s) IV Bolus once  dextrose 5%. 1000 milliLiter(s) (100 mL/Hr) IV Continuous <Continuous>  dextrose 5%. 1000 milliLiter(s) (50 mL/Hr) IV Continuous <Continuous>  dextrose 50% Injectable 25 Gram(s) IV Push once  dextrose 50% Injectable 12.5 Gram(s) IV Push once  folic acid 1 milliGRAM(s) Oral daily  glucagon  Injectable 1 milliGRAM(s) IntraMuscular once  hydrALAZINE 25 milliGRAM(s) Oral two times a day  insulin glargine Injectable (LANTUS) 18 Unit(s) SubCutaneous at bedtime  insulin lispro (ADMELOG) corrective regimen sliding scale   SubCutaneous three times a day before meals  insulin lispro Injectable (ADMELOG) 7 Unit(s) SubCutaneous three times a day before meals  mycophenolate mofetil 500 milliGRAM(s) Oral two times a day  NIFEdipine XL 30 milliGRAM(s) Oral daily  piperacillin/tazobactam IVPB.. 3.375 Gram(s) IV Intermittent every 8 hours  pyridoxine 25 milliGRAM(s) Oral daily  tacrolimus 1 milliGRAM(s) Oral two times a day  tamsulosin 0.4 milliGRAM(s) Oral at bedtime    MEDICATIONS  (PRN):  acetaminophen     Tablet .. 650 milliGRAM(s) Oral every 6 hours PRN Temp greater or equal to 38C (100.4F), Mild Pain (1 - 3)  aluminum hydroxide/magnesium hydroxide/simethicone Suspension 30 milliLiter(s) Oral every 4 hours PRN Dyspepsia  dextrose Oral Gel 15 Gram(s) Oral once PRN Blood Glucose LESS THAN 70 milliGRAM(s)/deciliter  melatonin 3 milliGRAM(s) Oral at bedtime PRN Insomnia  ondansetron Injectable 4 milliGRAM(s) IV Push every 8 hours PRN Nausea and/or Vomiting        I&O's Summary      PHYSICAL EXAM:  Vital Signs Last 24 Hrs  T(C): 36.7 (26 Apr 2024 09:11), Max: 36.9 (26 Apr 2024 04:44)  T(F): 98.1 (26 Apr 2024 09:11), Max: 98.5 (26 Apr 2024 04:44)  HR: 66 (26 Apr 2024 09:11) (58 - 106)  BP: 152/74 (26 Apr 2024 09:11) (118/65 - 158/75)  BP(mean): --  RR: 18 (26 Apr 2024 09:11) (17 - 18)  SpO2: 96% (26 Apr 2024 09:11) (93% - 98%)    Parameters below as of 26 Apr 2024 09:11  Patient On (Oxygen Delivery Method): room air            CONSTITUTIONAL: Non toxic appearing, obese male lying in bed  ENMT: Moist oral mucosa, no pharyngeal injection or exudates; normal dentition  RESPIRATORY: Normal respiratory effort; lungs are clear to auscultation bilaterally  CARDIOVASCULAR: Regular rate and rhythm, normal S1 and S2, no murmur/rub/gallop; No lower extremity edema; Peripheral pulses are 2+ bilaterally  ABDOMEN: Nontender to palpation, normoactive bowel sounds, no rebound/guarding; No hepatosplenomegaly  MUSCLOSKELETAL:  RLE dressing, no soaking of gauze appreciated . no clubbing or cyanosis of digits; no joint swelling or tenderness to palpation  PSYCH: A+O to person, place, and time; affect appropriate  NEUROLOGY: CN 2-12 are intact and symmetric; no gross sensory deficits;   SKIN: No rashes; no palpable lesions    LABS:                        13.7   5.08  )-----------( 199      ( 26 Apr 2024 05:28 )             41.6     04-26    136  |  100  |  25.1<H>  ----------------------------<  111<H>  4.8   |  23.0  |  1.21    Ca    9.7      26 Apr 2024 05:28            Urinalysis Basic - ( 26 Apr 2024 05:28 )    Color: x / Appearance: x / SG: x / pH: x  Gluc: 111 mg/dL / Ketone: x  / Bili: x / Urobili: x   Blood: x / Protein: x / Nitrite: x   Leuk Esterase: x / RBC: x / WBC x   Sq Epi: x / Non Sq Epi: x / Bacteria: x        Culture - Surgical Swab (collected 23 Apr 2024 16:49)  Source: .Surgical Swab right 2nd toe  Preliminary Report (25 Apr 2024 10:18):    Moderate Staphylococcus aureus  Organism: Staphylococcus aureus (26 Apr 2024 10:42)  Organism: Staphylococcus aureus (26 Apr 2024 10:42)    Culture - Blood (collected 23 Apr 2024 15:15)  Source: .Blood Blood-Peripheral  Preliminary Report (25 Apr 2024 23:01):    No growth at 48 Hours      CAPILLARY BLOOD GLUCOSE      POCT Blood Glucose.: 131 mg/dL (26 Apr 2024 12:45)  POCT Blood Glucose.: 106 mg/dL (26 Apr 2024 07:35)  POCT Blood Glucose.: 176 mg/dL (25 Apr 2024 22:08)  POCT Blood Glucose.: 104 mg/dL (25 Apr 2024 16:55)        RADIOLOGY & ADDITIONAL TESTS:    4/24/24 MRI     IMPRESSION:    1.  Acute on chronic osteomyelitis of the distal phalanx of right second   toe. Nonspecific acute osseous stress reaction throughout the adjacent   second middle phalanx favored to represent hyperacute osteomyelitis.  2.  Status post amputation of right great toe with nonspecific mild   subchondral stress reaction throughout the residual first proximal   phalanx base, favored to be on the basis of altered weightbearing but   with differential including hyperacute osteomyelitis in the proper   clinical setting.

## 2024-04-26 NOTE — PROGRESS NOTE ADULT - ASSESSMENT
A:  R 2nd digit ulcer  R 2nd digit OM  Cellulitis    P:   Patient evaluated and Chart reviewed   Discussed diagnosis and treatment with patient  Reviewed right foot x-rays- OM of 2nd digit  Pending right foot MRI - confirmed OM 2nd digit , results as noted above  Pending wound culture final results  Applied betadine, DSD to right toe wound  C/w IV abx   Discussed with patient management of osteomyelitis including surgical vs conservative intervention. Discussed risks and benefits of all options. Patient amenable to surgical intervention  Plan to take patient for right 2nd digit amputation, with flexor tenotomy of 3rd digit on Monday afternoon as add on  Request medical optimization  Offloading to bilateral Heels while in bed  Discussed importance of daily foot examinations and proper shoe gear and to importance of lower Fasting Blood Glucose levels.   Podiatry to follow while in house.  Discussed and evaluated bedside with Attending Dr. Silva

## 2024-04-27 LAB
ANION GAP SERPL CALC-SCNC: 13 MMOL/L — SIGNIFICANT CHANGE UP (ref 5–17)
BUN SERPL-MCNC: 27.2 MG/DL — HIGH (ref 8–20)
CALCIUM SERPL-MCNC: 9.8 MG/DL — SIGNIFICANT CHANGE UP (ref 8.4–10.5)
CHLORIDE SERPL-SCNC: 99 MMOL/L — SIGNIFICANT CHANGE UP (ref 96–108)
CO2 SERPL-SCNC: 21 MMOL/L — LOW (ref 22–29)
CREAT SERPL-MCNC: 1.24 MG/DL — SIGNIFICANT CHANGE UP (ref 0.5–1.3)
EGFR: 67 ML/MIN/1.73M2 — SIGNIFICANT CHANGE UP
GLUCOSE BLDC GLUCOMTR-MCNC: 115 MG/DL — HIGH (ref 70–99)
GLUCOSE BLDC GLUCOMTR-MCNC: 124 MG/DL — HIGH (ref 70–99)
GLUCOSE BLDC GLUCOMTR-MCNC: 128 MG/DL — HIGH (ref 70–99)
GLUCOSE BLDC GLUCOMTR-MCNC: 167 MG/DL — HIGH (ref 70–99)
GLUCOSE SERPL-MCNC: 125 MG/DL — HIGH (ref 70–99)
HCT VFR BLD CALC: 42.3 % — SIGNIFICANT CHANGE UP (ref 39–50)
HGB BLD-MCNC: 14 G/DL — SIGNIFICANT CHANGE UP (ref 13–17)
MCHC RBC-ENTMCNC: 28.7 PG — SIGNIFICANT CHANGE UP (ref 27–34)
MCHC RBC-ENTMCNC: 33.1 GM/DL — SIGNIFICANT CHANGE UP (ref 32–36)
MCV RBC AUTO: 86.9 FL — SIGNIFICANT CHANGE UP (ref 80–100)
PLATELET # BLD AUTO: 201 K/UL — SIGNIFICANT CHANGE UP (ref 150–400)
POTASSIUM SERPL-MCNC: 4.6 MMOL/L — SIGNIFICANT CHANGE UP (ref 3.5–5.3)
POTASSIUM SERPL-SCNC: 4.6 MMOL/L — SIGNIFICANT CHANGE UP (ref 3.5–5.3)
RBC # BLD: 4.87 M/UL — SIGNIFICANT CHANGE UP (ref 4.2–5.8)
RBC # FLD: 12.7 % — SIGNIFICANT CHANGE UP (ref 10.3–14.5)
SODIUM SERPL-SCNC: 133 MMOL/L — LOW (ref 135–145)
WBC # BLD: 5.65 K/UL — SIGNIFICANT CHANGE UP (ref 3.8–10.5)
WBC # FLD AUTO: 5.65 K/UL — SIGNIFICANT CHANGE UP (ref 3.8–10.5)

## 2024-04-27 PROCEDURE — 99232 SBSQ HOSP IP/OBS MODERATE 35: CPT

## 2024-04-27 RX ADMIN — Medication 25 MILLIGRAM(S): at 16:50

## 2024-04-27 RX ADMIN — PIPERACILLIN AND TAZOBACTAM 25 GRAM(S): 4; .5 INJECTION, POWDER, LYOPHILIZED, FOR SOLUTION INTRAVENOUS at 05:25

## 2024-04-27 RX ADMIN — Medication 2: at 13:03

## 2024-04-27 RX ADMIN — TAMSULOSIN HYDROCHLORIDE 0.4 MILLIGRAM(S): 0.4 CAPSULE ORAL at 21:16

## 2024-04-27 RX ADMIN — Medication 7 UNIT(S): at 17:29

## 2024-04-27 RX ADMIN — TACROLIMUS 1 MILLIGRAM(S): 5 CAPSULE ORAL at 21:16

## 2024-04-27 RX ADMIN — Medication 30 MILLIGRAM(S): at 05:25

## 2024-04-27 RX ADMIN — MYCOPHENOLATE MOFETIL 500 MILLIGRAM(S): 250 CAPSULE ORAL at 08:54

## 2024-04-27 RX ADMIN — CLOPIDOGREL BISULFATE 75 MILLIGRAM(S): 75 TABLET, FILM COATED ORAL at 12:24

## 2024-04-27 RX ADMIN — PIPERACILLIN AND TAZOBACTAM 25 GRAM(S): 4; .5 INJECTION, POWDER, LYOPHILIZED, FOR SOLUTION INTRAVENOUS at 14:42

## 2024-04-27 RX ADMIN — Medication 25 MILLIGRAM(S): at 12:23

## 2024-04-27 RX ADMIN — TACROLIMUS 1 MILLIGRAM(S): 5 CAPSULE ORAL at 08:54

## 2024-04-27 RX ADMIN — CARVEDILOL PHOSPHATE 12.5 MILLIGRAM(S): 80 CAPSULE, EXTENDED RELEASE ORAL at 16:51

## 2024-04-27 RX ADMIN — Medication 1 MILLIGRAM(S): at 12:24

## 2024-04-27 RX ADMIN — Medication 7 UNIT(S): at 08:05

## 2024-04-27 RX ADMIN — CARVEDILOL PHOSPHATE 12.5 MILLIGRAM(S): 80 CAPSULE, EXTENDED RELEASE ORAL at 05:24

## 2024-04-27 RX ADMIN — Medication 25 MILLIGRAM(S): at 05:24

## 2024-04-27 RX ADMIN — PIPERACILLIN AND TAZOBACTAM 25 GRAM(S): 4; .5 INJECTION, POWDER, LYOPHILIZED, FOR SOLUTION INTRAVENOUS at 21:19

## 2024-04-27 RX ADMIN — Medication 7 UNIT(S): at 13:03

## 2024-04-27 RX ADMIN — Medication 81 MILLIGRAM(S): at 12:23

## 2024-04-27 RX ADMIN — MYCOPHENOLATE MOFETIL 500 MILLIGRAM(S): 250 CAPSULE ORAL at 21:16

## 2024-04-27 RX ADMIN — ATORVASTATIN CALCIUM 40 MILLIGRAM(S): 80 TABLET, FILM COATED ORAL at 21:24

## 2024-04-27 RX ADMIN — INSULIN GLARGINE 18 UNIT(S): 100 INJECTION, SOLUTION SUBCUTANEOUS at 21:17

## 2024-04-27 NOTE — PROGRESS NOTE ADULT - SUBJECTIVE AND OBJECTIVE BOX
[facility]                                                INFECTIOUS DISEASES  =======================================================                   Pratik Garibay#   Lamonte Arita MD#    Karl Nur MD*                           Maryjo Owens MD*   Chrissy Baltazar MD*           Diplomates American Board of Internal Medicine & Infectious Diseases                  # [place] Office - Appt - Tel  [phone] Fax [phone]                * [place] Office - Appt - Tel [phone] Fax [phone]                                  Hospital Consult line:  [phone]  =======================================================      [facility]-[id number]  PRATIKASIA NUR   follow up for: OM  denies complaints    patient seen and examined.       I have personally reviewed the labs and data; pertinent labs and data are listed in this note; please see below.   ===================================================  REVIEW OF SYSTEMS:  CONSTITUTIONAL:  No Fever or chills  HEENT:  No diplopia or blurred vision.  No earache, sore throat or runny nose.  CARDIOVASCULAR:  No pressure, squeezing, strangling, tightness, heaviness or aching about the chest, neck, axilla or epigastrium.  RESPIRATORY:  No cough, shortness of breath  GASTROINTESTINAL:  No nausea, vomiting or diarrhea.  GENITOURINARY:  No dysuria, frequency or urgency. No Blood in urine  MUSCULOSKELETAL:  no joint aches, no muscle pain  SKIN:  No change in skin, hair or nails.  NEUROLOGIC:  No Headaches, seizures or weakness.  PSYCHIATRIC:  No disorder of thought or mood.  ENDOCRINE:  No heat or cold intolerance  HEMATOLOGICAL:  No easy bruising or bleeding.    =======================================================  Allergies    No Known Allergies    Intolerances    Antibiotics:  piperacillin/tazobactam IVPB.. 3.375 Gram(s) IV Intermittent every 8 hours    Other medications:  aspirin enteric coated 81 milliGRAM(s) Oral daily  atorvastatin 40 milliGRAM(s) Oral at bedtime  carvedilol 12.5 milliGRAM(s) Oral every 12 hours  clopidogrel Tablet 75 milliGRAM(s) Oral daily  dextrose 10% Bolus 125 milliLiter(s) IV Bolus once  dextrose 5%. 1000 milliLiter(s) IV Continuous <Continuous>  dextrose 5%. 1000 milliLiter(s) IV Continuous <Continuous>  dextrose 50% Injectable 25 Gram(s) IV Push once  dextrose 50% Injectable 12.5 Gram(s) IV Push once  folic acid 1 milliGRAM(s) Oral daily  glucagon  Injectable 1 milliGRAM(s) IntraMuscular once  hydrALAZINE 25 milliGRAM(s) Oral two times a day  insulin glargine Injectable (LANTUS) 18 Unit(s) SubCutaneous at bedtime  insulin lispro (ADMELOG) corrective regimen sliding scale   SubCutaneous three times a day before meals  insulin lispro Injectable (ADMELOG) 7 Unit(s) SubCutaneous three times a day before meals  mycophenolate mofetil 500 milliGRAM(s) Oral two times a day  NIFEdipine XL 30 milliGRAM(s) Oral daily  pyridoxine 25 milliGRAM(s) Oral daily  tacrolimus 1 milliGRAM(s) Oral two times a day  tamsulosin 0.4 milliGRAM(s) Oral at bedtime    ======================================================  Physical Exam:  ============  T(F): 98.3 (27 Apr 2024 08:57), Max: 98.3 (27 Apr 2024 08:57)  HR: 63 (27 Apr 2024 08:57)  BP: 158/73 (27 Apr 2024 08:57)  RR: 18 (27 Apr 2024 08:57)  SpO2: 96% (27 Apr 2024 08:57) (96% - 98%)  temp max in last 48H T(F): , Max: 98.5 (04-26-24 @ 04:44)    General:  No acute distress.  Eye: no conjunctival pallor, no scleral icterus  Respiratory: Lungs are clear to auscultation, Respirations are non-labored.  Cardiovascular: Normal rate, Regular rhythm,  s1+s2  Gastrointestinal: Soft, Non-tender, Non-distended, Normal bowel sounds.  Musculoskeletal: right foot dressing intact  Integumentary: No rash.  Neurologic: Alert, Oriented, No focal deficits  Psychiatric: Appropriate mood & affect.  =======================================================  Labs:                        14.0   5.65  )-----------( 201      ( 27 Apr 2024 06:07 )             42.3     04-27    133<L>  |  99  |  27.2<H>  ----------------------------<  125<H>  4.6   |  21.0<L>  |  1.24    Ca    9.8      27 Apr 2024 06:07        Culture - Surgical Swab (collected 04-23-24 @ 16:49)  Source: .Surgical Swab right 2nd toe  Final Report (04-26-24 @ 15:17):    Moderate Staphylococcus aureus    Few Bacteroides fragilis "Susceptibilities not performed"  Organism: Staphylococcus aureus (04-26-24 @ 15:17)  Organism: Staphylococcus aureus (04-26-24 @ 15:17)    Sensitivities:      Method Type: LUIS MANUEL      -  Clindamycin: S <=0.25      -  Erythromycin: S <=0.25      -  Gentamicin: S <=1 Should not be used as monotherapy      -  Oxacillin: S <=0.25 Oxacillin predicts susceptibility for dicloxacillin, methicillin, and nafcillin      -  Rifampin: S <=1 Should not be used as monotherapy      -  Tetracycline: S <=1      -  Trimethoprim/Sulfamethoxazole: S <=0.5/9.5      -  Vancomycin: S 2    Culture - Blood (collected 04-23-24 @ 15:15)  Source: .Blood Blood-Peripheral  Preliminary Report (04-26-24 @ 23:01):    No growth at 72 Hours    Culture - Urine (collected 04-23-24 @ 15:10)  Source: Clean Catch Clean Catch (Midstream)  Final Report (04-24-24 @ 18:37):    No growth    Culture - Blood (collected 04-23-24 @ 15:10)  Source: .Blood Blood-Peripheral  Preliminary Report (04-26-24 @ 23:01):    No growth at 72 Hours    Culture - Tissue with Gram Stain (collected 01-24-24 @ 17:34)  Source: .Tissue R 1st proximal phalanx bone bx  Gram Stain (01-25-24 @ 06:08):    No polymorphonuclear cells seen per low power field    No organisms seen per oil power field  Final Report (01-30-24 @ 07:41):    No growth at 5 days    Culture - Surgical Swab (collected 01-21-24 @ 16:30)  Source: .Surgical Swab right hallux wound  Final Report (01-23-24 @ 19:18):    Numerous Streptococcus agalactiae (Group B)    Streptococcus agalactiae (Group B) isolated    Group B streptococci are susceptible to ampicillin,    penicillin and cefazolin, but may be resistant to    erythromycin and clindamycin.    Few Staphylococcus epidermidis "Susceptibilities not performed"    Mixed Anaerobic Yudelka including    Numerous Finegoldia magna "Susceptibilities not performed"    Few Anaerobic Gram Negative Reynaldo Most closely resembling Prevotella    species "Susceptibilities not performed"    Culture - Urine (collected 01-21-24 @ 14:30)  Source: Clean Catch Clean Catch (Midstream)  Final Report (01-22-24 @ 13:01):    No growth    Culture - Blood (collected 01-21-24 @ 14:10)  Source: .Blood Blood-Peripheral  Final Report (01-26-24 @ 19:00):    No growth at 5 days    Culture - Blood (collected 01-21-24 @ 13:55)  Source: .Blood Blood-Peripheral  Final Report (01-26-24 @ 19:00):    No growth at 5 days    Culture - Blood (collected 09-19-22 @ 20:25)  Source: .Blood Blood-Peripheral  Final Report (09-25-22 @ 01:01):    No Growth Final    Culture - Blood (collected 09-19-22 @ 20:20)  Source: .Blood Blood-Peripheral  Final Report (09-25-22 @ 01:01):    No Growth Final

## 2024-04-27 NOTE — PROGRESS NOTE ADULT - ASSESSMENT
59 yo male with ESRD s/p renal transplant (1/2023), CAD s/p PCI, IDDM presenting to the ED with complaint of right 2nd toe pain and swelling. Patient follows with Dr. Silva for podiatry and is recently s/p right great toe amputation for OM. He saw Dr. Silva on 4/19 and was started on Doxycycline, however, swelling was worsening so patient came to the ED. MRI + 2nd toe OM      - bcx ngtd  - wound cx mssa, b.frag  - podiatry f/u-plan for right 2nd digit amputation, with flexor tenotomy of 3rd digit on Monday . f/u Or cx and path  - Continue zosyn  - Trend Fever  - Trend Leukocytosis  - on cellcelpt/tacrolimus  - optimize glycemic control      Will Follow

## 2024-04-27 NOTE — PROGRESS NOTE ADULT - SUBJECTIVE AND OBJECTIVE BOX
[facility] Division of Hospital Medicine    Chief Complaint:  DM foot OM     SUBJECTIVE / OVERNIGHT EVENTS:  Pt examined lying in bed  Potentially planned for amputation of infected digit next week   Patient denies chest pain, SOB, abd pain, N/V, fever, chills, dysuria or any other complaints. All remainder ROS negative.     MEDICATIONS  (STANDING):  aspirin enteric coated 81 milliGRAM(s) Oral daily  atorvastatin 40 milliGRAM(s) Oral at bedtime  carvedilol 12.5 milliGRAM(s) Oral every 12 hours  clopidogrel Tablet 75 milliGRAM(s) Oral daily  dextrose 10% Bolus 125 milliLiter(s) IV Bolus once  dextrose 5%. 1000 milliLiter(s) (100 mL/Hr) IV Continuous <Continuous>  dextrose 5%. 1000 milliLiter(s) (50 mL/Hr) IV Continuous <Continuous>  dextrose 50% Injectable 25 Gram(s) IV Push once  dextrose 50% Injectable 12.5 Gram(s) IV Push once  folic acid 1 milliGRAM(s) Oral daily  glucagon  Injectable 1 milliGRAM(s) IntraMuscular once  hydrALAZINE 25 milliGRAM(s) Oral two times a day  insulin glargine Injectable (LANTUS) 18 Unit(s) SubCutaneous at bedtime  insulin lispro (ADMELOG) corrective regimen sliding scale   SubCutaneous three times a day before meals  insulin lispro Injectable (ADMELOG) 7 Unit(s) SubCutaneous three times a day before meals  mycophenolate mofetil 500 milliGRAM(s) Oral two times a day  NIFEdipine XL 30 milliGRAM(s) Oral daily  piperacillin/tazobactam IVPB.. 3.375 Gram(s) IV Intermittent every 8 hours  pyridoxine 25 milliGRAM(s) Oral daily  tacrolimus 1 milliGRAM(s) Oral two times a day  tamsulosin 0.4 milliGRAM(s) Oral at bedtime    MEDICATIONS  (PRN):  acetaminophen     Tablet .. 650 milliGRAM(s) Oral every 6 hours PRN Temp greater or equal to 38C (100.4F), Mild Pain (1 - 3)  aluminum hydroxide/magnesium hydroxide/simethicone Suspension 30 milliLiter(s) Oral every 4 hours PRN Dyspepsia  dextrose Oral Gel 15 Gram(s) Oral once PRN Blood Glucose LESS THAN 70 milliGRAM(s)/deciliter  melatonin 3 milliGRAM(s) Oral at bedtime PRN Insomnia  ondansetron Injectable 4 milliGRAM(s) IV Push every 8 hours PRN Nausea and/or Vomiting        I&O's Summary      PHYSICAL EXAM:  Vital Signs Last 24 Hrs  T(C): 36.8 (27 Apr 2024 08:57), Max: 36.8 (27 Apr 2024 08:57)  T(F): 98.3 (27 Apr 2024 08:57), Max: 98.3 (27 Apr 2024 08:57)  HR: 63 (27 Apr 2024 08:57) (61 - 74)  BP: 158/73 (27 Apr 2024 08:57) (118/69 - 158/73)  BP(mean): --  RR: 18 (27 Apr 2024 08:57) (18 - 18)  SpO2: 96% (27 Apr 2024 08:57) (96% - 98%)    Parameters below as of 27 Apr 2024 08:57  Patient On (Oxygen Delivery Method): room air        CONSTITUTIONAL: Non toxic appearing, obese male lying in bed  ENMT: Moist oral mucosa, no pharyngeal injection or exudates; normal dentition  RESPIRATORY: Normal respiratory effort; lungs are clear to auscultation bilaterally  CARDIOVASCULAR: Regular rate and rhythm, normal S1 and S2, no murmur/rub/gallop; No lower extremity edema; Peripheral pulses are 2+ bilaterally  ABDOMEN: Nontender to palpation, normoactive bowel sounds, no rebound/guarding; No hepatosplenomegaly  MUSCLOSKELETAL:  RLE dressing, no soaking of gauze appreciated . no clubbing or cyanosis of digits; no joint swelling or tenderness to palpation  PSYCH: A+O to person, place, and time; affect appropriate  NEUROLOGY: CN 2-12 are intact and symmetric; no gross sensory deficits;   SKIN: No rashes; no palpable lesions    LABS:                                   14.0   5.65  )-----------( 201      ( 27 Apr 2024 06:07 )             42.3   04-27    133<L>  |  99  |  27.2<H>  ----------------------------<  125<H>  4.6   |  21.0<L>  |  1.24    Ca    9.8      27 Apr 2024 06:07      Urinalysis Basic - ( 26 Apr 2024 05:28 )    Color: x / Appearance: x / SG: x / pH: x  Gluc: 111 mg/dL / Ketone: x  / Bili: x / Urobili: x   Blood: x / Protein: x / Nitrite: x   Leuk Esterase: x / RBC: x / WBC x   Sq Epi: x / Non Sq Epi: x / Bacteria: x        Culture - Surgical Swab (collected 23 Apr 2024 16:49)  Source: .Surgical Swab right 2nd toe  Preliminary Report (25 Apr 2024 10:18):    Moderate Staphylococcus aureus  Organism: Staphylococcus aureus (26 Apr 2024 10:42)  Organism: Staphylococcus aureus (26 Apr 2024 10:42)    Culture - Blood (collected 23 Apr 2024 15:15)  Source: .Blood Blood-Peripheral  Preliminary Report (25 Apr 2024 23:01):    No growth at 48 Hours      CAPILLARY BLOOD GLUCOSE      POCT Blood Glucose.: 131 mg/dL (26 Apr 2024 12:45)  POCT Blood Glucose.: 106 mg/dL (26 Apr 2024 07:35)  POCT Blood Glucose.: 176 mg/dL (25 Apr 2024 22:08)  POCT Blood Glucose.: 104 mg/dL (25 Apr 2024 16:55)        RADIOLOGY & ADDITIONAL TESTS:    4/24/24 MRI     IMPRESSION:    1.  Acute on chronic osteomyelitis of the distal phalanx of right second   toe. Nonspecific acute osseous stress reaction throughout the adjacent   second middle phalanx favored to represent hyperacute osteomyelitis.  2.  Status post amputation of right great toe with nonspecific mild   subchondral stress reaction throughout the residual first proximal   phalanx base, favored to be on the basis of altered weightbearing but   with differential including hyperacute osteomyelitis in the proper   clinical setting.

## 2024-04-27 NOTE — PROGRESS NOTE ADULT - ASSESSMENT
57 yo male with ESRD s/p renal transplant (1/2023), CAD s/p PCI, IDDM presenting to the ED with complaint of right foot pain and swelling.     Infected diabetic foot ulcer / Acute ch OM   Elevated inflammatory markers -CRP 13, ESR 30  MRI results noted, Acute on ch OM   s/p Vanc x1, Zosyn x 1 in the ED  Continue PipTazo   d/w ID, appreciate recs  d/w Podiatry, possible surgical planning next week   Podiatry following    ESRD s/p Renal Transplant 1/2023  Continue Tacrolimus 2 mg BID  Continue Mycophenolate 1000 mg BID  Crt stable     CAD, HTN  Continue Atorvastatin 40 mg qhs and Plavix 75 mg daily  Continue Coreg 12.5 mg BID  Continue Hydralazine 25 mg BID  Continue Nifedipine 30 mg daily    IDDM  Continue 18 u Lantus qhs with 7 units mealtime insulin TID   ISS/Accuchecks/A1c  Carb controlled diet    BPH  Continue Flomax 0.4 mg qhs    DVTppx: Lovenox

## 2024-04-28 ENCOUNTER — TRANSCRIPTION ENCOUNTER (OUTPATIENT)
Age: 59
End: 2024-04-28

## 2024-04-28 LAB
ANION GAP SERPL CALC-SCNC: 12 MMOL/L — SIGNIFICANT CHANGE UP (ref 5–17)
BUN SERPL-MCNC: 30.7 MG/DL — HIGH (ref 8–20)
CALCIUM SERPL-MCNC: 10.2 MG/DL — SIGNIFICANT CHANGE UP (ref 8.4–10.5)
CHLORIDE SERPL-SCNC: 99 MMOL/L — SIGNIFICANT CHANGE UP (ref 96–108)
CO2 SERPL-SCNC: 23 MMOL/L — SIGNIFICANT CHANGE UP (ref 22–29)
CREAT SERPL-MCNC: 1.29 MG/DL — SIGNIFICANT CHANGE UP (ref 0.5–1.3)
CRP SERPL-MCNC: <4 MG/L — SIGNIFICANT CHANGE UP
CULTURE RESULTS: SIGNIFICANT CHANGE UP
CULTURE RESULTS: SIGNIFICANT CHANGE UP
EGFR: 64 ML/MIN/1.73M2 — SIGNIFICANT CHANGE UP
ERYTHROCYTE [SEDIMENTATION RATE] IN BLOOD: 13 MM/HR — SIGNIFICANT CHANGE UP (ref 0–15)
GLUCOSE BLDC GLUCOMTR-MCNC: 112 MG/DL — HIGH (ref 70–99)
GLUCOSE BLDC GLUCOMTR-MCNC: 119 MG/DL — HIGH (ref 70–99)
GLUCOSE BLDC GLUCOMTR-MCNC: 121 MG/DL — HIGH (ref 70–99)
GLUCOSE BLDC GLUCOMTR-MCNC: 169 MG/DL — HIGH (ref 70–99)
GLUCOSE SERPL-MCNC: 113 MG/DL — HIGH (ref 70–99)
HCT VFR BLD CALC: 45 % — SIGNIFICANT CHANGE UP (ref 39–50)
HGB BLD-MCNC: 14.8 G/DL — SIGNIFICANT CHANGE UP (ref 13–17)
MCHC RBC-ENTMCNC: 28.5 PG — SIGNIFICANT CHANGE UP (ref 27–34)
MCHC RBC-ENTMCNC: 32.9 GM/DL — SIGNIFICANT CHANGE UP (ref 32–36)
MCV RBC AUTO: 86.7 FL — SIGNIFICANT CHANGE UP (ref 80–100)
PLATELET # BLD AUTO: 190 K/UL — SIGNIFICANT CHANGE UP (ref 150–400)
POTASSIUM SERPL-MCNC: 4.5 MMOL/L — SIGNIFICANT CHANGE UP (ref 3.5–5.3)
POTASSIUM SERPL-SCNC: 4.5 MMOL/L — SIGNIFICANT CHANGE UP (ref 3.5–5.3)
RBC # BLD: 5.19 M/UL — SIGNIFICANT CHANGE UP (ref 4.2–5.8)
RBC # FLD: 12.7 % — SIGNIFICANT CHANGE UP (ref 10.3–14.5)
SODIUM SERPL-SCNC: 134 MMOL/L — LOW (ref 135–145)
SPECIMEN SOURCE: SIGNIFICANT CHANGE UP
SPECIMEN SOURCE: SIGNIFICANT CHANGE UP
WBC # BLD: 5.24 K/UL — SIGNIFICANT CHANGE UP (ref 3.8–10.5)
WBC # FLD AUTO: 5.24 K/UL — SIGNIFICANT CHANGE UP (ref 3.8–10.5)

## 2024-04-28 PROCEDURE — 99233 SBSQ HOSP IP/OBS HIGH 50: CPT

## 2024-04-28 RX ORDER — SODIUM CHLORIDE 9 MG/ML
1000 INJECTION INTRAMUSCULAR; INTRAVENOUS; SUBCUTANEOUS
Refills: 0 | Status: DISCONTINUED | OUTPATIENT
Start: 2024-04-28 | End: 2024-05-02

## 2024-04-28 RX ORDER — INSULIN GLARGINE 100 [IU]/ML
10 INJECTION, SOLUTION SUBCUTANEOUS AT BEDTIME
Refills: 0 | Status: DISCONTINUED | OUTPATIENT
Start: 2024-04-28 | End: 2024-04-29

## 2024-04-28 RX ADMIN — Medication 2: at 11:37

## 2024-04-28 RX ADMIN — Medication 81 MILLIGRAM(S): at 08:39

## 2024-04-28 RX ADMIN — Medication 7 UNIT(S): at 08:38

## 2024-04-28 RX ADMIN — INSULIN GLARGINE 10 UNIT(S): 100 INJECTION, SOLUTION SUBCUTANEOUS at 21:06

## 2024-04-28 RX ADMIN — PIPERACILLIN AND TAZOBACTAM 25 GRAM(S): 4; .5 INJECTION, POWDER, LYOPHILIZED, FOR SOLUTION INTRAVENOUS at 21:06

## 2024-04-28 RX ADMIN — TACROLIMUS 1 MILLIGRAM(S): 5 CAPSULE ORAL at 08:39

## 2024-04-28 RX ADMIN — Medication 7 UNIT(S): at 16:57

## 2024-04-28 RX ADMIN — PIPERACILLIN AND TAZOBACTAM 25 GRAM(S): 4; .5 INJECTION, POWDER, LYOPHILIZED, FOR SOLUTION INTRAVENOUS at 05:59

## 2024-04-28 RX ADMIN — Medication 25 MILLIGRAM(S): at 17:01

## 2024-04-28 RX ADMIN — ATORVASTATIN CALCIUM 40 MILLIGRAM(S): 80 TABLET, FILM COATED ORAL at 21:06

## 2024-04-28 RX ADMIN — TAMSULOSIN HYDROCHLORIDE 0.4 MILLIGRAM(S): 0.4 CAPSULE ORAL at 21:06

## 2024-04-28 RX ADMIN — Medication 7 UNIT(S): at 11:36

## 2024-04-28 RX ADMIN — SODIUM CHLORIDE 100 MILLILITER(S): 9 INJECTION INTRAMUSCULAR; INTRAVENOUS; SUBCUTANEOUS at 15:09

## 2024-04-28 RX ADMIN — MYCOPHENOLATE MOFETIL 500 MILLIGRAM(S): 250 CAPSULE ORAL at 08:40

## 2024-04-28 RX ADMIN — CLOPIDOGREL BISULFATE 75 MILLIGRAM(S): 75 TABLET, FILM COATED ORAL at 08:39

## 2024-04-28 RX ADMIN — Medication 25 MILLIGRAM(S): at 05:59

## 2024-04-28 RX ADMIN — Medication 1 MILLIGRAM(S): at 08:39

## 2024-04-28 RX ADMIN — Medication 30 MILLIGRAM(S): at 05:59

## 2024-04-28 RX ADMIN — TACROLIMUS 1 MILLIGRAM(S): 5 CAPSULE ORAL at 21:06

## 2024-04-28 RX ADMIN — PIPERACILLIN AND TAZOBACTAM 25 GRAM(S): 4; .5 INJECTION, POWDER, LYOPHILIZED, FOR SOLUTION INTRAVENOUS at 13:01

## 2024-04-28 RX ADMIN — MYCOPHENOLATE MOFETIL 500 MILLIGRAM(S): 250 CAPSULE ORAL at 21:06

## 2024-04-28 RX ADMIN — CARVEDILOL PHOSPHATE 12.5 MILLIGRAM(S): 80 CAPSULE, EXTENDED RELEASE ORAL at 17:01

## 2024-04-28 RX ADMIN — Medication 25 MILLIGRAM(S): at 08:39

## 2024-04-28 NOTE — PROGRESS NOTE ADULT - ASSESSMENT
59 yo male with ESRD s/p renal transplant (1/2023), CAD s/p PCI, IDDM presenting to the ED with complaint of right foot pain and swelling.     Infected diabetic foot ulcer / Acute ch OM   Elevated inflammatory markers -CRP 13, ESR 30  MRI results noted, Acute on ch OM   s/p Vanc x1, Zosyn x 1 in the ED  Continue PipTazo   d/w ID, appreciate recs  d/w Podiatry, pllanned for OR resection of toe tomorrow   From a medical stanpoint pt is optimized for planned procedure    ESRD s/p Renal Transplant 1/2023  Continue Tacrolimus 2 mg BID  Continue Mycophenolate 1000 mg BID  Crt stable   IVF NS today     CAD, HTN  Continue Atorvastatin 40 mg qhs and Plavix 75 mg daily  Continue Coreg 12.5 mg BID  Continue Hydralazine 25 mg BID  Continue Nifedipine 30 mg daily    IDDM  Reduce Lantus to 10 units tonight in antipation of NPO status tomorrow   Resume 18 u Lantus qhs with 7 units mealtime insulin TID post op   ISS/Accuchecks/A1c  Carb controlled diet    BPH  Continue Flomax 0.4 mg qhs    DVTppx: Lovenox

## 2024-04-28 NOTE — PROGRESS NOTE ADULT - SUBJECTIVE AND OBJECTIVE BOX
Interval: No acute overnight events. Patient seen resting in bed comfortably. Aware and agreeable to surgery tomorrow for R. 2nd digit amputation with 3rd flexor tenotomy. Denies constitutional symptoms. No pain to right foot. No other pedal complaints at this time,     Podiatry HPI: 58M with complaint of worsening swelling to the right foot with associated 2nd digit ulcer. The patient states that he has recently seen Dr. Silva outpatient for a wound to the distal aspect of the right 2nd digit. He last saw Dr. Silva on Friday and was started on Doxycycline, which he has taken as directed since prescribed. The patient states that today, he noticed his right 2nd digit and the right foot were becoming more swollen, and there was some discoloration to the 2nd digit, so he presented to the ED. The patient denies any constitutional symptoms. Denies other pedal complaints. History of hallux amputation earlier this year.      HPI: 57 yo male with ESRD s/p renal transplant (1/2023), CAD s/p PCI, IDDM presenting to the ED with complaint of right 2nd toe pain and swelling. Patient follows with Dr. Silva for podiatry and is recently s/p right great toe amputation for OM. He saw Dr. Silva on 4/19 and was started on Doxycycline, however, swelling was worsening so patient came to the ED. He reports compliance with PO antibiotic. He denies fever or chills, N/V/D. States there is not much pain at the site as he pas poor sensation.      Patient admits to  (-) Fevers, (-) Chills, (-) Nausea, (-) Vomiting, (-) Shortness of Breath (-) calf pain (-) chest pain     Medications acetaminophen     Tablet .. 650 milliGRAM(s) Oral every 6 hours PRN  aluminum hydroxide/magnesium hydroxide/simethicone Suspension 30 milliLiter(s) Oral every 4 hours PRN  aspirin enteric coated 81 milliGRAM(s) Oral daily  atorvastatin 40 milliGRAM(s) Oral at bedtime  carvedilol 12.5 milliGRAM(s) Oral every 12 hours  clopidogrel Tablet 75 milliGRAM(s) Oral daily  dextrose 10% Bolus 125 milliLiter(s) IV Bolus once  dextrose 5%. 1000 milliLiter(s) IV Continuous <Continuous>  dextrose 5%. 1000 milliLiter(s) IV Continuous <Continuous>  dextrose 50% Injectable 25 Gram(s) IV Push once  dextrose 50% Injectable 12.5 Gram(s) IV Push once  dextrose Oral Gel 15 Gram(s) Oral once PRN  folic acid 1 milliGRAM(s) Oral daily  glucagon  Injectable 1 milliGRAM(s) IntraMuscular once  hydrALAZINE 25 milliGRAM(s) Oral two times a day  insulin glargine Injectable (LANTUS) 10 Unit(s) SubCutaneous at bedtime  insulin lispro (ADMELOG) corrective regimen sliding scale   SubCutaneous three times a day before meals  insulin lispro Injectable (ADMELOG) 7 Unit(s) SubCutaneous three times a day before meals  melatonin 3 milliGRAM(s) Oral at bedtime PRN  mycophenolate mofetil 500 milliGRAM(s) Oral two times a day  NIFEdipine XL 30 milliGRAM(s) Oral daily  ondansetron Injectable 4 milliGRAM(s) IV Push every 8 hours PRN  piperacillin/tazobactam IVPB.. 3.375 Gram(s) IV Intermittent every 8 hours  pyridoxine 25 milliGRAM(s) Oral daily  sodium chloride 0.9%. 1000 milliLiter(s) IV Continuous <Continuous>  tacrolimus 1 milliGRAM(s) Oral two times a day  tamsulosin 0.4 milliGRAM(s) Oral at bedtime    FHNo pertinent family history in first degree relatives    FHx: diabetes mellitus (Mother)    ,   PMHRenal failure    Dialysis patient    Diabetes    Hypertension    CAD (coronary artery disease)       PSHNo significant past surgical history    AV fistula    Renal transplant recipient    History of amputation of hallux        Labs                          14.8   5.24  )-----------( 190      ( 28 Apr 2024 05:28 )             45.0      04-28    134<L>  |  99  |  30.7<H>  ----------------------------<  113<H>  4.5   |  23.0  |  1.29    Ca    10.2      28 Apr 2024 05:28       Vital Signs Last 24 Hrs  T(C): 36.9 (28 Apr 2024 10:56), Max: 36.9 (28 Apr 2024 10:56)  T(F): 98.5 (28 Apr 2024 10:56), Max: 98.5 (28 Apr 2024 10:56)  HR: 64 (28 Apr 2024 10:56) (56 - 68)  BP: 156/77 (28 Apr 2024 10:56) (100/63 - 176/77)  BP(mean): --  RR: 19 (28 Apr 2024 10:56) (18 - 19)  SpO2: 98% (28 Apr 2024 10:56) (97% - 100%)    Parameters below as of 28 Apr 2024 10:56  Patient On (Oxygen Delivery Method): room air      Sedimentation Rate, Erythrocyte: 13 mm/hr (04-28-24 @ 05:28)  Sedimentation Rate, Erythrocyte: 30 mm/hr (04-23-24 @ 15:10)         C-Reactive Protein: <4 mg/L (04-28-24 @ 05:28)  C-Reactive Protein, Serum: 13 mg/L (04-23-24 @ 15:10)   WBC Count: 5.24 K/uL (04-28-24 @ 05:28)      ROS unremarkable outside of HPI    PHYSICAL EXAM  LE Focused:    Vasc:  DP and PT pulses palpable 1/4 b/l. TG warm to warm with increased warmth noted to right foot. Non-pitting edema to right 2nd toe and foot extending to the midfoot.  Derm: Right 2nd digit distal tuft ulcer, purulence previously noted but none today, -PTB, mild malodor, no fluctuance, dusky discoloration to the 2nd digit. No other wounds or lesions noted  Neuro: Protective sensation diminished  MSK: s/p R hallux amputation January 2024      IMAGING:   < from: Xray Foot AP + Lateral + Oblique, Right (04.23.24 @ 16:47) >  IMPRESSION:    Second distal digit tuft erosion/pathologic   fractures/osteolysis/osteomyelitis.  Confirmation MRI recommended    ACC: [id number] EXAM:  MR FOOT WAW IC RT   ORDERED BY: REGULO ZAYAS     PROCEDURE DATE:  04/25/2024          INTERPRETATION:  RIGHT FOOT MRI    CLINICAL INDICATION: Infection, for evaluation of osteomyelitis.    COMPARISON: None available.    TECHNIQUE: Multiplanar, multisequence MRI was obtained of the right foot   both prior to and following administration of 8 cc Gadavist intravenous   contrast. Additional 2 cc Gadavist contrast was discarded.    FINDINGS:    OSSEOUS: Acute on chronic osteomyelitis of the distal phalanx of the   second toe. Patchy enhancing osteitis throughout adjacent second middle   phalanx, but without corresponding change in T1 marrow signal intensity.   Status post amputation of the great toe at the level of the base of the   proximal phalanx with patchy enhancing subchondral osteitis throughout   the residual, but without corresponding change in T1 marrow signal   intensity. Mild to moderate midfoot arthrosis most advanced at the second   tarsometatarsal joint.    SYNOVIUM: No effusions.    TENDONS: Postsurgical changes of the great toe. Visualized flexor and   extensor tendons are otherwise intact.    LIGAMENTS: Lisfranc ligament is mostly intact.    GENERAL: Expected postsurgical changes overlying the great toe amputation   stump. No significant intermetatarsal bursal fluid distension. No   drainable encapsulated fluid collection.    IMPRESSION:    1.  Acute on chronic osteomyelitis of the distal phalanx of right second   toe. Nonspecific acute osseous stress reaction throughout the adjacent   second middle phalanx favored to represent hyperacute osteomyelitis.  2.  Status post amputation of right great toe with nonspecific mild   subchondral stress reaction throughout the residual first proximal  phalanx base, favored to be on the basis of altered weightbearing but   with differential including hyperacute osteomyelitis in the proper   clinical setting.    --- End of Report ---        CULTURES:   Pending

## 2024-04-28 NOTE — PROGRESS NOTE ADULT - SUBJECTIVE AND OBJECTIVE BOX
[facility] Division of Hospital Medicine    Chief Complaint:  DM foot OM     SUBJECTIVE / OVERNIGHT EVENTS:  Pt examined lying in bed  Planned for OR tomorrow with podiatry   Patient denies chest pain, SOB, abd pain, N/V, fever, chills, dysuria or any other complaints. All remainder ROS negative.     MEDICATIONS  (STANDING):  aspirin enteric coated 81 milliGRAM(s) Oral daily  atorvastatin 40 milliGRAM(s) Oral at bedtime  carvedilol 12.5 milliGRAM(s) Oral every 12 hours  clopidogrel Tablet 75 milliGRAM(s) Oral daily  dextrose 10% Bolus 125 milliLiter(s) IV Bolus once  dextrose 5%. 1000 milliLiter(s) (100 mL/Hr) IV Continuous <Continuous>  dextrose 5%. 1000 milliLiter(s) (50 mL/Hr) IV Continuous <Continuous>  dextrose 50% Injectable 25 Gram(s) IV Push once  dextrose 50% Injectable 12.5 Gram(s) IV Push once  folic acid 1 milliGRAM(s) Oral daily  glucagon  Injectable 1 milliGRAM(s) IntraMuscular once  hydrALAZINE 25 milliGRAM(s) Oral two times a day  insulin glargine Injectable (LANTUS) 18 Unit(s) SubCutaneous at bedtime  insulin lispro (ADMELOG) corrective regimen sliding scale   SubCutaneous three times a day before meals  insulin lispro Injectable (ADMELOG) 7 Unit(s) SubCutaneous three times a day before meals  mycophenolate mofetil 500 milliGRAM(s) Oral two times a day  NIFEdipine XL 30 milliGRAM(s) Oral daily  piperacillin/tazobactam IVPB.. 3.375 Gram(s) IV Intermittent every 8 hours  pyridoxine 25 milliGRAM(s) Oral daily  tacrolimus 1 milliGRAM(s) Oral two times a day  tamsulosin 0.4 milliGRAM(s) Oral at bedtime    MEDICATIONS  (PRN):  acetaminophen     Tablet .. 650 milliGRAM(s) Oral every 6 hours PRN Temp greater or equal to 38C (100.4F), Mild Pain (1 - 3)  aluminum hydroxide/magnesium hydroxide/simethicone Suspension 30 milliLiter(s) Oral every 4 hours PRN Dyspepsia  dextrose Oral Gel 15 Gram(s) Oral once PRN Blood Glucose LESS THAN 70 milliGRAM(s)/deciliter  melatonin 3 milliGRAM(s) Oral at bedtime PRN Insomnia  ondansetron Injectable 4 milliGRAM(s) IV Push every 8 hours PRN Nausea and/or Vomiting        I&O's Summary      PHYSICAL EXAM:  Vital Signs Last 24 Hrs  T(C): 36.9 (28 Apr 2024 10:56), Max: 36.9 (28 Apr 2024 10:56)  T(F): 98.5 (28 Apr 2024 10:56), Max: 98.5 (28 Apr 2024 10:56)  HR: 64 (28 Apr 2024 10:56) (56 - 68)  BP: 156/77 (28 Apr 2024 10:56) (100/63 - 176/77)  BP(mean): --  RR: 19 (28 Apr 2024 10:56) (18 - 19)  SpO2: 98% (28 Apr 2024 10:56) (97% - 100%)    Parameters below as of 28 Apr 2024 10:56  Patient On (Oxygen Delivery Method): room air      CONSTITUTIONAL: Non toxic appearing, obese male lying in bed  ENMT: Moist oral mucosa, no pharyngeal injection or exudates; normal dentition  RESPIRATORY: Normal respiratory effort; lungs are clear to auscultation bilaterally  CARDIOVASCULAR: Regular rate and rhythm, normal S1 and S2, no murmur/rub/gallop; No lower extremity edema; Peripheral pulses are 2+ bilaterally  ABDOMEN: Nontender to palpation, normoactive bowel sounds, no rebound/guarding; No hepatosplenomegaly  MUSCLOSKELETAL:  RLE dressing, no soaking of gauze appreciated . no clubbing or cyanosis of digits; no joint swelling or tenderness to palpation  PSYCH: A+O to person, place, and time; affect appropriate  NEUROLOGY: CN 2-12 are intact and symmetric; no gross sensory deficits;   SKIN: No rashes; no palpable lesions    LABS:                        14.8   5.24  )-----------( 190      ( 28 Apr 2024 05:28 )             45.0   04-28    134<L>  |  99  |  30.7<H>  ----------------------------<  113<H>  4.5   |  23.0  |  1.29    Ca    10.2      28 Apr 2024 05:28      Urinalysis Basic - ( 26 Apr 2024 05:28 )    Color: x / Appearance: x / SG: x / pH: x  Gluc: 111 mg/dL / Ketone: x  / Bili: x / Urobili: x   Blood: x / Protein: x / Nitrite: x   Leuk Esterase: x / RBC: x / WBC x   Sq Epi: x / Non Sq Epi: x / Bacteria: x        Culture - Surgical Swab (collected 23 Apr 2024 16:49)  Source: .Surgical Swab right 2nd toe  Preliminary Report (25 Apr 2024 10:18):    Moderate Staphylococcus aureus  Organism: Staphylococcus aureus (26 Apr 2024 10:42)  Organism: Staphylococcus aureus (26 Apr 2024 10:42)    Culture - Blood (collected 23 Apr 2024 15:15)  Source: .Blood Blood-Peripheral  Preliminary Report (25 Apr 2024 23:01):    No growth at 48 Hours      CAPILLARY BLOOD GLUCOSE      POCT Blood Glucose.: 131 mg/dL (26 Apr 2024 12:45)  POCT Blood Glucose.: 106 mg/dL (26 Apr 2024 07:35)  POCT Blood Glucose.: 176 mg/dL (25 Apr 2024 22:08)  POCT Blood Glucose.: 104 mg/dL (25 Apr 2024 16:55)        RADIOLOGY & ADDITIONAL TESTS:    4/24/24 MRI     IMPRESSION:    1.  Acute on chronic osteomyelitis of the distal phalanx of right second   toe. Nonspecific acute osseous stress reaction throughout the adjacent   second middle phalanx favored to represent hyperacute osteomyelitis.  2.  Status post amputation of right great toe with nonspecific mild   subchondral stress reaction throughout the residual first proximal   phalanx base, favored to be on the basis of altered weightbearing but   with differential including hyperacute osteomyelitis in the proper   clinical setting.

## 2024-04-28 NOTE — PROGRESS NOTE ADULT - ASSESSMENT
A:  R 2nd digit ulcer  R 2nd digit OM  Cellulitis    P:   Patient evaluated and Chart reviewed   Discussed diagnosis and treatment with patient  Reviewed right foot x-rays- OM of 2nd digit  Pending right foot MRI - confirmed OM 2nd digit , results as noted above  C/w IV abx   Discussed with patient management of osteomyelitis including surgical vs conservative intervention. Discussed risks and benefits of all options. Patient amenable to surgical intervention  Plan to take patient for right 2nd digit amputation, with flexor tenotomy of 3rd digit on Monday afternoon as add on  Appreciate medical optimization  T&S done   NPO at midnight placed   Offloading to bilateral Heels while in bed  Discussed importance of daily foot examinations and proper shoe gear and to importance of lower Fasting Blood Glucose levels.   Podiatry to follow while in house.  Discussed with Attending Dr. Silva

## 2024-04-28 NOTE — PROGRESS NOTE ADULT - SUBJECTIVE AND OBJECTIVE BOX
[facility] Division of Hospital Medicine    Chief Complaint:  DM foot OM     SUBJECTIVE / OVERNIGHT EVENTS:  Pt examined lying in bed  Potentially planned for amputation of infected digit next week   Patient denies chest pain, SOB, abd pain, N/V, fever, chills, dysuria or any other complaints. All remainder ROS negative.     MEDICATIONS  (STANDING):  aspirin enteric coated 81 milliGRAM(s) Oral daily  atorvastatin 40 milliGRAM(s) Oral at bedtime  carvedilol 12.5 milliGRAM(s) Oral every 12 hours  clopidogrel Tablet 75 milliGRAM(s) Oral daily  dextrose 10% Bolus 125 milliLiter(s) IV Bolus once  dextrose 5%. 1000 milliLiter(s) (100 mL/Hr) IV Continuous <Continuous>  dextrose 5%. 1000 milliLiter(s) (50 mL/Hr) IV Continuous <Continuous>  dextrose 50% Injectable 25 Gram(s) IV Push once  dextrose 50% Injectable 12.5 Gram(s) IV Push once  folic acid 1 milliGRAM(s) Oral daily  glucagon  Injectable 1 milliGRAM(s) IntraMuscular once  hydrALAZINE 25 milliGRAM(s) Oral two times a day  insulin glargine Injectable (LANTUS) 18 Unit(s) SubCutaneous at bedtime  insulin lispro (ADMELOG) corrective regimen sliding scale   SubCutaneous three times a day before meals  insulin lispro Injectable (ADMELOG) 7 Unit(s) SubCutaneous three times a day before meals  mycophenolate mofetil 500 milliGRAM(s) Oral two times a day  NIFEdipine XL 30 milliGRAM(s) Oral daily  piperacillin/tazobactam IVPB.. 3.375 Gram(s) IV Intermittent every 8 hours  pyridoxine 25 milliGRAM(s) Oral daily  tacrolimus 1 milliGRAM(s) Oral two times a day  tamsulosin 0.4 milliGRAM(s) Oral at bedtime    MEDICATIONS  (PRN):  acetaminophen     Tablet .. 650 milliGRAM(s) Oral every 6 hours PRN Temp greater or equal to 38C (100.4F), Mild Pain (1 - 3)  aluminum hydroxide/magnesium hydroxide/simethicone Suspension 30 milliLiter(s) Oral every 4 hours PRN Dyspepsia  dextrose Oral Gel 15 Gram(s) Oral once PRN Blood Glucose LESS THAN 70 milliGRAM(s)/deciliter  melatonin 3 milliGRAM(s) Oral at bedtime PRN Insomnia  ondansetron Injectable 4 milliGRAM(s) IV Push every 8 hours PRN Nausea and/or Vomiting        I&O's Summary      PHYSICAL EXAM:  Vital Signs Last 24 Hrs  T(C): 36.7 (28 Apr 2024 04:10), Max: 36.8 (27 Apr 2024 08:57)  T(F): 98 (28 Apr 2024 04:10), Max: 98.3 (27 Apr 2024 08:57)  HR: 58 (28 Apr 2024 05:56) (56 - 68)  BP: 152/75 (28 Apr 2024 05:56) (100/63 - 176/77)  BP(mean): --  RR: 19 (28 Apr 2024 05:56) (18 - 19)  SpO2: 100% (28 Apr 2024 05:56) (96% - 100%)    Parameters below as of 28 Apr 2024 05:56  Patient On (Oxygen Delivery Method): room air          CONSTITUTIONAL: Non toxic appearing, obese male lying in bed  ENMT: Moist oral mucosa, no pharyngeal injection or exudates; normal dentition  RESPIRATORY: Normal respiratory effort; lungs are clear to auscultation bilaterally  CARDIOVASCULAR: Regular rate and rhythm, normal S1 and S2, no murmur/rub/gallop; No lower extremity edema; Peripheral pulses are 2+ bilaterally  ABDOMEN: Nontender to palpation, normoactive bowel sounds, no rebound/guarding; No hepatosplenomegaly  MUSCLOSKELETAL:  RLE dressing, no soaking of gauze appreciated . no clubbing or cyanosis of digits; no joint swelling or tenderness to palpation  PSYCH: A+O to person, place, and time; affect appropriate  NEUROLOGY: CN 2-12 are intact and symmetric; no gross sensory deficits;   SKIN: No rashes; no palpable lesions    LABS:                          14.8   5.24  )-----------( 190      ( 28 Apr 2024 05:28 )             45.0   04-28    134<L>  |  99  |  30.7<H>  ----------------------------<  113<H>  4.5   |  23.0  |  1.29    Ca    10.2      28 Apr 2024 05:28      Urinalysis Basic - ( 26 Apr 2024 05:28 )    Color: x / Appearance: x / SG: x / pH: x  Gluc: 111 mg/dL / Ketone: x  / Bili: x / Urobili: x   Blood: x / Protein: x / Nitrite: x   Leuk Esterase: x / RBC: x / WBC x   Sq Epi: x / Non Sq Epi: x / Bacteria: x        Culture - Surgical Swab (collected 23 Apr 2024 16:49)  Source: .Surgical Swab right 2nd toe  Preliminary Report (25 Apr 2024 10:18):    Moderate Staphylococcus aureus  Organism: Staphylococcus aureus (26 Apr 2024 10:42)  Organism: Staphylococcus aureus (26 Apr 2024 10:42)    Culture - Blood (collected 23 Apr 2024 15:15)  Source: .Blood Blood-Peripheral  Preliminary Report (25 Apr 2024 23:01):    No growth at 48 Hours      CAPILLARY BLOOD GLUCOSE      POCT Blood Glucose.: 131 mg/dL (26 Apr 2024 12:45)  POCT Blood Glucose.: 106 mg/dL (26 Apr 2024 07:35)  POCT Blood Glucose.: 176 mg/dL (25 Apr 2024 22:08)  POCT Blood Glucose.: 104 mg/dL (25 Apr 2024 16:55)        RADIOLOGY & ADDITIONAL TESTS:    4/24/24 MRI     IMPRESSION:    1.  Acute on chronic osteomyelitis of the distal phalanx of right second   toe. Nonspecific acute osseous stress reaction throughout the adjacent   second middle phalanx favored to represent hyperacute osteomyelitis.  2.  Status post amputation of right great toe with nonspecific mild   subchondral stress reaction throughout the residual first proximal   phalanx base, favored to be on the basis of altered weightbearing but   with differential including hyperacute osteomyelitis in the proper   clinical setting.

## 2024-04-29 LAB
ANION GAP SERPL CALC-SCNC: 11 MMOL/L — SIGNIFICANT CHANGE UP (ref 5–17)
BUN SERPL-MCNC: 28.3 MG/DL — HIGH (ref 8–20)
CALCIUM SERPL-MCNC: 9.7 MG/DL — SIGNIFICANT CHANGE UP (ref 8.4–10.5)
CHLORIDE SERPL-SCNC: 102 MMOL/L — SIGNIFICANT CHANGE UP (ref 96–108)
CO2 SERPL-SCNC: 19 MMOL/L — LOW (ref 22–29)
CREAT SERPL-MCNC: 1.04 MG/DL — SIGNIFICANT CHANGE UP (ref 0.5–1.3)
EGFR: 83 ML/MIN/1.73M2 — SIGNIFICANT CHANGE UP
GLUCOSE BLDC GLUCOMTR-MCNC: 111 MG/DL — HIGH (ref 70–99)
GLUCOSE BLDC GLUCOMTR-MCNC: 112 MG/DL — HIGH (ref 70–99)
GLUCOSE BLDC GLUCOMTR-MCNC: 113 MG/DL — HIGH (ref 70–99)
GLUCOSE BLDC GLUCOMTR-MCNC: 121 MG/DL — HIGH (ref 70–99)
GLUCOSE BLDC GLUCOMTR-MCNC: 131 MG/DL — HIGH (ref 70–99)
GLUCOSE BLDC GLUCOMTR-MCNC: 133 MG/DL — HIGH (ref 70–99)
GLUCOSE BLDC GLUCOMTR-MCNC: 147 MG/DL — HIGH (ref 70–99)
GLUCOSE SERPL-MCNC: 116 MG/DL — HIGH (ref 70–99)
HCT VFR BLD CALC: 43.1 % — SIGNIFICANT CHANGE UP (ref 39–50)
HGB BLD-MCNC: 14.5 G/DL — SIGNIFICANT CHANGE UP (ref 13–17)
INR BLD: 1.01 RATIO — SIGNIFICANT CHANGE UP (ref 0.85–1.18)
MCHC RBC-ENTMCNC: 28.9 PG — SIGNIFICANT CHANGE UP (ref 27–34)
MCHC RBC-ENTMCNC: 33.6 GM/DL — SIGNIFICANT CHANGE UP (ref 32–36)
MCV RBC AUTO: 85.9 FL — SIGNIFICANT CHANGE UP (ref 80–100)
PLATELET # BLD AUTO: 189 K/UL — SIGNIFICANT CHANGE UP (ref 150–400)
POTASSIUM SERPL-MCNC: 5 MMOL/L — SIGNIFICANT CHANGE UP (ref 3.5–5.3)
POTASSIUM SERPL-SCNC: 5 MMOL/L — SIGNIFICANT CHANGE UP (ref 3.5–5.3)
PROTHROM AB SERPL-ACNC: 11.2 SEC — SIGNIFICANT CHANGE UP (ref 9.5–13)
RBC # BLD: 5.02 M/UL — SIGNIFICANT CHANGE UP (ref 4.2–5.8)
RBC # FLD: 12.8 % — SIGNIFICANT CHANGE UP (ref 10.3–14.5)
SODIUM SERPL-SCNC: 132 MMOL/L — LOW (ref 135–145)
WBC # BLD: 5.11 K/UL — SIGNIFICANT CHANGE UP (ref 3.8–10.5)
WBC # FLD AUTO: 5.11 K/UL — SIGNIFICANT CHANGE UP (ref 3.8–10.5)

## 2024-04-29 PROCEDURE — 88311 DECALCIFY TISSUE: CPT | Mod: 26

## 2024-04-29 PROCEDURE — 88305 TISSUE EXAM BY PATHOLOGIST: CPT | Mod: 26

## 2024-04-29 PROCEDURE — 99233 SBSQ HOSP IP/OBS HIGH 50: CPT

## 2024-04-29 PROCEDURE — 99232 SBSQ HOSP IP/OBS MODERATE 35: CPT

## 2024-04-29 RX ORDER — MYCOPHENOLATE MOFETIL 250 MG/1
500 CAPSULE ORAL
Refills: 0 | Status: DISCONTINUED | OUTPATIENT
Start: 2024-04-29 | End: 2024-05-02

## 2024-04-29 RX ORDER — ONDANSETRON 8 MG/1
4 TABLET, FILM COATED ORAL ONCE
Refills: 0 | Status: DISCONTINUED | OUTPATIENT
Start: 2024-04-29 | End: 2024-04-29

## 2024-04-29 RX ORDER — TACROLIMUS 5 MG/1
1 CAPSULE ORAL
Refills: 0 | Status: DISCONTINUED | OUTPATIENT
Start: 2024-04-29 | End: 2024-05-02

## 2024-04-29 RX ORDER — TAMSULOSIN HYDROCHLORIDE 0.4 MG/1
0.4 CAPSULE ORAL AT BEDTIME
Refills: 0 | Status: DISCONTINUED | OUTPATIENT
Start: 2024-04-29 | End: 2024-05-02

## 2024-04-29 RX ORDER — PYRIDOXINE HCL (VITAMIN B6) 100 MG
25 TABLET ORAL DAILY
Refills: 0 | Status: DISCONTINUED | OUTPATIENT
Start: 2024-04-29 | End: 2024-05-02

## 2024-04-29 RX ORDER — INSULIN LISPRO 100/ML
7 VIAL (ML) SUBCUTANEOUS
Refills: 0 | Status: DISCONTINUED | OUTPATIENT
Start: 2024-04-29 | End: 2024-05-02

## 2024-04-29 RX ORDER — GLUCAGON INJECTION, SOLUTION 0.5 MG/.1ML
1 INJECTION, SOLUTION SUBCUTANEOUS ONCE
Refills: 0 | Status: DISCONTINUED | OUTPATIENT
Start: 2024-04-29 | End: 2024-05-02

## 2024-04-29 RX ORDER — ACETAMINOPHEN 500 MG
650 TABLET ORAL EVERY 6 HOURS
Refills: 0 | Status: DISCONTINUED | OUTPATIENT
Start: 2024-04-29 | End: 2024-05-02

## 2024-04-29 RX ORDER — PIPERACILLIN AND TAZOBACTAM 4; .5 G/20ML; G/20ML
3.38 INJECTION, POWDER, LYOPHILIZED, FOR SOLUTION INTRAVENOUS EVERY 8 HOURS
Refills: 0 | Status: DISCONTINUED | OUTPATIENT
Start: 2024-04-29 | End: 2024-05-02

## 2024-04-29 RX ORDER — INSULIN GLARGINE 100 [IU]/ML
10 INJECTION, SOLUTION SUBCUTANEOUS AT BEDTIME
Refills: 0 | Status: DISCONTINUED | OUTPATIENT
Start: 2024-04-29 | End: 2024-05-02

## 2024-04-29 RX ORDER — INSULIN LISPRO 100/ML
VIAL (ML) SUBCUTANEOUS
Refills: 0 | Status: DISCONTINUED | OUTPATIENT
Start: 2024-04-29 | End: 2024-05-02

## 2024-04-29 RX ORDER — HYDRALAZINE HCL 50 MG
25 TABLET ORAL
Refills: 0 | Status: DISCONTINUED | OUTPATIENT
Start: 2024-04-29 | End: 2024-05-02

## 2024-04-29 RX ORDER — ASPIRIN/CALCIUM CARB/MAGNESIUM 324 MG
81 TABLET ORAL DAILY
Refills: 0 | Status: DISCONTINUED | OUTPATIENT
Start: 2024-04-29 | End: 2024-05-02

## 2024-04-29 RX ORDER — LANOLIN ALCOHOL/MO/W.PET/CERES
3 CREAM (GRAM) TOPICAL AT BEDTIME
Refills: 0 | Status: DISCONTINUED | OUTPATIENT
Start: 2024-04-29 | End: 2024-05-02

## 2024-04-29 RX ORDER — NIFEDIPINE 30 MG
30 TABLET, EXTENDED RELEASE 24 HR ORAL DAILY
Refills: 0 | Status: DISCONTINUED | OUTPATIENT
Start: 2024-04-29 | End: 2024-05-02

## 2024-04-29 RX ORDER — CARVEDILOL PHOSPHATE 80 MG/1
12.5 CAPSULE, EXTENDED RELEASE ORAL EVERY 12 HOURS
Refills: 0 | Status: DISCONTINUED | OUTPATIENT
Start: 2024-04-29 | End: 2024-05-02

## 2024-04-29 RX ORDER — ATORVASTATIN CALCIUM 80 MG/1
40 TABLET, FILM COATED ORAL AT BEDTIME
Refills: 0 | Status: DISCONTINUED | OUTPATIENT
Start: 2024-04-29 | End: 2024-05-02

## 2024-04-29 RX ORDER — FENTANYL CITRATE 50 UG/ML
25 INJECTION INTRAVENOUS
Refills: 0 | Status: DISCONTINUED | OUTPATIENT
Start: 2024-04-29 | End: 2024-04-29

## 2024-04-29 RX ORDER — ONDANSETRON 8 MG/1
4 TABLET, FILM COATED ORAL EVERY 8 HOURS
Refills: 0 | Status: DISCONTINUED | OUTPATIENT
Start: 2024-04-29 | End: 2024-05-02

## 2024-04-29 RX ORDER — SODIUM CHLORIDE 9 MG/ML
1000 INJECTION, SOLUTION INTRAVENOUS
Refills: 0 | Status: DISCONTINUED | OUTPATIENT
Start: 2024-04-29 | End: 2024-05-02

## 2024-04-29 RX ORDER — CLOPIDOGREL BISULFATE 75 MG/1
75 TABLET, FILM COATED ORAL DAILY
Refills: 0 | Status: DISCONTINUED | OUTPATIENT
Start: 2024-04-29 | End: 2024-05-02

## 2024-04-29 RX ORDER — DEXTROSE 10 % IN WATER 10 %
125 INTRAVENOUS SOLUTION INTRAVENOUS ONCE
Refills: 0 | Status: DISCONTINUED | OUTPATIENT
Start: 2024-04-29 | End: 2024-05-02

## 2024-04-29 RX ORDER — FOLIC ACID 0.8 MG
1 TABLET ORAL DAILY
Refills: 0 | Status: DISCONTINUED | OUTPATIENT
Start: 2024-04-29 | End: 2024-05-02

## 2024-04-29 RX ADMIN — PIPERACILLIN AND TAZOBACTAM 25 GRAM(S): 4; .5 INJECTION, POWDER, LYOPHILIZED, FOR SOLUTION INTRAVENOUS at 05:09

## 2024-04-29 RX ADMIN — Medication 30 MILLIGRAM(S): at 05:09

## 2024-04-29 RX ADMIN — PIPERACILLIN AND TAZOBACTAM 25 GRAM(S): 4; .5 INJECTION, POWDER, LYOPHILIZED, FOR SOLUTION INTRAVENOUS at 21:57

## 2024-04-29 RX ADMIN — Medication 81 MILLIGRAM(S): at 11:47

## 2024-04-29 RX ADMIN — MYCOPHENOLATE MOFETIL 500 MILLIGRAM(S): 250 CAPSULE ORAL at 08:07

## 2024-04-29 RX ADMIN — TACROLIMUS 1 MILLIGRAM(S): 5 CAPSULE ORAL at 21:56

## 2024-04-29 RX ADMIN — MYCOPHENOLATE MOFETIL 500 MILLIGRAM(S): 250 CAPSULE ORAL at 21:56

## 2024-04-29 RX ADMIN — TACROLIMUS 1 MILLIGRAM(S): 5 CAPSULE ORAL at 08:07

## 2024-04-29 RX ADMIN — Medication 25 MILLIGRAM(S): at 21:56

## 2024-04-29 RX ADMIN — CLOPIDOGREL BISULFATE 75 MILLIGRAM(S): 75 TABLET, FILM COATED ORAL at 11:47

## 2024-04-29 RX ADMIN — CARVEDILOL PHOSPHATE 12.5 MILLIGRAM(S): 80 CAPSULE, EXTENDED RELEASE ORAL at 21:57

## 2024-04-29 RX ADMIN — Medication 25 MILLIGRAM(S): at 05:09

## 2024-04-29 RX ADMIN — ATORVASTATIN CALCIUM 40 MILLIGRAM(S): 80 TABLET, FILM COATED ORAL at 21:57

## 2024-04-29 RX ADMIN — CARVEDILOL PHOSPHATE 12.5 MILLIGRAM(S): 80 CAPSULE, EXTENDED RELEASE ORAL at 05:09

## 2024-04-29 RX ADMIN — Medication 25 MILLIGRAM(S): at 11:46

## 2024-04-29 RX ADMIN — Medication 1 MILLIGRAM(S): at 11:46

## 2024-04-29 RX ADMIN — PIPERACILLIN AND TAZOBACTAM 25 GRAM(S): 4; .5 INJECTION, POWDER, LYOPHILIZED, FOR SOLUTION INTRAVENOUS at 14:55

## 2024-04-29 RX ADMIN — INSULIN GLARGINE 10 UNIT(S): 100 INJECTION, SOLUTION SUBCUTANEOUS at 22:21

## 2024-04-29 RX ADMIN — TAMSULOSIN HYDROCHLORIDE 0.4 MILLIGRAM(S): 0.4 CAPSULE ORAL at 21:56

## 2024-04-29 NOTE — BRIEF OPERATIVE NOTE - OPERATION/FINDINGS
See operative report   S/p right foot partial 2nd digit amputation and right 3rd toe flexor tenotomy. Bone was noted to be hard at the level of the osteotomy with no signs of remaining non-viable soft tissue and bone.

## 2024-04-29 NOTE — PROGRESS NOTE ADULT - SUBJECTIVE AND OBJECTIVE BOX
Interval: No acute overnight events. Patient seen resting in bed comfortably. Ensured NPO status. Patient amendable for R. 2nd digit amputation with 3rd flexor tenotomy today 4/29. Denies constitutional symptoms. No pain to right foot. No other pedal complaints at this time,     Podiatry HPI: 58M with complaint of worsening swelling to the right foot with associated 2nd digit ulcer. The patient states that he has recently seen Dr. Silva outpatient for a wound to the distal aspect of the right 2nd digit. He last saw Dr. Silva on Friday and was started on Doxycycline, which he has taken as directed since prescribed. The patient states that today, he noticed his right 2nd digit and the right foot were becoming more swollen, and there was some discoloration to the 2nd digit, so he presented to the ED. The patient denies any constitutional symptoms. Denies other pedal complaints. History of hallux amputation earlier this year.      HPI: 59 yo male with ESRD s/p renal transplant (1/2023), CAD s/p PCI, IDDM presenting to the ED with complaint of right 2nd toe pain and swelling. Patient follows with Dr. Silva for podiatry and is recently s/p right great toe amputation for OM. He saw Dr. Silva on 4/19 and was started on Doxycycline, however, swelling was worsening so patient came to the ED. He reports compliance with PO antibiotic. He denies fever or chills, N/V/D. States there is not much pain at the site as he pas poor sensation.        Medications acetaminophen     Tablet .. 650 milliGRAM(s) Oral every 6 hours PRN  aluminum hydroxide/magnesium hydroxide/simethicone Suspension 30 milliLiter(s) Oral every 4 hours PRN  aspirin enteric coated 81 milliGRAM(s) Oral daily  atorvastatin 40 milliGRAM(s) Oral at bedtime  carvedilol 12.5 milliGRAM(s) Oral every 12 hours  clopidogrel Tablet 75 milliGRAM(s) Oral daily  dextrose 10% Bolus 125 milliLiter(s) IV Bolus once  dextrose 5%. 1000 milliLiter(s) IV Continuous <Continuous>  dextrose 5%. 1000 milliLiter(s) IV Continuous <Continuous>  dextrose 50% Injectable 25 Gram(s) IV Push once  dextrose 50% Injectable 12.5 Gram(s) IV Push once  dextrose Oral Gel 15 Gram(s) Oral once PRN  folic acid 1 milliGRAM(s) Oral daily  glucagon  Injectable 1 milliGRAM(s) IntraMuscular once  hydrALAZINE 25 milliGRAM(s) Oral two times a day  insulin glargine Injectable (LANTUS) 10 Unit(s) SubCutaneous at bedtime  insulin lispro (ADMELOG) corrective regimen sliding scale   SubCutaneous three times a day before meals  insulin lispro Injectable (ADMELOG) 7 Unit(s) SubCutaneous three times a day before meals  melatonin 3 milliGRAM(s) Oral at bedtime PRN  mycophenolate mofetil 500 milliGRAM(s) Oral two times a day  NIFEdipine XL 30 milliGRAM(s) Oral daily  ondansetron Injectable 4 milliGRAM(s) IV Push every 8 hours PRN  piperacillin/tazobactam IVPB.. 3.375 Gram(s) IV Intermittent every 8 hours  pyridoxine 25 milliGRAM(s) Oral daily  sodium chloride 0.9%. 1000 milliLiter(s) IV Continuous <Continuous>  tacrolimus 1 milliGRAM(s) Oral two times a day  tamsulosin 0.4 milliGRAM(s) Oral at bedtime    FHNo pertinent family history in first degree relatives    FHx: diabetes mellitus (Mother)    ,   PMHRenal failure    Dialysis patient    Diabetes    Hypertension    CAD (coronary artery disease)       PSHNo significant past surgical history    AV fistula    Renal transplant recipient    History of amputation of hallux        Labs                          14.5   5.11  )-----------( 189      ( 29 Apr 2024 05:30 )             43.1      04-29    132<L>  |  102  |  28.3<H>  ----------------------------<  116<H>  5.0   |  19.0<L>  |  1.04    Ca    9.7      29 Apr 2024 05:30       Vital Signs Last 24 Hrs  T(C): 36.4 (29 Apr 2024 04:30), Max: 36.9 (28 Apr 2024 10:56)  T(F): 97.6 (29 Apr 2024 04:30), Max: 98.5 (28 Apr 2024 10:56)  HR: 58 (29 Apr 2024 04:30) (58 - 66)  BP: 164/76 (29 Apr 2024 04:30) (133/74 - 175/80)  BP(mean): 112 (28 Apr 2024 16:39) (112 - 112)  RR: 18 (29 Apr 2024 04:30) (18 - 19)  SpO2: 98% (29 Apr 2024 04:30) (96% - 99%)    Parameters below as of 29 Apr 2024 04:30  Patient On (Oxygen Delivery Method): room air      Sedimentation Rate, Erythrocyte: 13 mm/hr (04-28-24 @ 05:28)  Sedimentation Rate, Erythrocyte: 30 mm/hr (04-23-24 @ 15:10)         C-Reactive Protein: <4 mg/L (04-28-24 @ 05:28)  C-Reactive Protein, Serum: 13 mg/L (04-23-24 @ 15:10)   WBC Count: 5.11 K/uL (04-29-24 @ 05:30)      ROS: Unremarkable outside HPI      PHYSICAL EXAM  LE Focused:    Vasc:  DP and PT pulses palpable 1/4 b/l. TG warm to warm with increased warmth noted to right foot. Non-pitting edema to right 2nd toe and foot extending to the midfoot.  Derm: Right 2nd digit distal tuft ulcer, purulence previously noted but none today, -PTB, mild malodor, no fluctuance, dusky discoloration to the 2nd digit. No other wounds or lesions noted  Neuro: Protective sensation diminished  MSK: s/p R hallux amputation January 2024      IMAGING:   < from: Xray Foot AP + Lateral + Oblique, Right (04.23.24 @ 16:47) >  IMPRESSION:    Second distal digit tuft erosion/pathologic   fractures/osteolysis/osteomyelitis.  Confirmation MRI recommended    ACC: [id number] EXAM:  MR FOOT WAW IC RT   ORDERED BY: REGULO ZAYAS     PROCEDURE DATE:  04/25/2024          INTERPRETATION:  RIGHT FOOT MRI    CLINICAL INDICATION: Infection, for evaluation of osteomyelitis.    COMPARISON: None available.    TECHNIQUE: Multiplanar, multisequence MRI was obtained of the right foot   both prior to and following administration of 8 cc Gadavist intravenous   contrast. Additional 2 cc Gadavist contrast was discarded.    FINDINGS:    OSSEOUS: Acute on chronic osteomyelitis of the distal phalanx of the   second toe. Patchy enhancing osteitis throughout adjacent second middle   phalanx, but without corresponding change in T1 marrow signal intensity.   Status post amputation of the great toe at the level of the base of the   proximal phalanx with patchy enhancing subchondral osteitis throughout   the residual, but without corresponding change in T1 marrow signal   intensity. Mild to moderate midfoot arthrosis most advanced at the second   tarsometatarsal joint.    SYNOVIUM: No effusions.    TENDONS: Postsurgical changes of the great toe. Visualized flexor and   extensor tendons are otherwise intact.    LIGAMENTS: Lisfranc ligament is mostly intact.    GENERAL: Expected postsurgical changes overlying the great toe amputation   stump. No significant intermetatarsal bursal fluid distension. No   drainable encapsulated fluid collection.    IMPRESSION:    1.  Acute on chronic osteomyelitis of the distal phalanx of right second   toe. Nonspecific acute osseous stress reaction throughout the adjacent   second middle phalanx favored to represent hyperacute osteomyelitis.  2.  Status post amputation of right great toe with nonspecific mild   subchondral stress reaction throughout the residual first proximal  phalanx base, favored to be on the basis of altered weightbearing but   with differential including hyperacute osteomyelitis in the proper   clinical setting.    --- End of Report ---        CULTURES:   Pending

## 2024-04-29 NOTE — PROGRESS NOTE ADULT - SUBJECTIVE AND OBJECTIVE BOX
[facility] Division of Hospital Medicine    Chief Complaint:  DM foot OM     SUBJECTIVE / OVERNIGHT EVENTS:  Pt examined lying in bed  Planned for OR today with podiatry   Patient denies chest pain, SOB, abd pain, N/V, fever, chills, dysuria or any other complaints. All remainder ROS negative.     MEDICATIONS  (STANDING):  aspirin enteric coated 81 milliGRAM(s) Oral daily  atorvastatin 40 milliGRAM(s) Oral at bedtime  carvedilol 12.5 milliGRAM(s) Oral every 12 hours  clopidogrel Tablet 75 milliGRAM(s) Oral daily  dextrose 10% Bolus 125 milliLiter(s) IV Bolus once  dextrose 5%. 1000 milliLiter(s) (100 mL/Hr) IV Continuous <Continuous>  dextrose 5%. 1000 milliLiter(s) (50 mL/Hr) IV Continuous <Continuous>  dextrose 50% Injectable 25 Gram(s) IV Push once  dextrose 50% Injectable 12.5 Gram(s) IV Push once  folic acid 1 milliGRAM(s) Oral daily  glucagon  Injectable 1 milliGRAM(s) IntraMuscular once  hydrALAZINE 25 milliGRAM(s) Oral two times a day  insulin glargine Injectable (LANTUS) 18 Unit(s) SubCutaneous at bedtime  insulin lispro (ADMELOG) corrective regimen sliding scale   SubCutaneous three times a day before meals  insulin lispro Injectable (ADMELOG) 7 Unit(s) SubCutaneous three times a day before meals  mycophenolate mofetil 500 milliGRAM(s) Oral two times a day  NIFEdipine XL 30 milliGRAM(s) Oral daily  piperacillin/tazobactam IVPB.. 3.375 Gram(s) IV Intermittent every 8 hours  pyridoxine 25 milliGRAM(s) Oral daily  tacrolimus 1 milliGRAM(s) Oral two times a day  tamsulosin 0.4 milliGRAM(s) Oral at bedtime    MEDICATIONS  (PRN):  acetaminophen     Tablet .. 650 milliGRAM(s) Oral every 6 hours PRN Temp greater or equal to 38C (100.4F), Mild Pain (1 - 3)  aluminum hydroxide/magnesium hydroxide/simethicone Suspension 30 milliLiter(s) Oral every 4 hours PRN Dyspepsia  dextrose Oral Gel 15 Gram(s) Oral once PRN Blood Glucose LESS THAN 70 milliGRAM(s)/deciliter  melatonin 3 milliGRAM(s) Oral at bedtime PRN Insomnia  ondansetron Injectable 4 milliGRAM(s) IV Push every 8 hours PRN Nausea and/or Vomiting        I&O's Summary      PHYSICAL EXAM:  Vital Signs Last 24 Hrs  T(C): 36.9 (28 Apr 2024 10:56), Max: 36.9 (28 Apr 2024 10:56)  T(F): 98.5 (28 Apr 2024 10:56), Max: 98.5 (28 Apr 2024 10:56)  HR: 64 (28 Apr 2024 10:56) (56 - 68)  BP: 156/77 (28 Apr 2024 10:56) (100/63 - 176/77)  BP(mean): --  RR: 19 (28 Apr 2024 10:56) (18 - 19)  SpO2: 98% (28 Apr 2024 10:56) (97% - 100%)    Parameters below as of 28 Apr 2024 10:56  Patient On (Oxygen Delivery Method): room air      CONSTITUTIONAL: Non toxic appearing, obese male lying in bed  ENMT: Moist oral mucosa, no pharyngeal injection or exudates; normal dentition  RESPIRATORY: Normal respiratory effort; lungs are clear to auscultation bilaterally  CARDIOVASCULAR: Regular rate and rhythm, normal S1 and S2, no murmur/rub/gallop; No lower extremity edema; Peripheral pulses are 2+ bilaterally  ABDOMEN: Nontender to palpation, normoactive bowel sounds, no rebound/guarding; No hepatosplenomegaly  MUSCLOSKELETAL:  RLE dressing, no soaking of gauze appreciated . no clubbing or cyanosis of digits; no joint swelling or tenderness to palpation  PSYCH: A+O to person, place, and time; affect appropriate  NEUROLOGY: CN 2-12 are intact and symmetric; no gross sensory deficits;   SKIN: No rashes; no palpable lesions    LABS:                        14.5   5.11  )-----------( 189      ( 29 Apr 2024 05:30 )             43.1   04-29    132<L>  |  102  |  28.3<H>  ----------------------------<  116<H>  5.0   |  19.0<L>  |  1.04    Ca    9.7      29 Apr 2024 05:30            CAPILLARY BLOOD GLUCOSE      POCT Blood Glucose.: 133 mg/dL (29 Apr 2024 11:44)  POCT Blood Glucose.: 131 mg/dL (29 Apr 2024 07:51)  POCT Blood Glucose.: 121 mg/dL (28 Apr 2024 21:04)  POCT Blood Glucose.: 119 mg/dL (28 Apr 2024 16:45)    Urinalysis Basic - ( 26 Apr 2024 05:28 )    Color: x / Appearance: x / SG: x / pH: x  Gluc: 111 mg/dL / Ketone: x  / Bili: x / Urobili: x   Blood: x / Protein: x / Nitrite: x   Leuk Esterase: x / RBC: x / WBC x   Sq Epi: x / Non Sq Epi: x / Bacteria: x        Culture - Surgical Swab (collected 23 Apr 2024 16:49)  Source: .Surgical Swab right 2nd toe  Preliminary Report (25 Apr 2024 10:18):    Moderate Staphylococcus aureus  Organism: Staphylococcus aureus (26 Apr 2024 10:42)  Organism: Staphylococcus aureus (26 Apr 2024 10:42)    Culture - Blood (collected 23 Apr 2024 15:15)  Source: .Blood Blood-Peripheral  Preliminary Report (25 Apr 2024 23:01):    No growth at 48 Hours      CAPILLARY BLOOD GLUCOSE      POCT Blood Glucose.: 131 mg/dL (26 Apr 2024 12:45)  POCT Blood Glucose.: 106 mg/dL (26 Apr 2024 07:35)  POCT Blood Glucose.: 176 mg/dL (25 Apr 2024 22:08)  POCT Blood Glucose.: 104 mg/dL (25 Apr 2024 16:55)        RADIOLOGY & ADDITIONAL TESTS:    4/24/24 MRI     IMPRESSION:    1.  Acute on chronic osteomyelitis of the distal phalanx of right second   toe. Nonspecific acute osseous stress reaction throughout the adjacent   second middle phalanx favored to represent hyperacute osteomyelitis.  2.  Status post amputation of right great toe with nonspecific mild   subchondral stress reaction throughout the residual first proximal   phalanx base, favored to be on the basis of altered weightbearing but   with differential including hyperacute osteomyelitis in the proper   clinical setting.

## 2024-04-29 NOTE — PROGRESS NOTE ADULT - ASSESSMENT
59 yo male with ESRD s/p renal transplant (1/2023), CAD s/p PCI, IDDM presenting to the ED with complaint of right 2nd toe pain and swelling. Patient follows with Dr. Silva for podiatry and is recently s/p right great toe amputation for OM. He saw Dr. Silva on 4/19 and was started on Doxycycline, however, swelling was worsening so patient came to the ED. MRI + 2nd toe OM      - bcx ngtd  - wound cx mssa, b.frag  - podiatry f/u-plan for right 2nd digit amputation, with flexor tenotomy of 3rd digit on 4/29/24 . f/u Or cx and path  - Continue zosyn  - Trend Fever  - Trend Leukocytosis  - on cellcelpt/tacrolimus  - optimize glycemic control      Will Follow

## 2024-04-29 NOTE — PROGRESS NOTE ADULT - ASSESSMENT
59 yo male with ESRD s/p renal transplant (1/2023), CAD s/p PCI, IDDM, h/o Rt hallux OM s/p resection now admitted with acute OM of Rt 2nd digit. Planned for OR amputation today     Infected diabetic foot ulcer / Acute ch OM   Elevated inflammatory markers -CRP 13, ESR 30  MRI results noted, Acute on ch OM   s/p Vanc x1, Zosyn x 1 in the ED  Continue PipTazo   ID on board,, appreciate recs  Per Podiatry, planned for right 2nd digit amputation, with flexor tenotomy of 3rd digit   Cleared from Medicine to proceed with planned resection     ESRD s/p Renal Transplant 1/2023  Continue Tacrolimus 2 mg BID  Continue Mycophenolate 1000 mg BID  Crt stable   IVF NS today     CAD, HTN  Continue Atorvastatin 40 mg qhs and Plavix 75 mg daily  Continue Coreg 12.5 mg BID  Continue Hydralazine 25 mg BID  Continue Nifedipine 30 mg daily    IDDM  Lantus reduced yesterday to 10 units in antipation of NPO status today  Resume 18 u Lantus qhs with 7 units mealtime insulin TID post op   ISS/Accuchecks/A1c  Carb controlled diet    BPH  Continue Flomax 0.4 mg qhs    DVTppx: Lovenox

## 2024-04-29 NOTE — PROGRESS NOTE ADULT - ASSESSMENT
A:  R 2nd digit ulcer  R 2nd digit OM  Cellulitis    P:   Patient evaluated and Chart reviewed   Discussed diagnosis and treatment with patient  Reviewed right foot x-rays- OM of 2nd digit  Pending right foot MRI - confirmed OM 2nd digit , results as noted above  C/w IV abx   Dressing left DCI pre-operatively   Discussed with patient management of osteomyelitis including surgical vs conservative intervention. Discussed risks and benefits of all options. Patient amenable to surgical intervention  Plan to take patient for right 2nd digit amputation, with flexor tenotomy of 3rd digit today 4/29 as add on  Appreciate medical optimization  T&S done   NPO since midnight   Offloading to bilateral Heels while in bed  Discussed importance of daily foot examinations and proper shoe gear and to importance of lower Fasting Blood Glucose levels.   Podiatry to follow while in house.  Discussed with Attending Dr. Silva

## 2024-04-29 NOTE — BRIEF OPERATIVE NOTE - NSICDXBRIEFPOSTOP_GEN_ALL_CORE_FT
POST-OP DIAGNOSIS:  Acquired hammertoe of right foot 29-Apr-2024 20:14:51  Reinier Munoz  Osteomyelitis of second toe of right foot 29-Apr-2024 20:15:26  Reinier Munoz

## 2024-04-29 NOTE — PROGRESS NOTE ADULT - SUBJECTIVE AND OBJECTIVE BOX
[facility]                                                INFECTIOUS DISEASES  =======================================================                   Pratik Garibay#   Lamonte Airta MD#    Karl Nur MD*                           Maryjo Owens MD*   Chrissy Baltazar MD*           Diplomates American Board of Internal Medicine & Infectious Diseases                  # [place] Office - Appt - Tel  [phone] Fax [phone]                * [place] Office - Appt - Tel [phone] Fax [phone]                                  Hospital Consult line:  [phone]  =======================================================      [facility]-[id number]  PRATIKASIA NUR   follow up for: OM  denies complaints    patient seen and examined.       I have personally reviewed the labs and data; pertinent labs and data are listed in this note; please see below.   ===================================================  REVIEW OF SYSTEMS:  CONSTITUTIONAL:  No Fever or chills  HEENT:  No diplopia or blurred vision.  No earache, sore throat or runny nose.  CARDIOVASCULAR:  No pressure, squeezing, strangling, tightness, heaviness or aching about the chest, neck, axilla or epigastrium.  RESPIRATORY:  No cough, shortness of breath  GASTROINTESTINAL:  No nausea, vomiting or diarrhea.  GENITOURINARY:  No dysuria, frequency or urgency. No Blood in urine  MUSCULOSKELETAL:  no joint aches, no muscle pain  SKIN:  No change in skin, hair or nails.  NEUROLOGIC:  No Headaches, seizures or weakness.  PSYCHIATRIC:  No disorder of thought or mood.  ENDOCRINE:  No heat or cold intolerance  HEMATOLOGICAL:  No easy bruising or bleeding.    =======================================================  Allergies    No Known Allergies    Intolerances    Antibiotics:  piperacillin/tazobactam IVPB.. 3.375 Gram(s) IV Intermittent every 8 hours    Other medications:  aspirin enteric coated 81 milliGRAM(s) Oral daily  atorvastatin 40 milliGRAM(s) Oral at bedtime  carvedilol 12.5 milliGRAM(s) Oral every 12 hours  clopidogrel Tablet 75 milliGRAM(s) Oral daily  dextrose 10% Bolus 125 milliLiter(s) IV Bolus once  dextrose 5%. 1000 milliLiter(s) IV Continuous <Continuous>  dextrose 5%. 1000 milliLiter(s) IV Continuous <Continuous>  dextrose 50% Injectable 25 Gram(s) IV Push once  dextrose 50% Injectable 12.5 Gram(s) IV Push once  folic acid 1 milliGRAM(s) Oral daily  glucagon  Injectable 1 milliGRAM(s) IntraMuscular once  hydrALAZINE 25 milliGRAM(s) Oral two times a day  insulin glargine Injectable (LANTUS) 18 Unit(s) SubCutaneous at bedtime  insulin lispro (ADMELOG) corrective regimen sliding scale   SubCutaneous three times a day before meals  insulin lispro Injectable (ADMELOG) 7 Unit(s) SubCutaneous three times a day before meals  mycophenolate mofetil 500 milliGRAM(s) Oral two times a day  NIFEdipine XL 30 milliGRAM(s) Oral daily  pyridoxine 25 milliGRAM(s) Oral daily  tacrolimus 1 milliGRAM(s) Oral two times a day  tamsulosin 0.4 milliGRAM(s) Oral at bedtime    ======================================================  Physical Exam:  ============  Vital Signs Last 24 Hrs  T(C): 36.3 (29 Apr 2024 10:30), Max: 36.7 (28 Apr 2024 21:20)  T(F): 97.4 (29 Apr 2024 10:30), Max: 98.1 (28 Apr 2024 21:20)  HR: 68 (29 Apr 2024 10:30) (58 - 68)  BP: 122/71 (29 Apr 2024 10:30) (122/71 - 175/80)  BP(mean): 112 (28 Apr 2024 16:39) (112 - 112)  RR: 18 (29 Apr 2024 10:30) (18 - 18)  SpO2: 98% (29 Apr 2024 10:30) (98% - 99%)    Parameters below as of 29 Apr 2024 10:30  Patient On (Oxygen Delivery Method): room air        General:  No acute distress.  Eye: no conjunctival pallor, no scleral icterus  Respiratory: Lungs are clear to auscultation, Respirations are non-labored.  Cardiovascular: Normal rate, Regular rhythm,  s1+s2  Gastrointestinal: Soft, Non-tender, Non-distended, Normal bowel sounds.  Musculoskeletal: right foot dressing intact  Integumentary: No rash.  Neurologic: Alert, Oriented, No focal deficits  Psychiatric: Appropriate mood & affect.  =======================================================  Labs:                                      14.5   5.11  )-----------( 189      ( 29 Apr 2024 05:30 )             43.1       04-29    132<L>  |  102  |  28.3<H>  ----------------------------<  116<H>  5.0   |  19.0<L>  |  1.04    Ca    9.7      29 Apr 2024 05:30                Urinalysis Basic - ( 29 Apr 2024 05:30 )    Color: x / Appearance: x / SG: x / pH: x  Gluc: 116 mg/dL / Ketone: x  / Bili: x / Urobili: x   Blood: x / Protein: x / Nitrite: x   Leuk Esterase: x / RBC: x / WBC x   Sq Epi: x / Non Sq Epi: x / Bacteria: x        PT/INR - ( 29 Apr 2024 05:30 )   PT: 11.2 sec;   INR: 1.01 ratio                   CAPILLARY BLOOD GLUCOSE      POCT Blood Glucose.: 133 mg/dL (29 Apr 2024 11:44)              Culture - Surgical Swab (collected 04-23-24 @ 16:49)  Source: .Surgical Swab right 2nd toe  Final Report (04-26-24 @ 15:17):    Moderate Staphylococcus aureus    Few Bacteroides fragilis "Susceptibilities not performed"  Organism: Staphylococcus aureus (04-26-24 @ 15:17)  Organism: Staphylococcus aureus (04-26-24 @ 15:17)    Sensitivities:      Method Type: LUIS MANUEL      -  Clindamycin: S <=0.25      -  Erythromycin: S <=0.25      -  Gentamicin: S <=1 Should not be used as monotherapy      -  Oxacillin: S <=0.25 Oxacillin predicts susceptibility for dicloxacillin, methicillin, and nafcillin      -  Rifampin: S <=1 Should not be used as monotherapy      -  Tetracycline: S <=1      -  Trimethoprim/Sulfamethoxazole: S <=0.5/9.5      -  Vancomycin: S 2    Culture - Blood (collected 04-23-24 @ 15:15)  Source: .Blood Blood-Peripheral  Preliminary Report (04-26-24 @ 23:01):    No growth at 72 Hours    Culture - Urine (collected 04-23-24 @ 15:10)  Source: Clean Catch Clean Catch (Midstream)  Final Report (04-24-24 @ 18:37):    No growth    Culture - Blood (collected 04-23-24 @ 15:10)  Source: .Blood Blood-Peripheral  Preliminary Report (04-26-24 @ 23:01):    No growth at 72 Hours    Culture - Tissue with Gram Stain (collected 01-24-24 @ 17:34)  Source: .Tissue R 1st proximal phalanx bone bx  Gram Stain (01-25-24 @ 06:08):    No polymorphonuclear cells seen per low power field    No organisms seen per oil power field  Final Report (01-30-24 @ 07:41):    No growth at 5 days    Culture - Surgical Swab (collected 01-21-24 @ 16:30)  Source: .Surgical Swab right hallux wound  Final Report (01-23-24 @ 19:18):    Numerous Streptococcus agalactiae (Group B)    Streptococcus agalactiae (Group B) isolated    Group B streptococci are susceptible to ampicillin,    penicillin and cefazolin, but may be resistant to    erythromycin and clindamycin.    Few Staphylococcus epidermidis "Susceptibilities not performed"    Mixed Anaerobic Yudelka including    Numerous Finegoldia magna "Susceptibilities not performed"    Few Anaerobic Gram Negative Reynaldo Most closely resembling Prevotella    species "Susceptibilities not performed"    Culture - Urine (collected 01-21-24 @ 14:30)  Source: Clean Catch Clean Catch (Midstream)  Final Report (01-22-24 @ 13:01):    No growth    Culture - Blood (collected 01-21-24 @ 14:10)  Source: .Blood Blood-Peripheral  Final Report (01-26-24 @ 19:00):    No growth at 5 days    Culture - Blood (collected 01-21-24 @ 13:55)  Source: .Blood Blood-Peripheral  Final Report (01-26-24 @ 19:00):    No growth at 5 days    Culture - Blood (collected 09-19-22 @ 20:25)  Source: .Blood Blood-Peripheral  Final Report (09-25-22 @ 01:01):    No Growth Final    Culture - Blood (collected 09-19-22 @ 20:20)  Source: .Blood Blood-Peripheral  Final Report (09-25-22 @ 01:01):    No Growth Final

## 2024-04-29 NOTE — BRIEF OPERATIVE NOTE - NSICDXBRIEFPREOP_GEN_ALL_CORE_FT
PRE-OP DIAGNOSIS:  Acquired hammertoe of right foot 29-Apr-2024 20:14:26  Reinier Munoz  Osteomyelitis of second toe of right foot 29-Apr-2024 20:14:39  Reinier Munoz

## 2024-04-29 NOTE — CHART NOTE - NSCHARTNOTEFT_GEN_A_CORE
Patient is s/p right foot 2nd digit amputation with 3rd digit flexor tenotomy.   Bone was noted to be hard at the level of the osteotomy with no evidence of remaining non-viable soft tissue and bone. Patient tolerated procedure well with no complications  Pending intra-op specimen including clean margin, pathology and culture  C/w IV abx per ID

## 2024-04-29 NOTE — BRIEF OPERATIVE NOTE - NSICDXBRIEFPROCEDURE_GEN_ALL_CORE_FT
PROCEDURES:  Partial amputation of right second toe 29-Apr-2024 20:13:01  Reinier Munoz  Percutaneous tenotomy of toe 29-Apr-2024 20:14:10  Reinier Munoz

## 2024-04-30 LAB
ANION GAP SERPL CALC-SCNC: 13 MMOL/L — SIGNIFICANT CHANGE UP (ref 5–17)
BUN SERPL-MCNC: 23.8 MG/DL — HIGH (ref 8–20)
CALCIUM SERPL-MCNC: 9.5 MG/DL — SIGNIFICANT CHANGE UP (ref 8.4–10.5)
CHLORIDE SERPL-SCNC: 98 MMOL/L — SIGNIFICANT CHANGE UP (ref 96–108)
CO2 SERPL-SCNC: 22 MMOL/L — SIGNIFICANT CHANGE UP (ref 22–29)
CREAT SERPL-MCNC: 1.11 MG/DL — SIGNIFICANT CHANGE UP (ref 0.5–1.3)
EGFR: 77 ML/MIN/1.73M2 — SIGNIFICANT CHANGE UP
GLUCOSE BLDC GLUCOMTR-MCNC: 125 MG/DL — HIGH (ref 70–99)
GLUCOSE BLDC GLUCOMTR-MCNC: 135 MG/DL — HIGH (ref 70–99)
GLUCOSE BLDC GLUCOMTR-MCNC: 159 MG/DL — HIGH (ref 70–99)
GLUCOSE BLDC GLUCOMTR-MCNC: 96 MG/DL — SIGNIFICANT CHANGE UP (ref 70–99)
GLUCOSE SERPL-MCNC: 175 MG/DL — HIGH (ref 70–99)
GRAM STN FLD: SIGNIFICANT CHANGE UP
HCT VFR BLD CALC: 43 % — SIGNIFICANT CHANGE UP (ref 39–50)
HGB BLD-MCNC: 14.4 G/DL — SIGNIFICANT CHANGE UP (ref 13–17)
MAGNESIUM SERPL-MCNC: 2 MG/DL — SIGNIFICANT CHANGE UP (ref 1.6–2.6)
MCHC RBC-ENTMCNC: 28.7 PG — SIGNIFICANT CHANGE UP (ref 27–34)
MCHC RBC-ENTMCNC: 33.5 GM/DL — SIGNIFICANT CHANGE UP (ref 32–36)
MCV RBC AUTO: 85.7 FL — SIGNIFICANT CHANGE UP (ref 80–100)
PLATELET # BLD AUTO: 176 K/UL — SIGNIFICANT CHANGE UP (ref 150–400)
POTASSIUM SERPL-MCNC: 4.5 MMOL/L — SIGNIFICANT CHANGE UP (ref 3.5–5.3)
POTASSIUM SERPL-SCNC: 4.5 MMOL/L — SIGNIFICANT CHANGE UP (ref 3.5–5.3)
RBC # BLD: 5.02 M/UL — SIGNIFICANT CHANGE UP (ref 4.2–5.8)
RBC # FLD: 12.8 % — SIGNIFICANT CHANGE UP (ref 10.3–14.5)
SODIUM SERPL-SCNC: 133 MMOL/L — LOW (ref 135–145)
SPECIMEN SOURCE: SIGNIFICANT CHANGE UP
WBC # BLD: 4.75 K/UL — SIGNIFICANT CHANGE UP (ref 3.8–10.5)
WBC # FLD AUTO: 4.75 K/UL — SIGNIFICANT CHANGE UP (ref 3.8–10.5)

## 2024-04-30 PROCEDURE — 99232 SBSQ HOSP IP/OBS MODERATE 35: CPT

## 2024-04-30 PROCEDURE — 73630 X-RAY EXAM OF FOOT: CPT | Mod: 26,RT

## 2024-04-30 RX ORDER — POLYETHYLENE GLYCOL 3350 17 G/17G
17 POWDER, FOR SOLUTION ORAL DAILY
Refills: 0 | Status: DISCONTINUED | OUTPATIENT
Start: 2024-04-30 | End: 2024-05-02

## 2024-04-30 RX ADMIN — Medication 1 MILLIGRAM(S): at 11:01

## 2024-04-30 RX ADMIN — TACROLIMUS 1 MILLIGRAM(S): 5 CAPSULE ORAL at 10:35

## 2024-04-30 RX ADMIN — Medication 7 UNIT(S): at 07:47

## 2024-04-30 RX ADMIN — Medication 650 MILLIGRAM(S): at 06:27

## 2024-04-30 RX ADMIN — Medication 7 UNIT(S): at 10:59

## 2024-04-30 RX ADMIN — Medication 81 MILLIGRAM(S): at 11:01

## 2024-04-30 RX ADMIN — PIPERACILLIN AND TAZOBACTAM 25 GRAM(S): 4; .5 INJECTION, POWDER, LYOPHILIZED, FOR SOLUTION INTRAVENOUS at 06:16

## 2024-04-30 RX ADMIN — CARVEDILOL PHOSPHATE 12.5 MILLIGRAM(S): 80 CAPSULE, EXTENDED RELEASE ORAL at 10:35

## 2024-04-30 RX ADMIN — Medication 650 MILLIGRAM(S): at 06:59

## 2024-04-30 RX ADMIN — Medication 650 MILLIGRAM(S): at 13:32

## 2024-04-30 RX ADMIN — Medication 25 MILLIGRAM(S): at 10:35

## 2024-04-30 RX ADMIN — Medication 25 MILLIGRAM(S): at 21:31

## 2024-04-30 RX ADMIN — MYCOPHENOLATE MOFETIL 500 MILLIGRAM(S): 250 CAPSULE ORAL at 10:35

## 2024-04-30 RX ADMIN — Medication 650 MILLIGRAM(S): at 21:32

## 2024-04-30 RX ADMIN — PIPERACILLIN AND TAZOBACTAM 25 GRAM(S): 4; .5 INJECTION, POWDER, LYOPHILIZED, FOR SOLUTION INTRAVENOUS at 21:25

## 2024-04-30 RX ADMIN — CLOPIDOGREL BISULFATE 75 MILLIGRAM(S): 75 TABLET, FILM COATED ORAL at 11:00

## 2024-04-30 RX ADMIN — Medication 25 MILLIGRAM(S): at 11:16

## 2024-04-30 RX ADMIN — MYCOPHENOLATE MOFETIL 500 MILLIGRAM(S): 250 CAPSULE ORAL at 21:31

## 2024-04-30 RX ADMIN — CARVEDILOL PHOSPHATE 12.5 MILLIGRAM(S): 80 CAPSULE, EXTENDED RELEASE ORAL at 21:31

## 2024-04-30 RX ADMIN — POLYETHYLENE GLYCOL 3350 17 GRAM(S): 17 POWDER, FOR SOLUTION ORAL at 10:36

## 2024-04-30 RX ADMIN — PIPERACILLIN AND TAZOBACTAM 25 GRAM(S): 4; .5 INJECTION, POWDER, LYOPHILIZED, FOR SOLUTION INTRAVENOUS at 13:32

## 2024-04-30 RX ADMIN — ATORVASTATIN CALCIUM 40 MILLIGRAM(S): 80 TABLET, FILM COATED ORAL at 21:31

## 2024-04-30 RX ADMIN — Medication 7 UNIT(S): at 17:14

## 2024-04-30 RX ADMIN — Medication 2: at 10:59

## 2024-04-30 RX ADMIN — Medication 30 MILLIGRAM(S): at 06:15

## 2024-04-30 RX ADMIN — TAMSULOSIN HYDROCHLORIDE 0.4 MILLIGRAM(S): 0.4 CAPSULE ORAL at 21:31

## 2024-04-30 RX ADMIN — TACROLIMUS 1 MILLIGRAM(S): 5 CAPSULE ORAL at 21:31

## 2024-04-30 RX ADMIN — Medication 650 MILLIGRAM(S): at 14:30

## 2024-04-30 RX ADMIN — INSULIN GLARGINE 10 UNIT(S): 100 INJECTION, SOLUTION SUBCUTANEOUS at 21:34

## 2024-04-30 RX ADMIN — Medication 650 MILLIGRAM(S): at 22:30

## 2024-04-30 NOTE — PROGRESS NOTE ADULT - SUBJECTIVE AND OBJECTIVE BOX
Declan Mayfield MD  [facility]  Contact via Teams or text/call at [phone]    Patient is a 58y old  Male who presents with a chief complaint of Diabetic foot infection (29 Apr 2024 15:43)      Patient seen and examined at bedside. No overnight events reported.     ALLERGIES:  No Known Allergies    MEDICATIONS  (STANDING):  aspirin enteric coated 81 milliGRAM(s) Oral daily  atorvastatin 40 milliGRAM(s) Oral at bedtime  carvedilol 12.5 milliGRAM(s) Oral every 12 hours  clopidogrel Tablet 75 milliGRAM(s) Oral daily  dextrose 10% Bolus 125 milliLiter(s) IV Bolus once  dextrose 5%. 1000 milliLiter(s) (100 mL/Hr) IV Continuous <Continuous>  folic acid 1 milliGRAM(s) Oral daily  glucagon  Injectable 1 milliGRAM(s) IntraMuscular once  hydrALAZINE 25 milliGRAM(s) Oral two times a day  insulin glargine Injectable (LANTUS) 10 Unit(s) SubCutaneous at bedtime  insulin lispro (ADMELOG) corrective regimen sliding scale   SubCutaneous three times a day before meals  insulin lispro Injectable (ADMELOG) 7 Unit(s) SubCutaneous three times a day before meals  mycophenolate mofetil 500 milliGRAM(s) Oral two times a day  NIFEdipine XL 30 milliGRAM(s) Oral daily  piperacillin/tazobactam IVPB.. 3.375 Gram(s) IV Intermittent every 8 hours  pyridoxine 25 milliGRAM(s) Oral daily  sodium chloride 0.9%. 1000 milliLiter(s) (100 mL/Hr) IV Continuous <Continuous>  tacrolimus 1 milliGRAM(s) Oral two times a day  tamsulosin 0.4 milliGRAM(s) Oral at bedtime    MEDICATIONS  (PRN):  acetaminophen     Tablet .. 650 milliGRAM(s) Oral every 6 hours PRN Temp greater or equal to 38C (100.4F), Mild Pain (1 - 3)  aluminum hydroxide/magnesium hydroxide/simethicone Suspension 30 milliLiter(s) Oral every 4 hours PRN Dyspepsia  melatonin 3 milliGRAM(s) Oral at bedtime PRN Insomnia  ondansetron Injectable 4 milliGRAM(s) IV Push every 8 hours PRN Nausea and/or Vomiting    Vital Signs Last 24 Hrs  T(F): 98 (30 Apr 2024 04:30), Max: 98.3 (29 Apr 2024 21:49)  HR: 58 (30 Apr 2024 04:30) (58 - 68)  BP: 158/76 (30 Apr 2024 04:30) (114/63 - 186/86)  RR: 18 (30 Apr 2024 04:30) (14 - 20)  SpO2: 99% (30 Apr 2024 04:30) (97% - 100%)  I&O's Summary    29 Apr 2024 07:01  -  30 Apr 2024 07:00  --------------------------------------------------------  IN: 240 mL / OUT: 0 mL / NET: 240 mL      PHYSICAL EXAM:  General: NAD, A/O x 3  ENT: No gross hearing impairment, Moist mucous membranes, no thrush  Neck: Supple, No JVD  Lungs: Clear to auscultation bilaterally, good air entry, non-labored breathing  Cardio: RRR, S1/S2, No murmur  Abdomen: Soft, Nontender, Nondistended; Bowel sounds present  Extremities: No calf tenderness, No cyanosis, No pitting edema  Psych: Appropriate mood and affect    LABS:                        14.4   4.75  )-----------( 176      ( 30 Apr 2024 04:35 )             43.0     04-30    133  |  98  |  23.8  ----------------------------<  175  4.5   |  22.0  |  1.11    Ca    9.5      30 Apr 2024 04:35  Mg     2.0     04-30            PT/INR - ( 29 Apr 2024 05:30 )   PT: 11.2 sec;   INR: 1.01 ratio                                 POCT Blood Glucose.: 135 mg/dL (30 Apr 2024 07:42)  POCT Blood Glucose.: 147 mg/dL (29 Apr 2024 22:12)  POCT Blood Glucose.: 113 mg/dL (29 Apr 2024 20:14)  POCT Blood Glucose.: 111 mg/dL (29 Apr 2024 19:35)  POCT Blood Glucose.: 121 mg/dL (29 Apr 2024 18:03)  POCT Blood Glucose.: 112 mg/dL (29 Apr 2024 17:04)  POCT Blood Glucose.: 133 mg/dL (29 Apr 2024 11:44)      Urinalysis Basic - ( 30 Apr 2024 04:35 )    Color: x / Appearance: x / SG: x / pH: x  Gluc: 175 mg/dL / Ketone: x  / Bili: x / Urobili: x   Blood: x / Protein: x / Nitrite: x   Leuk Esterase: x / RBC: x / WBC x   Sq Epi: x / Non Sq Epi: x / Bacteria: x        Culture - Tissue with Gram Stain (collected 29 Apr 2024 19:40)  Source: .Tissue Right 2nd Toe Clean Margin  Gram Stain (30 Apr 2024 02:53):    No polymorphonuclear cells seen per low power field    No organisms seen per oil power field    Culture - Surgical Swab (collected 23 Apr 2024 16:49)  Source: .Surgical Swab right 2nd toe  Final Report (26 Apr 2024 15:17):    Moderate Staphylococcus aureus    Few Bacteroides fragilis "Susceptibilities not performed"  Organism: Staphylococcus aureus (26 Apr 2024 15:17)  Organism: Staphylococcus aureus (26 Apr 2024 15:17)      Method Type: LUIS MANUEL      -  Clindamycin: S <=0.25      -  Erythromycin: S <=0.25      -  Gentamicin: S <=1 Should not be used as monotherapy      -  Oxacillin: S <=0.25 Oxacillin predicts susceptibility for dicloxacillin, methicillin, and nafcillin      -  Rifampin: S <=1 Should not be used as monotherapy      -  Tetracycline: S <=1      -  Trimethoprim/Sulfamethoxazole: S <=0.5/9.5      -  Vancomycin: S 2    Culture - Blood (collected 23 Apr 2024 15:15)  Source: .Blood Blood-Peripheral  Final Report (28 Apr 2024 23:00):    No growth at 5 days    Culture - Urine (collected 23 Apr 2024 15:10)  Source: Clean Catch Clean Catch (Midstream)  Final Report (24 Apr 2024 18:37):    No growth    Culture - Blood (collected 23 Apr 2024 15:10)  Source: .Blood Blood-Peripheral  Final Report (28 Apr 2024 23:00):    No growth at 5 days        RADIOLOGY & ADDITIONAL TESTS:    Care Discussed with Consultants/Other Providers:

## 2024-04-30 NOTE — PROGRESS NOTE ADULT - ASSESSMENT
59 yo male with ESRD s/p renal transplant (1/2023), CAD s/p PCI, IDDM presenting to the ED with complaint of right 2nd toe pain and swelling. Patient follows with Dr. Silva for podiatry and is recently s/p right great toe amputation for OM. He saw Dr. Silva on 4/19 and was started on Doxycycline, however, swelling was worsening so patient came to the ED. MRI + 2nd toe OM      - bcx ngtd  - wound cx mssa, b.frag  - s/pright 2nd digit amputation, with flexor tenotomy of 3rd digit on 4/29/24 . f/u Or cx and path  - Continue zosyn  - Trend Fever  - Trend Leukocytosis  - on cellcelpt/tacrolimus  - optimize glycemic control      Will Follow

## 2024-04-30 NOTE — PROGRESS NOTE ADULT - SUBJECTIVE AND OBJECTIVE BOX
[facility]                                                INFECTIOUS DISEASES  =======================================================                   Pratik Garibay#   Lamonte Arita MD#    Karl Nur MD*                           Maryjo Owens MD*   Chrissy Baltazar MD*           Diplomates American Board of Internal Medicine & Infectious Diseases                  # [place] Office - Appt - Tel  [phone] Fax [phone]                * [place] Office - Appt - Tel [phone] Fax [phone]                                  Hospital Consult line:  [phone]  =======================================================      [facility]-[id number]  PRATIKASIA NUR   follow up for: OM  denies complaints    patient seen and examined.       I have personally reviewed the labs and data; pertinent labs and data are listed in this note; please see below.   ===================================================  REVIEW OF SYSTEMS:  CONSTITUTIONAL:  No Fever or chills  HEENT:  No diplopia or blurred vision.  No earache, sore throat or runny nose.  CARDIOVASCULAR:  No pressure, squeezing, strangling, tightness, heaviness or aching about the chest, neck, axilla or epigastrium.  RESPIRATORY:  No cough, shortness of breath  GASTROINTESTINAL:  No nausea, vomiting or diarrhea.  GENITOURINARY:  No dysuria, frequency or urgency. No Blood in urine  MUSCULOSKELETAL:  no joint aches, no muscle pain  SKIN:  No change in skin, hair or nails.  NEUROLOGIC:  No Headaches, seizures or weakness.  PSYCHIATRIC:  No disorder of thought or mood.  ENDOCRINE:  No heat or cold intolerance  HEMATOLOGICAL:  No easy bruising or bleeding.    =======================================================  Allergies    No Known Allergies    Intolerances    Antibiotics:  piperacillin/tazobactam IVPB.. 3.375 Gram(s) IV Intermittent every 8 hours    Other medications:  aspirin enteric coated 81 milliGRAM(s) Oral daily  atorvastatin 40 milliGRAM(s) Oral at bedtime  carvedilol 12.5 milliGRAM(s) Oral every 12 hours  clopidogrel Tablet 75 milliGRAM(s) Oral daily  dextrose 10% Bolus 125 milliLiter(s) IV Bolus once  dextrose 5%. 1000 milliLiter(s) IV Continuous <Continuous>  dextrose 5%. 1000 milliLiter(s) IV Continuous <Continuous>  dextrose 50% Injectable 25 Gram(s) IV Push once  dextrose 50% Injectable 12.5 Gram(s) IV Push once  folic acid 1 milliGRAM(s) Oral daily  glucagon  Injectable 1 milliGRAM(s) IntraMuscular once  hydrALAZINE 25 milliGRAM(s) Oral two times a day  insulin glargine Injectable (LANTUS) 18 Unit(s) SubCutaneous at bedtime  insulin lispro (ADMELOG) corrective regimen sliding scale   SubCutaneous three times a day before meals  insulin lispro Injectable (ADMELOG) 7 Unit(s) SubCutaneous three times a day before meals  mycophenolate mofetil 500 milliGRAM(s) Oral two times a day  NIFEdipine XL 30 milliGRAM(s) Oral daily  pyridoxine 25 milliGRAM(s) Oral daily  tacrolimus 1 milliGRAM(s) Oral two times a day  tamsulosin 0.4 milliGRAM(s) Oral at bedtime    ======================================================  Physical Exam:  ============  Vital Signs Last 24 Hrs  T(C): 36.7 (30 Apr 2024 21:26), Max: 36.8 (30 Apr 2024 08:27)  T(F): 98 (30 Apr 2024 21:26), Max: 98.3 (30 Apr 2024 08:27)  HR: 62 (30 Apr 2024 21:26) (57 - 67)  BP: 168/74 (30 Apr 2024 21:26) (150/74 - 171/80)  BP(mean): --  RR: 19 (30 Apr 2024 21:26) (18 - 19)  SpO2: 100% (30 Apr 2024 21:26) (97% - 100%)    Parameters below as of 30 Apr 2024 21:26  Patient On (Oxygen Delivery Method): room air      Patient On (Oxygen Delivery Method): room air        General:  No acute distress.  Eye: no conjunctival pallor, no scleral icterus  Respiratory: Lungs are clear to auscultation, Respirations are non-labored.  Cardiovascular: Normal rate, Regular rhythm,  s1+s2  Gastrointestinal: Soft, Non-tender, Non-distended, Normal bowel sounds.  Musculoskeletal: right foot dressing intact  Integumentary: No rash.  Neurologic: Alert, Oriented, No focal deficits  Psychiatric: Appropriate mood & affect.  =======================================================  Labs:                                                      14.4   4.75  )-----------( 176      ( 30 Apr 2024 04:35 )             43.0       04-30    133<L>  |  98  |  23.8<H>  ----------------------------<  175<H>  4.5   |  22.0  |  1.11    Ca    9.5      30 Apr 2024 04:35  Mg     2.0     04-30                Urinalysis Basic - ( 30 Apr 2024 04:35 )    Color: x / Appearance: x / SG: x / pH: x  Gluc: 175 mg/dL / Ketone: x  / Bili: x / Urobili: x   Blood: x / Protein: x / Nitrite: x   Leuk Esterase: x / RBC: x / WBC x   Sq Epi: x / Non Sq Epi: x / Bacteria: x        PT/INR - ( 29 Apr 2024 05:30 )   PT: 11.2 sec;   INR: 1.01 ratio                   CAPILLARY BLOOD GLUCOSE      POCT Blood Glucose.: 125 mg/dL (30 Apr 2024 21:33)                Urinalysis Basic - ( 29 Apr 2024 05:30 )    Color: x / Appearance: x / SG: x / pH: x  Gluc: 116 mg/dL / Ketone: x  / Bili: x / Urobili: x   Blood: x / Protein: x / Nitrite: x   Leuk Esterase: x / RBC: x / WBC x   Sq Epi: x / Non Sq Epi: x / Bacteria: x        PT/INR - ( 29 Apr 2024 05:30 )   PT: 11.2 sec;   INR: 1.01 ratio                   CAPILLARY BLOOD GLUCOSE      POCT Blood Glucose.: 133 mg/dL (29 Apr 2024 11:44)              Culture - Surgical Swab (collected 04-23-24 @ 16:49)  Source: .Surgical Swab right 2nd toe  Final Report (04-26-24 @ 15:17):    Moderate Staphylococcus aureus    Few Bacteroides fragilis "Susceptibilities not performed"  Organism: Staphylococcus aureus (04-26-24 @ 15:17)  Organism: Staphylococcus aureus (04-26-24 @ 15:17)    Sensitivities:      Method Type: LUIS MANUEL      -  Clindamycin: S <=0.25      -  Erythromycin: S <=0.25      -  Gentamicin: S <=1 Should not be used as monotherapy      -  Oxacillin: S <=0.25 Oxacillin predicts susceptibility for dicloxacillin, methicillin, and nafcillin      -  Rifampin: S <=1 Should not be used as monotherapy      -  Tetracycline: S <=1      -  Trimethoprim/Sulfamethoxazole: S <=0.5/9.5      -  Vancomycin: S 2    Culture - Blood (collected 04-23-24 @ 15:15)  Source: .Blood Blood-Peripheral  Preliminary Report (04-26-24 @ 23:01):    No growth at 72 Hours    Culture - Urine (collected 04-23-24 @ 15:10)  Source: Clean Catch Clean Catch (Midstream)  Final Report (04-24-24 @ 18:37):    No growth    Culture - Blood (collected 04-23-24 @ 15:10)  Source: .Blood Blood-Peripheral  Preliminary Report (04-26-24 @ 23:01):    No growth at 72 Hours    Culture - Tissue with Gram Stain (collected 01-24-24 @ 17:34)  Source: .Tissue R 1st proximal phalanx bone bx  Gram Stain (01-25-24 @ 06:08):    No polymorphonuclear cells seen per low power field    No organisms seen per oil power field  Final Report (01-30-24 @ 07:41):    No growth at 5 days    Culture - Surgical Swab (collected 01-21-24 @ 16:30)  Source: .Surgical Swab right hallux wound  Final Report (01-23-24 @ 19:18):    Numerous Streptococcus agalactiae (Group B)    Streptococcus agalactiae (Group B) isolated    Group B streptococci are susceptible to ampicillin,    penicillin and cefazolin, but may be resistant to    erythromycin and clindamycin.    Few Staphylococcus epidermidis "Susceptibilities not performed"    Mixed Anaerobic Yudelka including    Numerous Finegoldia magna "Susceptibilities not performed"    Few Anaerobic Gram Negative Reynaldo Most closely resembling Prevotella    species "Susceptibilities not performed"    Culture - Urine (collected 01-21-24 @ 14:30)  Source: Clean Catch Clean Catch (Midstream)  Final Report (01-22-24 @ 13:01):    No growth    Culture - Blood (collected 01-21-24 @ 14:10)  Source: .Blood Blood-Peripheral  Final Report (01-26-24 @ 19:00):    No growth at 5 days    Culture - Blood (collected 01-21-24 @ 13:55)  Source: .Blood Blood-Peripheral  Final Report (01-26-24 @ 19:00):    No growth at 5 days    Culture - Blood (collected 09-19-22 @ 20:25)  Source: .Blood Blood-Peripheral  Final Report (09-25-22 @ 01:01):    No Growth Final    Culture - Blood (collected 09-19-22 @ 20:20)  Source: .Blood Blood-Peripheral  Final Report (09-25-22 @ 01:01):    No Growth Final

## 2024-04-30 NOTE — PROGRESS NOTE ADULT - SUBJECTIVE AND OBJECTIVE BOX
Interval: No acute overnight events. Patient seen resting in bed comfortably. Patient is s/p R. 2nd digit amputation with 3rd flexor tenotomy 4/29, POD#1. Denies constitutional symptoms. No strikethrough to bandage. No pain to right foot. No other pedal complaints at this time.    Podiatry HPI: 58M with complaint of worsening swelling to the right foot with associated 2nd digit ulcer. The patient states that he has recently seen Dr. Silva outpatient for a wound to the distal aspect of the right 2nd digit. He last saw Dr. Silva on Friday and was started on Doxycycline, which he has taken as directed since prescribed. The patient states that today, he noticed his right 2nd digit and the right foot were becoming more swollen, and there was some discoloration to the 2nd digit, so he presented to the ED. The patient denies any constitutional symptoms. Denies other pedal complaints. History of hallux amputation earlier this year.      HPI: 59 yo male with ESRD s/p renal transplant (1/2023), CAD s/p PCI, IDDM presenting to the ED with complaint of right 2nd toe pain and swelling. Patient follows with Dr. Silva for podiatry and is recently s/p right great toe amputation for OM. He saw Dr. Silva on 4/19 and was started on Doxycycline, however, swelling was worsening so patient came to the ED. He reports compliance with PO antibiotic. He denies fever or chills, N/V/D. States there is not much pain at the site as he pas poor sensation.        Medications acetaminophen     Tablet .. 650 milliGRAM(s) Oral every 6 hours PRN  aluminum hydroxide/magnesium hydroxide/simethicone Suspension 30 milliLiter(s) Oral every 4 hours PRN  aspirin enteric coated 81 milliGRAM(s) Oral daily  atorvastatin 40 milliGRAM(s) Oral at bedtime  carvedilol 12.5 milliGRAM(s) Oral every 12 hours  clopidogrel Tablet 75 milliGRAM(s) Oral daily  dextrose 10% Bolus 125 milliLiter(s) IV Bolus once  dextrose 5%. 1000 milliLiter(s) IV Continuous <Continuous>  folic acid 1 milliGRAM(s) Oral daily  glucagon  Injectable 1 milliGRAM(s) IntraMuscular once  hydrALAZINE 25 milliGRAM(s) Oral two times a day  insulin glargine Injectable (LANTUS) 10 Unit(s) SubCutaneous at bedtime  insulin lispro (ADMELOG) corrective regimen sliding scale   SubCutaneous three times a day before meals  insulin lispro Injectable (ADMELOG) 7 Unit(s) SubCutaneous three times a day before meals  melatonin 3 milliGRAM(s) Oral at bedtime PRN  mycophenolate mofetil 500 milliGRAM(s) Oral two times a day  NIFEdipine XL 30 milliGRAM(s) Oral daily  ondansetron Injectable 4 milliGRAM(s) IV Push every 8 hours PRN  piperacillin/tazobactam IVPB.. 3.375 Gram(s) IV Intermittent every 8 hours  polyethylene glycol 3350 17 Gram(s) Oral daily  pyridoxine 25 milliGRAM(s) Oral daily  sodium chloride 0.9%. 1000 milliLiter(s) IV Continuous <Continuous>  tacrolimus 1 milliGRAM(s) Oral two times a day  tamsulosin 0.4 milliGRAM(s) Oral at bedtime    FHNo pertinent family history in first degree relatives    FHx: diabetes mellitus (Mother)    ,   PMHRenal failure    Dialysis patient    Diabetes    Hypertension    CAD (coronary artery disease)       PSHNo significant past surgical history    AV fistula    Renal transplant recipient    History of amputation of hallux        Labs                          14.4   4.75  )-----------( 176      ( 30 Apr 2024 04:35 )             43.0      04-30    133<L>  |  98  |  23.8<H>  ----------------------------<  175<H>  4.5   |  22.0  |  1.11    Ca    9.5      30 Apr 2024 04:35  Mg     2.0     04-30       Vital Signs Last 24 Hrs  T(C): 36.8 (30 Apr 2024 08:27), Max: 36.8 (29 Apr 2024 16:05)  T(F): 98.3 (30 Apr 2024 08:27), Max: 98.3 (29 Apr 2024 21:49)  HR: 61 (30 Apr 2024 08:27) (58 - 67)  BP: 171/80 (30 Apr 2024 08:27) (114/63 - 186/86)  BP(mean): 98 (29 Apr 2024 21:00) (77 - 98)  RR: 18 (30 Apr 2024 08:27) (14 - 20)  SpO2: 100% (30 Apr 2024 08:27) (97% - 100%)    Parameters below as of 30 Apr 2024 08:27  Patient On (Oxygen Delivery Method): room air      Sedimentation Rate, Erythrocyte: 13 mm/hr (04-28-24 @ 05:28)  Sedimentation Rate, Erythrocyte: 30 mm/hr (04-23-24 @ 15:10)         C-Reactive Protein: <4 mg/L (04-28-24 @ 05:28)  C-Reactive Protein, Serum: 13 mg/L (04-23-24 @ 15:10)   WBC Count: 4.75 K/uL (04-30-24 @ 04:35)      ROS: Unremarkable outside HPI      PHYSICAL EXAM  LE Focused:    Vasc:  DP and PT pulses palpable 1/4 b/l. TG warm to warm with increased warmth noted to right foot. Non-pitting edema to right 2nd toe and foot extending to the midfoot.  Derm: (post-op) dressing left dry, clean and intact. No strikethrough noted from bandage. (Pre-op) Right 2nd digit distal tuft ulcer, purulence previously noted but none today, -PTB, mild malodor, no fluctuance, dusky discoloration to the 2nd digit. No other wounds or lesions noted  Neuro: Protective sensation diminished  MSK: s/p R hallux amputation January 2024, no pain on palpation.       IMAGING:   < from: Xray Foot AP + Lateral + Oblique, Right (04.23.24 @ 16:47) >  IMPRESSION:    Second distal digit tuft erosion/pathologic   fractures/osteolysis/osteomyelitis.  Confirmation MRI recommended    ACC: [id number] EXAM:  MR FOOT WAW IC RT   ORDERED BY: REGULO ZAYAS     PROCEDURE DATE:  04/25/2024          INTERPRETATION:  RIGHT FOOT MRI    CLINICAL INDICATION: Infection, for evaluation of osteomyelitis.    COMPARISON: None available.    TECHNIQUE: Multiplanar, multisequence MRI was obtained of the right foot   both prior to and following administration of 8 cc Gadavist intravenous   contrast. Additional 2 cc Gadavist contrast was discarded.    FINDINGS:    OSSEOUS: Acute on chronic osteomyelitis of the distal phalanx of the   second toe. Patchy enhancing osteitis throughout adjacent second middle   phalanx, but without corresponding change in T1 marrow signal intensity.   Status post amputation of the great toe at the level of the base of the   proximal phalanx with patchy enhancing subchondral osteitis throughout   the residual, but without corresponding change in T1 marrow signal   intensity. Mild to moderate midfoot arthrosis most advanced at the second   tarsometatarsal joint.    SYNOVIUM: No effusions.    TENDONS: Postsurgical changes of the great toe. Visualized flexor and   extensor tendons are otherwise intact.    LIGAMENTS: Lisfranc ligament is mostly intact.    GENERAL: Expected postsurgical changes overlying the great toe amputation   stump. No significant intermetatarsal bursal fluid distension. No   drainable encapsulated fluid collection.    IMPRESSION:    1.  Acute on chronic osteomyelitis of the distal phalanx of right second   toe. Nonspecific acute osseous stress reaction throughout the adjacent   second middle phalanx favored to represent hyperacute osteomyelitis.  2.  Status post amputation of right great toe with nonspecific mild   subchondral stress reaction throughout the residual first proximal  phalanx base, favored to be on the basis of altered weightbearing but   with differential including hyperacute osteomyelitis in the proper   clinical setting.    --- End of Report ---        CULTURES:   Pending intra-op cultures

## 2024-04-30 NOTE — PROGRESS NOTE ADULT - ASSESSMENT
A:  R 2nd digit ulcer  R 2nd digit OM  Cellulitis    P:   Patient evaluated and Chart reviewed   Discussed diagnosis and treatment with patient  Reviewed right foot x-rays- OM of 2nd digit  Reviewed right foot MRI - Results as noted above  Dressing left dry, clean and intact, no strikethrough from bandage, will change tomorrow.  Pending intra-op cultures and specimens.   Appreciate ID reccs- cont zosyn  Weightbearing to right heel as tolerated in surgical shoe  Offloading to bilateral Heels while in bed  Discussed importance of daily foot examinations and proper shoe gear and to importance of lower Fasting Blood Glucose levels.   Stable from podiatry standpoint, will continue to follow while patient is in house.   Patient to follow up with Dr. Silva in office [address] [facility] office, call [phone] to make an appointment  Discussed with Attending Dr. Silva    WCO: Xeroform, gauze, abd pad, kerlex, ace compression to be changed 3x/week

## 2024-04-30 NOTE — PROGRESS NOTE ADULT - ASSESSMENT
59 yo male with ESRD s/p renal transplant (1/2023), CAD s/p PCI, IDDM, h/o Rt hallux OM s/p resection now admitted with acute OM of Rt 2nd digit. Planned for OR amputation today     Infected diabetic foot ulcer / Acute ch OM   - s/p partial amputation of right second toe  - s/p percutaneous tenotomy of 3rd right toe   - MRI results noted, Acute on ch OM   - cont zosyn  - ID on board  - podiatry following    ESRD s/p Renal Transplant 1/2023  Continue Tacrolimus 2 mg BID  Continue Mycophenolate 1000 mg BID  Cr stable, monitor daily     CAD, HTN  Continue Atorvastatin 40 mg qhs and Plavix 75 mg daily  Continue Coreg 12.5 mg BID  Continue Hydralazine 25 mg BID  Continue Nifedipine 30 mg daily    IDDM  Resume 18 u Lantus qhs with 7 units mealtime insulin ISS/Accuchecks/A1c  Carb controlled diet    BPH  Continue Flomax 0.4 mg qhs    DVT ppx: chemical on hold, will discuss with podiatry if okay to resume PPX

## 2024-05-01 LAB
GLUCOSE BLDC GLUCOMTR-MCNC: 110 MG/DL — HIGH (ref 70–99)
GLUCOSE BLDC GLUCOMTR-MCNC: 116 MG/DL — HIGH (ref 70–99)
GLUCOSE BLDC GLUCOMTR-MCNC: 159 MG/DL — HIGH (ref 70–99)
GLUCOSE BLDC GLUCOMTR-MCNC: 86 MG/DL — SIGNIFICANT CHANGE UP (ref 70–99)

## 2024-05-01 PROCEDURE — 99232 SBSQ HOSP IP/OBS MODERATE 35: CPT

## 2024-05-01 RX ORDER — HEPARIN SODIUM 5000 [USP'U]/ML
5000 INJECTION INTRAVENOUS; SUBCUTANEOUS EVERY 12 HOURS
Refills: 0 | Status: DISCONTINUED | OUTPATIENT
Start: 2024-05-01 | End: 2024-05-02

## 2024-05-01 RX ADMIN — Medication 650 MILLIGRAM(S): at 21:10

## 2024-05-01 RX ADMIN — Medication 7 UNIT(S): at 17:12

## 2024-05-01 RX ADMIN — MYCOPHENOLATE MOFETIL 500 MILLIGRAM(S): 250 CAPSULE ORAL at 09:43

## 2024-05-01 RX ADMIN — CLOPIDOGREL BISULFATE 75 MILLIGRAM(S): 75 TABLET, FILM COATED ORAL at 11:21

## 2024-05-01 RX ADMIN — Medication 25 MILLIGRAM(S): at 11:22

## 2024-05-01 RX ADMIN — Medication 1 MILLIGRAM(S): at 11:21

## 2024-05-01 RX ADMIN — MYCOPHENOLATE MOFETIL 500 MILLIGRAM(S): 250 CAPSULE ORAL at 22:02

## 2024-05-01 RX ADMIN — CARVEDILOL PHOSPHATE 12.5 MILLIGRAM(S): 80 CAPSULE, EXTENDED RELEASE ORAL at 22:02

## 2024-05-01 RX ADMIN — Medication 650 MILLIGRAM(S): at 09:44

## 2024-05-01 RX ADMIN — Medication 7 UNIT(S): at 12:18

## 2024-05-01 RX ADMIN — Medication 650 MILLIGRAM(S): at 20:10

## 2024-05-01 RX ADMIN — Medication 7 UNIT(S): at 08:21

## 2024-05-01 RX ADMIN — Medication 30 MILLIGRAM(S): at 06:05

## 2024-05-01 RX ADMIN — TACROLIMUS 1 MILLIGRAM(S): 5 CAPSULE ORAL at 09:43

## 2024-05-01 RX ADMIN — Medication 25 MILLIGRAM(S): at 22:01

## 2024-05-01 RX ADMIN — Medication 650 MILLIGRAM(S): at 10:20

## 2024-05-01 RX ADMIN — ATORVASTATIN CALCIUM 40 MILLIGRAM(S): 80 TABLET, FILM COATED ORAL at 22:02

## 2024-05-01 RX ADMIN — PIPERACILLIN AND TAZOBACTAM 25 GRAM(S): 4; .5 INJECTION, POWDER, LYOPHILIZED, FOR SOLUTION INTRAVENOUS at 06:05

## 2024-05-01 RX ADMIN — TACROLIMUS 1 MILLIGRAM(S): 5 CAPSULE ORAL at 22:03

## 2024-05-01 RX ADMIN — Medication 81 MILLIGRAM(S): at 11:21

## 2024-05-01 RX ADMIN — Medication 2: at 12:18

## 2024-05-01 RX ADMIN — POLYETHYLENE GLYCOL 3350 17 GRAM(S): 17 POWDER, FOR SOLUTION ORAL at 11:22

## 2024-05-01 RX ADMIN — PIPERACILLIN AND TAZOBACTAM 25 GRAM(S): 4; .5 INJECTION, POWDER, LYOPHILIZED, FOR SOLUTION INTRAVENOUS at 14:32

## 2024-05-01 RX ADMIN — Medication 25 MILLIGRAM(S): at 09:44

## 2024-05-01 RX ADMIN — INSULIN GLARGINE 10 UNIT(S): 100 INJECTION, SOLUTION SUBCUTANEOUS at 22:16

## 2024-05-01 RX ADMIN — CARVEDILOL PHOSPHATE 12.5 MILLIGRAM(S): 80 CAPSULE, EXTENDED RELEASE ORAL at 09:44

## 2024-05-01 RX ADMIN — PIPERACILLIN AND TAZOBACTAM 25 GRAM(S): 4; .5 INJECTION, POWDER, LYOPHILIZED, FOR SOLUTION INTRAVENOUS at 22:03

## 2024-05-01 RX ADMIN — TAMSULOSIN HYDROCHLORIDE 0.4 MILLIGRAM(S): 0.4 CAPSULE ORAL at 22:03

## 2024-05-01 NOTE — PROGRESS NOTE ADULT - SUBJECTIVE AND OBJECTIVE BOX
[facility]                                                INFECTIOUS DISEASES  =======================================================                   Pratik Garibay#   Lamonte Arita MD#    Karl Nur MD*                           Maryjo Owens MD*   Chrissy Baltazar MD*           Diplomates American Board of Internal Medicine & Infectious Diseases                  # [place] Office - Appt - Tel  [phone] Fax [phone]                * [place] Office - Appt - Tel [phone] Fax [phone]                                  Hospital Consult line:  [phone]  =======================================================      [facility]-[id number]  PRATIKASIA NUR   follow up for: OM  denies complaints    patient seen and examined.       I have personally reviewed the labs and data; pertinent labs and data are listed in this note; please see below.   ===================================================  REVIEW OF SYSTEMS:  CONSTITUTIONAL:  No Fever or chills  HEENT:  No diplopia or blurred vision.  No earache, sore throat or runny nose.  CARDIOVASCULAR:  No pressure, squeezing, strangling, tightness, heaviness or aching about the chest, neck, axilla or epigastrium.  RESPIRATORY:  No cough, shortness of breath  GASTROINTESTINAL:  No nausea, vomiting or diarrhea.  GENITOURINARY:  No dysuria, frequency or urgency. No Blood in urine  MUSCULOSKELETAL:  no joint aches, no muscle pain  SKIN:  No change in skin, hair or nails.  NEUROLOGIC:  No Headaches, seizures or weakness.  PSYCHIATRIC:  No disorder of thought or mood.  ENDOCRINE:  No heat or cold intolerance  HEMATOLOGICAL:  No easy bruising or bleeding.    =======================================================  Allergies    No Known Allergies    Intolerances    Antibiotics:  piperacillin/tazobactam IVPB.. 3.375 Gram(s) IV Intermittent every 8 hours    Other medications:  aspirin enteric coated 81 milliGRAM(s) Oral daily  atorvastatin 40 milliGRAM(s) Oral at bedtime  carvedilol 12.5 milliGRAM(s) Oral every 12 hours  clopidogrel Tablet 75 milliGRAM(s) Oral daily  dextrose 10% Bolus 125 milliLiter(s) IV Bolus once  dextrose 5%. 1000 milliLiter(s) IV Continuous <Continuous>  dextrose 5%. 1000 milliLiter(s) IV Continuous <Continuous>  dextrose 50% Injectable 25 Gram(s) IV Push once  dextrose 50% Injectable 12.5 Gram(s) IV Push once  folic acid 1 milliGRAM(s) Oral daily  glucagon  Injectable 1 milliGRAM(s) IntraMuscular once  hydrALAZINE 25 milliGRAM(s) Oral two times a day  insulin glargine Injectable (LANTUS) 18 Unit(s) SubCutaneous at bedtime  insulin lispro (ADMELOG) corrective regimen sliding scale   SubCutaneous three times a day before meals  insulin lispro Injectable (ADMELOG) 7 Unit(s) SubCutaneous three times a day before meals  mycophenolate mofetil 500 milliGRAM(s) Oral two times a day  NIFEdipine XL 30 milliGRAM(s) Oral daily  pyridoxine 25 milliGRAM(s) Oral daily  tacrolimus 1 milliGRAM(s) Oral two times a day  tamsulosin 0.4 milliGRAM(s) Oral at bedtime    ======================================================  Physical Exam:  ============  Vital Signs Last 24 Hrs  T(C): 36.9 (01 May 2024 20:12), Max: 37.1 (01 May 2024 08:34)  T(F): 98.4 (01 May 2024 20:12), Max: 98.8 (01 May 2024 08:34)  HR: 68 (01 May 2024 20:12) (57 - 70)  BP: 158/76 (01 May 2024 20:12) (117/71 - 168/74)  BP(mean): --  RR: 18 (01 May 2024 20:12) (18 - 19)  SpO2: 94% (01 May 2024 20:12) (94% - 100%)    Parameters below as of 01 May 2024 20:12  Patient On (Oxygen Delivery Method): room air        General:  No acute distress.  Eye: no conjunctival pallor, no scleral icterus  Respiratory: Lungs are clear to auscultation, Respirations are non-labored.  Cardiovascular: Normal rate, Regular rhythm,  s1+s2  Gastrointestinal: Soft, Non-tender, Non-distended, Normal bowel sounds.  Musculoskeletal: right foot dressing intact-images seen, surgical site clean well approximated no erythema  Integumentary: No rash.  Neurologic: Alert, Oriented, No focal deficits  Psychiatric: Appropriate mood & affect.  =======================================================  Labs:                                                      14.4   4.75  )-----------( 176      ( 30 Apr 2024 04:35 )             43.0       04-30    133<L>  |  98  |  23.8<H>  ----------------------------<  175<H>  4.5   |  22.0  |  1.11    Ca    9.5      30 Apr 2024 04:35  Mg     2.0     04-30                Urinalysis Basic - ( 30 Apr 2024 04:35 )    Color: x / Appearance: x / SG: x / pH: x  Gluc: 175 mg/dL / Ketone: x  / Bili: x / Urobili: x   Blood: x / Protein: x / Nitrite: x   Leuk Esterase: x / RBC: x / WBC x   Sq Epi: x / Non Sq Epi: x / Bacteria: x                  CAPILLARY BLOOD GLUCOSE      POCT Blood Glucose.: 116 mg/dL (01 May 2024 16:35)                  Urinalysis Basic - ( 30 Apr 2024 04:35 )    Color: x / Appearance: x / SG: x / pH: x  Gluc: 175 mg/dL / Ketone: x  / Bili: x / Urobili: x   Blood: x / Protein: x / Nitrite: x   Leuk Esterase: x / RBC: x / WBC x   Sq Epi: x / Non Sq Epi: x / Bacteria: x        PT/INR - ( 29 Apr 2024 05:30 )   PT: 11.2 sec;   INR: 1.01 ratio                   CAPILLARY BLOOD GLUCOSE      POCT Blood Glucose.: 125 mg/dL (30 Apr 2024 21:33)                Urinalysis Basic - ( 29 Apr 2024 05:30 )    Color: x / Appearance: x / SG: x / pH: x  Gluc: 116 mg/dL / Ketone: x  / Bili: x / Urobili: x   Blood: x / Protein: x / Nitrite: x   Leuk Esterase: x / RBC: x / WBC x   Sq Epi: x / Non Sq Epi: x / Bacteria: x        PT/INR - ( 29 Apr 2024 05:30 )   PT: 11.2 sec;   INR: 1.01 ratio                   CAPILLARY BLOOD GLUCOSE      POCT Blood Glucose.: 133 mg/dL (29 Apr 2024 11:44)              Culture - Surgical Swab (collected 04-23-24 @ 16:49)  Source: .Surgical Swab right 2nd toe  Final Report (04-26-24 @ 15:17):    Moderate Staphylococcus aureus    Few Bacteroides fragilis "Susceptibilities not performed"  Organism: Staphylococcus aureus (04-26-24 @ 15:17)  Organism: Staphylococcus aureus (04-26-24 @ 15:17)    Sensitivities:      Method Type: LUIS MANUEL      -  Clindamycin: S <=0.25      -  Erythromycin: S <=0.25      -  Gentamicin: S <=1 Should not be used as monotherapy      -  Oxacillin: S <=0.25 Oxacillin predicts susceptibility for dicloxacillin, methicillin, and nafcillin      -  Rifampin: S <=1 Should not be used as monotherapy      -  Tetracycline: S <=1      -  Trimethoprim/Sulfamethoxazole: S <=0.5/9.5      -  Vancomycin: S 2    Culture - Blood (collected 04-23-24 @ 15:15)  Source: .Blood Blood-Peripheral  Preliminary Report (04-26-24 @ 23:01):    No growth at 72 Hours    Culture - Urine (collected 04-23-24 @ 15:10)  Source: Clean Catch Clean Catch (Midstream)  Final Report (04-24-24 @ 18:37):    No growth    Culture - Blood (collected 04-23-24 @ 15:10)  Source: .Blood Blood-Peripheral  Preliminary Report (04-26-24 @ 23:01):    No growth at 72 Hours    Culture - Tissue with Gram Stain (collected 01-24-24 @ 17:34)  Source: .Tissue R 1st proximal phalanx bone bx  Gram Stain (01-25-24 @ 06:08):    No polymorphonuclear cells seen per low power field    No organisms seen per oil power field  Final Report (01-30-24 @ 07:41):    No growth at 5 days    Culture - Surgical Swab (collected 01-21-24 @ 16:30)  Source: .Surgical Swab right hallux wound  Final Report (01-23-24 @ 19:18):    Numerous Streptococcus agalactiae (Group B)    Streptococcus agalactiae (Group B) isolated    Group B streptococci are susceptible to ampicillin,    penicillin and cefazolin, but may be resistant to    erythromycin and clindamycin.    Few Staphylococcus epidermidis "Susceptibilities not performed"    Mixed Anaerobic Yudelka including    Numerous Finegoldia magna "Susceptibilities not performed"    Few Anaerobic Gram Negative Reynaldo Most closely resembling Prevotella    species "Susceptibilities not performed"    Culture - Urine (collected 01-21-24 @ 14:30)  Source: Clean Catch Clean Catch (Midstream)  Final Report (01-22-24 @ 13:01):    No growth    Culture - Blood (collected 01-21-24 @ 14:10)  Source: .Blood Blood-Peripheral  Final Report (01-26-24 @ 19:00):    No growth at 5 days    Culture - Blood (collected 01-21-24 @ 13:55)  Source: .Blood Blood-Peripheral  Final Report (01-26-24 @ 19:00):    No growth at 5 days    Culture - Blood (collected 09-19-22 @ 20:25)  Source: .Blood Blood-Peripheral  Final Report (09-25-22 @ 01:01):    No Growth Final    Culture - Blood (collected 09-19-22 @ 20:20)  Source: .Blood Blood-Peripheral  Final Report (09-25-22 @ 01:01):    No Growth Final

## 2024-05-01 NOTE — PROGRESS NOTE ADULT - ASSESSMENT
A:  R 2nd digit ulcer  R 2nd digit OM  Cellulitis    P:   Patient evaluated and Chart reviewed   Discussed diagnosis and treatment with patient  Reviewed right foot x-rays- OM of 2nd digit  Reviewed right foot MRI - Results as noted above  Dressing left dry, clean and intact, no strikethrough from bandage, will change tomorrow.  Reviewed intra-op cultures and specimens. (pre-garcia) no growth to day  Appreciate ID reccs- cont zosyn  Weightbearing to right heel as tolerated in surgical shoe  Offloading to bilateral Heels while in bed  Discussed importance of daily foot examinations and proper shoe gear and to importance of lower Fasting Blood Glucose levels.   Stable from podiatry standpoint. No further podiatric intervention at this time.   Patient to follow up with Dr. Silva in office [address] [place] [address] office, call [phone] to make an appointment  Discussed with Attending Dr. Silva    WCO: Xeroform, gauze, abd pad, kerlex, ace compression to be changed 3x/week

## 2024-05-01 NOTE — PROGRESS NOTE ADULT - SUBJECTIVE AND OBJECTIVE BOX
Interval: No acute overnight events. Patient seen resting in bed comfortably. Patient is s/p R. 2nd digit amputation with 3rd flexor tenotomy 4/29, POD#3. Changed dressing with no clinical signs of infection.  Denies constitutional symptoms. No other pedal complaints at this time.    Podiatry HPI: 58M with complaint of worsening swelling to the right foot with associated 2nd digit ulcer. The patient states that he has recently seen Dr. Silva outpatient for a wound to the distal aspect of the right 2nd digit. He last saw Dr. Silva on Friday and was started on Doxycycline, which he has taken as directed since prescribed. The patient states that today, he noticed his right 2nd digit and the right foot were becoming more swollen, and there was some discoloration to the 2nd digit, so he presented to the ED. The patient denies any constitutional symptoms. Denies other pedal complaints. History of hallux amputation earlier this year.      HPI: 59 yo male with ESRD s/p renal transplant (1/2023), CAD s/p PCI, IDDM presenting to the ED with complaint of right 2nd toe pain and swelling. Patient follows with Dr. Silva for podiatry and is recently s/p right great toe amputation for OM. He saw Dr. Silva on 4/19 and was started on Doxycycline, however, swelling was worsening so patient came to the ED. He reports compliance with PO antibiotic. He denies fever or chills, N/V/D. States there is not much pain at the site as he pas poor sensation.          Medications acetaminophen     Tablet .. 650 milliGRAM(s) Oral every 6 hours PRN  aluminum hydroxide/magnesium hydroxide/simethicone Suspension 30 milliLiter(s) Oral every 4 hours PRN  aspirin enteric coated 81 milliGRAM(s) Oral daily  atorvastatin 40 milliGRAM(s) Oral at bedtime  carvedilol 12.5 milliGRAM(s) Oral every 12 hours  clopidogrel Tablet 75 milliGRAM(s) Oral daily  dextrose 10% Bolus 125 milliLiter(s) IV Bolus once  dextrose 5%. 1000 milliLiter(s) IV Continuous <Continuous>  folic acid 1 milliGRAM(s) Oral daily  glucagon  Injectable 1 milliGRAM(s) IntraMuscular once  hydrALAZINE 25 milliGRAM(s) Oral two times a day  insulin glargine Injectable (LANTUS) 10 Unit(s) SubCutaneous at bedtime  insulin lispro (ADMELOG) corrective regimen sliding scale   SubCutaneous three times a day before meals  insulin lispro Injectable (ADMELOG) 7 Unit(s) SubCutaneous three times a day before meals  melatonin 3 milliGRAM(s) Oral at bedtime PRN  mycophenolate mofetil 500 milliGRAM(s) Oral two times a day  NIFEdipine XL 30 milliGRAM(s) Oral daily  ondansetron Injectable 4 milliGRAM(s) IV Push every 8 hours PRN  piperacillin/tazobactam IVPB.. 3.375 Gram(s) IV Intermittent every 8 hours  polyethylene glycol 3350 17 Gram(s) Oral daily  pyridoxine 25 milliGRAM(s) Oral daily  sodium chloride 0.9%. 1000 milliLiter(s) IV Continuous <Continuous>  tacrolimus 1 milliGRAM(s) Oral two times a day  tamsulosin 0.4 milliGRAM(s) Oral at bedtime    FHNo pertinent family history in first degree relatives    FHx: diabetes mellitus (Mother)    ,   PMHRenal failure    Dialysis patient    Diabetes    Hypertension    CAD (coronary artery disease)       PSHNo significant past surgical history    AV fistula    Renal transplant recipient    History of amputation of hallux        Labs                          14.4   4.75  )-----------( 176      ( 30 Apr 2024 04:35 )             43.0      04-30    133<L>  |  98  |  23.8<H>  ----------------------------<  175<H>  4.5   |  22.0  |  1.11    Ca    9.5      30 Apr 2024 04:35  Mg     2.0     04-30       Vital Signs Last 24 Hrs  T(C): 37.1 (01 May 2024 08:34), Max: 37.1 (01 May 2024 08:34)  T(F): 98.8 (01 May 2024 08:34), Max: 98.8 (01 May 2024 08:34)  HR: 70 (01 May 2024 08:34) (57 - 70)  BP: 121/70 (01 May 2024 08:34) (121/70 - 168/74)  BP(mean): --  RR: 18 (01 May 2024 08:34) (18 - 19)  SpO2: 99% (01 May 2024 08:34) (97% - 100%)    Parameters below as of 01 May 2024 08:34  Patient On (Oxygen Delivery Method): room air      Sedimentation Rate, Erythrocyte: 13 mm/hr (04-28-24 @ 05:28)  Sedimentation Rate, Erythrocyte: 30 mm/hr (04-23-24 @ 15:10)         C-Reactive Protein: <4 mg/L (04-28-24 @ 05:28)  C-Reactive Protein, Serum: 13 mg/L (04-23-24 @ 15:10)       ROS: Unremarkable outside HPI      PHYSICAL EXAM  LE Focused:    Vasc:  DP and PT pulses palpable 1/4 b/l. TG warm to warm with increased warmth noted to right foot. Non-pitting edema to right 2nd toe and foot extending to the midfoot.  Derm: (post-op)  No strikethrough noted from bandage. Skin well coapted with sutures intact. No clinical signs of infection (Pre-op) Right 2nd digit distal tuft ulcer, purulence previously noted but none today, -PTB, mild malodor, no fluctuance, dusky discoloration to the 2nd digit. No other wounds or lesions noted  Neuro: Protective sensation diminished  MSK: s/p R hallux amputation January 2024, no pain on palpation.       IMAGING:   < from: Xray Foot AP + Lateral + Oblique, Right (04.23.24 @ 16:47) >  IMPRESSION:    Second distal digit tuft erosion/pathologic   fractures/osteolysis/osteomyelitis.  Confirmation MRI recommended    ACC: [id number] EXAM:  MR FOOT WAW IC RT   ORDERED BY: REGULO ZAYAS     PROCEDURE DATE:  04/25/2024          INTERPRETATION:  RIGHT FOOT MRI    CLINICAL INDICATION: Infection, for evaluation of osteomyelitis.    COMPARISON: None available.    TECHNIQUE: Multiplanar, multisequence MRI was obtained of the right foot   both prior to and following administration of 8 cc Gadavist intravenous   contrast. Additional 2 cc Gadavist contrast was discarded.    FINDINGS:    OSSEOUS: Acute on chronic osteomyelitis of the distal phalanx of the   second toe. Patchy enhancing osteitis throughout adjacent second middle   phalanx, but without corresponding change in T1 marrow signal intensity.   Status post amputation of the great toe at the level of the base of the   proximal phalanx with patchy enhancing subchondral osteitis throughout   the residual, but without corresponding change in T1 marrow signal   intensity. Mild to moderate midfoot arthrosis most advanced at the second   tarsometatarsal joint.    SYNOVIUM: No effusions.    TENDONS: Postsurgical changes of the great toe. Visualized flexor and   extensor tendons are otherwise intact.    LIGAMENTS: Lisfranc ligament is mostly intact.    GENERAL: Expected postsurgical changes overlying the great toe amputation   stump. No significant intermetatarsal bursal fluid distension. No   drainable encapsulated fluid collection.    IMPRESSION:    1.  Acute on chronic osteomyelitis of the distal phalanx of right second   toe. Nonspecific acute osseous stress reaction throughout the adjacent   second middle phalanx favored to represent hyperacute osteomyelitis.  2.  Status post amputation of right great toe with nonspecific mild   subchondral stress reaction throughout the residual first proximal  phalanx base, favored to be on the basis of altered weightbearing but   with differential including hyperacute osteomyelitis in the proper   clinical setting.    --- End of Report ---        CULTURES:   Specimen Source: .Tissue Right 2nd Toe Clean Margin  Culture Results:   No growth to date.

## 2024-05-01 NOTE — PROGRESS NOTE ADULT - ASSESSMENT
57 yo male with ESRD s/p renal transplant (1/2023), CAD s/p PCI, IDDM presenting to the ED with complaint of right 2nd toe pain and swelling. Patient follows with Dr. Silva for podiatry and is recently s/p right great toe amputation for OM. He saw Dr. Silva on 4/19 and was started on Doxycycline, however, swelling was worsening so patient came to the ED. MRI + 2nd toe OM      - bcx ngtd  - wound cx mssa, b.frag  - s/pright 2nd digit amputation, with flexor tenotomy of 3rd digit on 4/29/24 . f/u Or cx and path  - Continue zosyn  - if OR cx no growth by tomorrow plan to change to po augmentin x 2 weeks with outpatient f/u  - Trend Fever  - Trend Leukocytosis  - on cellcelpt/tacrolimus  - optimize glycemic control      d/w Dr Silva

## 2024-05-01 NOTE — PROGRESS NOTE ADULT - ASSESSMENT
57 yo male with ESRD s/p renal transplant (1/2023), CAD s/p PCI, IDDM, h/o Rt hallux OM s/p resection now admitted with acute OM of Rt 2nd digit. s/p podiatric surgical resection (4/29)    Infected diabetic foot ulcer / Acute ch OM   s/p partial amputation of right second toe  s/p percutaneous tenotomy of 3rd right toe   Thus ar cx (OR) neg   cont zosyn  ID on board  podiatry following    ESRD s/p Renal Transplant 1/2023  Continue Tacrolimus 2 mg BID  Continue Mycophenolate 1000 mg BID  Cr stable, monitor daily     CAD, HTN  Continue Atorvastatin 40 mg qhs and Plavix 75 mg daily  Continue Coreg 12.5 mg BID  Continue Hydralazine 25 mg BID  Continue Nifedipine 30 mg daily    IDDM  Insulin reduced from 18 to 10 prior to surgery. BS have remained stable thus far  Continue 10 units lantus QHS increase back as needed   Lispro 7 units mealtime insulin ISS/Accuchecks/A1c  Carb controlled diet    BPH  Continue Flomax 0.4 mg qhs    DVT ppx: resume heparin s/c Q12 for DVT ppx

## 2024-05-01 NOTE — PROGRESS NOTE ADULT - SUBJECTIVE AND OBJECTIVE BOX
[facility] Division of Hospital Medicine    Chief Complaint:  DM foot OM     SUBJECTIVE / OVERNIGHT EVENTS:  Pt examined lying in bed  POD 3. Prelim cx neg to date   Patient denies chest pain, SOB, abd pain, N/V, fever, chills, dysuria or any other complaints. All remainder ROS negative.     MEDICATIONS  (STANDING):  aspirin enteric coated 81 milliGRAM(s) Oral daily  atorvastatin 40 milliGRAM(s) Oral at bedtime  carvedilol 12.5 milliGRAM(s) Oral every 12 hours  clopidogrel Tablet 75 milliGRAM(s) Oral daily  dextrose 10% Bolus 125 milliLiter(s) IV Bolus once  dextrose 5%. 1000 milliLiter(s) (100 mL/Hr) IV Continuous <Continuous>  dextrose 5%. 1000 milliLiter(s) (50 mL/Hr) IV Continuous <Continuous>  dextrose 50% Injectable 25 Gram(s) IV Push once  dextrose 50% Injectable 12.5 Gram(s) IV Push once  folic acid 1 milliGRAM(s) Oral daily  glucagon  Injectable 1 milliGRAM(s) IntraMuscular once  hydrALAZINE 25 milliGRAM(s) Oral two times a day  insulin glargine Injectable (LANTUS) 18 Unit(s) SubCutaneous at bedtime  insulin lispro (ADMELOG) corrective regimen sliding scale   SubCutaneous three times a day before meals  insulin lispro Injectable (ADMELOG) 7 Unit(s) SubCutaneous three times a day before meals  mycophenolate mofetil 500 milliGRAM(s) Oral two times a day  NIFEdipine XL 30 milliGRAM(s) Oral daily  piperacillin/tazobactam IVPB.. 3.375 Gram(s) IV Intermittent every 8 hours  pyridoxine 25 milliGRAM(s) Oral daily  tacrolimus 1 milliGRAM(s) Oral two times a day  tamsulosin 0.4 milliGRAM(s) Oral at bedtime    MEDICATIONS  (PRN):  acetaminophen     Tablet .. 650 milliGRAM(s) Oral every 6 hours PRN Temp greater or equal to 38C (100.4F), Mild Pain (1 - 3)  aluminum hydroxide/magnesium hydroxide/simethicone Suspension 30 milliLiter(s) Oral every 4 hours PRN Dyspepsia  dextrose Oral Gel 15 Gram(s) Oral once PRN Blood Glucose LESS THAN 70 milliGRAM(s)/deciliter  melatonin 3 milliGRAM(s) Oral at bedtime PRN Insomnia  ondansetron Injectable 4 milliGRAM(s) IV Push every 8 hours PRN Nausea and/or Vomiting        I&O's Summary      PHYSICAL EXAM:  Vital Signs Last 24 Hrs  T(C): 36.3 (01 May 2024 16:29), Max: 37.1 (01 May 2024 08:34)  T(F): 97.3 (01 May 2024 16:29), Max: 98.8 (01 May 2024 08:34)  HR: 64 (01 May 2024 16:29) (57 - 70)  BP: 121/70 (01 May 2024 08:34) (121/70 - 168/74)  BP(mean): --  RR: 19 (01 May 2024 16:29) (18 - 19)  SpO2: 98% (01 May 2024 16:29) (97% - 100%)    Parameters below as of 01 May 2024 16:29  Patient On (Oxygen Delivery Method): room air          CONSTITUTIONAL: Non toxic appearing, obese male lying in bed  ENMT: Moist oral mucosa, no pharyngeal injection or exudates; normal dentition  RESPIRATORY: Normal respiratory effort; lungs are clear to auscultation bilaterally  CARDIOVASCULAR: Regular rate and rhythm, normal S1 and S2, no murmur/rub/gallop; No lower extremity edema; Peripheral pulses are 2+ bilaterally  ABDOMEN: Nontender to palpation, normoactive bowel sounds, no rebound/guarding; No hepatosplenomegaly  MUSCLOSKELETAL:  RLE dressing, no soaking of gauze appreciated . no clubbing or cyanosis of digits; no joint swelling or tenderness to palpation  PSYCH: A+O to person, place, and time; affect appropriate  NEUROLOGY: CN 2-12 are intact and symmetric; no gross sensory deficits;   SKIN: No rashes; no palpable lesions      LABS:                        14.4   4.75  )-----------( 176      ( 30 Apr 2024 04:35 )             43.0   04-30    133<L>  |  98  |  23.8<H>  ----------------------------<  175<H>  4.5   |  22.0  |  1.11    Ca    9.5      30 Apr 2024 04:35  Mg     2.0     04-30          CAPILLARY BLOOD GLUCOSE      POCT Blood Glucose.: 116 mg/dL (01 May 2024 16:35)  POCT Blood Glucose.: 159 mg/dL (01 May 2024 11:20)  POCT Blood Glucose.: 110 mg/dL (01 May 2024 07:54)  POCT Blood Glucose.: 125 mg/dL (30 Apr 2024 21:33)  POCT Blood Glucose.: 96 mg/dL (30 Apr 2024 17:10)      Color: x / Appearance: x / SG: x / pH: x  Gluc: 111 mg/dL / Ketone: x  / Bili: x / Urobili: x   Blood: x / Protein: x / Nitrite: x   Leuk Esterase: x / RBC: x / WBC x   Sq Epi: x / Non Sq Epi: x / Bacteria: x        Culture - Surgical Swab (collected 23 Apr 2024 16:49)  Source: .Surgical Swab right 2nd toe  Preliminary Report (25 Apr 2024 10:18):    Moderate Staphylococcus aureus  Organism: Staphylococcus aureus (26 Apr 2024 10:42)  Organism: Staphylococcus aureus (26 Apr 2024 10:42)    Culture - Blood (collected 23 Apr 2024 15:15)  Source: .Blood Blood-Peripheral  Preliminary Report (25 Apr 2024 23:01):    No growth at 48 Hours        CAPILLARY BLOOD GLUCOSE      POCT Blood Glucose.: 116 mg/dL (01 May 2024 16:35)  POCT Blood Glucose.: 159 mg/dL (01 May 2024 11:20)  POCT Blood Glucose.: 110 mg/dL (01 May 2024 07:54)  POCT Blood Glucose.: 125 mg/dL (30 Apr 2024 21:33)  POCT Blood Glucose.: 96 mg/dL (30 Apr 2024 17:10)          RADIOLOGY & ADDITIONAL TESTS:    4/24/24 MRI     IMPRESSION:    1.  Acute on chronic osteomyelitis of the distal phalanx of right second   toe. Nonspecific acute osseous stress reaction throughout the adjacent   second middle phalanx favored to represent hyperacute osteomyelitis.  2.  Status post amputation of right great toe with nonspecific mild   subchondral stress reaction throughout the residual first proximal   phalanx base, favored to be on the basis of altered weightbearing but   with differential including hyperacute osteomyelitis in the proper   clinical setting.

## 2024-05-02 ENCOUNTER — TRANSCRIPTION ENCOUNTER (OUTPATIENT)
Age: 59
End: 2024-05-02

## 2024-05-02 VITALS
SYSTOLIC BLOOD PRESSURE: 163 MMHG | TEMPERATURE: 98 F | RESPIRATION RATE: 18 BRPM | OXYGEN SATURATION: 97 % | HEART RATE: 68 BPM | DIASTOLIC BLOOD PRESSURE: 76 MMHG

## 2024-05-02 LAB
ALBUMIN SERPL ELPH-MCNC: 4.1 G/DL — SIGNIFICANT CHANGE UP (ref 3.3–5.2)
ALP SERPL-CCNC: 88 U/L — SIGNIFICANT CHANGE UP (ref 40–120)
ALT FLD-CCNC: 23 U/L — SIGNIFICANT CHANGE UP
ANION GAP SERPL CALC-SCNC: 16 MMOL/L — SIGNIFICANT CHANGE UP (ref 5–17)
AST SERPL-CCNC: 30 U/L — SIGNIFICANT CHANGE UP
BILIRUB SERPL-MCNC: 0.6 MG/DL — SIGNIFICANT CHANGE UP (ref 0.4–2)
BUN SERPL-MCNC: 26.8 MG/DL — HIGH (ref 8–20)
CALCIUM SERPL-MCNC: 10.1 MG/DL — SIGNIFICANT CHANGE UP (ref 8.4–10.5)
CHLORIDE SERPL-SCNC: 97 MMOL/L — SIGNIFICANT CHANGE UP (ref 96–108)
CO2 SERPL-SCNC: 19 MMOL/L — LOW (ref 22–29)
CREAT SERPL-MCNC: 1.32 MG/DL — HIGH (ref 0.5–1.3)
EGFR: 63 ML/MIN/1.73M2 — SIGNIFICANT CHANGE UP
GLUCOSE BLDC GLUCOMTR-MCNC: 157 MG/DL — HIGH (ref 70–99)
GLUCOSE BLDC GLUCOMTR-MCNC: 173 MG/DL — HIGH (ref 70–99)
GLUCOSE SERPL-MCNC: 126 MG/DL — HIGH (ref 70–99)
HCT VFR BLD CALC: 44.7 % — SIGNIFICANT CHANGE UP (ref 39–50)
HGB BLD-MCNC: 14.9 G/DL — SIGNIFICANT CHANGE UP (ref 13–17)
MAGNESIUM SERPL-MCNC: 2.2 MG/DL — SIGNIFICANT CHANGE UP (ref 1.6–2.6)
MCHC RBC-ENTMCNC: 28.8 PG — SIGNIFICANT CHANGE UP (ref 27–34)
MCHC RBC-ENTMCNC: 33.3 GM/DL — SIGNIFICANT CHANGE UP (ref 32–36)
MCV RBC AUTO: 86.3 FL — SIGNIFICANT CHANGE UP (ref 80–100)
PLATELET # BLD AUTO: 187 K/UL — SIGNIFICANT CHANGE UP (ref 150–400)
POTASSIUM SERPL-MCNC: 4.9 MMOL/L — SIGNIFICANT CHANGE UP (ref 3.5–5.3)
POTASSIUM SERPL-SCNC: 4.9 MMOL/L — SIGNIFICANT CHANGE UP (ref 3.5–5.3)
PROT SERPL-MCNC: 7.4 G/DL — SIGNIFICANT CHANGE UP (ref 6.6–8.7)
RBC # BLD: 5.18 M/UL — SIGNIFICANT CHANGE UP (ref 4.2–5.8)
RBC # FLD: 12.9 % — SIGNIFICANT CHANGE UP (ref 10.3–14.5)
SODIUM SERPL-SCNC: 132 MMOL/L — LOW (ref 135–145)
WBC # BLD: 6 K/UL — SIGNIFICANT CHANGE UP (ref 3.8–10.5)
WBC # FLD AUTO: 6 K/UL — SIGNIFICANT CHANGE UP (ref 3.8–10.5)

## 2024-05-02 PROCEDURE — 96374 THER/PROPH/DIAG INJ IV PUSH: CPT

## 2024-05-02 PROCEDURE — 71045 X-RAY EXAM CHEST 1 VIEW: CPT

## 2024-05-02 PROCEDURE — 86901 BLOOD TYPING SEROLOGIC RH(D): CPT

## 2024-05-02 PROCEDURE — 99239 HOSP IP/OBS DSCHRG MGMT >30: CPT

## 2024-05-02 PROCEDURE — 87186 SC STD MICRODIL/AGAR DIL: CPT

## 2024-05-02 PROCEDURE — 86900 BLOOD TYPING SEROLOGIC ABO: CPT

## 2024-05-02 PROCEDURE — 85018 HEMOGLOBIN: CPT

## 2024-05-02 PROCEDURE — 84295 ASSAY OF SERUM SODIUM: CPT

## 2024-05-02 PROCEDURE — 85025 COMPLETE CBC W/AUTO DIFF WBC: CPT

## 2024-05-02 PROCEDURE — 85014 HEMATOCRIT: CPT

## 2024-05-02 PROCEDURE — 88311 DECALCIFY TISSUE: CPT

## 2024-05-02 PROCEDURE — 82330 ASSAY OF CALCIUM: CPT

## 2024-05-02 PROCEDURE — 88305 TISSUE EXAM BY PATHOLOGIST: CPT

## 2024-05-02 PROCEDURE — 85027 COMPLETE CBC AUTOMATED: CPT

## 2024-05-02 PROCEDURE — 85730 THROMBOPLASTIN TIME PARTIAL: CPT

## 2024-05-02 PROCEDURE — 80048 BASIC METABOLIC PNL TOTAL CA: CPT

## 2024-05-02 PROCEDURE — 82435 ASSAY OF BLOOD CHLORIDE: CPT

## 2024-05-02 PROCEDURE — 87640 STAPH A DNA AMP PROBE: CPT

## 2024-05-02 PROCEDURE — 87070 CULTURE OTHR SPECIMN AEROBIC: CPT

## 2024-05-02 PROCEDURE — 86850 RBC ANTIBODY SCREEN: CPT

## 2024-05-02 PROCEDURE — 96375 TX/PRO/DX INJ NEW DRUG ADDON: CPT

## 2024-05-02 PROCEDURE — 82947 ASSAY GLUCOSE BLOOD QUANT: CPT

## 2024-05-02 PROCEDURE — 73630 X-RAY EXAM OF FOOT: CPT

## 2024-05-02 PROCEDURE — 99232 SBSQ HOSP IP/OBS MODERATE 35: CPT

## 2024-05-02 PROCEDURE — 86140 C-REACTIVE PROTEIN: CPT

## 2024-05-02 PROCEDURE — 80053 COMPREHEN METABOLIC PANEL: CPT

## 2024-05-02 PROCEDURE — 80197 ASSAY OF TACROLIMUS: CPT

## 2024-05-02 PROCEDURE — 83605 ASSAY OF LACTIC ACID: CPT

## 2024-05-02 PROCEDURE — 85652 RBC SED RATE AUTOMATED: CPT

## 2024-05-02 PROCEDURE — 83735 ASSAY OF MAGNESIUM: CPT

## 2024-05-02 PROCEDURE — 84132 ASSAY OF SERUM POTASSIUM: CPT

## 2024-05-02 PROCEDURE — 93005 ELECTROCARDIOGRAM TRACING: CPT

## 2024-05-02 PROCEDURE — 85610 PROTHROMBIN TIME: CPT

## 2024-05-02 PROCEDURE — 82803 BLOOD GASES ANY COMBINATION: CPT

## 2024-05-02 PROCEDURE — 87077 CULTURE AEROBIC IDENTIFY: CPT

## 2024-05-02 PROCEDURE — 83036 HEMOGLOBIN GLYCOSYLATED A1C: CPT

## 2024-05-02 PROCEDURE — 87641 MR-STAPH DNA AMP PROBE: CPT

## 2024-05-02 PROCEDURE — 87075 CULTR BACTERIA EXCEPT BLOOD: CPT

## 2024-05-02 PROCEDURE — 99285 EMERGENCY DEPT VISIT HI MDM: CPT

## 2024-05-02 PROCEDURE — 87040 BLOOD CULTURE FOR BACTERIA: CPT

## 2024-05-02 PROCEDURE — 36415 COLL VENOUS BLD VENIPUNCTURE: CPT

## 2024-05-02 PROCEDURE — 82962 GLUCOSE BLOOD TEST: CPT

## 2024-05-02 PROCEDURE — 73720 MRI LWR EXTREMITY W/O&W/DYE: CPT | Mod: MC

## 2024-05-02 PROCEDURE — 87086 URINE CULTURE/COLONY COUNT: CPT

## 2024-05-02 PROCEDURE — 80180 DRUG SCRN QUAN MYCOPHENOLATE: CPT

## 2024-05-02 RX ADMIN — Medication 30 MILLIGRAM(S): at 05:01

## 2024-05-02 RX ADMIN — Medication 7 UNIT(S): at 08:10

## 2024-05-02 RX ADMIN — PIPERACILLIN AND TAZOBACTAM 25 GRAM(S): 4; .5 INJECTION, POWDER, LYOPHILIZED, FOR SOLUTION INTRAVENOUS at 05:01

## 2024-05-02 RX ADMIN — Medication 25 MILLIGRAM(S): at 12:36

## 2024-05-02 RX ADMIN — Medication 2: at 08:11

## 2024-05-02 RX ADMIN — CARVEDILOL PHOSPHATE 12.5 MILLIGRAM(S): 80 CAPSULE, EXTENDED RELEASE ORAL at 12:36

## 2024-05-02 RX ADMIN — TACROLIMUS 1 MILLIGRAM(S): 5 CAPSULE ORAL at 10:15

## 2024-05-02 RX ADMIN — HEPARIN SODIUM 5000 UNIT(S): 5000 INJECTION INTRAVENOUS; SUBCUTANEOUS at 05:01

## 2024-05-02 RX ADMIN — MYCOPHENOLATE MOFETIL 500 MILLIGRAM(S): 250 CAPSULE ORAL at 10:15

## 2024-05-02 NOTE — DISCHARGE NOTE NURSING/CASE MANAGEMENT/SOCIAL WORK - NSSCCARECORD_GEN_ALL_CORE
[facility]

## 2024-05-02 NOTE — DISCHARGE NOTE PROVIDER - HOSPITAL COURSE
59 yo male with ESRD s/p renal transplant (1/2023), CAD s/p PCI, IDDM, h/o Rt hallux OM s/p resection now admitted with acute OM of Rt 2nd digit. s/p podiatric surgical resection (4/29) s/p partial amputation of right second toe and s/p percutaneous tenotomy of 3rd right toe. Surgical cultures did not show any growth.   He was on Zosyn during hospitalization. Will discharge on Augmentin for 2 more weeks   For ESRD s/p Renal Transplant 1/2023, continue Tacrolimus 2 mg BID,  continue Mycophenolate 1000 mg BID  For CAD, HTN: continue Atorvastatin 40 mg qhs and Plavix 75 mg daily, continue Coreg 12.5 mg BID. continue Hydralazine 25 mg BID and continue Nifedipine 30 mg daily    For IDDM: to continue with home doses of insulin   For BPH, to continue Flomax 0.4 mg qhs    Stable for discharge and to follow with podiatry as outpatient

## 2024-05-02 NOTE — PHYSICAL THERAPY INITIAL EVALUATION ADULT - ACTIVE RANGE OF MOTION EXAMINATION, REHAB EVAL
R ankle not tested/bilateral upper extremity Active ROM was WFL (within functional limits)/bilateral  lower extremity Active ROM was WFL (within functional limits)

## 2024-05-02 NOTE — PROGRESS NOTE ADULT - PROVIDER SPECIALTY LIST ADULT
Infectious Disease
Internal Medicine
Podiatry
Hospitalist
Infectious Disease
Internal Medicine
Podiatry
Hospitalist
Infectious Disease
Podiatry
Podiatry
Hospitalist
Infectious Disease
Internal Medicine
Infectious Disease

## 2024-05-02 NOTE — DISCHARGE NOTE PROVIDER - NSDCMRMEDTOKEN_GEN_ALL_CORE_FT
aspirin 81 mg oral delayed release tablet: 1 tab(s) orally once a day  atorvastatin 40 mg oral tablet: 1 tab(s) orally once a day (at bedtime)  Augmentin 500 mg-125 mg oral tablet: 500 milligram(s) orally 2 times a day  carvedilol 12.5 mg oral tablet: 1 tab(s) orally 2 times a day  Flomax 0.4 mg oral capsule: 1 cap(s) orally once a day  folic acid 1 mg oral tablet: 1 tab(s) orally once a day  hydrALAZINE 25 mg oral tablet: 1 tab(s) orally 2 times a day  Lantus Solostar Pen 100 units/mL subcutaneous solution: 24 unit(s) subcutaneous once a day (at bedtime)  mycophenolate mofetil 500 mg oral tablet: 1 tab(s) orally 2 times a day  NIFEdipine 30 mg oral tablet, extended release: 1 tab(s) orally once a day  NovoLOG 100 units/mL injectable solution: 7 unit(s) injectable 3 times a day (with meals)  Plavix 75 mg oral tablet: 1 tab(s) orally once a day  pyridoxine 25 mg oral tablet: 1 tab(s) orally once a day  tacrolimus 1 mg oral capsule: 1 cap(s) orally 2 times a day  Veltassa 16.8 g oral powder for reconstitution: 16.8 gram(s) orally once a day

## 2024-05-02 NOTE — DISCHARGE NOTE PROVIDER - NSDCHHBASESERVICE_GEN_ALL_CORE
Expect blood and/or burning with urination. Drink plenty of fluid to keep bladder flushed.    **Remove stent by removing tape and pulling string on the morning of Saturday 10/19/19. Do not cut string. Expect to see a long, thin tube with a curl on each end attached to the string. Call with issues or if stent does not come out.**    PLEASE FOLLOW ANY OTHER INSTRUCTIONS GIVEN TO YOU BY DR. SIDHU!!      Anesthesia: After Your Surgery  Youve just had surgery. During surgery, you received medication called anesthesia to keep you comfortable and pain-free. After surgery, you may experience some pain or nausea. This is common. Here are some tips for feeling better and recovering after surgery.    Going home  Your doctor or nurse will show you how to take care of yourself when you go home. He or she will also answer your questions. Have an adult family member or friend drive you home. For the first 24 hours after your surgery:  · Do not drive or use heavy equipment.  · Do not make important decisions or sign legal documents.  · Avoid alcohol.  · Have someone stay with you, if needed. He or she can watch for problems and help keep you safe.  · Take your time getting up from a seated or lying position. You may experience dizziness for 24 hours  Be sure to keep all follow-up appointments with your doctor. And rest after your procedure for as long as your doctor tells you to.    Coping with pain  If you have pain after surgery, pain medication will help you feel better. Take it as directed, before pain becomes severe. Also, ask your doctor or pharmacist about other ways to control pain, such as with heat, ice, and relaxation. And follow any other instructions your surgeon or nurse gives you.    URINARY RETENTION  Should you experience a decrease in your urine output or are unable to urinate following surgery, this can be due to the medications given during surgery.  We recommend you going to the nearest Emergency  Department.    Tips for taking pain medication  To get the best relief possible, remember these points:  · Pain medications can upset your stomach. Taking them with a little food may help.  · Most pain relievers taken by mouth need at least 20 to 30 minutes to take effect.  · Taking medication on a schedule can help you remember to take it. Try to time your medication so that you can take it before beginning an activity, such as dressing, walking, or sitting down for dinner.  · Constipation is a common side effect of pain medications. Contact your doctor before taking any medications like laxatives or stool softeners to help relieve constipation. Also ask about any dietary restrictions, because drinking lots of fluids and eating foods like fruits and vegetables that are high in fiber can also help. Remember, dont take laxatives unless your surgeon has prescribed them.  · Mixing alcohol and pain medication can cause dizziness and slow your breathing. It can even be fatal. Dont drink alcohol while taking pain medication.  · Pain medication can slow your reflexes. Dont drive or operate machinery while taking pain medication.  If your health care provider tells you to take acetaminophen to help relieve your pain, ask him or her how much you are supposed to take each day. (Acetaminophen is the generic name for Tylenol and other brand-name pain relievers.) Acetaminophen or other pain relievers may interact with your prescription medicines or other over-the-counter (OTC) drugs. Some prescription medications contain acetaminophen along with other active ingredients. Using both prescription and OTC acetaminophen for pain can cause you to overdose. The FDA recommends that you read the labels on your OTC medications carefully. This will help you to clearly understand the list of active ingredients, dosing instructions, and any warnings. It may also help you avoid taking too much acetaminophen. If you have questions or don't  understand the information, ask your pharmacist or health care provider to explain it to you before you take the OTC medication.    Managing nausea  Some people have an upset stomach after surgery. This is often due to anesthesia, pain, pain medications, or the stress of surgery. The following tips will help you manage nausea and get good nutrition as you recover. If you were on a special diet before surgery, ask your doctor if you should follow it during recovery. These tips may help:  · Dont push yourself to eat. Your body will tell you when to eat and how much.  · Start off with clear liquids and soup. They are easier to digest.  · Progress to semi-solid foods (mashed potatoes, applesauce, and gelatin) as you feel ready.  · Slowly move to solid foods. Dont eat fatty, rich, or spicy foods at first.  · Dont force yourself to have three large meals a day. Instead, eat smaller amounts more often.  · Take pain medications with a small amount of solid food, such as crackers or toast to avoid nausea.      Call your surgeon if    · You feel too sleepy, dizzy, or groggy (medication may be too strong).  · You have side effects like nausea, vomiting, or skin changes (rash, itching, or hives).   © 2631-3851 [employer]. [address] [place] [address], [place] [address]. All rights reserved. This information is not intended as a substitute for professional medical care. Always follow your healthcare professional's instructions.                     Nursing

## 2024-05-02 NOTE — DISCHARGE NOTE PROVIDER - CARE PROVIDER_API CALL
Nu Silva  Podiatric Medicine and Surgery  [address], [address]  [place] [address]  Phone: [phone]  Follow Up Time:

## 2024-05-02 NOTE — PROGRESS NOTE ADULT - SUBJECTIVE AND OBJECTIVE BOX
[facility]                                                INFECTIOUS DISEASES  =======================================================                   Pratik Garibay#   Lamonte Arita MD#    Karl Nur MD*                           Maryjo Owens MD*   Chrissy Baltazar MD*           Diplomates American Board of Internal Medicine & Infectious Diseases                  # [place] Office - Appt - Tel  [phone] Fax [phone]                * [place] Office - Appt - Tel [phone] Fax [phone]                                  Hospital Consult line:  [phone]  =======================================================      [facility]-[id number]  PRATIKASIA NUR   follow up for: OM  denies complaints    patient seen and examined.       I have personally reviewed the labs and data; pertinent labs and data are listed in this note; please see below.   ===================================================  REVIEW OF SYSTEMS:  CONSTITUTIONAL:  No Fever or chills  HEENT:  No diplopia or blurred vision.  No earache, sore throat or runny nose.  CARDIOVASCULAR:  No pressure, squeezing, strangling, tightness, heaviness or aching about the chest, neck, axilla or epigastrium.  RESPIRATORY:  No cough, shortness of breath  GASTROINTESTINAL:  No nausea, vomiting or diarrhea.  GENITOURINARY:  No dysuria, frequency or urgency. No Blood in urine  MUSCULOSKELETAL:  no joint aches, no muscle pain  SKIN:  No change in skin, hair or nails.  NEUROLOGIC:  No Headaches, seizures or weakness.  PSYCHIATRIC:  No disorder of thought or mood.  ENDOCRINE:  No heat or cold intolerance  HEMATOLOGICAL:  No easy bruising or bleeding.    =======================================================  Allergies    No Known Allergies    Intolerances    Antibiotics:  piperacillin/tazobactam IVPB.. 3.375 Gram(s) IV Intermittent every 8 hours    Other medications:  aspirin enteric coated 81 milliGRAM(s) Oral daily  atorvastatin 40 milliGRAM(s) Oral at bedtime  carvedilol 12.5 milliGRAM(s) Oral every 12 hours  clopidogrel Tablet 75 milliGRAM(s) Oral daily  dextrose 10% Bolus 125 milliLiter(s) IV Bolus once  dextrose 5%. 1000 milliLiter(s) IV Continuous <Continuous>  dextrose 5%. 1000 milliLiter(s) IV Continuous <Continuous>  dextrose 50% Injectable 25 Gram(s) IV Push once  dextrose 50% Injectable 12.5 Gram(s) IV Push once  folic acid 1 milliGRAM(s) Oral daily  glucagon  Injectable 1 milliGRAM(s) IntraMuscular once  hydrALAZINE 25 milliGRAM(s) Oral two times a day  insulin glargine Injectable (LANTUS) 18 Unit(s) SubCutaneous at bedtime  insulin lispro (ADMELOG) corrective regimen sliding scale   SubCutaneous three times a day before meals  insulin lispro Injectable (ADMELOG) 7 Unit(s) SubCutaneous three times a day before meals  mycophenolate mofetil 500 milliGRAM(s) Oral two times a day  NIFEdipine XL 30 milliGRAM(s) Oral daily  pyridoxine 25 milliGRAM(s) Oral daily  tacrolimus 1 milliGRAM(s) Oral two times a day  tamsulosin 0.4 milliGRAM(s) Oral at bedtime    ======================================================  Physical Exam:  ============  Vital Signs Last 24 Hrs  T(C): 36.8 (02 May 2024 10:34), Max: 36.9 (01 May 2024 20:12)  T(F): 98.2 (02 May 2024 10:34), Max: 98.4 (01 May 2024 20:12)  HR: 68 (02 May 2024 10:34) (64 - 80)  BP: 163/76 (02 May 2024 10:34) (112/70 - 163/76)  BP(mean): --  RR: 18 (02 May 2024 10:34) (18 - 19)  SpO2: 97% (02 May 2024 10:34) (94% - 98%)    Parameters below as of 02 May 2024 10:34  Patient On (Oxygen Delivery Method): room air        General:  No acute distress.  Eye: no conjunctival pallor, no scleral icterus  Respiratory: Lungs are clear to auscultation, Respirations are non-labored.  Cardiovascular: Normal rate, Regular rhythm,  s1+s2  Gastrointestinal: Soft, Non-tender, Non-distended, Normal bowel sounds.  Musculoskeletal: right foot dressing intact-images seen, surgical site clean well approximated no erythema  Integumentary: No rash.  Neurologic: Alert, Oriented, No focal deficits  Psychiatric: Appropriate mood & affect.  =======================================================  Labs:                                        14.9   6.00  )-----------( 187      ( 02 May 2024 04:50 )             44.7       05-02    132<L>  |  97  |  26.8<H>  ----------------------------<  126<H>  4.9   |  19.0<L>  |  1.32<H>    Ca    10.1      02 May 2024 04:50  Mg     2.2     05-02    TPro  7.4  /  Alb  4.1  /  TBili  0.6  /  DBili  x   /  AST  30  /  ALT  23  /  AlkPhos  88  05-02              Urinalysis Basic - ( 02 May 2024 04:50 )    Color: x / Appearance: x / SG: x / pH: x  Gluc: 126 mg/dL / Ketone: x  / Bili: x / Urobili: x   Blood: x / Protein: x / Nitrite: x   Leuk Esterase: x / RBC: x / WBC x   Sq Epi: x / Non Sq Epi: x / Bacteria: x                  CAPILLARY BLOOD GLUCOSE      POCT Blood Glucose.: 157 mg/dL (02 May 2024 07:35)                  Urinalysis Basic - ( 30 Apr 2024 04:35 )    Color: x / Appearance: x / SG: x / pH: x  Gluc: 175 mg/dL / Ketone: x  / Bili: x / Urobili: x   Blood: x / Protein: x / Nitrite: x   Leuk Esterase: x / RBC: x / WBC x   Sq Epi: x / Non Sq Epi: x / Bacteria: x                  CAPILLARY BLOOD GLUCOSE      POCT Blood Glucose.: 116 mg/dL (01 May 2024 16:35)                  Urinalysis Basic - ( 30 Apr 2024 04:35 )    Color: x / Appearance: x / SG: x / pH: x  Gluc: 175 mg/dL / Ketone: x  / Bili: x / Urobili: x   Blood: x / Protein: x / Nitrite: x   Leuk Esterase: x / RBC: x / WBC x   Sq Epi: x / Non Sq Epi: x / Bacteria: x        PT/INR - ( 29 Apr 2024 05:30 )   PT: 11.2 sec;   INR: 1.01 ratio                   CAPILLARY BLOOD GLUCOSE      POCT Blood Glucose.: 125 mg/dL (30 Apr 2024 21:33)                Urinalysis Basic - ( 29 Apr 2024 05:30 )    Color: x / Appearance: x / SG: x / pH: x  Gluc: 116 mg/dL / Ketone: x  / Bili: x / Urobili: x   Blood: x / Protein: x / Nitrite: x   Leuk Esterase: x / RBC: x / WBC x   Sq Epi: x / Non Sq Epi: x / Bacteria: x        PT/INR - ( 29 Apr 2024 05:30 )   PT: 11.2 sec;   INR: 1.01 ratio                   CAPILLARY BLOOD GLUCOSE      POCT Blood Glucose.: 133 mg/dL (29 Apr 2024 11:44)              Culture - Surgical Swab (collected 04-23-24 @ 16:49)  Source: .Surgical Swab right 2nd toe  Final Report (04-26-24 @ 15:17):    Moderate Staphylococcus aureus    Few Bacteroides fragilis "Susceptibilities not performed"  Organism: Staphylococcus aureus (04-26-24 @ 15:17)  Organism: Staphylococcus aureus (04-26-24 @ 15:17)    Sensitivities:      Method Type: LUIS MANUEL      -  Clindamycin: S <=0.25      -  Erythromycin: S <=0.25      -  Gentamicin: S <=1 Should not be used as monotherapy      -  Oxacillin: S <=0.25 Oxacillin predicts susceptibility for dicloxacillin, methicillin, and nafcillin      -  Rifampin: S <=1 Should not be used as monotherapy      -  Tetracycline: S <=1      -  Trimethoprim/Sulfamethoxazole: S <=0.5/9.5      -  Vancomycin: S 2    Culture - Blood (collected 04-23-24 @ 15:15)  Source: .Blood Blood-Peripheral  Preliminary Report (04-26-24 @ 23:01):    No growth at 72 Hours    Culture - Urine (collected 04-23-24 @ 15:10)  Source: Clean Catch Clean Catch (Midstream)  Final Report (04-24-24 @ 18:37):    No growth    Culture - Blood (collected 04-23-24 @ 15:10)  Source: .Blood Blood-Peripheral  Preliminary Report (04-26-24 @ 23:01):    No growth at 72 Hours    Culture - Tissue with Gram Stain (collected 01-24-24 @ 17:34)  Source: .Tissue R 1st proximal phalanx bone bx  Gram Stain (01-25-24 @ 06:08):    No polymorphonuclear cells seen per low power field    No organisms seen per oil power field  Final Report (01-30-24 @ 07:41):    No growth at 5 days    Culture - Surgical Swab (collected 01-21-24 @ 16:30)  Source: .Surgical Swab right hallux wound  Final Report (01-23-24 @ 19:18):    Numerous Streptococcus agalactiae (Group B)    Streptococcus agalactiae (Group B) isolated    Group B streptococci are susceptible to ampicillin,    penicillin and cefazolin, but may be resistant to    erythromycin and clindamycin.    Few Staphylococcus epidermidis "Susceptibilities not performed"    Mixed Anaerobic Yudelka including    Numerous Finegoldia magna "Susceptibilities not performed"    Few Anaerobic Gram Negative Reynaldo Most closely resembling Prevotella    species "Susceptibilities not performed"    Culture - Urine (collected 01-21-24 @ 14:30)  Source: Clean Catch Clean Catch (Midstream)  Final Report (01-22-24 @ 13:01):    No growth    Culture - Blood (collected 01-21-24 @ 14:10)  Source: .Blood Blood-Peripheral  Final Report (01-26-24 @ 19:00):    No growth at 5 days    Culture - Blood (collected 01-21-24 @ 13:55)  Source: .Blood Blood-Peripheral  Final Report (01-26-24 @ 19:00):    No growth at 5 days    Culture - Blood (collected 09-19-22 @ 20:25)  Source: .Blood Blood-Peripheral  Final Report (09-25-22 @ 01:01):    No Growth Final    Culture - Blood (collected 09-19-22 @ 20:20)  Source: .Blood Blood-Peripheral  Final Report (09-25-22 @ 01:01):    No Growth Final

## 2024-05-02 NOTE — DISCHARGE NOTE PROVIDER - NSDCCPCAREPLAN_GEN_ALL_CORE_FT
PRINCIPAL DISCHARGE DIAGNOSIS  Diagnosis: Acute osteomyelitis  Assessment and Plan of Treatment: Take [prescribed antibiotics for 2 more weeks.   Follow with podiatry within 7-10 days      SECONDARY DISCHARGE DIAGNOSES  Diagnosis: Renal transplant recipient  Assessment and Plan of Treatment: Take your medications as prescribed    Diagnosis: HTN (hypertension)  Assessment and Plan of Treatment: Take your medications as prescribed    Diagnosis: DM (diabetes mellitus)  Assessment and Plan of Treatment: Take your medications as prescribed     PRINCIPAL DISCHARGE DIAGNOSIS  Diagnosis: Acute osteomyelitis  Assessment and Plan of Treatment: Take [prescribed antibiotics for 2 more weeks.   Follow with podiatry within 7-10 days  Wound care: Xeroform, gauze, abd pad, kerlex, ace compression to be changed 3x/week      SECONDARY DISCHARGE DIAGNOSES  Diagnosis: Renal transplant recipient  Assessment and Plan of Treatment: Take your medications as prescribed    Diagnosis: HTN (hypertension)  Assessment and Plan of Treatment: Take your medications as prescribed    Diagnosis: DM (diabetes mellitus)  Assessment and Plan of Treatment: Take your medications as prescribed

## 2024-05-02 NOTE — PROGRESS NOTE ADULT - ASSESSMENT
57 yo male with ESRD s/p renal transplant (1/2023), CAD s/p PCI, IDDM presenting to the ED with complaint of right 2nd toe pain and swelling. Patient follows with Dr. Silva for podiatry and is recently s/p right great toe amputation for OM. He saw Dr. Silva on 4/19 and was started on Doxycycline, however, swelling was worsening so patient came to the ED. MRI + 2nd toe OM      - bcx ngtd  - wound cx mssa, b.frag  - s/p right 2nd digit amputation, with flexor tenotomy of 3rd digit on 4/29/24 . f/u Or cx and path  - on zosyn  - or cx ngtd  - path pending  - low suspicion for residual bone infection  - change to po augmentin x 2 weeks with outpatient f/u ID and podiatry next week  - Trend Fever  - Trend Leukocytosis  - on cellcelpt/tacrolimus  - optimize glycemic control      d/w Dr Munoz

## 2024-05-02 NOTE — PHYSICAL THERAPY INITIAL EVALUATION ADULT - PERTINENT HX OF CURRENT PROBLEM, REHAB EVAL
59 yo male with ESRD s/p renal transplant (1/2023), CAD s/p PCI, IDDM presenting to the ED with complaint of right 2nd toe pain and swelling. Patient follows with Dr. Silva for podiatry and is recently s/p right great toe amputation for OM. He saw Dr. Silva on 4/19 and was started on Doxycycline, however, swelling was worsening so patient came to the ED. MRI + 2nd toe OM

## 2024-05-02 NOTE — PHYSICAL THERAPY INITIAL EVALUATION ADULT - ADDITIONAL COMMENTS
Pt AxOx4 states he lives at home 8+5 TONIO 2HR with wife. Pt was independent PTA without use of DME. does not own.

## 2024-05-02 NOTE — DISCHARGE NOTE NURSING/CASE MANAGEMENT/SOCIAL WORK - PATIENT PORTAL LINK FT
You can access the FollowMyHealth Patient Portal offered by [facility] by registering at the following website: [email or web address]. By joining [employer]’s FollowMyHealth portal, you will also be able to view your health information using other applications (apps) compatible with our system.

## 2024-05-02 NOTE — PROGRESS NOTE ADULT - TIME BILLING
chart review, patient eval

## 2024-05-02 NOTE — PHYSICAL THERAPY INITIAL EVALUATION ADULT - PASSIVE RANGE OF MOTION EXAMINATION, REHAB EVAL
R ankle not tested/bilateral upper extremity Passive ROM was WFL (within functional limits)/bilateral lower extremity Passive ROM was WFL (within functional limits)

## 2024-05-02 NOTE — PROGRESS NOTE ADULT - REASON FOR ADMISSION
Diabetic foot infection

## 2024-05-04 LAB
CULTURE RESULTS: SIGNIFICANT CHANGE UP
SPECIMEN SOURCE: SIGNIFICANT CHANGE UP

## 2024-05-07 LAB — SURGICAL PATHOLOGY STUDY: SIGNIFICANT CHANGE UP

## 2024-05-09 NOTE — H&P ADULT - NSHPROSALLOTHERNEGRD_GEN_ALL_CORE
Addended by: RACHELE ONEAL on: 5/9/2024 04:27 PM     Modules accepted: Orders     All other review of systems negative, except as noted in HPI

## 2024-05-22 ENCOUNTER — APPOINTMENT (OUTPATIENT)
Dept: INTERNAL MEDICINE | Facility: CLINIC | Age: 59
End: 2024-05-22
Payer: COMMERCIAL

## 2024-05-22 VITALS
BODY MASS INDEX: 27.94 KG/M2 | TEMPERATURE: 97 F | HEART RATE: 69 BPM | WEIGHT: 178 LBS | DIASTOLIC BLOOD PRESSURE: 61 MMHG | HEIGHT: 67 IN | SYSTOLIC BLOOD PRESSURE: 97 MMHG | OXYGEN SATURATION: 99 %

## 2024-05-22 DIAGNOSIS — E11.628 TYPE 2 DIABETES MELLITUS WITH OTHER SKIN COMPLICATIONS: ICD-10-CM

## 2024-05-22 DIAGNOSIS — L08.9 TYPE 2 DIABETES MELLITUS WITH OTHER SKIN COMPLICATIONS: ICD-10-CM

## 2024-05-22 PROCEDURE — G2211 COMPLEX E/M VISIT ADD ON: CPT | Mod: NC

## 2024-05-22 PROCEDURE — 99213 OFFICE O/P EST LOW 20 MIN: CPT

## 2024-05-22 RX ORDER — AMOXICILLIN AND CLAVULANATE POTASSIUM 875; 125 MG/1; MG/1
875-125 TABLET, COATED ORAL
Qty: 28 | Refills: 0 | Status: ACTIVE | COMMUNITY
Start: 2024-05-22 | End: 1900-01-01

## 2024-05-23 LAB
ALBUMIN SERPL ELPH-MCNC: 4.5 G/DL
ALP BLD-CCNC: 80 U/L
ALT SERPL-CCNC: 30 U/L
ANION GAP SERPL CALC-SCNC: 16 MMOL/L
AST SERPL-CCNC: 34 U/L
BASOPHILS # BLD AUTO: 0.05 K/UL
BASOPHILS NFR BLD AUTO: 1.1 %
BILIRUB SERPL-MCNC: 0.5 MG/DL
BUN SERPL-MCNC: 33 MG/DL
CALCIUM SERPL-MCNC: 10.5 MG/DL
CHLORIDE SERPL-SCNC: 103 MMOL/L
CO2 SERPL-SCNC: 15 MMOL/L
CREAT SERPL-MCNC: 1.28 MG/DL
EGFR: 65 ML/MIN/1.73M2
EOSINOPHIL # BLD AUTO: 0.1 K/UL
EOSINOPHIL NFR BLD AUTO: 2.2 %
GLUCOSE SERPL-MCNC: 211 MG/DL
HCT VFR BLD CALC: 43.6 %
HGB BLD-MCNC: 14.3 G/DL
IMM GRANULOCYTES NFR BLD AUTO: 0.2 %
LYMPHOCYTES # BLD AUTO: 0.73 K/UL
LYMPHOCYTES NFR BLD AUTO: 16 %
MAN DIFF?: NORMAL
MCHC RBC-ENTMCNC: 29.4 PG
MCHC RBC-ENTMCNC: 32.8 GM/DL
MCV RBC AUTO: 89.5 FL
MONOCYTES # BLD AUTO: 0.28 K/UL
MONOCYTES NFR BLD AUTO: 6.2 %
NEUTROPHILS # BLD AUTO: 3.38 K/UL
NEUTROPHILS NFR BLD AUTO: 74.3 %
PLATELET # BLD AUTO: 150 K/UL
POTASSIUM SERPL-SCNC: 5.6 MMOL/L
PROT SERPL-MCNC: 7 G/DL
RBC # BLD: 4.87 M/UL
RBC # FLD: 14.2 %
SODIUM SERPL-SCNC: 134 MMOL/L
WBC # FLD AUTO: 4.55 K/UL

## 2024-06-05 ENCOUNTER — APPOINTMENT (OUTPATIENT)
Dept: INTERNAL MEDICINE | Facility: CLINIC | Age: 59
End: 2024-06-05
Payer: MEDICARE

## 2024-06-05 VITALS
BODY MASS INDEX: 27.94 KG/M2 | OXYGEN SATURATION: 98 % | HEIGHT: 67 IN | HEART RATE: 63 BPM | DIASTOLIC BLOOD PRESSURE: 70 MMHG | TEMPERATURE: 97.4 F | WEIGHT: 178 LBS | SYSTOLIC BLOOD PRESSURE: 120 MMHG

## 2024-06-05 PROCEDURE — G2211 COMPLEX E/M VISIT ADD ON: CPT

## 2024-06-05 PROCEDURE — 99213 OFFICE O/P EST LOW 20 MIN: CPT

## 2024-06-17 NOTE — PHYSICAL EXAM
[General Appearance - Alert] : alert [General Appearance - In No Acute Distress] : in no acute distress [Auscultation Breath Sounds / Voice Sounds] : lungs were clear to auscultation bilaterally [Heart Rate And Rhythm] : heart rate was normal and rhythm regular [Heart Sounds] : normal S1 and S2 [Heart Sounds Gallop] : no gallops [Murmurs] : no murmurs [Heart Sounds Pericardial Friction Rub] : no pericardial rub [Bowel Sounds] : normal bowel sounds [Abdomen Soft] : soft [Abdomen Tenderness] : non-tender [] : no hepato-splenomegaly [Abdomen Mass (___ Cm)] : no abdominal mass palpated [Costovertebral Angle Tenderness] : no CVA tenderness [FreeTextEntry1] : rt 2nd toe stump wound sutures intact slight dehisced in center no drainage no erythema

## 2024-06-17 NOTE — HISTORY OF PRESENT ILLNESS
[FreeTextEntry1] :  Hospital Course: Discharge Date	02-May-2024 Admission Date	23-Apr-2024 18:04 Reason for Admission	Diabetic foot infection Hospital Course	 59 yo male with ESRD s/p renal transplant (1/2023), CAD s/p PCI, IDDM, h/o Rt hallux OM s/p resection now admitted with acute OM of Rt 2nd digit. s/p podiatric surgical resection (4/29) s/p partial amputation of right second toe and s/p percutaneous tenotomy of 3rd right toe. Surgical cultures did not show any growth.  He was on Zosyn during hospitalization. Will discharge on Augmentin for 2 more weeks  For ESRD s/p Renal Transplant 1/2023, continue Tacrolimus 2 mg BID,  continue Mycophenolate 1000 mg BID For CAD, HTN: continue Atorvastatin 40 mg qhs and Plavix 75 mg daily, continue Coreg 12.5 mg BID. continue Hydralazine 25 mg BID and continue Nifedipine 30 mg daily  For IDDM: to continue with home doses of insulin  For BPH, to continue Flomax 0.4 mg qhs  Stable for discharge and to follow with podiatry as outpatient    Interval f/u 5/22/24- pt here for f/u visit doing well no fever chills no drainage will see podiatry for f/u visit  PAth:   Collected Date/Time:                   4/29/2024 17:00 EDT Received Date/Time:                    4/30/2024 05:40 EDT  Surgical Pathology Report - Auth (Verified)  Specimen(s) Submitted 1  Right 2nd toe 2  Right 2nd toe clean margin for biopsy  Final Diagnosis 1.  Right second toe, amputation: -   Hyperkeratotic toe with ulceration, acute and chronic inflammation and gangrenous necrosis extending to underlying bone.  -   Soft tissue margin of resection is not involved.  2.  Right second toe, "clean margin", biopsy: -   Trabeculated bone, negative for acute inflammation. Verified by: Jane Trevino M.D. (Electronic Signature) Reported on: 05/07/24 14:15 EDT, [facility], [address] [place] [address]

## 2024-06-17 NOTE — ASSESSMENT
[FreeTextEntry1] : 59 yo male with ESRD s/p renal transplant (1/2023), CAD s/p PCI, IDDM, h/o Rt hallux OM s/p resection now admitted with acute OM of Rt 2nd digit. s/p podiatric surgical resection (4/29) s/p partial amputation of right second toe and s/p percutaneous tenotomy of 3rd right toe.  He was on Zosyn during hospitalization. Discharged on Augmentin for 2 weeks  Surgical cultures did not show any growth. Path clean margins as above. wound healing well slight dehisced in center Will extend augmentin x 2 more weeks check CBC CMP f/u podiatry c/w transplant nephrologist f/u and immunosuppressives as per their recommendations f/u in 2 weeks

## 2024-06-18 NOTE — HISTORY OF PRESENT ILLNESS
[FreeTextEntry1] :  Hospital Course: Discharge Date	02-May-2024 Admission Date	23-Apr-2024 18:04 Reason for Admission	Diabetic foot infection Hospital Course	 57 yo male with ESRD s/p renal transplant (1/2023), CAD s/p PCI, IDDM, h/o Rt hallux OM s/p resection now admitted with acute OM of Rt 2nd digit. s/p podiatric surgical resection (4/29) s/p partial amputation of right second toe and s/p percutaneous tenotomy of 3rd right toe. Surgical cultures did not show any growth.  He was on Zosyn during hospitalization. Will discharge on Augmentin for 2 more weeks  For ESRD s/p Renal Transplant 1/2023, continue Tacrolimus 2 mg BID,  continue Mycophenolate 1000 mg BID For CAD, HTN: continue Atorvastatin 40 mg qhs and Plavix 75 mg daily, continue Coreg 12.5 mg BID. continue Hydralazine 25 mg BID and continue Nifedipine 30 mg daily  For IDDM: to continue with home doses of insulin  For BPH, to continue Flomax 0.4 mg qhs  Stable for discharge and to follow with podiatry as outpatient    Interval f/u 5/22/24- pt here for f/u visit doing well no fever chills no drainage will see podiatry for f/u visit  PAth:   Collected Date/Time:                   4/29/2024 17:00 EDT Received Date/Time:                    4/30/2024 05:40 EDT  Surgical Pathology Report - Auth (Verified)  Specimen(s) Submitted 1  Right 2nd toe 2  Right 2nd toe clean margin for biopsy  Final Diagnosis 1.  Right second toe, amputation: -   Hyperkeratotic toe with ulceration, acute and chronic inflammation and gangrenous necrosis extending to underlying bone.  -   Soft tissue margin of resection is not involved.  2.  Right second toe, "clean margin", biopsy: -   Trabeculated bone, negative for acute inflammation. Verified by: Jane Trevino M.D. (Electronic Signature) Reported on: 05/07/24 14:15 EDT, [facility], [address] [place] [id number]    Interval f/u 6/5/24- pt doing well sutures were removed by podiatry has f/u appt today taking abx no fever

## 2024-06-18 NOTE — ASSESSMENT
[FreeTextEntry1] : 57 yo male with ESRD s/p renal transplant (1/2023), CAD s/p PCI, IDDM, h/o Rt hallux OM s/p resection now admitted with acute OM of Rt 2nd digit. s/p podiatric surgical resection (4/29) s/p partial amputation of right second toe and s/p percutaneous tenotomy of 3rd right toe.  He was on Zosyn during hospitalization. Has been on augmenin x 4 weeks  Surgical cultures did not show any growth. Path clean margins as above. wound  had dehisced sutures removed, appears clean d/w podiatry Dr Silva, will monitor off abx. patient will see podiatry weekly. If any concern for wound infection will f/u ID local wound care per podiatry c/w transplant nephrologist f/u and immunosuppressives as per their recommendations f/u in 2 weeks [Treatment Education] : treatment education [Treatment Adherence] : treatment adherence [Rx Dose / Side Effects] : Rx dose/side effects [Anticipatory Guidance] : anticipatory guidance

## 2024-06-18 NOTE — PHYSICAL EXAM
[General Appearance - Alert] : alert [General Appearance - In No Acute Distress] : in no acute distress [Auscultation Breath Sounds / Voice Sounds] : lungs were clear to auscultation bilaterally [Heart Rate And Rhythm] : heart rate was normal and rhythm regular [Heart Sounds] : normal S1 and S2 [Heart Sounds Gallop] : no gallops [Murmurs] : no murmurs [Heart Sounds Pericardial Friction Rub] : no pericardial rub [Bowel Sounds] : normal bowel sounds [Abdomen Soft] : soft [Abdomen Tenderness] : non-tender [] : no hepato-splenomegaly [Abdomen Mass (___ Cm)] : no abdominal mass palpated [Costovertebral Angle Tenderness] : no CVA tenderness [FreeTextEntry1] : rt 2nd toe stump linear wound , clean, no erythema or drainage-images sent by podiatry

## 2024-07-02 NOTE — ED ADULT NURSE NOTE - NSSISCREENINGQ4_ED_A_ED
Care Transitions Initial Follow Up Call    Call within 2 business days of discharge: Yes     Patient: Jorge Resendez Patient : 1961 MRN: [id number]    Last Discharge Facility       None            RARS: No data recorded     Spoke with: Attempting HFU call, unsuccessful.  Message left with contact information.     Discharge department/facility: [facility]    Non-face-to-face services provided:  Scheduled appointment with PCP-Doreen  Obtained and reviewed discharge summary and/or continuity of care documents    Follow Up  Future Appointments   Date Time Provider Department Center   7/3/2024  3:30 PM Sarah Logan MD [facility] [place] [facility]       Vincent Montemayor RN    No

## 2024-08-02 ENCOUNTER — OFFICE (OUTPATIENT)
Dept: URBAN - METROPOLITAN AREA CLINIC 63 | Facility: CLINIC | Age: 59
Setting detail: OPHTHALMOLOGY
End: 2024-08-02
Payer: MEDICARE

## 2024-08-02 DIAGNOSIS — H43.12: ICD-10-CM

## 2024-08-02 DIAGNOSIS — E10.3512: ICD-10-CM

## 2024-08-02 DIAGNOSIS — E10.3513: ICD-10-CM

## 2024-08-02 PROCEDURE — 67210 TREATMENT OF RETINAL LESION: CPT | Mod: LT | Performed by: SPECIALIST

## 2024-08-02 PROCEDURE — 92134 CPTRZ OPH DX IMG PST SGM RTA: CPT | Performed by: SPECIALIST

## 2024-08-02 PROCEDURE — 92012 INTRM OPH EXAM EST PATIENT: CPT | Mod: 57 | Performed by: SPECIALIST

## 2024-08-28 NOTE — ED ADULT NURSE NOTE - NS ED NURSE RECORD ANOTHER VITAL SIGN
Subjective   Patient ID: Starr is a 69 Yes

## 2024-09-05 ENCOUNTER — ASC (OUTPATIENT)
Dept: URBAN - METROPOLITAN AREA SURGERY 8 | Facility: SURGERY | Age: 59
Setting detail: OPHTHALMOLOGY
End: 2024-09-05
Payer: MEDICARE

## 2024-09-05 DIAGNOSIS — E10.3511: ICD-10-CM

## 2024-09-05 PROCEDURE — 67210 TREATMENT OF RETINAL LESION: CPT | Mod: 79,RT | Performed by: SPECIALIST

## 2024-09-05 ASSESSMENT — CONFRONTATIONAL VISUAL FIELD TEST (CVF)
OD_FINDINGS: FULL
OS_FINDINGS: FULL

## 2024-09-16 NOTE — ED PROVIDER NOTE - NS ED MD TWO NIGHTS YN
Last Office Visit: 5/1/24  Last Refill: 12/14/23  Return to Clinic: 3-6 months   Protocol: failed   NOV: n./a    Requested Prescriptions     Pending Prescriptions Disp Refills    metFORMIN HCl 1000 MG Oral Tab 180 tablet 1     Sig: Take 1 tablet (1,000 mg total) by mouth 2 (two) times daily with meals.       Please approve if appropriate.     Thank you!     
No

## 2024-09-30 ENCOUNTER — NON-APPOINTMENT (OUTPATIENT)
Age: 59
End: 2024-09-30

## 2024-09-30 DIAGNOSIS — E11.40 TYPE 2 DIABETES MELLITUS WITH DIABETIC NEUROPATHY, UNSPECIFIED: ICD-10-CM

## 2024-09-30 DIAGNOSIS — B35.1 TINEA UNGUIUM: ICD-10-CM

## 2024-09-30 DIAGNOSIS — M79.671 PAIN IN RIGHT FOOT: ICD-10-CM

## 2024-09-30 DIAGNOSIS — Z86.79 PERSONAL HISTORY OF OTHER DISEASES OF THE CIRCULATORY SYSTEM: ICD-10-CM

## 2024-09-30 DIAGNOSIS — M79.672 PAIN IN LEFT FOOT: ICD-10-CM

## 2024-09-30 DIAGNOSIS — M20.41 OTHER HAMMER TOE(S) (ACQUIRED), RIGHT FOOT: ICD-10-CM

## 2024-09-30 RX ORDER — SILVER SULFADIAZINE 10 MG/G
1 CREAM TOPICAL
Refills: 0 | Status: ACTIVE | COMMUNITY

## 2024-09-30 RX ORDER — DOXYCYCLINE HYCLATE 100 MG/1
100 CAPSULE ORAL TWICE DAILY
Refills: 0 | Status: ACTIVE | COMMUNITY

## 2024-09-30 RX ORDER — KETOCONAZOLE 20 MG/G
2 CREAM TOPICAL
Refills: 0 | Status: ACTIVE | COMMUNITY

## 2024-10-04 ENCOUNTER — OFFICE (OUTPATIENT)
Dept: URBAN - METROPOLITAN AREA CLINIC 94 | Facility: CLINIC | Age: 59
Setting detail: OPHTHALMOLOGY
End: 2024-10-04
Payer: MEDICARE

## 2024-10-04 DIAGNOSIS — H35.033: ICD-10-CM

## 2024-10-04 DIAGNOSIS — H25.13: ICD-10-CM

## 2024-10-04 DIAGNOSIS — H25.12: ICD-10-CM

## 2024-10-04 PROBLEM — H25.11 CATARACT NUCLEAR SCLEROSIS AGE RELATED; RIGHT EYE, LEFT EYE, BOTH EYES: Status: ACTIVE | Noted: 2024-10-04

## 2024-10-04 PROCEDURE — 92136 OPHTHALMIC BIOMETRY: CPT | Mod: TC | Performed by: OPHTHALMOLOGY

## 2024-10-04 PROCEDURE — 92250 FUNDUS PHOTOGRAPHY W/I&R: CPT | Performed by: OPHTHALMOLOGY

## 2024-10-04 PROCEDURE — 92136 OPHTHALMIC BIOMETRY: CPT | Mod: 26,LT | Performed by: OPHTHALMOLOGY

## 2024-10-04 PROCEDURE — 99214 OFFICE O/P EST MOD 30 MIN: CPT | Mod: 24 | Performed by: OPHTHALMOLOGY

## 2024-10-04 ASSESSMENT — KERATOMETRY
OD_K2POWER_DIOPTERS: 43.25
OD_AXISANGLE_DEGREES: 085
OD_K2POWER_DIOPTERS: 43.25
OS_K2POWER_DIOPTERS: 43.25
METHOD_AUTO_MANUAL: AUTO
OS_K1POWER_DIOPTERS: 42.50
OS_AXISANGLE2_DEGREES: 095
OD_CYLAXISANGLE_DEGREES: 085
OS_CYLPOWER_DEGREES: 0.75
OS_CYLAXISANGLE_DEGREES: 095
OS_K1K2_AVERAGE: 42.875
OD_CYLPOWER_DEGREES: 0.75
OS_AXISANGLE_DEGREES: 095
OD_AXISANGLE2_DEGREES: 085
OS_K2POWER_DIOPTERS: 43.25
OD_AXISANGLE_DEGREES: 175
OS_K1POWER_DIOPTERS: 42.50
OD_K1POWER_DIOPTERS: 42.50
OD_K1K2_AVERAGE: 42.875
OS_AXISANGLE_DEGREES: 5
OD_K1POWER_DIOPTERS: 42.50

## 2024-10-04 ASSESSMENT — REFRACTION_AUTOREFRACTION
OS_CYLINDER: -0.75
OD_SPHERE: +0.75
OS_SPHERE: PLANO
OD_AXIS: 065
OS_AXIS: 100
OD_CYLINDER: -1.00

## 2024-10-04 ASSESSMENT — TONOMETRY
OD_IOP_MMHG: 15
OS_IOP_MMHG: 16

## 2024-10-04 ASSESSMENT — REFRACTION_MANIFEST
OS_AXIS: 100
OS_VA1: 20/80
OD_AXIS: 065
OS_CYLINDER: -0.75
OD_CYLINDER: -1.00
OD_VA1: 20/80
OS_SPHERE: PLANO
OD_SPHERE: +0.75

## 2024-10-04 ASSESSMENT — CONFRONTATIONAL VISUAL FIELD TEST (CVF)
OS_FINDINGS: FULL
OD_FINDINGS: FULL

## 2024-10-04 ASSESSMENT — VISUAL ACUITY
OD_BCVA: 20/80
OS_BCVA: 20/80

## 2024-10-23 ENCOUNTER — APPOINTMENT (OUTPATIENT)
Age: 59
End: 2024-10-23

## 2024-10-23 DIAGNOSIS — M20.41 OTHER HAMMER TOE(S) (ACQUIRED), RIGHT FOOT: ICD-10-CM

## 2024-10-23 DIAGNOSIS — B35.1 TINEA UNGUIUM: ICD-10-CM

## 2024-10-23 DIAGNOSIS — E11.40 TYPE 2 DIABETES MELLITUS WITH DIABETIC NEUROPATHY, UNSPECIFIED: ICD-10-CM

## 2024-10-23 DIAGNOSIS — M79.672 PAIN IN LEFT FOOT: ICD-10-CM

## 2024-10-23 DIAGNOSIS — M79.671 PAIN IN RIGHT FOOT: ICD-10-CM

## 2024-10-23 PROCEDURE — 11721 DEBRIDE NAIL 6 OR MORE: CPT | Mod: Q8

## 2024-10-23 PROCEDURE — 99203 OFFICE O/P NEW LOW 30 MIN: CPT | Mod: 25

## 2024-10-23 RX ORDER — CLOTRIMAZOLE 10 MG/G
1 CREAM TOPICAL TWICE DAILY
Qty: 45 | Refills: 3 | Status: ACTIVE | COMMUNITY
Start: 2024-10-23 | End: 1900-01-01

## 2024-11-04 ENCOUNTER — ASC (OUTPATIENT)
Dept: URBAN - METROPOLITAN AREA SURGERY 8 | Facility: SURGERY | Age: 59
Setting detail: OPHTHALMOLOGY
End: 2024-11-04
Payer: MEDICARE

## 2024-11-04 ENCOUNTER — OFFICE (OUTPATIENT)
Dept: URBAN - METROPOLITAN AREA CLINIC 94 | Facility: CLINIC | Age: 59
Setting detail: OPHTHALMOLOGY
End: 2024-11-04
Payer: MEDICARE

## 2024-11-04 DIAGNOSIS — E10.3512: ICD-10-CM

## 2024-11-04 DIAGNOSIS — E10.3513: ICD-10-CM

## 2024-11-04 DIAGNOSIS — H43.12: ICD-10-CM

## 2024-11-04 PROCEDURE — 92134 CPTRZ OPH DX IMG PST SGM RTA: CPT | Performed by: SPECIALIST

## 2024-11-04 PROCEDURE — 67210 TREATMENT OF RETINAL LESION: CPT | Mod: 79,LT | Performed by: SPECIALIST

## 2024-11-04 ASSESSMENT — VISUAL ACUITY
OD_BCVA: 20/80
OS_BCVA: 20/60

## 2024-11-04 ASSESSMENT — KERATOMETRY
METHOD_AUTO_MANUAL: AUTO
OS_K2POWER_DIOPTERS: 43.25
OD_K2POWER_DIOPTERS: 43.25
OD_K1POWER_DIOPTERS: 42.50
OS_AXISANGLE_DEGREES: 095
OS_K1POWER_DIOPTERS: 42.50
OD_AXISANGLE_DEGREES: 085

## 2024-11-04 ASSESSMENT — REFRACTION_AUTOREFRACTION
OD_CYLINDER: -1.00
OD_AXIS: 065
OS_AXIS: 100
OS_CYLINDER: -0.75
OD_SPHERE: +0.75
OS_SPHERE: PLANO

## 2024-11-04 ASSESSMENT — CONFRONTATIONAL VISUAL FIELD TEST (CVF)
OS_FINDINGS: FULL
OD_FINDINGS: FULL

## 2024-11-04 ASSESSMENT — TONOMETRY
OS_IOP_MMHG: 13
OD_IOP_MMHG: 13

## 2024-11-13 ENCOUNTER — ASC (OUTPATIENT)
Dept: URBAN - METROPOLITAN AREA SURGERY 8 | Facility: SURGERY | Age: 59
Setting detail: OPHTHALMOLOGY
End: 2024-11-13
Payer: MEDICARE

## 2024-11-13 DIAGNOSIS — H52.212: ICD-10-CM

## 2024-11-13 DIAGNOSIS — H25.12: ICD-10-CM

## 2024-11-13 PROCEDURE — 66984 XCAPSL CTRC RMVL W/O ECP: CPT | Mod: 79,LT | Performed by: OPHTHALMOLOGY

## 2024-11-13 PROCEDURE — A9270 NON-COVERED ITEM OR SERVICE: HCPCS | Mod: GY | Performed by: OPHTHALMOLOGY

## 2024-11-13 PROCEDURE — 68841 INSJ RX ELUT IMPLT LAC CANAL: CPT | Mod: 79,LT | Performed by: OPHTHALMOLOGY

## 2024-11-13 PROCEDURE — FEMTO FEMTOSECOND LASER: Mod: GY | Performed by: OPHTHALMOLOGY

## 2024-11-14 ENCOUNTER — RX ONLY (RX ONLY)
Age: 59
End: 2024-11-14

## 2024-11-14 ENCOUNTER — OFFICE (OUTPATIENT)
Dept: URBAN - METROPOLITAN AREA CLINIC 112 | Facility: CLINIC | Age: 59
Setting detail: OPHTHALMOLOGY
End: 2024-11-14
Payer: MEDICARE

## 2024-11-14 DIAGNOSIS — Z96.1: ICD-10-CM

## 2024-11-14 PROCEDURE — 99024 POSTOP FOLLOW-UP VISIT: CPT | Performed by: OPTOMETRIST

## 2024-11-14 ASSESSMENT — KERATOMETRY
OD_AXISANGLE_DEGREES: 085
OD_K2POWER_DIOPTERS: 43.25
OS_K1POWER_DIOPTERS: 42.50
METHOD_AUTO_MANUAL: AUTO
OD_K1POWER_DIOPTERS: 42.50
OS_K2POWER_DIOPTERS: 43.25
OS_AXISANGLE_DEGREES: 095

## 2024-11-14 ASSESSMENT — TONOMETRY
OD_IOP_MMHG: 17
OS_IOP_MMHG: 15

## 2024-11-14 ASSESSMENT — REFRACTION_AUTOREFRACTION
OD_SPHERE: +0.75
OS_SPHERE: UTP
OD_CYLINDER: -1.00
OD_AXIS: 065

## 2024-11-14 ASSESSMENT — CONFRONTATIONAL VISUAL FIELD TEST (CVF)
OS_FINDINGS: FULL
OD_FINDINGS: FULL

## 2024-11-14 ASSESSMENT — VISUAL ACUITY
OS_BCVA: 20/60
OD_BCVA: 20/80

## 2024-11-14 ASSESSMENT — CORNEAL TRAUMA - ABRASION: OS_ABRASION: PRESENT

## 2024-11-15 ENCOUNTER — OFFICE (OUTPATIENT)
Dept: URBAN - METROPOLITAN AREA CLINIC 94 | Facility: CLINIC | Age: 59
Setting detail: OPHTHALMOLOGY
End: 2024-11-15
Payer: MEDICARE

## 2024-11-15 ENCOUNTER — RX ONLY (RX ONLY)
Age: 59
End: 2024-11-15

## 2024-11-15 DIAGNOSIS — S05.02XA: ICD-10-CM

## 2024-11-15 DIAGNOSIS — Z96.1: ICD-10-CM

## 2024-11-15 PROCEDURE — 99024 POSTOP FOLLOW-UP VISIT: CPT | Performed by: OPHTHALMOLOGY

## 2024-11-15 ASSESSMENT — KERATOMETRY
OS_K1POWER_DIOPTERS: 45.00
OS_K2POWER_DIOPTERS: 48.00
OD_AXISANGLE_DEGREES: 097
OD_K1POWER_DIOPTERS: 42.75
OS_AXISANGLE_DEGREES: 084
METHOD_AUTO_MANUAL: AUTO
OD_K2POWER_DIOPTERS: 43.00

## 2024-11-15 ASSESSMENT — REFRACTION_AUTOREFRACTION
OD_SPHERE: +1.50
OS_AXIS: 169
OS_CYLINDER: -2.25
OD_CYLINDER: -1.50
OS_SPHERE: +2.50
OD_AXIS: 067

## 2024-11-15 ASSESSMENT — CONFRONTATIONAL VISUAL FIELD TEST (CVF)
OD_FINDINGS: FULL
OS_FINDINGS: FULL

## 2024-11-15 ASSESSMENT — TONOMETRY
OD_IOP_MMHG: 14
OS_IOP_MMHG: 19

## 2024-11-15 ASSESSMENT — CORNEAL TRAUMA - ABRASION: OS_ABRASION: PRESENT

## 2024-11-15 ASSESSMENT — VISUAL ACUITY
OS_BCVA: 20/60
OD_BCVA: 20/70+1

## 2024-11-22 ENCOUNTER — OFFICE (OUTPATIENT)
Dept: URBAN - METROPOLITAN AREA CLINIC 94 | Facility: CLINIC | Age: 59
Setting detail: OPHTHALMOLOGY
End: 2024-11-22
Payer: MEDICARE

## 2024-11-22 DIAGNOSIS — S05.02XA: ICD-10-CM

## 2024-11-22 DIAGNOSIS — Z96.1: ICD-10-CM

## 2024-11-22 DIAGNOSIS — H25.11: ICD-10-CM

## 2024-11-22 PROCEDURE — 99024 POSTOP FOLLOW-UP VISIT: CPT | Performed by: OPHTHALMOLOGY

## 2024-11-22 ASSESSMENT — REFRACTION_MANIFEST
OS_CYLINDER: -2.25
OS_VA1: 20/HM
OD_VA1: 20/60+
OD_SPHERE: +1.50
OD_AXIS: 080
OS_SPHERE: +2.50
OD_CYLINDER: -1.25
OS_AXIS: 169

## 2024-11-22 ASSESSMENT — KERATOMETRY
OS_AXISANGLE_DEGREES: 084
OS_K1POWER_DIOPTERS: 45.00
OD_AXISANGLE_DEGREES: 100
METHOD_AUTO_MANUAL: AUTO
OD_K1POWER_DIOPTERS: 42.75
OS_K2POWER_DIOPTERS: 48.00
OD_K2POWER_DIOPTERS: 43.50

## 2024-11-22 ASSESSMENT — REFRACTION_AUTOREFRACTION
OS_AXIS: 169
OS_CYLINDER: -2.25
OS_SPHERE: +2.50
OD_SPHERE: +1.50
OD_CYLINDER: -1.25
OD_AXIS: 080

## 2024-11-22 ASSESSMENT — CORNEAL TRAUMA - ABRASION: OS_ABRASION: PRESENT

## 2024-11-22 ASSESSMENT — CONFRONTATIONAL VISUAL FIELD TEST (CVF)
OD_FINDINGS: FULL
OS_FINDINGS: FULL

## 2024-11-22 ASSESSMENT — VISUAL ACUITY
OS_BCVA: 20/60+1
OD_BCVA: HM

## 2024-11-22 ASSESSMENT — TONOMETRY
OS_IOP_MMHG: 15
OD_IOP_MMHG: 14

## 2024-11-23 ENCOUNTER — OFFICE (OUTPATIENT)
Dept: URBAN - METROPOLITAN AREA CLINIC 94 | Facility: CLINIC | Age: 59
Setting detail: OPHTHALMOLOGY
End: 2024-11-23
Payer: MEDICARE

## 2024-11-23 DIAGNOSIS — E10.3513: ICD-10-CM

## 2024-11-23 DIAGNOSIS — E10.3512: ICD-10-CM

## 2024-11-23 PROBLEM — E10.3511 DM TYPE 1; RIGHT PROLIFERATIVE WITH ME, LEFT PROLIFERATIVE WITH ME: Status: ACTIVE | Noted: 2024-11-23

## 2024-11-23 PROBLEM — Z96.1 PSEUDOPHAKIA ; LEFT EYE: Status: ACTIVE | Noted: 2024-11-14

## 2024-11-23 PROBLEM — H25.11 CATARACT SENILE NUCLEAR SCLEROSIS;  , RIGHT EYE: Status: ACTIVE | Noted: 2024-11-14

## 2024-11-23 PROBLEM — S05.02XA CORNEAL ABRASION; INITIAL ENCOUNTER  LEFT EYE: Status: ACTIVE | Noted: 2024-11-14

## 2024-11-23 PROCEDURE — 92134 CPTRZ OPH DX IMG PST SGM RTA: CPT | Performed by: OPHTHALMOLOGY

## 2024-11-23 PROCEDURE — 67028 INJECTION EYE DRUG: CPT | Mod: 79,58,LT | Performed by: OPHTHALMOLOGY

## 2024-11-23 ASSESSMENT — REFRACTION_AUTOREFRACTION
OS_SPHERE: +2.50
OS_AXIS: 169
OD_AXIS: 080
OS_CYLINDER: -2.25
OD_SPHERE: +1.50
OD_CYLINDER: -1.25

## 2024-11-23 ASSESSMENT — KERATOMETRY
OD_AXISANGLE_DEGREES: 100
OS_K2POWER_DIOPTERS: 48.00
OD_K2POWER_DIOPTERS: 43.50
METHOD_AUTO_MANUAL: AUTO
OS_AXISANGLE_DEGREES: 084
OD_K1POWER_DIOPTERS: 42.75
OS_K1POWER_DIOPTERS: 45.00

## 2024-11-23 ASSESSMENT — CONFRONTATIONAL VISUAL FIELD TEST (CVF)
OD_FINDINGS: FULL
OS_FINDINGS: FULL

## 2024-11-23 ASSESSMENT — CORNEAL TRAUMA - ABRASION: OS_ABRASION: PRESENT

## 2024-11-23 ASSESSMENT — VISUAL ACUITY
OS_BCVA: 20/70+
OD_BCVA: HM

## 2024-11-23 ASSESSMENT — TONOMETRY
OS_IOP_MMHG: 13
OD_IOP_MMHG: 12

## 2024-12-06 ENCOUNTER — OFFICE (OUTPATIENT)
Dept: URBAN - METROPOLITAN AREA CLINIC 94 | Facility: CLINIC | Age: 59
Setting detail: OPHTHALMOLOGY
End: 2024-12-06
Payer: MEDICARE

## 2024-12-06 DIAGNOSIS — H25.11: ICD-10-CM

## 2024-12-06 DIAGNOSIS — Z96.1: ICD-10-CM

## 2024-12-06 PROCEDURE — 99024 POSTOP FOLLOW-UP VISIT: CPT | Performed by: OPHTHALMOLOGY

## 2024-12-06 ASSESSMENT — CORNEAL TRAUMA - ABRASION: OS_ABRASION: PRESENT

## 2024-12-06 ASSESSMENT — KERATOMETRY
OS_K1POWER_DIOPTERS: 42.75
OS_AXISANGLE_DEGREES: 082
OD_K1POWER_DIOPTERS: 42.75
METHOD_AUTO_MANUAL: AUTO
OS_K2POWER_DIOPTERS: 45.00
OD_AXISANGLE_DEGREES: 088
OD_K2POWER_DIOPTERS: 43.50

## 2024-12-06 ASSESSMENT — CONFRONTATIONAL VISUAL FIELD TEST (CVF)
OD_FINDINGS: FULL
OS_FINDINGS: FULL

## 2024-12-06 ASSESSMENT — REFRACTION_AUTOREFRACTION
OS_AXIS: 25
OD_AXIS: 075
OD_CYLINDER: -1.25
OS_CYLINDER: -0.50
OS_SPHERE: -1.00
OD_SPHERE: +0.50

## 2024-12-06 ASSESSMENT — VISUAL ACUITY
OS_BCVA: 20/70
OD_BCVA: 20/100+

## 2024-12-10 ENCOUNTER — OFFICE (OUTPATIENT)
Dept: URBAN - METROPOLITAN AREA CLINIC 94 | Facility: CLINIC | Age: 59
Setting detail: OPHTHALMOLOGY
End: 2024-12-10
Payer: MEDICARE

## 2024-12-10 DIAGNOSIS — E10.3511: ICD-10-CM

## 2024-12-10 DIAGNOSIS — E10.3512: ICD-10-CM

## 2024-12-10 PROCEDURE — 92134 CPTRZ OPH DX IMG PST SGM RTA: CPT | Performed by: OPHTHALMOLOGY

## 2024-12-10 PROCEDURE — 99024 POSTOP FOLLOW-UP VISIT: CPT | Performed by: OPHTHALMOLOGY

## 2024-12-10 PROCEDURE — 67228 TREATMENT X10SV RETINOPATHY: CPT | Mod: 58,LT | Performed by: OPHTHALMOLOGY

## 2024-12-10 ASSESSMENT — CORNEAL TRAUMA - ABRASION: OS_ABRASION: PRESENT

## 2024-12-10 ASSESSMENT — VISUAL ACUITY
OS_BCVA: 20/70-1
OD_BCVA: 20/80-1

## 2024-12-10 ASSESSMENT — KERATOMETRY
METHOD_AUTO_MANUAL: AUTO
OD_K2POWER_DIOPTERS: 43.50
OS_AXISANGLE_DEGREES: 082
OS_K2POWER_DIOPTERS: 45.00
OD_AXISANGLE_DEGREES: 088
OD_K1POWER_DIOPTERS: 42.75
OS_K1POWER_DIOPTERS: 42.75

## 2024-12-10 ASSESSMENT — CONFRONTATIONAL VISUAL FIELD TEST (CVF)
OS_FINDINGS: FULL
OD_FINDINGS: FULL

## 2024-12-10 ASSESSMENT — REFRACTION_AUTOREFRACTION
OS_AXIS: 25
OS_CYLINDER: -0.50
OD_AXIS: 075
OD_CYLINDER: -1.25
OS_SPHERE: -1.00
OD_SPHERE: +0.50

## 2024-12-11 ENCOUNTER — ASC (OUTPATIENT)
Dept: URBAN - METROPOLITAN AREA SURGERY 8 | Facility: SURGERY | Age: 59
Setting detail: OPHTHALMOLOGY
End: 2024-12-11
Payer: MEDICARE

## 2024-12-11 DIAGNOSIS — H25.11: ICD-10-CM

## 2024-12-11 DIAGNOSIS — H52.211: ICD-10-CM

## 2024-12-11 PROCEDURE — FEMTO PRECISION LASER CATARACT SURGERY: Mod: GY | Performed by: OPHTHALMOLOGY

## 2024-12-11 PROCEDURE — 66984 XCAPSL CTRC RMVL W/O ECP: CPT | Mod: 79,RT | Performed by: OPHTHALMOLOGY

## 2024-12-11 PROCEDURE — A9270 NON-COVERED ITEM OR SERVICE: HCPCS | Mod: GY | Performed by: OPHTHALMOLOGY

## 2024-12-11 PROCEDURE — 68841 INSJ RX ELUT IMPLT LAC CANAL: CPT | Mod: 79,RT | Performed by: OPHTHALMOLOGY

## 2024-12-12 ENCOUNTER — RX ONLY (RX ONLY)
Age: 59
End: 2024-12-12

## 2024-12-12 ENCOUNTER — OFFICE (OUTPATIENT)
Dept: URBAN - METROPOLITAN AREA CLINIC 94 | Facility: CLINIC | Age: 59
Setting detail: OPHTHALMOLOGY
End: 2024-12-12
Payer: MEDICARE

## 2024-12-12 DIAGNOSIS — Z96.1: ICD-10-CM

## 2024-12-12 PROCEDURE — 99024 POSTOP FOLLOW-UP VISIT: CPT | Performed by: OPTOMETRIST

## 2024-12-12 ASSESSMENT — CORNEAL TRAUMA - ABRASION: OS_ABRASION: PRESENT

## 2024-12-12 ASSESSMENT — TONOMETRY
OS_IOP_MMHG: 17
OD_IOP_MMHG: 18

## 2024-12-12 ASSESSMENT — CONFRONTATIONAL VISUAL FIELD TEST (CVF)
OS_FINDINGS: FULL
OD_FINDINGS: FULL

## 2024-12-14 ASSESSMENT — KERATOMETRY
OD_K2POWER_DIOPTERS: 43.00
OS_AXISANGLE_DEGREES: 090
OS_K2POWER_DIOPTERS: 44.25
OD_AXISANGLE_DEGREES: 105
METHOD_AUTO_MANUAL: AUTO
OS_K1POWER_DIOPTERS: 42.50
OD_K1POWER_DIOPTERS: 42.50

## 2024-12-14 ASSESSMENT — REFRACTION_AUTOREFRACTION
OS_SPHERE: -1.00
OD_SPHERE: 0.00
OS_AXIS: 165
OD_AXIS: 045
OS_CYLINDER: -0.75
OD_CYLINDER: -0.25

## 2024-12-14 ASSESSMENT — VISUAL ACUITY
OS_BCVA: 20/30
OD_BCVA: 20/100

## 2024-12-20 ENCOUNTER — OFFICE (OUTPATIENT)
Dept: URBAN - METROPOLITAN AREA CLINIC 94 | Facility: CLINIC | Age: 59
Setting detail: OPHTHALMOLOGY
End: 2024-12-20
Payer: MEDICARE

## 2024-12-20 DIAGNOSIS — Z96.1: ICD-10-CM

## 2024-12-20 PROCEDURE — 99024 POSTOP FOLLOW-UP VISIT: CPT | Performed by: PHYSICIAN ASSISTANT

## 2024-12-20 ASSESSMENT — REFRACTION_AUTOREFRACTION
OS_CYLINDER: -1.25
OS_SPHERE: -0.75
OD_CYLINDER: -1.00
OD_AXIS: 170
OD_SPHERE: -0.25
OS_AXIS: 175

## 2024-12-20 ASSESSMENT — TONOMETRY
OS_IOP_MMHG: 13
OD_IOP_MMHG: 12

## 2024-12-20 ASSESSMENT — CONFRONTATIONAL VISUAL FIELD TEST (CVF)
OD_FINDINGS: FULL
OS_FINDINGS: FULL

## 2024-12-20 ASSESSMENT — VISUAL ACUITY
OS_BCVA: 20/30-1
OD_BCVA: 20/40

## 2024-12-20 ASSESSMENT — KERATOMETRY
OD_K1POWER_DIOPTERS: 42.75
METHOD_AUTO_MANUAL: AUTO
OS_K2POWER_DIOPTERS: 44.00
OS_AXISANGLE_DEGREES: 095
OS_K1POWER_DIOPTERS: 42.50
OD_K2POWER_DIOPTERS: 43.25
OD_AXISANGLE_DEGREES: 105

## 2024-12-20 ASSESSMENT — CORNEAL EDEMA - FOLDS/STRIAE: OD_FOLDSSTRIAE: T

## 2024-12-24 ENCOUNTER — ASC (OUTPATIENT)
Dept: URBAN - METROPOLITAN AREA SURGERY 8 | Facility: SURGERY | Age: 59
Setting detail: OPHTHALMOLOGY
End: 2024-12-24
Payer: MEDICARE

## 2024-12-24 DIAGNOSIS — E10.3511: ICD-10-CM

## 2024-12-24 PROCEDURE — 67210 TREATMENT OF RETINAL LESION: CPT | Mod: 79,RT | Performed by: OPHTHALMOLOGY

## 2024-12-24 ASSESSMENT — KERATOMETRY
OS_K1POWER_DIOPTERS: 42.50
OD_AXISANGLE_DEGREES: 105
OS_K2POWER_DIOPTERS: 44.00
OS_AXISANGLE_DEGREES: 095
OD_K1POWER_DIOPTERS: 42.75
METHOD_AUTO_MANUAL: AUTO
OD_K2POWER_DIOPTERS: 43.25

## 2024-12-24 ASSESSMENT — CONFRONTATIONAL VISUAL FIELD TEST (CVF)
OD_FINDINGS: FULL
OS_FINDINGS: FULL

## 2024-12-24 ASSESSMENT — VISUAL ACUITY
OD_BCVA: 20/40
OS_BCVA: 20/30-1

## 2024-12-24 ASSESSMENT — REFRACTION_AUTOREFRACTION
OS_SPHERE: -0.75
OD_AXIS: 170
OS_AXIS: 175
OS_CYLINDER: -1.25
OD_SPHERE: -0.25
OD_CYLINDER: -1.00

## 2024-12-24 ASSESSMENT — CORNEAL EDEMA - FOLDS/STRIAE: OD_FOLDSSTRIAE: T

## 2025-01-06 ENCOUNTER — APPOINTMENT (OUTPATIENT)
Age: 60
End: 2025-01-06
Payer: MEDICARE

## 2025-01-06 VITALS — HEIGHT: 67 IN | BODY MASS INDEX: 28.56 KG/M2 | WEIGHT: 182 LBS

## 2025-01-06 DIAGNOSIS — B35.1 TINEA UNGUIUM: ICD-10-CM

## 2025-01-06 DIAGNOSIS — M20.41 OTHER HAMMER TOE(S) (ACQUIRED), RIGHT FOOT: ICD-10-CM

## 2025-01-06 DIAGNOSIS — M79.672 PAIN IN LEFT FOOT: ICD-10-CM

## 2025-01-06 DIAGNOSIS — M79.671 PAIN IN RIGHT FOOT: ICD-10-CM

## 2025-01-06 DIAGNOSIS — E11.40 TYPE 2 DIABETES MELLITUS WITH DIABETIC NEUROPATHY, UNSPECIFIED: ICD-10-CM

## 2025-01-06 DIAGNOSIS — S80.12XA CONTUSION OF LEFT LOWER LEG, INITIAL ENCOUNTER: ICD-10-CM

## 2025-01-06 PROBLEM — S05.02X CORNEAL ABRASION; INITIAL ENCOUNTER  LEFT EYE: Status: ACTIVE | Noted: 2024-12-12

## 2025-01-06 PROCEDURE — 99214 OFFICE O/P EST MOD 30 MIN: CPT | Mod: 25

## 2025-01-06 PROCEDURE — 11721 DEBRIDE NAIL 6 OR MORE: CPT | Mod: Q8

## 2025-01-14 ENCOUNTER — OFFICE (OUTPATIENT)
Dept: URBAN - METROPOLITAN AREA CLINIC 94 | Facility: CLINIC | Age: 60
Setting detail: OPHTHALMOLOGY
End: 2025-01-14
Payer: MEDICARE

## 2025-01-14 DIAGNOSIS — E10.3512: ICD-10-CM

## 2025-01-14 DIAGNOSIS — E10.3513: ICD-10-CM

## 2025-01-14 PROCEDURE — 92134 CPTRZ OPH DX IMG PST SGM RTA: CPT | Performed by: OPHTHALMOLOGY

## 2025-01-14 PROCEDURE — 67028 INJECTION EYE DRUG: CPT | Mod: 79,58,LT | Performed by: OPHTHALMOLOGY

## 2025-01-14 ASSESSMENT — KERATOMETRY
OS_AXISANGLE_DEGREES: 095
OD_AXISANGLE_DEGREES: 105
OS_K1POWER_DIOPTERS: 42.50
OD_K1POWER_DIOPTERS: 42.75
OD_K2POWER_DIOPTERS: 43.25
OS_K2POWER_DIOPTERS: 44.00
METHOD_AUTO_MANUAL: AUTO

## 2025-01-14 ASSESSMENT — REFRACTION_AUTOREFRACTION
OD_SPHERE: -0.25
OS_CYLINDER: -1.25
OD_AXIS: 170
OS_SPHERE: -0.75
OD_CYLINDER: -1.00
OS_AXIS: 175

## 2025-01-14 ASSESSMENT — VISUAL ACUITY
OS_BCVA: 20/40
OD_BCVA: 20/60-1

## 2025-01-14 ASSESSMENT — CONFRONTATIONAL VISUAL FIELD TEST (CVF)
OD_FINDINGS: FULL
OS_FINDINGS: FULL

## 2025-01-14 ASSESSMENT — TONOMETRY
OS_IOP_MMHG: 12
OD_IOP_MMHG: 11

## 2025-01-14 ASSESSMENT — CORNEAL EDEMA - FOLDS/STRIAE: OD_FOLDSSTRIAE: T

## 2025-01-17 ENCOUNTER — OFFICE (OUTPATIENT)
Dept: URBAN - METROPOLITAN AREA CLINIC 116 | Facility: CLINIC | Age: 60
Setting detail: OPHTHALMOLOGY
End: 2025-01-17
Payer: MEDICARE

## 2025-01-17 DIAGNOSIS — Z96.1: ICD-10-CM

## 2025-01-17 PROCEDURE — 99024 POSTOP FOLLOW-UP VISIT: CPT | Performed by: OPTOMETRIST

## 2025-01-17 ASSESSMENT — REFRACTION_MANIFEST
OS_VA1: 20/50PHNI
OS_ADD: +1.75
OS_SPHERE: PLANO
OD_SPHERE: PLANO
OD_ADD: +1.75

## 2025-01-17 ASSESSMENT — KERATOMETRY
OS_K2POWER_DIOPTERS: 44.00
OD_K2POWER_DIOPTERS: 43.50
OD_K1POWER_DIOPTERS: 42.75
OS_K1POWER_DIOPTERS: 42.50
OD_AXISANGLE_DEGREES: 088
METHOD_AUTO_MANUAL: AUTO
OS_AXISANGLE_DEGREES: 096

## 2025-01-17 ASSESSMENT — REFRACTION_AUTOREFRACTION
OS_CYLINDER: -1.00
OD_SPHERE: -0.25
OD_CYLINDER: -0.50
OS_AXIS: 167
OS_SPHERE: -0.75
OD_AXIS: 002

## 2025-01-17 ASSESSMENT — VISUAL ACUITY
OS_BCVA: 20/30
OD_BCVA: 20/50

## 2025-01-17 ASSESSMENT — TONOMETRY
OS_IOP_MMHG: 13
OD_IOP_MMHG: 13

## 2025-01-17 ASSESSMENT — CORNEAL EDEMA - FOLDS/STRIAE: OD_FOLDSSTRIAE: T

## 2025-01-17 ASSESSMENT — CONFRONTATIONAL VISUAL FIELD TEST (CVF)
OS_FINDINGS: FULL
OD_FINDINGS: FULL

## 2025-02-25 ENCOUNTER — OFFICE (OUTPATIENT)
Dept: URBAN - METROPOLITAN AREA CLINIC 94 | Facility: CLINIC | Age: 60
Setting detail: OPHTHALMOLOGY
End: 2025-02-25
Payer: MEDICARE

## 2025-02-25 DIAGNOSIS — E10.3512: ICD-10-CM

## 2025-02-25 DIAGNOSIS — E10.3511: ICD-10-CM

## 2025-02-25 DIAGNOSIS — H43.12: ICD-10-CM

## 2025-02-25 PROCEDURE — 67210 TREATMENT OF RETINAL LESION: CPT | Mod: 79,LT | Performed by: OPHTHALMOLOGY

## 2025-02-25 PROCEDURE — 92134 CPTRZ OPH DX IMG PST SGM RTA: CPT | Performed by: OPHTHALMOLOGY

## 2025-02-25 ASSESSMENT — KERATOMETRY
METHOD_AUTO_MANUAL: AUTO
OD_K2POWER_DIOPTERS: 43.50
OS_AXISANGLE_DEGREES: 096
OD_K1POWER_DIOPTERS: 42.75
OD_AXISANGLE_DEGREES: 088
OS_K1POWER_DIOPTERS: 42.50
OS_K2POWER_DIOPTERS: 44.00

## 2025-02-25 ASSESSMENT — REFRACTION_AUTOREFRACTION
OS_CYLINDER: -1.00
OD_AXIS: 002
OS_AXIS: 167
OS_SPHERE: -0.75
OD_CYLINDER: -0.50
OD_SPHERE: -0.25

## 2025-02-25 ASSESSMENT — VISUAL ACUITY
OS_BCVA: 20/25
OD_BCVA: 20/50-1

## 2025-02-25 ASSESSMENT — TONOMETRY
OS_IOP_MMHG: 12
OD_IOP_MMHG: 11

## 2025-02-25 ASSESSMENT — CONFRONTATIONAL VISUAL FIELD TEST (CVF)
OD_FINDINGS: FULL
OS_FINDINGS: FULL

## 2025-02-25 ASSESSMENT — CORNEAL EDEMA - FOLDS/STRIAE: OD_FOLDSSTRIAE: T

## 2025-03-18 ENCOUNTER — OFFICE (OUTPATIENT)
Dept: URBAN - METROPOLITAN AREA CLINIC 94 | Facility: CLINIC | Age: 60
Setting detail: OPHTHALMOLOGY
End: 2025-03-18
Payer: MEDICARE

## 2025-03-18 DIAGNOSIS — H43.12: ICD-10-CM

## 2025-03-18 DIAGNOSIS — E10.3512: ICD-10-CM

## 2025-03-18 DIAGNOSIS — E10.3511: ICD-10-CM

## 2025-03-18 PROCEDURE — 92134 CPTRZ OPH DX IMG PST SGM RTA: CPT | Performed by: OPHTHALMOLOGY

## 2025-03-18 PROCEDURE — 67028 INJECTION EYE DRUG: CPT | Mod: 79,50 | Performed by: OPHTHALMOLOGY

## 2025-03-18 ASSESSMENT — TONOMETRY
OD_IOP_MMHG: 11
OS_IOP_MMHG: 12

## 2025-03-18 ASSESSMENT — KERATOMETRY
OS_K2POWER_DIOPTERS: 44.00
OS_K1POWER_DIOPTERS: 42.50
METHOD_AUTO_MANUAL: AUTO
OS_AXISANGLE_DEGREES: 096
OD_K1POWER_DIOPTERS: 42.75
OD_AXISANGLE_DEGREES: 088
OD_K2POWER_DIOPTERS: 43.50

## 2025-03-18 ASSESSMENT — VISUAL ACUITY
OD_BCVA: 20/40-1
OS_BCVA: 20/25+1

## 2025-03-18 ASSESSMENT — REFRACTION_AUTOREFRACTION
OD_CYLINDER: -0.50
OD_AXIS: 002
OS_CYLINDER: -1.00
OD_SPHERE: -0.25
OS_AXIS: 167
OS_SPHERE: -0.75

## 2025-03-18 ASSESSMENT — CONFRONTATIONAL VISUAL FIELD TEST (CVF)
OD_FINDINGS: FULL
OS_FINDINGS: FULL

## 2025-03-18 ASSESSMENT — CORNEAL EDEMA - FOLDS/STRIAE: OD_FOLDSSTRIAE: T

## 2025-04-07 ENCOUNTER — APPOINTMENT (OUTPATIENT)
Age: 60
End: 2025-04-07
Payer: MEDICARE

## 2025-04-07 VITALS — HEIGHT: 67 IN | WEIGHT: 180 LBS | BODY MASS INDEX: 28.25 KG/M2

## 2025-04-07 DIAGNOSIS — M79.671 PAIN IN RIGHT FOOT: ICD-10-CM

## 2025-04-07 DIAGNOSIS — M79.672 PAIN IN LEFT FOOT: ICD-10-CM

## 2025-04-07 DIAGNOSIS — M20.41 OTHER HAMMER TOE(S) (ACQUIRED), RIGHT FOOT: ICD-10-CM

## 2025-04-07 DIAGNOSIS — E11.40 TYPE 2 DIABETES MELLITUS WITH DIABETIC NEUROPATHY, UNSPECIFIED: ICD-10-CM

## 2025-04-07 DIAGNOSIS — B35.1 TINEA UNGUIUM: ICD-10-CM

## 2025-04-07 PROCEDURE — 99213 OFFICE O/P EST LOW 20 MIN: CPT | Mod: 25

## 2025-04-07 PROCEDURE — 11721 DEBRIDE NAIL 6 OR MORE: CPT | Mod: Q8

## 2025-04-08 ENCOUNTER — ASC (OUTPATIENT)
Dept: URBAN - METROPOLITAN AREA SURGERY 8 | Facility: SURGERY | Age: 60
Setting detail: OPHTHALMOLOGY
End: 2025-04-08
Payer: MEDICARE

## 2025-04-08 DIAGNOSIS — E10.3511: ICD-10-CM

## 2025-04-08 PROCEDURE — 67210 TREATMENT OF RETINAL LESION: CPT | Mod: 79,RT | Performed by: OPHTHALMOLOGY

## 2025-04-08 ASSESSMENT — TONOMETRY
OS_IOP_MMHG: 16
OD_IOP_MMHG: 18

## 2025-04-08 ASSESSMENT — REFRACTION_AUTOREFRACTION
OS_SPHERE: -0.75
OS_CYLINDER: -1.00
OD_AXIS: 002
OD_SPHERE: -0.25
OS_AXIS: 167
OD_CYLINDER: -0.50

## 2025-04-08 ASSESSMENT — VISUAL ACUITY
OD_BCVA: 20/30-1
OS_BCVA: 20/25

## 2025-04-08 ASSESSMENT — KERATOMETRY
OD_K2POWER_DIOPTERS: 43.50
METHOD_AUTO_MANUAL: AUTO
OS_K1POWER_DIOPTERS: 42.50
OS_AXISANGLE_DEGREES: 096
OD_AXISANGLE_DEGREES: 088
OD_K1POWER_DIOPTERS: 42.75
OS_K2POWER_DIOPTERS: 44.00

## 2025-04-08 ASSESSMENT — CONFRONTATIONAL VISUAL FIELD TEST (CVF)
OD_FINDINGS: FULL
OS_FINDINGS: FULL

## 2025-04-08 ASSESSMENT — CORNEAL EDEMA - FOLDS/STRIAE: OD_FOLDSSTRIAE: T

## 2025-05-06 ENCOUNTER — OFFICE (OUTPATIENT)
Dept: URBAN - METROPOLITAN AREA CLINIC 94 | Facility: CLINIC | Age: 60
Setting detail: OPHTHALMOLOGY
End: 2025-05-06
Payer: MEDICARE

## 2025-05-06 DIAGNOSIS — E10.3513: ICD-10-CM

## 2025-05-06 PROCEDURE — 67028 INJECTION EYE DRUG: CPT | Mod: 58,50 | Performed by: OPHTHALMOLOGY

## 2025-05-06 PROCEDURE — 92134 CPTRZ OPH DX IMG PST SGM RTA: CPT | Performed by: OPHTHALMOLOGY

## 2025-05-06 ASSESSMENT — REFRACTION_AUTOREFRACTION
OD_AXIS: 002
OS_CYLINDER: -1.00
OS_AXIS: 167
OD_SPHERE: -0.25
OD_CYLINDER: -0.50
OS_SPHERE: -0.75

## 2025-05-06 ASSESSMENT — KERATOMETRY
METHOD_AUTO_MANUAL: AUTO
OD_K2POWER_DIOPTERS: 43.50
OS_K2POWER_DIOPTERS: 44.00
OS_K1POWER_DIOPTERS: 42.50
OD_K1POWER_DIOPTERS: 42.75
OD_AXISANGLE_DEGREES: 088
OS_AXISANGLE_DEGREES: 096

## 2025-05-06 ASSESSMENT — CONFRONTATIONAL VISUAL FIELD TEST (CVF)
OS_FINDINGS: FULL
OD_FINDINGS: FULL

## 2025-05-06 ASSESSMENT — TONOMETRY
OS_IOP_MMHG: 16
OD_IOP_MMHG: 15

## 2025-05-06 ASSESSMENT — VISUAL ACUITY
OD_BCVA: 20/30
OS_BCVA: 20/25-1

## 2025-05-06 ASSESSMENT — CORNEAL EDEMA - FOLDS/STRIAE: OD_FOLDSSTRIAE: T

## 2025-06-10 ENCOUNTER — ASC (OUTPATIENT)
Dept: URBAN - METROPOLITAN AREA SURGERY 8 | Facility: SURGERY | Age: 60
Setting detail: OPHTHALMOLOGY
End: 2025-06-10
Payer: MEDICARE

## 2025-06-10 DIAGNOSIS — E10.3512: ICD-10-CM

## 2025-06-10 PROCEDURE — 67210 TREATMENT OF RETINAL LESION: CPT | Mod: 79,LT | Performed by: OPHTHALMOLOGY

## 2025-06-10 ASSESSMENT — KERATOMETRY
OS_K2POWER_DIOPTERS: 44.00
OD_AXISANGLE_DEGREES: 088
OS_K1POWER_DIOPTERS: 42.50
OD_K2POWER_DIOPTERS: 43.50
OS_AXISANGLE_DEGREES: 096
METHOD_AUTO_MANUAL: AUTO
OD_K1POWER_DIOPTERS: 42.75

## 2025-06-10 ASSESSMENT — CONFRONTATIONAL VISUAL FIELD TEST (CVF)
OS_FINDINGS: FULL
OD_FINDINGS: FULL

## 2025-06-10 ASSESSMENT — VISUAL ACUITY
OS_BCVA: 20/40-1
OD_BCVA: 20/40-1

## 2025-06-10 ASSESSMENT — REFRACTION_AUTOREFRACTION
OD_AXIS: 002
OD_CYLINDER: -0.50
OS_CYLINDER: -1.00
OS_AXIS: 167
OD_SPHERE: -0.25
OS_SPHERE: -0.75

## 2025-06-10 ASSESSMENT — TONOMETRY
OD_IOP_MMHG: 13
OS_IOP_MMHG: 15

## 2025-06-10 ASSESSMENT — CORNEAL EDEMA - FOLDS/STRIAE: OD_FOLDSSTRIAE: T

## 2025-07-07 ENCOUNTER — APPOINTMENT (OUTPATIENT)
Age: 60
End: 2025-07-07
Payer: MEDICARE

## 2025-07-07 VITALS — HEIGHT: 69 IN | WEIGHT: 180.78 LBS | BODY MASS INDEX: 26.78 KG/M2

## 2025-07-07 PROCEDURE — 99213 OFFICE O/P EST LOW 20 MIN: CPT | Mod: 25

## 2025-07-07 PROCEDURE — 11721 DEBRIDE NAIL 6 OR MORE: CPT | Mod: Q8

## 2025-07-22 ENCOUNTER — OFFICE (OUTPATIENT)
Dept: URBAN - METROPOLITAN AREA CLINIC 94 | Facility: CLINIC | Age: 60
Setting detail: OPHTHALMOLOGY
End: 2025-07-22
Payer: MEDICARE

## 2025-07-22 DIAGNOSIS — E10.3513: ICD-10-CM

## 2025-07-22 DIAGNOSIS — E10.3511: ICD-10-CM

## 2025-07-22 DIAGNOSIS — E10.3512: ICD-10-CM

## 2025-07-22 PROCEDURE — 99024 POSTOP FOLLOW-UP VISIT: CPT | Performed by: OPHTHALMOLOGY

## 2025-07-22 PROCEDURE — 92134 CPTRZ OPH DX IMG PST SGM RTA: CPT | Performed by: OPHTHALMOLOGY

## 2025-07-22 PROCEDURE — 67210 TREATMENT OF RETINAL LESION: CPT | Mod: 79,RT | Performed by: OPHTHALMOLOGY

## 2025-07-22 ASSESSMENT — REFRACTION_AUTOREFRACTION
OS_CYLINDER: -1.00
OS_AXIS: 167
OD_CYLINDER: -0.50
OD_SPHERE: -0.25
OD_AXIS: 002
OS_SPHERE: -0.75

## 2025-07-22 ASSESSMENT — KERATOMETRY
OS_K1POWER_DIOPTERS: 42.50
METHOD_AUTO_MANUAL: AUTO
OD_K1POWER_DIOPTERS: 42.75
OS_AXISANGLE_DEGREES: 096
OS_K2POWER_DIOPTERS: 44.00
OD_K2POWER_DIOPTERS: 43.50
OD_AXISANGLE_DEGREES: 088

## 2025-07-22 ASSESSMENT — VISUAL ACUITY
OD_BCVA: 20/25
OS_BCVA: 20/25

## 2025-07-22 ASSESSMENT — TONOMETRY
OD_IOP_MMHG: 15
OS_IOP_MMHG: 11

## 2025-08-09 ENCOUNTER — OFFICE (OUTPATIENT)
Dept: URBAN - METROPOLITAN AREA CLINIC 94 | Facility: CLINIC | Age: 60
Setting detail: OPHTHALMOLOGY
End: 2025-08-09
Payer: MEDICARE

## 2025-08-09 DIAGNOSIS — E10.3513: ICD-10-CM

## 2025-08-09 PROCEDURE — 67028 INJECTION EYE DRUG: CPT | Mod: 58,50 | Performed by: OPHTHALMOLOGY

## 2025-08-09 PROCEDURE — 92134 CPTRZ OPH DX IMG PST SGM RTA: CPT | Performed by: OPHTHALMOLOGY

## 2025-08-09 ASSESSMENT — REFRACTION_AUTOREFRACTION
OD_SPHERE: -0.25
OS_SPHERE: -0.75
OD_AXIS: 002
OS_CYLINDER: -1.00
OD_CYLINDER: -0.50
OS_AXIS: 167

## 2025-08-09 ASSESSMENT — TONOMETRY
OD_IOP_MMHG: 15
OS_IOP_MMHG: 16

## 2025-08-09 ASSESSMENT — CONFRONTATIONAL VISUAL FIELD TEST (CVF)
OD_FINDINGS: FULL
OS_FINDINGS: FULL

## 2025-08-09 ASSESSMENT — VISUAL ACUITY
OD_BCVA: 20/25
OS_BCVA: 20/25

## 2025-08-09 ASSESSMENT — KERATOMETRY
OD_K2POWER_DIOPTERS: 43.50
OS_AXISANGLE_DEGREES: 096
OD_AXISANGLE_DEGREES: 088
OS_K1POWER_DIOPTERS: 42.50
OS_K2POWER_DIOPTERS: 44.00
OD_K1POWER_DIOPTERS: 42.75
METHOD_AUTO_MANUAL: AUTO

## 2025-08-09 ASSESSMENT — CORNEAL EDEMA - FOLDS/STRIAE: OD_FOLDSSTRIAE: T

## (undated) DEVICE — VENODYNE/SCD SLEEVE CALF MEDIUM

## (undated) DEVICE — DRSG WEBRIL 6"

## (undated) DEVICE — DRSG ACE BANDAGE 6"

## (undated) DEVICE — SYR CONTROL LUER LOK 10CC

## (undated) DEVICE — SUT VICRYL 0 27" CT-2 UNDYED

## (undated) DEVICE — TOURNIQUET ESMARK 6"

## (undated) DEVICE — PACK EXTREMITY

## (undated) DEVICE — SUT VICRYL 4-0 27" PS-2 UNDYED

## (undated) DEVICE — DRSG XEROFORM 5 X 9"

## (undated) DEVICE — SOL IRR POUR H2O 1000ML

## (undated) DEVICE — SOL IRR POUR NS 0.9% 1000ML

## (undated) DEVICE — FRAZIER SUCTION TIP 10FR

## (undated) DEVICE — BUR STRYKER JNOTCH ROUND CARBIDE 3.0MM

## (undated) DEVICE — GLV 7.5 PROTEXIS (WHITE)

## (undated) DEVICE — SUT ETHILON 3-0 18" PS-1

## (undated) DEVICE — SAW BLADE STRYKER PRECISION THIN SAGITTAL MEDIUM 9MM X 25MM

## (undated) DEVICE — DRSG 4 X 8

## (undated) DEVICE — SUT VICRYL 2-0 27" CT-2 UNDYED

## (undated) DEVICE — PREP BETADINE KIT

## (undated) DEVICE — SUT VICRYL 3-0 27" PS-2 UNDYED

## (undated) DEVICE — SAW BLADE LINVATEC OSCILLATING 25.4X90X1.20MM

## (undated) DEVICE — DRSG KERLIX ROLL 4.5"

## (undated) DEVICE — WARMING BLANKET UPPER ADULT

## (undated) DEVICE — DRAPE C ARM MINI

## (undated) DEVICE — WARMING BLANKET LOWER ADULT

## (undated) DEVICE — BLADE SURGICAL #15 CARBON

## (undated) DEVICE — SUT ETHILON 4-0 18" PS-2

## (undated) DEVICE — NDL HYPO REGULAR BEVEL 25G X 1.5" (BLUE)